# Patient Record
Sex: FEMALE | Race: BLACK OR AFRICAN AMERICAN | Employment: OTHER | URBAN - METROPOLITAN AREA
[De-identification: names, ages, dates, MRNs, and addresses within clinical notes are randomized per-mention and may not be internally consistent; named-entity substitution may affect disease eponyms.]

---

## 2017-02-20 ENCOUNTER — OFFICE VISIT (OUTPATIENT)
Dept: FAMILY MEDICINE CLINIC | Age: 77
End: 2017-02-20

## 2017-02-20 VITALS
DIASTOLIC BLOOD PRESSURE: 96 MMHG | BODY MASS INDEX: 47.09 KG/M2 | OXYGEN SATURATION: 98 % | SYSTOLIC BLOOD PRESSURE: 142 MMHG | WEIGHT: 293 LBS | RESPIRATION RATE: 16 BRPM | TEMPERATURE: 97.9 F | HEART RATE: 80 BPM | HEIGHT: 66 IN

## 2017-02-20 DIAGNOSIS — I10 UNCONTROLLED HYPERTENSION: Primary | ICD-10-CM

## 2017-02-20 DIAGNOSIS — R60.0 LEG EDEMA, RIGHT: ICD-10-CM

## 2017-02-20 DIAGNOSIS — E66.01 MORBID OBESITY WITH BMI OF 50.0-59.9, ADULT (HCC): ICD-10-CM

## 2017-02-20 PROCEDURE — 1090F PRES/ABSN URINE INCON ASSESS: CPT | Performed by: FAMILY MEDICINE

## 2017-02-20 PROCEDURE — G8399 PT W/DXA RESULTS DOCUMENT: HCPCS | Performed by: FAMILY MEDICINE

## 2017-02-20 PROCEDURE — 1123F ACP DISCUSS/DSCN MKR DOCD: CPT | Performed by: FAMILY MEDICINE

## 2017-02-20 PROCEDURE — G8427 DOCREV CUR MEDS BY ELIG CLIN: HCPCS | Performed by: FAMILY MEDICINE

## 2017-02-20 PROCEDURE — G8417 CALC BMI ABV UP PARAM F/U: HCPCS | Performed by: FAMILY MEDICINE

## 2017-02-20 PROCEDURE — G8484 FLU IMMUNIZE NO ADMIN: HCPCS | Performed by: FAMILY MEDICINE

## 2017-02-20 PROCEDURE — 99214 OFFICE O/P EST MOD 30 MIN: CPT | Performed by: FAMILY MEDICINE

## 2017-02-20 PROCEDURE — 4040F PNEUMOC VAC/ADMIN/RCVD: CPT | Performed by: FAMILY MEDICINE

## 2017-02-20 PROCEDURE — 1036F TOBACCO NON-USER: CPT | Performed by: FAMILY MEDICINE

## 2017-02-20 RX ORDER — METOPROLOL TARTRATE 50 MG/1
50 TABLET, FILM COATED ORAL 2 TIMES DAILY
Qty: 60 TABLET | Refills: 3 | Status: SHIPPED | OUTPATIENT
Start: 2017-02-20 | End: 2017-06-13 | Stop reason: SDUPTHER

## 2017-02-20 RX ORDER — ALBUTEROL SULFATE 0.63 MG/3ML
1 SOLUTION RESPIRATORY (INHALATION) EVERY 6 HOURS PRN
Qty: 270 ML | Refills: 0 | Status: SHIPPED | OUTPATIENT
Start: 2017-02-20 | End: 2017-11-20

## 2017-02-20 RX ORDER — FUROSEMIDE 20 MG/1
TABLET ORAL
Qty: 30 TABLET | Refills: 0 | Status: CANCELLED | OUTPATIENT
Start: 2017-02-20

## 2017-02-20 RX ORDER — LOSARTAN POTASSIUM 100 MG/1
100 TABLET ORAL DAILY
Qty: 90 TABLET | Refills: 1 | Status: CANCELLED | OUTPATIENT
Start: 2017-02-20

## 2017-02-20 RX ORDER — AMLODIPINE BESYLATE 10 MG/1
TABLET ORAL
Qty: 30 TABLET | Refills: 5 | Status: SHIPPED | OUTPATIENT
Start: 2017-02-20 | End: 2017-09-11 | Stop reason: SDUPTHER

## 2017-02-20 RX ORDER — ATENOLOL 25 MG/1
25 TABLET ORAL DAILY
Qty: 90 TABLET | Refills: 1 | Status: CANCELLED | OUTPATIENT
Start: 2017-02-20

## 2017-02-20 RX ORDER — LOSARTAN POTASSIUM AND HYDROCHLOROTHIAZIDE 25; 100 MG/1; MG/1
1 TABLET ORAL DAILY
Qty: 30 TABLET | Refills: 5 | Status: SHIPPED | OUTPATIENT
Start: 2017-02-20 | End: 2017-04-28 | Stop reason: SDUPTHER

## 2017-02-20 RX ORDER — HYDROCHLOROTHIAZIDE 25 MG/1
25 TABLET ORAL DAILY
Qty: 90 TABLET | Refills: 1 | Status: CANCELLED | OUTPATIENT
Start: 2017-02-20

## 2017-02-20 ASSESSMENT — ENCOUNTER SYMPTOMS
BACK PAIN: 0
CHEST TIGHTNESS: 0
ORTHOPNEA: 0
BLURRED VISION: 0
SHORTNESS OF BREATH: 0
COLOR CHANGE: 0

## 2017-02-22 ENCOUNTER — HOSPITAL ENCOUNTER (OUTPATIENT)
Dept: VASCULAR LAB | Age: 77
Discharge: HOME | End: 2017-02-22
Attending: FAMILY MEDICINE | Admitting: FAMILY MEDICINE

## 2017-02-22 DIAGNOSIS — R60.0 LOCALIZED EDEMA: ICD-10-CM

## 2017-04-28 DIAGNOSIS — I10 UNCONTROLLED HYPERTENSION: ICD-10-CM

## 2017-04-28 RX ORDER — LOSARTAN POTASSIUM AND HYDROCHLOROTHIAZIDE 25; 100 MG/1; MG/1
1 TABLET ORAL DAILY
Qty: 30 TABLET | Refills: 5 | Status: SHIPPED | OUTPATIENT
Start: 2017-04-28 | End: 2017-09-11 | Stop reason: SDUPTHER

## 2017-06-13 DIAGNOSIS — I10 UNCONTROLLED HYPERTENSION: ICD-10-CM

## 2017-06-13 RX ORDER — METOPROLOL TARTRATE 50 MG/1
50 TABLET, FILM COATED ORAL 2 TIMES DAILY
Qty: 180 TABLET | Refills: 3 | Status: SHIPPED | OUTPATIENT
Start: 2017-06-13 | End: 2017-09-11 | Stop reason: SDUPTHER

## 2017-09-11 ENCOUNTER — OFFICE VISIT (OUTPATIENT)
Dept: FAMILY MEDICINE CLINIC | Age: 77
End: 2017-09-11

## 2017-09-11 VITALS
HEIGHT: 66 IN | HEART RATE: 78 BPM | DIASTOLIC BLOOD PRESSURE: 88 MMHG | RESPIRATION RATE: 16 BRPM | SYSTOLIC BLOOD PRESSURE: 138 MMHG | TEMPERATURE: 98 F | WEIGHT: 293 LBS | OXYGEN SATURATION: 99 % | BODY MASS INDEX: 47.09 KG/M2

## 2017-09-11 DIAGNOSIS — Z12.39 SCREENING FOR BREAST CANCER: ICD-10-CM

## 2017-09-11 DIAGNOSIS — M25.561 CHRONIC PAIN OF RIGHT KNEE: ICD-10-CM

## 2017-09-11 DIAGNOSIS — R06.09 DOE (DYSPNEA ON EXERTION): ICD-10-CM

## 2017-09-11 DIAGNOSIS — E53.8 VITAMIN B12 DEFICIENCY: ICD-10-CM

## 2017-09-11 DIAGNOSIS — E55.9 VITAMIN D DEFICIENCY: ICD-10-CM

## 2017-09-11 DIAGNOSIS — Z23 NEED FOR PROPHYLACTIC VACCINATION AGAINST STREPTOCOCCUS PNEUMONIAE (PNEUMOCOCCUS): ICD-10-CM

## 2017-09-11 DIAGNOSIS — Z12.11 SCREEN FOR COLON CANCER: ICD-10-CM

## 2017-09-11 DIAGNOSIS — G89.29 CHRONIC PAIN OF RIGHT KNEE: ICD-10-CM

## 2017-09-11 DIAGNOSIS — Z23 NEED FOR PROPHYLACTIC VACCINATION AGAINST DIPHTHERIA-TETANUS-PERTUSSIS (DTP): ICD-10-CM

## 2017-09-11 DIAGNOSIS — I10 BENIGN ESSENTIAL HTN: Primary | ICD-10-CM

## 2017-09-11 PROCEDURE — 99214 OFFICE O/P EST MOD 30 MIN: CPT | Performed by: FAMILY MEDICINE

## 2017-09-11 RX ORDER — METOPROLOL TARTRATE 50 MG/1
50 TABLET, FILM COATED ORAL 2 TIMES DAILY
Qty: 180 TABLET | Refills: 3 | Status: SHIPPED | OUTPATIENT
Start: 2017-09-11 | End: 2019-10-01

## 2017-09-11 RX ORDER — FUROSEMIDE 20 MG/1
20 TABLET ORAL DAILY
Qty: 10 TABLET | Refills: 0 | Status: SHIPPED | OUTPATIENT
Start: 2017-09-11 | End: 2017-10-06

## 2017-09-11 RX ORDER — LOSARTAN POTASSIUM AND HYDROCHLOROTHIAZIDE 25; 100 MG/1; MG/1
1 TABLET ORAL DAILY
Qty: 30 TABLET | Refills: 5 | Status: SHIPPED | OUTPATIENT
Start: 2017-09-11 | End: 2019-09-30 | Stop reason: ALTCHOICE

## 2017-09-11 RX ORDER — HYDROCHLOROTHIAZIDE 25 MG/1
25 TABLET ORAL DAILY
Qty: 90 TABLET | Refills: 1 | Status: CANCELLED | OUTPATIENT
Start: 2017-09-11

## 2017-09-11 RX ORDER — AMLODIPINE BESYLATE 10 MG/1
TABLET ORAL
Qty: 30 TABLET | Refills: 5 | Status: SHIPPED | OUTPATIENT
Start: 2017-09-11 | End: 2017-11-20

## 2017-09-11 RX ORDER — LOSARTAN POTASSIUM 100 MG/1
100 TABLET ORAL DAILY
Qty: 90 TABLET | Refills: 1 | Status: CANCELLED | OUTPATIENT
Start: 2017-09-11

## 2017-09-12 PROCEDURE — 90732 PPSV23 VACC 2 YRS+ SUBQ/IM: CPT | Performed by: FAMILY MEDICINE

## 2017-09-12 PROCEDURE — G0009 ADMIN PNEUMOCOCCAL VACCINE: HCPCS | Performed by: FAMILY MEDICINE

## 2017-09-14 LAB
A/G RATIO: 1.2 (ref 1.1–2.2)
ALBUMIN SERPL-MCNC: 4.1 G/DL (ref 3.4–5)
ALP BLD-CCNC: 82 U/L (ref 40–129)
ALT SERPL-CCNC: 10 U/L (ref 10–40)
ANION GAP SERPL CALCULATED.3IONS-SCNC: 13 MMOL/L (ref 3–16)
AST SERPL-CCNC: 15 U/L (ref 15–37)
BASOPHILS ABSOLUTE: 0 K/UL (ref 0–0.2)
BASOPHILS RELATIVE PERCENT: 0.7 %
BILIRUB SERPL-MCNC: 0.5 MG/DL (ref 0–1)
BILIRUBIN URINE: NEGATIVE
BLOOD, URINE: NEGATIVE
BUN BLDV-MCNC: 18 MG/DL (ref 7–20)
CALCIUM SERPL-MCNC: 9.6 MG/DL (ref 8.3–10.6)
CHLORIDE BLD-SCNC: 96 MMOL/L (ref 99–110)
CHOLESTEROL, TOTAL: 214 MG/DL (ref 0–199)
CLARITY: CLEAR
CO2: 29 MMOL/L (ref 21–32)
COLOR: YELLOW
CREAT SERPL-MCNC: 0.6 MG/DL (ref 0.6–1.2)
CREATININE URINE: 9.6 MG/DL (ref 28–259)
EOSINOPHILS ABSOLUTE: 0.1 K/UL (ref 0–0.6)
EOSINOPHILS RELATIVE PERCENT: 1.7 %
GFR AFRICAN AMERICAN: >60
GFR NON-AFRICAN AMERICAN: >60
GLOBULIN: 3.4 G/DL
GLUCOSE BLD-MCNC: 99 MG/DL (ref 70–99)
GLUCOSE URINE: NEGATIVE MG/DL
HCT VFR BLD CALC: 36.4 % (ref 36–48)
HDLC SERPL-MCNC: 85 MG/DL (ref 40–60)
HEMOGLOBIN: 11.8 G/DL (ref 12–16)
KETONES, URINE: NEGATIVE MG/DL
LDL CHOLESTEROL CALCULATED: 118 MG/DL
LEUKOCYTE ESTERASE, URINE: NEGATIVE
LYMPHOCYTES ABSOLUTE: 2.3 K/UL (ref 1–5.1)
LYMPHOCYTES RELATIVE PERCENT: 43.2 %
MCH RBC QN AUTO: 24.7 PG (ref 26–34)
MCHC RBC AUTO-ENTMCNC: 32.3 G/DL (ref 31–36)
MCV RBC AUTO: 76.5 FL (ref 80–100)
MICROALBUMIN UR-MCNC: <1.2 MG/DL
MICROALBUMIN/CREAT UR-RTO: ABNORMAL MG/G (ref 0–30)
MICROSCOPIC EXAMINATION: NORMAL
MONOCYTES ABSOLUTE: 0.4 K/UL (ref 0–1.3)
MONOCYTES RELATIVE PERCENT: 7.7 %
NEUTROPHILS ABSOLUTE: 2.4 K/UL (ref 1.7–7.7)
NEUTROPHILS RELATIVE PERCENT: 46.7 %
NITRITE, URINE: NEGATIVE
PDW BLD-RTO: 16.9 % (ref 12.4–15.4)
PH UA: 7
PLATELET # BLD: 265 K/UL (ref 135–450)
PMV BLD AUTO: 8.8 FL (ref 5–10.5)
POTASSIUM SERPL-SCNC: 4.3 MMOL/L (ref 3.5–5.1)
PROTEIN UA: NEGATIVE MG/DL
RBC # BLD: 4.76 M/UL (ref 4–5.2)
SODIUM BLD-SCNC: 138 MMOL/L (ref 136–145)
SPECIFIC GRAVITY UA: 1.01
TOTAL PROTEIN: 7.5 G/DL (ref 6.4–8.2)
TRIGL SERPL-MCNC: 55 MG/DL (ref 0–150)
TSH SERPL DL<=0.05 MIU/L-ACNC: 2.05 UIU/ML (ref 0.27–4.2)
URINE TYPE: NORMAL
UROBILINOGEN, URINE: 0.2 E.U./DL
VITAMIN B-12: 307 PG/ML (ref 211–911)
VITAMIN D 25-HYDROXY: 26.4 NG/ML
VLDLC SERPL CALC-MCNC: 11 MG/DL
WBC # BLD: 5.2 K/UL (ref 4–11)

## 2017-09-15 DIAGNOSIS — D64.9 ANEMIA, UNSPECIFIED TYPE: Primary | ICD-10-CM

## 2017-09-15 LAB
IRON SATURATION: 16 % (ref 15–50)
IRON: 47 UG/DL (ref 37–145)
TOTAL IRON BINDING CAPACITY: 292 UG/DL (ref 260–445)

## 2017-09-20 DIAGNOSIS — D64.9 ANEMIA, UNSPECIFIED TYPE: Primary | ICD-10-CM

## 2017-09-21 ENCOUNTER — NURSE ONLY (OUTPATIENT)
Dept: FAMILY MEDICINE CLINIC | Age: 77
End: 2017-09-21

## 2017-09-21 DIAGNOSIS — E53.8 B12 DEFICIENCY: Primary | ICD-10-CM

## 2017-09-21 PROCEDURE — 96372 THER/PROPH/DIAG INJ SC/IM: CPT | Performed by: FAMILY MEDICINE

## 2017-09-21 RX ORDER — CYANOCOBALAMIN 1000 UG/ML
1000 INJECTION INTRAMUSCULAR; SUBCUTANEOUS ONCE
Status: COMPLETED | OUTPATIENT
Start: 2017-09-21 | End: 2017-09-21

## 2017-09-21 RX ADMIN — CYANOCOBALAMIN 1000 MCG: 1000 INJECTION INTRAMUSCULAR; SUBCUTANEOUS at 11:09

## 2017-09-25 ENCOUNTER — OFFICE VISIT (OUTPATIENT)
Dept: FAMILY MEDICINE CLINIC | Age: 77
End: 2017-09-25

## 2017-09-25 VITALS
WEIGHT: 293 LBS | RESPIRATION RATE: 16 BRPM | TEMPERATURE: 97.2 F | HEIGHT: 66 IN | HEART RATE: 87 BPM | DIASTOLIC BLOOD PRESSURE: 92 MMHG | SYSTOLIC BLOOD PRESSURE: 130 MMHG | OXYGEN SATURATION: 97 % | BODY MASS INDEX: 47.09 KG/M2

## 2017-09-25 DIAGNOSIS — E53.8 VITAMIN B 12 DEFICIENCY: ICD-10-CM

## 2017-09-25 DIAGNOSIS — I10 BENIGN ESSENTIAL HTN: Primary | ICD-10-CM

## 2017-09-25 DIAGNOSIS — R60.9 EDEMA, UNSPECIFIED TYPE: ICD-10-CM

## 2017-09-25 PROCEDURE — 99214 OFFICE O/P EST MOD 30 MIN: CPT | Performed by: FAMILY MEDICINE

## 2017-09-25 ASSESSMENT — ENCOUNTER SYMPTOMS
ORTHOPNEA: 0
COLOR CHANGE: 0
BLURRED VISION: 0
SHORTNESS OF BREATH: 1
CHEST TIGHTNESS: 0
BACK PAIN: 0

## 2017-09-26 ENCOUNTER — HOSPITAL ENCOUNTER (OUTPATIENT)
Dept: NON INVASIVE DIAGNOSTICS | Age: 77
Discharge: HOME | End: 2017-09-26
Attending: FAMILY MEDICINE | Admitting: FAMILY MEDICINE

## 2017-09-26 DIAGNOSIS — R06.09 DOE (DYSPNEA ON EXERTION): ICD-10-CM

## 2017-09-26 DIAGNOSIS — R06.09 OTHER FORMS OF DYSPNEA: ICD-10-CM

## 2017-09-26 LAB
LV EF: 61 %
LVEF MODALITY: NORMAL

## 2017-09-27 ENCOUNTER — HOSPITAL ENCOUNTER (OUTPATIENT)
Dept: MAMMOGRAPHY | Age: 77
Discharge: HOME | End: 2017-09-27
Attending: FAMILY MEDICINE | Admitting: FAMILY MEDICINE

## 2017-09-27 DIAGNOSIS — Z12.39 ENCOUNTER FOR OTHER SCREENING FOR MALIGNANT NEOPLASM OF BREAST: ICD-10-CM

## 2017-09-27 DIAGNOSIS — Z12.39 SCREENING FOR BREAST CANCER: ICD-10-CM

## 2017-10-06 ENCOUNTER — OFFICE VISIT (OUTPATIENT)
Dept: CARDIOLOGY CLINIC | Age: 77
End: 2017-10-06

## 2017-10-06 VITALS
HEART RATE: 83 BPM | OXYGEN SATURATION: 95 % | HEIGHT: 66 IN | BODY MASS INDEX: 47.09 KG/M2 | DIASTOLIC BLOOD PRESSURE: 74 MMHG | SYSTOLIC BLOOD PRESSURE: 134 MMHG | WEIGHT: 293 LBS

## 2017-10-06 DIAGNOSIS — R94.39 ABNORMAL NUCLEAR STRESS TEST: Primary | ICD-10-CM

## 2017-10-06 DIAGNOSIS — E78.00 PURE HYPERCHOLESTEROLEMIA: ICD-10-CM

## 2017-10-06 DIAGNOSIS — I10 BENIGN ESSENTIAL HTN: ICD-10-CM

## 2017-10-06 PROCEDURE — 99205 OFFICE O/P NEW HI 60 MIN: CPT | Performed by: INTERNAL MEDICINE

## 2017-10-06 PROCEDURE — 93000 ELECTROCARDIOGRAM COMPLETE: CPT | Performed by: INTERNAL MEDICINE

## 2017-10-06 RX ORDER — FERROUS SULFATE 325(65) MG
325 TABLET ORAL
COMMUNITY
End: 2019-12-23 | Stop reason: CLARIF

## 2017-10-06 NOTE — PROGRESS NOTES
2017    PATIENT: Ulisses Goldberg  : 1940    Primary Care Provider:   Rochelle Watts MD  I:[phone]  V:[phone]      Reason for evaluation:   Chief Complaint   Patient presents with   Leanna Barber Doctor    Shortness of Breath    Edema       History of present illness:   Ms. Ulisses Goldberg is a 76 y.o. female patient I am seeing for an abnormal stress test after recent concerns for increasing fatigue and \"not feeling well\". She is here today with her son and says up until a couple months ago she \"was always on the go- up at 5 am and going until 6 pm\". She throughout her progressive fatigue and malaise has been without fevers, chills, nausea, vomiting, chest pain or palpitations. The only localized symptom she endorses is \"heavy legs\" with pain closer to the knees when walking. She denies previous cardiac workup. Medical History:      Diagnosis Date    Edema     Hearing loss     Hyperlipidemia     Hypertension     Morbid obesity due to excess calories ([address] 2015    Pre-diabetes     PUD (peptic ulcer disease)     \"years ago\",        Surgical History:      Procedure Laterality Date    HERNIA REPAIR      umbilical     KNEE SURGERY Bilateral     TKR bilateral       Social History:  Social History     Social History    Marital status:      Spouse name: N/A    Number of children: N/A    Years of education: N/A     Occupational History    Not on file. Social History Main Topics    Smoking status: Never Smoker    Smokeless tobacco: Not on file    Alcohol use No    Drug use: No    Sexual activity: Not Currently     Other Topics Concern    Not on file     Social History Narrative        Family History:  No evidence for sudden cardiac death or premature CAD. Problem Relation Age of Onset    Other Mother      hernia    High Blood Pressure Father        Medications:  [x] Medications and dosages reviewed.   Prior Creatinine, Ur 09/14/2017 9.6*    Microalb Creat Ratio 09/14/2017 see below     Color, UA 09/14/2017 YELLOW     Clarity, UA 09/14/2017 Clear     Glucose, Ur 09/14/2017 Negative     Bilirubin Urine 09/14/2017 Negative     Ketones, Urine 09/14/2017 Negative     Specific Gravity, UA 09/14/2017 1.007     Blood, Urine 09/14/2017 Negative     pH, UA 09/14/2017 7.0     Protein, UA 09/14/2017 Negative     Urobilinogen, Urine 09/14/2017 0.2     Nitrite, Urine 09/14/2017 Negative     Leukocyte Esterase, Urine 09/14/2017 Negative     Microscopic Examination 09/14/2017 Not Indicated     Urine Type 09/14/2017 Not Specified     Vitamin B-12 09/14/2017 307     Vit D, 25-Hydroxy 09/14/2017 26.4*         Imaging:  I have reviewed the below testing personally:    EKG:  NSR, WNL     STRESS TEST:   9/26/17  1. Abnormal myocardial perfusion study. Exercised induced   perfusion abnormality at the apex and anterior lateral wall   compatible with moderate reversible ischemia. 2. Normal wall motion and ejection fraction. Impression/Recommendations    Ms. Lia Vásquez is a 76 y.o. female patient with:    1. Abnormal nuclear stress test    2. Benign essential HTN    3. Pure hypercholesterolemia        Abnormal Stress Test with Progressive fatigue     - Have reviewed the above results from recent testing per . Ebony Phillip has not had previous cardiac workup and now has a moderate sized defect anterolaterally extending to the apex. I discussed with her my concern given her sudden, unexplainable change in energy/function globally. 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 She has agreed to definitive coronary angiography, to be scheduled next week. At this time, will aim for R radial approach.  Precath labs are up to date and all of her questions were answered.    -Continue ASA    HTN, Controlled    -CCB/ARB/HCZT    Dyslipidemia   -Will discuss statin

## 2017-10-06 NOTE — MR AVS SNAPSHOT
your BMI, the greater your risk of heart disease, high blood pressure, type 2 diabetes, stroke, gallstones, arthritis, sleep apnea, and certain cancers. BMI is not perfect. It may overestimate body fat in athletes and people who are more muscular. Even a small weight loss (between 5 and 10 percent of your current weight) by decreasing your calorie intake and becoming more physically active will help lower your risk of developing or worsening diseases associated with obesity. Learn more at: [email or web address]          Instructions    Cardiac catheterization instructions:  No food or drink for at least 8 hours prior to procedure  Okay to take morning medications with small sips of water the morning of the procedure. Left Heart Catheterization: About This Test  What is it? Cardiac catheterization is a test to check the left side of your heart. Your doctor might look at the shape of your heart, the motion of your heart, or the blood pressure inside the chambers. Why is this test done? This test gives information about how your heart is working. It can:  · Check blood flow and blood pressure in the chambers of the heart. · Check the pumping action of the heart. · Find out if a heart defect is present and how severe it is. · Find out how well the heart valves work. What happens during the test?  · You will get medicine to help you relax. · A thin tube called a catheter is put into a blood vessel in the groin or the arm. The doctor moves the catheter through the blood vessel into your heart. · You will get a shot to numb the skin where the catheter goes in. You may feel pressure when the doctor moves the catheter through your blood vessel into your heart. · Dye may be injected into your heart. Your doctor can watch on special monitors as the dye moves in your heart. The dye helps your doctor see blood flow in your heart. questions, ask your nurse or doctor. STOP taking these medications           furosemide 20 MG tablet   Commonly known as:  LASIX   Stopped by:  Paresh Zuniga,        hydrochlorothiazide 25 MG tablet   Commonly known as:  HYDRODIURIL   Stopped by:  Paresh Zuniga, DO       losartan 100 MG tablet   Commonly known as:  COZAAR   Stopped by:  Paresh Zuniga, DO               Your Current Medications Are              ferrous sulfate 325 (65 Fe) MG tablet Take 325 mg by mouth daily (with breakfast)    amLODIPine (NORVASC) 10 MG tablet TAKE ONE TABLET BY MOUTH DAILY    losartan-hydrochlorothiazide (HYZAAR) 100-25 MG per tablet Take 1 tablet by mouth daily    metoprolol tartrate (LOPRESSOR) 50 MG tablet Take 1 tablet by mouth 2 times daily    albuterol (ACCUNEB) 0.63 MG/3ML nebulizer solution Take 3 mLs by nebulization every 6 hours as needed for Wheezing    vitamin D (ERGOCALCIFEROL) 70125 UNITS CAPS capsule Take 1 capsule by mouth once a week    Elastic Bandages & Supports (MEDICAL COMPRESSION STOCKINGS) MISC Use as instructed    aspirin 81 MG tablet Take 81 mg by mouth daily COATED asa      Allergies              Aspirin     Other reaction(s):  Other (See Comments)  Caused ulcers    Dye [Iodides] Itching, Swelling, Rash      We Ordered/Performed the following           EKG 12 Lead          Result Summary for EKG 12 Lead      Result Information     Status          Final result (Resulted: 10/6/2017)           10/6/2017  9:47 AM      Scans on Order [id number]            ECG on 10/6/2017  9:47 AM : ECG Report                     Additional Information        Basic Information     Date Of Birth Sex Race Ethnicity Preferred Language    1940 Female Black Non-/Non  English      Problem List as of 10/6/2017  Date Reviewed: 9/25/2017                Abnormal nuclear stress test    Hyperlipidemia LDL goal <100    Benign essential HTN    Morbid obesity due to excess calories ([address] Pure hypercholesterolemia    Bilateral knee pain    Pre-diabetes      Immunizations as of 10/6/2017     Name Date    Pneumococcal 13-valent Conjugate (Mvnlpea22) 12/29/2015    Pneumococcal Polysaccharide ([id number]) 9/12/2017      Preventive Care        Date Due    Tetanus Combination Vaccine (1 - Tdap) 6/27/1957    Zoster Vaccine 6/27/1998    Yearly Flu Vaccine (1) 9/1/2017    Cholesterol Screening 9/14/2022            MyChart Signup           [facility] allows you to send messages to your doctor, view your test results, renew your prescriptions, schedule appointments, view visit notes, and more. How Do I Sign Up? 1. In your Internet browser, go to [email or web address]  2. Click on the Sign Up Now link in the Sign In box. You will see the New Member Sign Up page. 3. Enter your [employer] Access Code exactly as it appears below. You will not need to use this code after youve completed the sign-up process. If you do not sign up before the expiration date, you must request a new code. [employer] Access Code: [email or web address]  Expires: 11/10/2017  2:36 PM    4. Enter your Social Security Number ([id number]) and Date of Birth (mm/dd/yyyy) as indicated and click Submit. You will be taken to the next sign-up page. 5. Create a [facility] ID. This will be your [facility] login ID and cannot be changed, so think of one that is secure and easy to remember. 6. Create a [employer] password. You can change your password at any time. 7. Enter your Password Reset Question and Answer. This can be used at a later time if you forget your password. 8. Enter your e-mail address. You will receive e-mail notification when new information is available in [address] 9. Click Sign Up. You can now view your medical record. Additional Information  If you have questions, please contact the physician practice where you receive care. Remember, [employer] is NOT to be used for urgent needs. For medical emergencies, dial 911. For questions regarding your [employer] account call [phone]. If you have a clinical question, please call your doctor's office.

## 2017-10-06 NOTE — PATIENT INSTRUCTIONS
Cardiac catheterization instructions:  No food or drink for at least 8 hours prior to procedure  Okay to take morning medications with small sips of water the morning of the procedure. Left Heart Catheterization: About This Test  What is it? Cardiac catheterization is a test to check the left side of your heart. Your doctor might look at the shape of your heart, the motion of your heart, or the blood pressure inside the chambers. Why is this test done? This test gives information about how your heart is working. It can:  · Check blood flow and blood pressure in the chambers of the heart. · Check the pumping action of the heart. · Find out if a heart defect is present and how severe it is. · Find out how well the heart valves work. What happens during the test?  · You will get medicine to help you relax. · A thin tube called a catheter is put into a blood vessel in the groin or the arm. The doctor moves the catheter through the blood vessel into your heart. · You will get a shot to numb the skin where the catheter goes in. You may feel pressure when the doctor moves the catheter through your blood vessel into your heart. · Dye may be injected into your heart. Your doctor can watch on special monitors as the dye moves in your heart. The dye helps your doctor see blood flow in your heart. · You may feel hot or flushed for several seconds when the dye is put in.  · If a heart defect is found, cardiac catheterization sometimes is used to correct it during the test.  How long does it take? · The test will take about 30 minutes. If a problem is found and the doctor treats it, it can take a few hours longer. What happens after the test?  · You will stay in a room for at least a few hours to make sure the catheter site starts to heal. You may have a bandage or a compression device on your groin or arm to prevent bleeding. · If the catheter was placed in your groin, you may lie in bed for a few hours. If the catheter was put in your arm, you will need to keep your arm still for at least 1 hour. · You may or may not need to stay in the hospital overnight. You will get more instructions for what to do at home. · Drink plenty of fluids for several hours after the test.  Follow-up care is a key part of your treatment and safety. Be sure to make and go to all appointments, and call your doctor if you are having problems. It's also a good idea to know your test results and keep a list of the medicines you take. Where can you learn more? Go to [email or web address] and sign in to your Autopilot account. Enter W306 in the [employer] box to learn more about \"Left Heart Catheterization: About This Test.\"     If you do not have an account, please click on the \"Sign Up Now\" link. Current as of: January 12, 2017  Content Version: 11.3  © 9282-2227 [employer]. Care instructions adapted under license by [facility] If you have questions about a medical condition or this instruction, always ask your healthcare professional. [place] [address] any warranty or liability for your use of this information.

## 2017-10-10 ENCOUNTER — HOSPITAL ENCOUNTER (OUTPATIENT)
Dept: NON INVASIVE DIAGNOSTICS | Age: 77
Discharge: HOME | End: 2017-10-10
Attending: INTERNAL MEDICINE | Admitting: INTERNAL MEDICINE

## 2017-10-10 DIAGNOSIS — R94.39 ABNORMAL RESULT OF OTHER CARDIOVASCULAR FUNCTION STUDY (CODE): ICD-10-CM

## 2017-10-10 LAB
LV EF: 55 %
LVEF MODALITY: NORMAL

## 2017-10-11 ENCOUNTER — TELEPHONE (OUTPATIENT)
Dept: CARDIOLOGY CLINIC | Age: 77
End: 2017-10-11

## 2017-10-11 NOTE — TELEPHONE ENCOUNTER
Per Dr Chiang Back patient to take prednisone 60mg 1 po q6hrs x4 doses prior to cath tomorrow d/t IVP dye allergy. I call [facility] pharmacy and left detailed instructions for medication on VM. Also, spoke w/ pt and provided her with detailed instructions for prednisone 60mg q6hrs x4 prior to cath. She verbalized understanding.  She will  rx when [facility]

## 2017-10-12 DIAGNOSIS — I10 ESSENTIAL HYPERTENSION: Primary | ICD-10-CM

## 2017-10-12 DIAGNOSIS — Q24.5 CORONARY ARTERY ANOMALY: ICD-10-CM

## 2017-10-12 RX ORDER — PREDNISONE 20 MG/1
60 TABLET ORAL EVERY 6 HOURS
Qty: 12 TABLET | Refills: 0 | Status: SHIPPED | OUTPATIENT
Start: 2017-10-12 | End: 2017-10-13

## 2017-10-20 ENCOUNTER — TELEPHONE (OUTPATIENT)
Dept: CARDIOLOGY CLINIC | Age: 77
End: 2017-10-20

## 2017-10-20 NOTE — TELEPHONE ENCOUNTER
Spoke to Katina from [facility] scheduling. Patient had poc bun/cr done 10/12/17. Prednisone was already prescribed for patient. Called patient and she took prednisone already (the night she filled it) She has not scheduled test yet. Per [facility] okay to send in prednisone again. Reinstructed patient on taking prednisone and not starting until 24 hrs prior to CT scan. Patient verbalized understanding. Gave her number to call back to schedule CT scan. Prednisone called in again to [facility]

## 2017-10-24 ENCOUNTER — HOSPITAL ENCOUNTER (OUTPATIENT)
Dept: VASCULAR LAB | Age: 77
Discharge: HOME | End: 2017-10-24
Attending: INTERNAL MEDICINE | Admitting: INTERNAL MEDICINE

## 2017-10-24 DIAGNOSIS — I10 ESSENTIAL (PRIMARY) HYPERTENSION: ICD-10-CM

## 2017-10-24 DIAGNOSIS — I10 ESSENTIAL HYPERTENSION: ICD-10-CM

## 2017-10-27 ENCOUNTER — TELEPHONE (OUTPATIENT)
Dept: CARDIOLOGY CLINIC | Age: 77
End: 2017-10-27

## 2017-11-01 ENCOUNTER — TELEPHONE (OUTPATIENT)
Dept: CARDIOLOGY CLINIC | Age: 77
End: 2017-11-01

## 2017-11-01 NOTE — TELEPHONE ENCOUNTER
I spoke with pt and relayed Renal duplex study results per [facility]. Pt verbalized understanding.

## 2017-11-02 ENCOUNTER — TELEPHONE (OUTPATIENT)
Dept: CARDIOLOGY CLINIC | Age: 77
End: 2017-11-02

## 2017-11-04 RX ORDER — PREDNISONE 20 MG/1
60 TABLET ORAL EVERY 6 HOURS
Qty: 12 TABLET | Refills: 0 | Status: SHIPPED | OUTPATIENT
Start: 2017-11-04 | End: 2017-11-05

## 2017-11-07 RX ORDER — DIPHENHYDRAMINE HCL 25 MG
25 CAPSULE ORAL EVERY 6 HOURS PRN
Qty: 10 CAPSULE | Refills: 0 | Status: SHIPPED | OUTPATIENT
Start: 2017-11-07 | End: 2017-11-17

## 2017-11-07 RX ORDER — PREDNISONE 20 MG/1
60 TABLET ORAL EVERY 6 HOURS
Qty: 12 TABLET | Refills: 0 | Status: SHIPPED | OUTPATIENT
Start: 2017-11-07 | End: 2017-11-08

## 2017-11-08 ENCOUNTER — TELEPHONE (OUTPATIENT)
Dept: CARDIOLOGY CLINIC | Age: 77
End: 2017-11-08

## 2017-11-08 DIAGNOSIS — R94.39 ABNORMAL NUCLEAR STRESS TEST: Primary | ICD-10-CM

## 2017-11-08 NOTE — TELEPHONE ENCOUNTER
Spoke to the pt. She will  the medications that were sent in for this pt, and take as directed on the bottle.

## 2017-11-10 ENCOUNTER — HOSPITAL ENCOUNTER (OUTPATIENT)
Dept: CT IMAGING | Age: 77
Discharge: HOME | End: 2017-11-10
Attending: INTERNAL MEDICINE | Admitting: INTERNAL MEDICINE

## 2017-11-10 VITALS
SYSTOLIC BLOOD PRESSURE: 154 MMHG | WEIGHT: 293 LBS | HEART RATE: 70 BPM | BODY MASS INDEX: 47.09 KG/M2 | DIASTOLIC BLOOD PRESSURE: 88 MMHG | HEIGHT: 66 IN | RESPIRATION RATE: 20 BRPM | TEMPERATURE: 98.4 F

## 2017-11-10 DIAGNOSIS — I10 ESSENTIAL (PRIMARY) HYPERTENSION: ICD-10-CM

## 2017-11-10 DIAGNOSIS — Q24.5 CORONARY ARTERY ANOMALY: ICD-10-CM

## 2017-11-10 LAB
ANION GAP SERPL CALCULATED.3IONS-SCNC: 11 MMOL/L (ref 3–16)
BUN BLDV-MCNC: 24 MG/DL (ref 7–20)
CALCIUM SERPL-MCNC: 9.6 MG/DL (ref 8.3–10.6)
CHLORIDE BLD-SCNC: 98 MMOL/L (ref 99–110)
CO2: 30 MMOL/L (ref 21–32)
CREAT SERPL-MCNC: 0.6 MG/DL (ref 0.6–1.2)
GFR AFRICAN AMERICAN: >60
GFR NON-AFRICAN AMERICAN: >60
GLUCOSE BLD-MCNC: 142 MG/DL (ref 70–99)
POTASSIUM SERPL-SCNC: 4.1 MMOL/L (ref 3.5–5.1)
SODIUM BLD-SCNC: 139 MMOL/L (ref 136–145)

## 2017-11-10 RX ORDER — PREDNISONE 20 MG/1
20 TABLET ORAL DAILY
COMMUNITY
End: 2017-11-20 | Stop reason: ALTCHOICE

## 2017-11-10 RX ORDER — METOPROLOL TARTRATE 5 MG/5ML
5 INJECTION INTRAVENOUS ONCE
Status: COMPLETED | OUTPATIENT
Start: 2017-11-10 | End: 2017-11-10

## 2017-11-10 RX ORDER — METOPROLOL TARTRATE 5 MG/5ML
5 INJECTION INTRAVENOUS EVERY 5 MIN PRN
Status: DISCONTINUED | OUTPATIENT
Start: 2017-11-10 | End: 2017-11-11 | Stop reason: HOSPADM

## 2017-11-10 RX ORDER — LORAZEPAM 2 MG/ML
1 INJECTION INTRAMUSCULAR ONCE
Status: COMPLETED | OUTPATIENT
Start: 2017-11-10 | End: 2017-11-10

## 2017-11-10 RX ADMIN — LORAZEPAM 1 MG: 2 INJECTION INTRAMUSCULAR at 09:44

## 2017-11-10 RX ADMIN — METOPROLOL TARTRATE 5 MG: 5 INJECTION INTRAVENOUS at 09:14

## 2017-11-10 RX ADMIN — METOPROLOL TARTRATE 5 MG: 5 INJECTION INTRAVENOUS at 09:52

## 2017-11-20 ENCOUNTER — OFFICE VISIT (OUTPATIENT)
Dept: CARDIOLOGY CLINIC | Age: 77
End: 2017-11-20

## 2017-11-20 VITALS
WEIGHT: 293 LBS | HEIGHT: 66 IN | BODY MASS INDEX: 47.09 KG/M2 | HEART RATE: 81 BPM | SYSTOLIC BLOOD PRESSURE: 124 MMHG | DIASTOLIC BLOOD PRESSURE: 66 MMHG | OXYGEN SATURATION: 98 %

## 2017-11-20 DIAGNOSIS — M25.551 RIGHT HIP PAIN: ICD-10-CM

## 2017-11-20 DIAGNOSIS — R94.39 ABNORMAL RESULT OF OTHER CARDIOVASCULAR FUNCTION STUDY (CODE): Primary | ICD-10-CM

## 2017-11-20 DIAGNOSIS — R94.39 ABNORMAL NUCLEAR STRESS TEST: ICD-10-CM

## 2017-11-20 DIAGNOSIS — E78.5 HYPERLIPIDEMIA LDL GOAL <100: ICD-10-CM

## 2017-11-20 DIAGNOSIS — I10 BENIGN ESSENTIAL HTN: ICD-10-CM

## 2017-11-20 PROCEDURE — 99213 OFFICE O/P EST LOW 20 MIN: CPT | Performed by: INTERNAL MEDICINE

## 2017-11-20 RX ORDER — AMLODIPINE BESYLATE 10 MG/1
10 TABLET ORAL DAILY
COMMUNITY
End: 2018-09-05 | Stop reason: ALTCHOICE

## 2017-11-20 NOTE — PROGRESS NOTES
(65 Fe) MG tablet Take 325 mg by mouth daily (with breakfast)       Historical Provider, MD   amLODIPine (NORVASC) 10 MG tablet TAKE ONE TABLET BY MOUTH DAILY 9/11/17     Teddy Gardiner MD   losartan-hydrochlorothiazide [facility]) 100-25 MG per tablet Take 1 tablet by mouth daily 9/11/17     Teddy Gardiner MD   metoprolol tartrate (LOPRESSOR) 50 MG tablet Take 1 tablet by mouth 2 times daily 9/11/17     Teddy Gardiner MD   albuterol (ACCUNEB) 0.63 MG/3ML nebulizer solution Take 3 mLs by nebulization every 6 hours as needed for Wheezing 2/20/17     Teddy Gardiner MD   vitamin D (ERGOCALCIFEROL) 27683 UNITS CAPS capsule Take 1 capsule by mouth once a week 8/26/16     Teddy Gardiner MD   Elastic Bandages & Supports ([address]) MISC Use as instructed 8/26/16     Teddy Gardiner MD   aspirin 81 MG tablet Take 81 mg by mouth daily COATED asa       Historical Provider, MD            Allergies:  Aspirin and Dye [iodides]      Review of Systems:               [x]Full ROS obtained and negative except as mentioned in HPI     Physical Examination:    There were no vitals taken for this visit.       Wt Readings from Last 3 Encounters:   10/12/17 (!) 326 lb (147.9 kg)   10/06/17 (!) 321 lb 9.6 oz (145.9 kg)   09/25/17 (!) 332 lb 3.2 oz (150.7 kg)         · GENERAL: Well developed, well nourished, no acute distress  · NEUROLOGICAL: Alert and oriented x3  · PSYCH: Normal mood and affect   · SKIN: Warm and dry  · HEENT: Normocephalic, atraumatic, Sclera non-icteric, mucous membranes moist  · NECK: supple, JVP normal  · CAROTID: Normal upstroke, no bruits  · CARDIAC: Normal PMI, regular rate and rhythm, normal S1S2, no murmur, rub, or gallop  · RESPIRATORY: Normal respiratory effort, clear to auscultation bilaterally  · EXTREMITIES: +1edema b/l, +2 pulses bilaterally   · MUSCULOSKELETAL: No joint swelling or tenderness, no chest wall tenderness  · GASTROINTESTINAL: normal bowel sounds, soft, non-tender, no 09/14/2017 5.2     RBC 09/14/2017 4.76     Hemoglobin 09/14/2017 11.8*    Hematocrit 09/14/2017 36.4     MCV 09/14/2017 76.5*    MCH 09/14/2017 24.7*    MCHC 09/14/2017 32.3     RDW 09/14/2017 16.9*    Platelets 71/73/1461 265     MPV 09/14/2017 8.8     Neutrophils % 09/14/2017 46.7     Lymphocytes % 09/14/2017 43.2     Monocytes % 09/14/2017 7.7     Eosinophils % 09/14/2017 1.7     Basophils % 09/14/2017 0.7     Neutrophils # 09/14/2017 2.4     Lymphocytes # 09/14/2017 2.3     Monocytes # 09/14/2017 0.4     Eosinophils # 09/14/2017 0.1     Basophils # 09/14/2017 0.0     TSH 09/14/2017 2.05     MICROALBUMIN, RANDOM URI* 09/14/2017 <1.20     Creatinine, Ur 09/14/2017 9.6*    Microalb Creat Ratio 09/14/2017 see below     Color, UA 09/14/2017 YELLOW     Clarity, UA 09/14/2017 Clear     Glucose, Ur 09/14/2017 Negative     Bilirubin Urine 09/14/2017 Negative     Ketones, Urine 09/14/2017 Negative     Specific Gravity, UA 09/14/2017 1.007     Blood, Urine 09/14/2017 Negative     pH, UA 09/14/2017 7.0     Protein, UA 09/14/2017 Negative     Urobilinogen, Urine 09/14/2017 0.2     Nitrite, Urine 09/14/2017 Negative     Leukocyte Esterase, Urine 09/14/2017 Negative     Microscopic Examination 09/14/2017 Not Indicated     Urine Type 09/14/2017 Not Specified     Vitamin B-12 09/14/2017 307     Vit D, 25-Hydroxy 09/14/2017 26.4*            Imaging:  I have reviewed the below testing personally:     EKG:  NSR, WNL      STRESS TEST:   9/26/17  1. Abnormal myocardial perfusion study. Exercised induced   perfusion abnormality at the apex and anterior lateral wall   compatible with moderate reversible ischemia.    2. Normal wall motion and ejection fraction.         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    -Right anechoic cyst in the lower pole measuring 2.7 x 2.7 x 2.3 cm.    -Proximal and distal aorta were not visualized due to overlying bowel.       11/10/17 Coronary CTA    CTA OF THE CORONARY ARTERIES 11/10/2017 10:45 am       TECHNIQUE:   Retrospective cardiac gating following IV bolus of contrast. Periprocedural   oral metoprolol, Ativan and sublingual nitroglycerin.  Multiplanar   reconstructions generated on the [facility].       Dose modulation, iterative reconstruction, and/or weight based adjustment of   the mA/kV was utilized to reduce the radiation dose to as low as reasonably   achievable.       COMPARISON:   Cardiac SPECT, 09/26/2017.  Report of cardiac catheterization, 10/12/2017.       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       Left Anterior Descending- Widely patent.       1st Diagonal- Patent.       2nd Diagonal- Patent.       Left Circumflex- Widely patent.       Left Marginal- Widely patent.       Right Coronary- Normal origin from the right aortic sinus.  Limitations   secondary to Z axis artifact.  There is a 1 cm missing segment in the lower   3rd of the RCA.       Posterior Descending- Patent.       Posterolateral Branch- Patent       Mediastinum: There is a small sliding hiatal hernia.  No mediastinal or hilar   adenopathy is seen.       Lungs/Pleura: There is mild atelectasis in the left upper lobe.        Upper Abdomen: Limited images of the upper abdomen are unremarkable.       Soft Tissues/Bones: No acute bone or soft tissue

## 2017-11-20 NOTE — PATIENT INSTRUCTIONS
Follow up with Dr Sandie Garcia about right hip pain, testing, and referral to specialist   Script given for thigh high compression hose 20-30 mmHg  Follow up as needed if change in symptoms

## 2017-11-20 NOTE — LETTER
[address] [address] [address] [id number]  Phone: [phone]  Fax: [address], DO        2017     Jairo Ma, [facility] [address]    Patient: Vianca Reid  MR Number: [id number]  YOB: 1940  Date of Visit: 2017    Dear Dr. Jairo Ma:    .      PATIENT: Vianca Reid  : 1940     Primary Care Provider:   Jairo Ma MD  B:[phone]  R:[phone]        Reason for evaluation:   Follow up         History of present illness:              Ms. Vianca Reid is  doing well overall- says that she feels it's down to her right hip in regards to walking limitations. She has had double knee replacements and is describing a shooting pain from outer right hip to knee. She plans to schedule follow up with Dr. Stalin Gorman. Otherwise, no chest pain or shortness of breath.         Medical History:  Past Medical History             Diagnosis Date    Edema      Hearing loss      Hyperlipidemia      Hypertension      Morbid obesity due to excess calories ([address] 2015    Pre-diabetes      PUD (peptic ulcer disease)       \"years ago\",             Surgical History:  Past Surgical History             Procedure Laterality Date    HERNIA REPAIR         umbilical     KNEE SURGERY Bilateral       TKR bilateral            Social History:  Social History   Social History            Social History    Marital status:         Spouse name: N/A    Number of children: N/A    Years of education: N/A          Occupational History    Not on file.           Social History Main Topics    Smoking status: Never Smoker    Smokeless tobacco: Not on file    Alcohol use No    Drug use: No    Sexual activity: Not Currently           Other Topics Concern    Not on file          Social History Narrative    No narrative on file         Alb 09/14/2017 4.1     Albumin/Globulin Ratio 09/14/2017 1.2     Total Bilirubin 09/14/2017 0.5     Alkaline Phosphatase 09/14/2017 82     ALT 09/14/2017 10     AST 09/14/2017 15     Globulin 09/14/2017 3.4     Cholesterol, Total 09/14/2017 214*    Triglycerides 09/14/2017 55     HDL 09/14/2017 85*    LDL Calculated 09/14/2017 118*    VLDL CHOLESTEROL CALCULA* 09/14/2017 11     WBC 09/14/2017 5.2     RBC 09/14/2017 4.76     Hemoglobin 09/14/2017 11.8*    Hematocrit 09/14/2017 36.4     MCV 09/14/2017 76.5*    MCH 09/14/2017 24.7*    MCHC 09/14/2017 32.3     RDW 09/14/2017 16.9*    Platelets 94/13/9263 265     MPV 09/14/2017 8.8     Neutrophils % 09/14/2017 46.7     Lymphocytes % 09/14/2017 43.2     Monocytes % 09/14/2017 7.7     Eosinophils % 09/14/2017 1.7     Basophils % 09/14/2017 0.7     Neutrophils # 09/14/2017 2.4     Lymphocytes # 09/14/2017 2.3     Monocytes # 09/14/2017 0.4     Eosinophils # 09/14/2017 0.1     Basophils # 09/14/2017 0.0     TSH 09/14/2017 2.05     MICROALBUMIN, RANDOM URI* 09/14/2017 <1.20     Creatinine, Ur 09/14/2017 9.6*    Microalb Creat Ratio 09/14/2017 see below     Color, UA 09/14/2017 YELLOW     Clarity, UA 09/14/2017 Clear     Glucose, Ur 09/14/2017 Negative     Bilirubin Urine 09/14/2017 Negative     Ketones, Urine 09/14/2017 Negative     Specific Gravity, UA 09/14/2017 1.007     Blood, Urine 09/14/2017 Negative     pH, UA 09/14/2017 7.0     Protein, UA 09/14/2017 Negative     Urobilinogen, Urine 09/14/2017 0.2     Nitrite, Urine 09/14/2017 Negative     Leukocyte Esterase, Urine 09/14/2017 Negative     Microscopic Examination 09/14/2017 Not Indicated     Urine Type 09/14/2017 Not Specified     Vitamin B-12 09/14/2017 307     Vit D, 25-Hydroxy 09/14/2017 26.4*            Imaging:  I have reviewed the below testing personally:     EKG:  STANFORD BARAKAT      STRESS TEST:   9/26/17 1. Abnormal myocardial perfusion study. Exercised induced   perfusion abnormality at the apex and anterior lateral wall   compatible with moderate reversible ischemia.    2. Normal wall motion and ejection fraction.         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    -Right anechoic cyst in the lower pole measuring 2.7 x 2.7 x 2.3 cm.    -Proximal and distal aorta were not visualized due to overlying bowel.       11/10/17 Coronary CTA    CTA OF THE CORONARY ARTERIES 11/10/2017 10:45 am       TECHNIQUE:   Retrospective cardiac gating following IV bolus of contrast. Periprocedural   oral metoprolol, Ativan and sublingual nitroglycerin.  Multiplanar   reconstructions generated on the [employer].       Dose modulation, iterative reconstruction, and/or weight based adjustment of   the mA/kV was utilized to reduce the radiation dose to as low as reasonably   achievable.       COMPARISON:   Cardiac SPECT, 09/26/2017.  Report of cardiac catheterization, 10/12/2017.       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       Left Anterior Descending- Widely patent.       1st Diagonal- Patent.       2nd Diagonal- Patent.       Left Circumflex- Widely patent.       Left Marginal- Widely patent.       Right Coronary- Normal origin from the right aortic sinus.  Limitations secondary to Z axis artifact. Farida Morris is a 1 cm missing segment in the lower   3rd of the RCA.       Posterior Descending- Patent.       Posterolateral Branch- Patent       Mediastinum: There is a small sliding hiatal hernia.  No mediastinal or hilar   adenopathy is seen.       Lungs/Pleura: There is mild atelectasis in the left upper lobe.     Upper Abdomen: Limited images of the upper abdomen are unremarkable.       Soft Tissues/Bones: No acute bone or soft tissue abnormality.          Impression/Recommendations     Ms. Meli Foss is a 76 y.o. female patient with:        Abnormal Stress Test and subsequent normal diagnostic coronaries                           -Discussed with Dr. Arun Miller my concern with not selectively engaging the dominant RCA on cardiac cath. Given the anomaly, I recommended a Cardiac CTA for coronary course. We discussed all of the testing results as above, viewed together alongside her daily function and no further follow up testing is needed at this time. Zaria Calle knows to call if any further questions or new concerns.                           HTN, Controlled                         -CCB/ARB/HCTZ                          -No BRANDEE as above      Dyslipidemia- not currently on statin   Obesity  PreDM  LE Edema     -Venous duplex was negative in 2014. She had been planning to ask Dr. Arun Miller about the Norvasc as well as fill her Rx for 20-30 mmHg thigh high compression stockings.           Thank you for allowing me to participate in the care of your patient. Please do not hesitate to call.      Gricelda Pinedo D.O., [facility]  Interventional Cardiology      o: [phone]  [address] [place] [address]., [address], [address]      If you have questions, please do not hesitate to call me. I look forward to following Zaria Calle along with you.     Sincerely,        Leonel Erickson, DO

## 2017-11-22 NOTE — COMMUNICATION BODY
PATIENT: Mayur Hitchcock  : 1940     Primary Care Provider:   Grady Conklin MD  L:[phone]  V:[phone]        Reason for evaluation:   Follow up         History of present illness:              Ms. Mayur Hitchcock is  doing well overall- says that she feels it's down to her right hip in regards to walking limitations. She has had double knee replacements and is describing a shooting pain from outer right hip to knee. She plans to schedule follow up with Dr. Shaniqua Sawyer. Otherwise, no chest pain or shortness of breath.         Medical History:  Past Medical History             Diagnosis Date    Edema      Hearing loss      Hyperlipidemia      Hypertension      Morbid obesity due to excess calories ([facility] [address] 2015    Pre-diabetes      PUD (peptic ulcer disease)       \"years ago\",             Surgical History:  Past Surgical History             Procedure Laterality Date    HERNIA REPAIR         umbilical     KNEE SURGERY Bilateral       TKR bilateral            Social History:  Social History   Social History            Social History    Marital status:        Spouse name: N/A    Number of children: N/A    Years of education: N/A          Occupational History    Not on file.           Social History Main Topics    Smoking status: Never Smoker    Smokeless tobacco: Not on file    Alcohol use No    Drug use: No    Sexual activity: Not Currently           Other Topics Concern    Not on file          Social History Narrative    No narrative on file            Family History:  No evidence for sudden cardiac death or premature CAD. Family History              Problem Relation Age of Onset    Other Mother         hernia    High Blood Pressure Father              Medications:  [x] Medications and dosages reviewed. Home Medications           Prior to Admission medications    Medication Sig Start Date End Date Taking?  Authorizing Provider   ferrous sulfate 325 (65 Fe) MG tablet Take 325 mg by mouth daily (with breakfast)       Historical Provider, MD   amLODIPine (NORVASC) 10 MG tablet TAKE ONE TABLET BY MOUTH DAILY 9/11/17     Gwen Wallace MD   losartan-hydrochlorothiazide [facility]) 100-25 MG per tablet Take 1 tablet by mouth daily 9/11/17     Gwen Wallace MD   metoprolol tartrate (LOPRESSOR) 50 MG tablet Take 1 tablet by mouth 2 times daily 9/11/17     Gwen Wallace MD   albuterol (ACCUNEB) 0.63 MG/3ML nebulizer solution Take 3 mLs by nebulization every 6 hours as needed for Wheezing 2/20/17     Gwen Wallace MD   vitamin D (ERGOCALCIFEROL) 88015 UNITS CAPS capsule Take 1 capsule by mouth once a week 8/26/16     Gwen Wallace MD   Elastic Bandages & Supports ([address]) MISC Use as instructed 8/26/16     Gwen Wallace MD   aspirin 81 MG tablet Take 81 mg by mouth daily COATED asa       Historical Provider, MD            Allergies:  Aspirin and Dye [iodides]      Review of Systems:               [x]Full ROS obtained and negative except as mentioned in HPI     Physical Examination:    There were no vitals taken for this visit.       Wt Readings from Last 3 Encounters:   10/12/17 (!) 326 lb (147.9 kg)   10/06/17 (!) 321 lb 9.6 oz (145.9 kg)   09/25/17 (!) 332 lb 3.2 oz (150.7 kg)         · GENERAL: Well developed, well nourished, no acute distress  · NEUROLOGICAL: Alert and oriented x3  · PSYCH: Normal mood and affect   · SKIN: Warm and dry  · HEENT: Normocephalic, atraumatic, Sclera non-icteric, mucous membranes moist  · NECK: supple, JVP normal  · CAROTID: Normal upstroke, no bruits  · CARDIAC: Normal PMI, regular rate and rhythm, normal S1S2, no murmur, rub, or gallop  · RESPIRATORY: Normal respiratory effort, clear to auscultation bilaterally  · EXTREMITIES: +1edema b/l, +2 pulses bilaterally   · MUSCULOSKELETAL: No joint swelling or tenderness, no chest wall tenderness  · GASTROINTESTINAL: normal bowel sounds, soft, non-tender, no aortogram      LVEDP: 24 mmHg     10/24/17 Renal Arterial Duplex  Summary        -No evidence of hemodynamically significant stenosis is identified in the    bilateral renal artery(ies).    -Right anechoic cyst in the lower pole measuring 2.7 x 2.7 x 2.3 cm.    -Proximal and distal aorta were not visualized due to overlying bowel.       11/10/17 Coronary CTA    CTA OF THE CORONARY ARTERIES 11/10/2017 10:45 am       TECHNIQUE:   Retrospective cardiac gating following IV bolus of contrast. Periprocedural   oral metoprolol, Ativan and sublingual nitroglycerin.  Multiplanar   reconstructions generated on the [facility].       Dose modulation, iterative reconstruction, and/or weight based adjustment of   the mA/kV was utilized to reduce the radiation dose to as low as reasonably   achievable.       COMPARISON:   Cardiac SPECT, 09/26/2017.  Report of cardiac catheterization, 10/12/2017.       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       Left Anterior Descending- Widely patent.       1st Diagonal- Patent.       2nd Diagonal- Patent.       Left Circumflex- Widely patent.       Left Marginal- Widely patent.       Right Coronary- Normal origin from the right aortic sinus.  Limitations   secondary to Z axis artifact.  There is a 1 cm missing segment in the lower   3rd of the RCA.       Posterior Descending- Patent.       Posterolateral Branch- Patent       Mediastinum: There is a small sliding hiatal hernia.  No mediastinal or hilar   adenopathy is seen.       Lungs/Pleura: There is mild atelectasis in the left upper lobe.        Upper Abdomen: Limited images of the upper abdomen are unremarkable.       Soft Tissues/Bones: No acute bone or soft tissue

## 2018-09-05 ENCOUNTER — OFFICE VISIT (OUTPATIENT)
Dept: FAMILY MEDICINE CLINIC | Age: 78
End: 2018-09-05

## 2018-09-05 VITALS
BODY MASS INDEX: 47.09 KG/M2 | SYSTOLIC BLOOD PRESSURE: 128 MMHG | HEIGHT: 66 IN | HEART RATE: 81 BPM | RESPIRATION RATE: 16 BRPM | OXYGEN SATURATION: 98 % | TEMPERATURE: 97.3 F | WEIGHT: 293 LBS | DIASTOLIC BLOOD PRESSURE: 88 MMHG

## 2018-09-05 DIAGNOSIS — I10 BENIGN ESSENTIAL HTN: ICD-10-CM

## 2018-09-05 DIAGNOSIS — Z23 NEED FOR SHINGLES VACCINE: ICD-10-CM

## 2018-09-05 DIAGNOSIS — R60.0 LEG EDEMA, RIGHT: ICD-10-CM

## 2018-09-05 DIAGNOSIS — M79.604 LEG PAIN, RIGHT: ICD-10-CM

## 2018-09-05 DIAGNOSIS — Z23 NEED FOR DIPHTHERIA-TETANUS-PERTUSSIS (TDAP) VACCINE: ICD-10-CM

## 2018-09-05 DIAGNOSIS — Z00.00 MEDICARE ANNUAL WELLNESS VISIT, SUBSEQUENT: Primary | ICD-10-CM

## 2018-09-05 DIAGNOSIS — E66.01 MORBID OBESITY WITH BMI OF 50.0-59.9, ADULT (HCC): ICD-10-CM

## 2018-09-05 DIAGNOSIS — E78.00 PURE HYPERCHOLESTEROLEMIA: ICD-10-CM

## 2018-09-05 PROCEDURE — G8399 PT W/DXA RESULTS DOCUMENT: HCPCS | Performed by: FAMILY MEDICINE

## 2018-09-05 PROCEDURE — G8417 CALC BMI ABV UP PARAM F/U: HCPCS | Performed by: FAMILY MEDICINE

## 2018-09-05 PROCEDURE — G8427 DOCREV CUR MEDS BY ELIG CLIN: HCPCS | Performed by: FAMILY MEDICINE

## 2018-09-05 PROCEDURE — 99214 OFFICE O/P EST MOD 30 MIN: CPT | Performed by: FAMILY MEDICINE

## 2018-09-05 PROCEDURE — 4040F PNEUMOC VAC/ADMIN/RCVD: CPT | Performed by: FAMILY MEDICINE

## 2018-09-05 PROCEDURE — 1101F PT FALLS ASSESS-DOCD LE1/YR: CPT | Performed by: FAMILY MEDICINE

## 2018-09-05 PROCEDURE — G0439 PPPS, SUBSEQ VISIT: HCPCS | Performed by: FAMILY MEDICINE

## 2018-09-05 PROCEDURE — 1036F TOBACCO NON-USER: CPT | Performed by: FAMILY MEDICINE

## 2018-09-05 PROCEDURE — 1090F PRES/ABSN URINE INCON ASSESS: CPT | Performed by: FAMILY MEDICINE

## 2018-09-05 PROCEDURE — 1123F ACP DISCUSS/DSCN MKR DOCD: CPT | Performed by: FAMILY MEDICINE

## 2018-09-05 RX ORDER — HYDROCHLOROTHIAZIDE 25 MG/1
25 TABLET ORAL DAILY
Qty: 90 TABLET | Refills: 1 | Status: SHIPPED | OUTPATIENT
Start: 2018-09-05 | End: 2019-09-30 | Stop reason: SDUPTHER

## 2018-09-05 ASSESSMENT — PATIENT HEALTH QUESTIONNAIRE - PHQ9
SUM OF ALL RESPONSES TO PHQ QUESTIONS 1-9: 0
SUM OF ALL RESPONSES TO PHQ QUESTIONS 1-9: 0
2. FEELING DOWN, DEPRESSED OR HOPELESS: 0
1. LITTLE INTEREST OR PLEASURE IN DOING THINGS: 0
SUM OF ALL RESPONSES TO PHQ9 QUESTIONS 1 & 2: 0

## 2018-09-05 ASSESSMENT — ENCOUNTER SYMPTOMS
SHORTNESS OF BREATH: 0
ORTHOPNEA: 0
BLURRED VISION: 0

## 2018-09-05 NOTE — PROGRESS NOTES
Conjunctivae and EOM are normal. No scleral icterus. Neck: Normal range of motion. Neck supple. No JVD present. No thyromegaly present. No carotid bruits   Cardiovascular: Normal rate, regular rhythm, normal heart sounds and intact distal pulses. Exam reveals no gallop and no friction rub. No murmur heard. Pulses:       Carotid pulses are 2+ on the right side, and 2+ on the left side. Asymmetric edema R> L 2++ pitting edema to below knee     Pulmonary/Chest: Effort normal. No respiratory distress. She has no wheezes. She has no rales. She exhibits no tenderness. Abdominal: Soft. Bowel sounds are normal. She exhibits no mass. There is no tenderness. Musculoskeletal: She exhibits no edema. Lymphadenopathy:     She has no cervical adenopathy. Neurological: She is alert and oriented to person, place, and time. She has normal reflexes. No cranial nerve deficit. She exhibits normal muscle tone. Skin: Skin is warm. She is not diaphoretic. No erythema. No pallor. Psychiatric: She has a normal mood and affect. Her behavior is normal. Judgment and thought content normal.   Nursing note and vitals reviewed. Assessment:       Diagnosis Orders   1. Medicare annual wellness visit, subsequent  Comprehensive Metabolic Panel    Lipid Panel    Microalbumin / Creatinine Urine Ratio   2. Benign essential HTN  hydrochlorothiazide (HYDRODIURIL) 25 MG tablet   3. Pure hypercholesterolemia     4. Need for diphtheria-tetanus-pertussis (Tdap) vaccine  Tdap (ADACEL) 5-2-15.5 LF-MCG/0.5 injection   5. Need for shingles vaccine  zoster recombinant adjuvanted vaccine (SHINGRIX) 50 MCG SUSR injection   6. Leg edema, right  US DOPPLER VENOUS LEG RIGHT    hydrochlorothiazide (HYDRODIURIL) 25 MG tablet   7. Leg pain, right  US DOPPLER VENOUS LEG RIGHT           Plan:      1.  Declines flu shot   Counseling: encourage to adjust caloric and fat  intake to maintain or achieve ideal body weight, to emphasize fruits, vegetables, and are found in [facility]. The following problems were reviewed today and where indicated follow up appointments were made and/or referrals ordered. Positive Risk Factor Screenings with Interventions:     Health Habits/Nutrition:     Body mass index is 55.36 kg/m². Health Habits/Nutrition Interventions:  · Inadequate physical activity:  patient is not ready to increase his/her physical activity level at this time    Personalized Preventive Plan   Current Health Maintenance Status  Immunization History   Administered Date(s) Administered    Pneumococcal 13-valent Conjugate ([id number]) 12/29/2015    Pneumococcal Polysaccharide ([id number]) 09/12/2017        Health Maintenance   Topic Date Due    DTaP/Tdap/Td vaccine (1 - Tdap) 06/27/1957    Shingles Vaccine (1 of 2 - 2 Dose Series) 06/27/1988    Flu vaccine (1) 09/02/2019 (Originally 9/1/2018)    Potassium monitoring  11/10/2018    Creatinine monitoring  11/10/2018    DEXA (modify frequency per FRAX score)  Addressed    Pneumococcal low/med risk  Completed     Recommendations for Preventive Services Due: see orders and patient instructions/AVS.  .   Recommended screening schedule for the next 5-10 years is provided to the patient in written form: see Patient Meghann Mccarthy MD

## 2018-09-06 DIAGNOSIS — I10 BENIGN ESSENTIAL HTN: Primary | ICD-10-CM

## 2018-09-06 DIAGNOSIS — E78.00 PURE HYPERCHOLESTEROLEMIA: ICD-10-CM

## 2018-09-06 DIAGNOSIS — I10 BENIGN ESSENTIAL HTN: ICD-10-CM

## 2018-09-06 LAB
A/G RATIO: 1.3 (ref 1.1–2.2)
ALBUMIN SERPL-MCNC: 4 G/DL (ref 3.4–5)
ALP BLD-CCNC: 83 U/L (ref 40–129)
ALT SERPL-CCNC: 9 U/L (ref 10–40)
ANION GAP SERPL CALCULATED.3IONS-SCNC: 15 MMOL/L (ref 3–16)
AST SERPL-CCNC: 15 U/L (ref 15–37)
BILIRUB SERPL-MCNC: 0.4 MG/DL (ref 0–1)
BUN BLDV-MCNC: 14 MG/DL (ref 7–20)
CALCIUM SERPL-MCNC: 9.3 MG/DL (ref 8.3–10.6)
CHLORIDE BLD-SCNC: 102 MMOL/L (ref 99–110)
CHOLESTEROL, TOTAL: 228 MG/DL (ref 0–199)
CO2: 24 MMOL/L (ref 21–32)
CREAT SERPL-MCNC: 0.7 MG/DL (ref 0.6–1.2)
GFR AFRICAN AMERICAN: >60
GFR NON-AFRICAN AMERICAN: >60
GLOBULIN: 3.2 G/DL
GLUCOSE BLD-MCNC: 109 MG/DL (ref 70–99)
HDLC SERPL-MCNC: 72 MG/DL (ref 40–60)
LDL CHOLESTEROL CALCULATED: 143 MG/DL
POTASSIUM SERPL-SCNC: 4.6 MMOL/L (ref 3.5–5.1)
SODIUM BLD-SCNC: 141 MMOL/L (ref 136–145)
TOTAL PROTEIN: 7.2 G/DL (ref 6.4–8.2)
TRIGL SERPL-MCNC: 66 MG/DL (ref 0–150)
VLDLC SERPL CALC-MCNC: 13 MG/DL

## 2018-09-24 ENCOUNTER — HOSPITAL ENCOUNTER (OUTPATIENT)
Dept: VASCULAR LAB | Age: 78
Discharge: HOME | End: 2018-09-24
Payer: MEDICARE

## 2018-09-24 PROCEDURE — 93971 EXTREMITY STUDY: CPT

## 2019-09-30 ENCOUNTER — OFFICE VISIT (OUTPATIENT)
Dept: FAMILY MEDICINE CLINIC | Age: 79
End: 2019-09-30
Payer: MEDICARE

## 2019-09-30 VITALS
TEMPERATURE: 99.1 F | HEART RATE: 78 BPM | SYSTOLIC BLOOD PRESSURE: 130 MMHG | WEIGHT: 293 LBS | OXYGEN SATURATION: 98 % | BODY MASS INDEX: 47.09 KG/M2 | HEIGHT: 66 IN | RESPIRATION RATE: 16 BRPM | DIASTOLIC BLOOD PRESSURE: 88 MMHG

## 2019-09-30 DIAGNOSIS — I10 BENIGN ESSENTIAL HTN: Primary | ICD-10-CM

## 2019-09-30 DIAGNOSIS — E66.01 MORBID OBESITY WITH BMI OF 50.0-59.9, ADULT (HCC): ICD-10-CM

## 2019-09-30 DIAGNOSIS — Z23 FLU VACCINE NEED: ICD-10-CM

## 2019-09-30 DIAGNOSIS — I89.0 LYMPHEDEMA: ICD-10-CM

## 2019-09-30 DIAGNOSIS — E78.00 PURE HYPERCHOLESTEROLEMIA: ICD-10-CM

## 2019-09-30 DIAGNOSIS — D50.8 OTHER IRON DEFICIENCY ANEMIA: ICD-10-CM

## 2019-09-30 DIAGNOSIS — Z13.1 SCREENING FOR DIABETES MELLITUS: ICD-10-CM

## 2019-09-30 LAB
A/G RATIO: 1.3 (ref 1.1–2.2)
ALBUMIN SERPL-MCNC: 4.1 G/DL (ref 3.4–5)
ALP BLD-CCNC: 82 U/L (ref 40–129)
ALT SERPL-CCNC: 9 U/L (ref 10–40)
ANION GAP SERPL CALCULATED.3IONS-SCNC: 13 MMOL/L (ref 3–16)
AST SERPL-CCNC: 16 U/L (ref 15–37)
BASOPHILS ABSOLUTE: 0 K/UL (ref 0–0.2)
BASOPHILS RELATIVE PERCENT: 0.7 %
BILIRUB SERPL-MCNC: 0.4 MG/DL (ref 0–1)
BUN BLDV-MCNC: 12 MG/DL (ref 7–20)
CALCIUM SERPL-MCNC: 9.5 MG/DL (ref 8.3–10.6)
CHLORIDE BLD-SCNC: 100 MMOL/L (ref 99–110)
CHOLESTEROL, TOTAL: 220 MG/DL (ref 0–199)
CO2: 28 MMOL/L (ref 21–32)
CREAT SERPL-MCNC: 0.6 MG/DL (ref 0.6–1.2)
CREATININE URINE: 25.4 MG/DL (ref 28–259)
EOSINOPHILS ABSOLUTE: 0.1 K/UL (ref 0–0.6)
EOSINOPHILS RELATIVE PERCENT: 1.6 %
FERRITIN: 194.8 NG/ML (ref 15–150)
GFR AFRICAN AMERICAN: >60
GFR NON-AFRICAN AMERICAN: >60
GLOBULIN: 3.1 G/DL
GLUCOSE BLD-MCNC: 98 MG/DL (ref 70–99)
HCT VFR BLD CALC: 34.2 % (ref 36–48)
HDLC SERPL-MCNC: 74 MG/DL (ref 40–60)
HEMOGLOBIN: 11.1 G/DL (ref 12–16)
IRON SATURATION: 19 % (ref 15–50)
IRON: 57 UG/DL (ref 37–145)
LDL CHOLESTEROL CALCULATED: 135 MG/DL
LYMPHOCYTES ABSOLUTE: 1.9 K/UL (ref 1–5.1)
LYMPHOCYTES RELATIVE PERCENT: 40.8 %
MCH RBC QN AUTO: 24.8 PG (ref 26–34)
MCHC RBC AUTO-ENTMCNC: 32.5 G/DL (ref 31–36)
MCV RBC AUTO: 76.2 FL (ref 80–100)
MICROALBUMIN UR-MCNC: <1.2 MG/DL
MICROALBUMIN/CREAT UR-RTO: ABNORMAL MG/G (ref 0–30)
MONOCYTES ABSOLUTE: 0.4 K/UL (ref 0–1.3)
MONOCYTES RELATIVE PERCENT: 7.8 %
NEUTROPHILS ABSOLUTE: 2.2 K/UL (ref 1.7–7.7)
NEUTROPHILS RELATIVE PERCENT: 49.1 %
PDW BLD-RTO: 16.9 % (ref 12.4–15.4)
PLATELET # BLD: 252 K/UL (ref 135–450)
PMV BLD AUTO: 8.6 FL (ref 5–10.5)
POTASSIUM SERPL-SCNC: 4.7 MMOL/L (ref 3.5–5.1)
RBC # BLD: 4.49 M/UL (ref 4–5.2)
SODIUM BLD-SCNC: 141 MMOL/L (ref 136–145)
TOTAL IRON BINDING CAPACITY: 304 UG/DL (ref 260–445)
TOTAL PROTEIN: 7.2 G/DL (ref 6.4–8.2)
TRIGL SERPL-MCNC: 56 MG/DL (ref 0–150)
VLDLC SERPL CALC-MCNC: 11 MG/DL
WBC # BLD: 4.6 K/UL (ref 4–11)

## 2019-09-30 PROCEDURE — G8417 CALC BMI ABV UP PARAM F/U: HCPCS | Performed by: FAMILY MEDICINE

## 2019-09-30 PROCEDURE — 4040F PNEUMOC VAC/ADMIN/RCVD: CPT | Performed by: FAMILY MEDICINE

## 2019-09-30 PROCEDURE — 1090F PRES/ABSN URINE INCON ASSESS: CPT | Performed by: FAMILY MEDICINE

## 2019-09-30 PROCEDURE — 36415 COLL VENOUS BLD VENIPUNCTURE: CPT | Performed by: FAMILY MEDICINE

## 2019-09-30 PROCEDURE — 1036F TOBACCO NON-USER: CPT | Performed by: FAMILY MEDICINE

## 2019-09-30 PROCEDURE — G0008 ADMIN INFLUENZA VIRUS VAC: HCPCS | Performed by: FAMILY MEDICINE

## 2019-09-30 PROCEDURE — 1123F ACP DISCUSS/DSCN MKR DOCD: CPT | Performed by: FAMILY MEDICINE

## 2019-09-30 PROCEDURE — 90662 IIV NO PRSV INCREASED AG IM: CPT | Performed by: FAMILY MEDICINE

## 2019-09-30 PROCEDURE — G8399 PT W/DXA RESULTS DOCUMENT: HCPCS | Performed by: FAMILY MEDICINE

## 2019-09-30 PROCEDURE — 99214 OFFICE O/P EST MOD 30 MIN: CPT | Performed by: FAMILY MEDICINE

## 2019-09-30 PROCEDURE — G8427 DOCREV CUR MEDS BY ELIG CLIN: HCPCS | Performed by: FAMILY MEDICINE

## 2019-09-30 RX ORDER — HYDROCHLOROTHIAZIDE 25 MG/1
25 TABLET ORAL DAILY
Qty: 90 TABLET | Refills: 1 | Status: ON HOLD | OUTPATIENT
Start: 2019-09-30 | End: 2019-12-30 | Stop reason: HOSPADM

## 2019-09-30 ASSESSMENT — ENCOUNTER SYMPTOMS
SHORTNESS OF BREATH: 0
CHEST TIGHTNESS: 0
ABDOMINAL DISTENTION: 0
BLURRED VISION: 0
ABDOMINAL PAIN: 0
ORTHOPNEA: 0

## 2019-09-30 ASSESSMENT — PATIENT HEALTH QUESTIONNAIRE - PHQ9
SUM OF ALL RESPONSES TO PHQ9 QUESTIONS 1 & 2: 0
SUM OF ALL RESPONSES TO PHQ QUESTIONS 1-9: 0
1. LITTLE INTEREST OR PLEASURE IN DOING THINGS: 0
SUM OF ALL RESPONSES TO PHQ QUESTIONS 1-9: 0
2. FEELING DOWN, DEPRESSED OR HOPELESS: 0

## 2019-09-30 NOTE — PROGRESS NOTES
Subjective:      Patient ID: Roseann Naranjo is a 78 y.o. female. Hypertension   This is a chronic problem. The current episode started more than 1 year ago. The problem is unchanged. The problem is controlled. Associated symptoms include malaise/fatigue and peripheral edema. Pertinent negatives include no anxiety, blurred vision, chest pain, headaches, neck pain, orthopnea, palpitations, PND, shortness of breath or sweats. There are no associated agents to hypertension. Past treatments include nothing. There is no history of heart failure or left ventricular hypertrophy. There is no history of sleep apnea or a thyroid problem. Hyperlipidemia  Not on mdical tx  Poor adherence to diet/exericse    Bilateral LE edema  Onset years ago   Progression: getting worse   Quality : aches   Radiation: none   Severity mod to severe   Timing: constant   Worse with nothing  Better with nothing, no compliant with stockings  Has apt with lymphedema clinic     Anemia hx   Does not take iron  No hx of abnormal bleedings    Obesity  Pt is not adherent to diet and can not exercise due to poor support and LE victoriano/pain      Review of Systems   Constitutional: Positive for malaise/fatigue. Negative for activity change and appetite change. Eyes: Negative for blurred vision and visual disturbance. Respiratory: Negative for chest tightness and shortness of breath. Cardiovascular: Positive for leg swelling. Negative for chest pain, palpitations, orthopnea and PND. Gastrointestinal: Negative for abdominal distention and abdominal pain. Endocrine: Negative for polydipsia, polyphagia and polyuria. Musculoskeletal: Positive for gait problem. Negative for neck pain. Neurological: Negative for dizziness, light-headedness and headaches. Psychiatric/Behavioral: Negative for behavioral problems and sleep disturbance. The patient is not nervous/anxious. is allergic to aspirin and dye [iodides].       Current Outpatient

## 2019-10-01 LAB
ESTIMATED AVERAGE GLUCOSE: 131.2 MG/DL
HBA1C MFR BLD: 6.2 %

## 2019-11-24 ENCOUNTER — HOSPITAL ENCOUNTER (EMERGENCY)
Age: 79
Discharge: HOME | End: 2019-11-24
Attending: EMERGENCY MEDICINE
Payer: MEDICARE

## 2019-11-24 VITALS
WEIGHT: 293 LBS | HEART RATE: 87 BPM | SYSTOLIC BLOOD PRESSURE: 152 MMHG | TEMPERATURE: 98.5 F | BODY MASS INDEX: 47.09 KG/M2 | HEIGHT: 66 IN | OXYGEN SATURATION: 95 % | RESPIRATION RATE: 18 BRPM | DIASTOLIC BLOOD PRESSURE: 92 MMHG

## 2019-11-24 DIAGNOSIS — N30.00 ACUTE CYSTITIS WITHOUT HEMATURIA: ICD-10-CM

## 2019-11-24 DIAGNOSIS — R82.71 BACTERIURIA: Primary | ICD-10-CM

## 2019-11-24 LAB
BACTERIA: ABNORMAL /HPF
BILIRUBIN URINE: NEGATIVE
BLOOD, URINE: ABNORMAL
CLARITY: ABNORMAL
COLOR: YELLOW
EPITHELIAL CELLS, UA: 1 /HPF (ref 0–5)
GLUCOSE URINE: NEGATIVE MG/DL
HYALINE CASTS: 1 /LPF (ref 0–8)
KETONES, URINE: NEGATIVE MG/DL
LEUKOCYTE ESTERASE, URINE: NEGATIVE
MICROSCOPIC EXAMINATION: YES
NITRITE, URINE: POSITIVE
PH UA: 7 (ref 5–8)
PROTEIN UA: ABNORMAL MG/DL
RBC UA: 5 /HPF (ref 0–4)
SPECIFIC GRAVITY UA: 1.02 (ref 1–1.03)
URINE REFLEX TO CULTURE: YES
URINE TYPE: ABNORMAL
UROBILINOGEN, URINE: 1 E.U./DL
WBC UA: 7 /HPF (ref 0–5)

## 2019-11-24 PROCEDURE — 99283 EMERGENCY DEPT VISIT LOW MDM: CPT

## 2019-11-24 PROCEDURE — 6370000000 HC RX 637 (ALT 250 FOR IP): Performed by: EMERGENCY MEDICINE

## 2019-11-24 PROCEDURE — 87086 URINE CULTURE/COLONY COUNT: CPT

## 2019-11-24 PROCEDURE — 81001 URINALYSIS AUTO W/SCOPE: CPT

## 2019-11-24 PROCEDURE — 87186 SC STD MICRODIL/AGAR DIL: CPT

## 2019-11-24 PROCEDURE — 87077 CULTURE AEROBIC IDENTIFY: CPT

## 2019-11-24 RX ORDER — CEPHALEXIN 500 MG/1
500 CAPSULE ORAL 4 TIMES DAILY
Qty: 40 CAPSULE | Refills: 0 | Status: SHIPPED | OUTPATIENT
Start: 2019-11-24 | End: 2019-12-04

## 2019-11-24 RX ORDER — CEPHALEXIN 250 MG/1
500 CAPSULE ORAL ONCE
Status: COMPLETED | OUTPATIENT
Start: 2019-11-24 | End: 2019-11-24

## 2019-11-24 RX ORDER — AMLODIPINE BESYLATE 5 MG/1
5 TABLET ORAL DAILY
Status: DISCONTINUED | OUTPATIENT
Start: 2019-11-24 | End: 2019-11-24 | Stop reason: HOSPADM

## 2019-11-24 RX ORDER — ATENOLOL 50 MG/1
50 TABLET ORAL DAILY
Status: DISCONTINUED | OUTPATIENT
Start: 2019-11-24 | End: 2019-11-24 | Stop reason: HOSPADM

## 2019-11-24 RX ADMIN — AMLODIPINE BESYLATE 5 MG: 5 TABLET ORAL at 20:49

## 2019-11-24 RX ADMIN — CEPHALEXIN 500 MG: 250 CAPSULE ORAL at 21:20

## 2019-11-24 RX ADMIN — ATENOLOL 50 MG: 50 TABLET ORAL at 20:49

## 2019-11-24 ASSESSMENT — PAIN DESCRIPTION - PAIN TYPE: TYPE: ACUTE PAIN

## 2019-11-24 ASSESSMENT — PAIN SCALES - GENERAL: PAINLEVEL_OUTOF10: 10

## 2019-11-25 ENCOUNTER — TELEPHONE (OUTPATIENT)
Dept: FAMILY MEDICINE CLINIC | Age: 79
End: 2019-11-25

## 2019-11-27 LAB
ORGANISM: ABNORMAL
URINE CULTURE, ROUTINE: ABNORMAL

## 2019-12-23 ENCOUNTER — APPOINTMENT (OUTPATIENT)
Dept: GENERAL RADIOLOGY | Age: 79
DRG: 280 | End: 2019-12-23
Payer: MEDICARE

## 2019-12-23 ENCOUNTER — HOSPITAL ENCOUNTER (INPATIENT)
Age: 79
LOS: 8 days | Discharge: HOME | DRG: 280 | End: 2019-12-31
Attending: EMERGENCY MEDICINE | Admitting: INTERNAL MEDICINE
Payer: MEDICARE

## 2019-12-23 PROBLEM — I48.91 ATRIAL FIBRILLATION WITH RVR (HCC): Status: ACTIVE | Noted: 2019-12-23

## 2019-12-23 LAB
ANION GAP SERPL CALCULATED.3IONS-SCNC: 19 MMOL/L (ref 3–16)
ANTI-XA UNFRAC HEPARIN: 1.57 IU/ML (ref 0.3–0.7)
BASE EXCESS VENOUS: -2 (ref -3–3)
BASOPHILS ABSOLUTE: 0.1 K/UL (ref 0–0.2)
BASOPHILS RELATIVE PERCENT: 1.5 %
BUN BLDV-MCNC: 16 MG/DL (ref 7–20)
CALCIUM SERPL-MCNC: 9.2 MG/DL (ref 8.3–10.6)
CHLORIDE BLD-SCNC: 99 MMOL/L (ref 99–110)
CO2: 22 MMOL/L (ref 21–32)
CREAT SERPL-MCNC: 1 MG/DL (ref 0.6–1.2)
EKG ATRIAL RATE: 375 BPM
EKG DIAGNOSIS: NORMAL
EKG Q-T INTERVAL: 256 MS
EKG QRS DURATION: 70 MS
EKG QTC CALCULATION (BAZETT): 430 MS
EKG R AXIS: 40 DEGREES
EKG T AXIS: -58 DEGREES
EKG VENTRICULAR RATE: 170 BPM
EOSINOPHILS ABSOLUTE: 0.1 K/UL (ref 0–0.6)
EOSINOPHILS RELATIVE PERCENT: 1.4 %
GFR AFRICAN AMERICAN: >60
GFR NON-AFRICAN AMERICAN: 53
GLUCOSE BLD-MCNC: 128 MG/DL (ref 70–99)
GLUCOSE BLD-MCNC: 144 MG/DL (ref 70–99)
GLUCOSE BLD-MCNC: 288 MG/DL (ref 70–99)
HCO3 VENOUS: 24.1 MMOL/L (ref 23–29)
HCT VFR BLD CALC: 35.6 % (ref 36–48)
HEMOGLOBIN: 11.2 G/DL (ref 12–16)
INR BLD: 1.26 (ref 0.86–1.14)
LACTATE: 4.23 MMOL/L (ref 0.4–2)
LACTIC ACID: 2.5 MMOL/L (ref 0.4–2)
LYMPHOCYTES ABSOLUTE: 2.7 K/UL (ref 1–5.1)
LYMPHOCYTES RELATIVE PERCENT: 40.4 %
MAGNESIUM: 1.9 MG/DL (ref 1.8–2.4)
MCH RBC QN AUTO: 25 PG (ref 26–34)
MCHC RBC AUTO-ENTMCNC: 31.5 G/DL (ref 31–36)
MCV RBC AUTO: 79.2 FL (ref 80–100)
MONOCYTES ABSOLUTE: 0.4 K/UL (ref 0–1.3)
MONOCYTES RELATIVE PERCENT: 5.9 %
NEUTROPHILS ABSOLUTE: 3.3 K/UL (ref 1.7–7.7)
NEUTROPHILS RELATIVE PERCENT: 50.8 %
O2 SAT, VEN: 88 %
PCO2, VEN: 45.7 MM HG (ref 40–50)
PDW BLD-RTO: 17.1 % (ref 12.4–15.4)
PERFORMED ON: ABNORMAL
PH VENOUS: 7.33 (ref 7.35–7.45)
PLATELET # BLD: 297 K/UL (ref 135–450)
PMV BLD AUTO: 8.2 FL (ref 5–10.5)
PO2, VEN: 59 MM HG
POC SAMPLE TYPE: ABNORMAL
POTASSIUM REFLEX MAGNESIUM: 4.1 MMOL/L (ref 3.5–5.1)
PRO-BNP: 2266 PG/ML (ref 0–449)
PROTHROMBIN TIME: 14.7 SEC (ref 10–13.2)
RBC # BLD: 4.49 M/UL (ref 4–5.2)
SODIUM BLD-SCNC: 140 MMOL/L (ref 136–145)
TCO2 CALC VENOUS: 25 MMOL/L
TROPONIN: 0.1 NG/ML
TROPONIN: <0.01 NG/ML
TSH REFLEX: 1.85 UIU/ML (ref 0.27–4.2)
WBC # BLD: 6.6 K/UL (ref 4–11)

## 2019-12-23 PROCEDURE — 99223 1ST HOSP IP/OBS HIGH 75: CPT | Performed by: INTERNAL MEDICINE

## 2019-12-23 PROCEDURE — 2500000003 HC RX 250 WO HCPCS

## 2019-12-23 PROCEDURE — 94761 N-INVAS EAR/PLS OXIMETRY MLT: CPT

## 2019-12-23 PROCEDURE — 2700000000 HC OXYGEN THERAPY PER DAY

## 2019-12-23 PROCEDURE — 85520 HEPARIN ASSAY: CPT

## 2019-12-23 PROCEDURE — 96374 THER/PROPH/DIAG INJ IV PUSH: CPT

## 2019-12-23 PROCEDURE — 99222 1ST HOSP IP/OBS MODERATE 55: CPT | Performed by: INTERNAL MEDICINE

## 2019-12-23 PROCEDURE — 71045 X-RAY EXAM CHEST 1 VIEW: CPT

## 2019-12-23 PROCEDURE — 6360000002 HC RX W HCPCS: Performed by: STUDENT IN AN ORGANIZED HEALTH CARE EDUCATION/TRAINING PROGRAM

## 2019-12-23 PROCEDURE — 84484 ASSAY OF TROPONIN QUANT: CPT

## 2019-12-23 PROCEDURE — 84443 ASSAY THYROID STIM HORMONE: CPT

## 2019-12-23 PROCEDURE — 6360000002 HC RX W HCPCS

## 2019-12-23 PROCEDURE — 6370000000 HC RX 637 (ALT 250 FOR IP)

## 2019-12-23 PROCEDURE — 83735 ASSAY OF MAGNESIUM: CPT

## 2019-12-23 PROCEDURE — 96375 TX/PRO/DX INJ NEW DRUG ADDON: CPT

## 2019-12-23 PROCEDURE — 96376 TX/PRO/DX INJ SAME DRUG ADON: CPT

## 2019-12-23 PROCEDURE — 2500000003 HC RX 250 WO HCPCS: Performed by: EMERGENCY MEDICINE

## 2019-12-23 PROCEDURE — 36415 COLL VENOUS BLD VENIPUNCTURE: CPT

## 2019-12-23 PROCEDURE — 99291 CRITICAL CARE FIRST HOUR: CPT

## 2019-12-23 PROCEDURE — 83036 HEMOGLOBIN GLYCOSYLATED A1C: CPT

## 2019-12-23 PROCEDURE — 2000000000 HC ICU R&B

## 2019-12-23 PROCEDURE — 85025 COMPLETE CBC W/AUTO DIFF WBC: CPT

## 2019-12-23 PROCEDURE — 2500000003 HC RX 250 WO HCPCS: Performed by: STUDENT IN AN ORGANIZED HEALTH CARE EDUCATION/TRAINING PROGRAM

## 2019-12-23 PROCEDURE — 93005 ELECTROCARDIOGRAM TRACING: CPT | Performed by: EMERGENCY MEDICINE

## 2019-12-23 PROCEDURE — 94660 CPAP INITIATION&MGMT: CPT

## 2019-12-23 PROCEDURE — 6360000002 HC RX W HCPCS: Performed by: EMERGENCY MEDICINE

## 2019-12-23 PROCEDURE — 80048 BASIC METABOLIC PNL TOTAL CA: CPT

## 2019-12-23 PROCEDURE — 6370000000 HC RX 637 (ALT 250 FOR IP): Performed by: INTERNAL MEDICINE

## 2019-12-23 PROCEDURE — 82803 BLOOD GASES ANY COMBINATION: CPT

## 2019-12-23 PROCEDURE — 83605 ASSAY OF LACTIC ACID: CPT

## 2019-12-23 PROCEDURE — 51702 INSERT TEMP BLADDER CATH: CPT

## 2019-12-23 PROCEDURE — 83880 ASSAY OF NATRIURETIC PEPTIDE: CPT

## 2019-12-23 PROCEDURE — 85610 PROTHROMBIN TIME: CPT

## 2019-12-23 PROCEDURE — 2580000003 HC RX 258: Performed by: STUDENT IN AN ORGANIZED HEALTH CARE EDUCATION/TRAINING PROGRAM

## 2019-12-23 RX ORDER — HEPARIN SODIUM 5000 [USP'U]/ML
10000 INJECTION, SOLUTION INTRAVENOUS; SUBCUTANEOUS ONCE
Status: COMPLETED | OUTPATIENT
Start: 2019-12-23 | End: 2019-12-23

## 2019-12-23 RX ORDER — DILTIAZEM HYDROCHLORIDE 5 MG/ML
10 INJECTION INTRAVENOUS ONCE
Status: COMPLETED | OUTPATIENT
Start: 2019-12-23 | End: 2019-12-23

## 2019-12-23 RX ORDER — SODIUM CHLORIDE 0.9 % (FLUSH) 0.9 %
10 SYRINGE (ML) INJECTION EVERY 12 HOURS SCHEDULED
Status: DISCONTINUED | OUTPATIENT
Start: 2019-12-23 | End: 2019-12-31 | Stop reason: HOSPADM

## 2019-12-23 RX ORDER — ACETAMINOPHEN 325 MG/1
650 TABLET ORAL EVERY 4 HOURS PRN
Status: DISCONTINUED | OUTPATIENT
Start: 2019-12-23 | End: 2019-12-30 | Stop reason: SDUPTHER

## 2019-12-23 RX ORDER — METOPROLOL TARTRATE 5 MG/5ML
5 INJECTION INTRAVENOUS ONCE
Status: COMPLETED | OUTPATIENT
Start: 2019-12-23 | End: 2019-12-23

## 2019-12-23 RX ORDER — DILTIAZEM HYDROCHLORIDE 5 MG/ML
10 INJECTION INTRAVENOUS ONCE
Status: DISCONTINUED | OUTPATIENT
Start: 2019-12-23 | End: 2019-12-23

## 2019-12-23 RX ORDER — SODIUM CHLORIDE 9 MG/ML
INJECTION, SOLUTION INTRAVENOUS
Status: DISPENSED
Start: 2019-12-23 | End: 2019-12-23

## 2019-12-23 RX ORDER — NITROGLYCERIN 20 MG/100ML
20 INJECTION INTRAVENOUS CONTINUOUS
Status: DISCONTINUED | OUTPATIENT
Start: 2019-12-23 | End: 2019-12-23

## 2019-12-23 RX ORDER — DEXTROSE MONOHYDRATE 25 G/50ML
12.5 INJECTION, SOLUTION INTRAVENOUS PRN
Status: DISCONTINUED | OUTPATIENT
Start: 2019-12-23 | End: 2019-12-31 | Stop reason: HOSPADM

## 2019-12-23 RX ORDER — ONDANSETRON 2 MG/ML
4 INJECTION INTRAMUSCULAR; INTRAVENOUS EVERY 6 HOURS PRN
Status: DISCONTINUED | OUTPATIENT
Start: 2019-12-23 | End: 2019-12-30 | Stop reason: SDUPTHER

## 2019-12-23 RX ORDER — NICOTINE POLACRILEX 4 MG
15 LOZENGE BUCCAL PRN
Status: DISCONTINUED | OUTPATIENT
Start: 2019-12-23 | End: 2019-12-31 | Stop reason: HOSPADM

## 2019-12-23 RX ORDER — INSULIN LISPRO 100 [IU]/ML
0-6 INJECTION, SOLUTION INTRAVENOUS; SUBCUTANEOUS
Status: DISCONTINUED | OUTPATIENT
Start: 2019-12-23 | End: 2019-12-31 | Stop reason: HOSPADM

## 2019-12-23 RX ORDER — GUAIFENESIN AND DEXTROMETHORPHAN HYDROBROMIDE 100; 10 MG/5ML; MG/5ML
10 SOLUTION ORAL EVERY 8 HOURS PRN
COMMUNITY
End: 2020-01-06

## 2019-12-23 RX ORDER — FUROSEMIDE 10 MG/ML
40 INJECTION INTRAMUSCULAR; INTRAVENOUS ONCE
Status: COMPLETED | OUTPATIENT
Start: 2019-12-23 | End: 2019-12-23

## 2019-12-23 RX ORDER — HEPARIN SODIUM 5000 [USP'U]/ML
5000 INJECTION, SOLUTION INTRAVENOUS; SUBCUTANEOUS PRN
Status: DISCONTINUED | OUTPATIENT
Start: 2019-12-23 | End: 2019-12-23 | Stop reason: ALTCHOICE

## 2019-12-23 RX ORDER — FUROSEMIDE 10 MG/ML
40 INJECTION INTRAMUSCULAR; INTRAVENOUS 2 TIMES DAILY
Status: DISCONTINUED | OUTPATIENT
Start: 2019-12-23 | End: 2019-12-25

## 2019-12-23 RX ORDER — NITROGLYCERIN 20 MG/100ML
INJECTION INTRAVENOUS
Status: COMPLETED
Start: 2019-12-23 | End: 2019-12-23

## 2019-12-23 RX ORDER — SODIUM CHLORIDE 0.9 % (FLUSH) 0.9 %
10 SYRINGE (ML) INJECTION PRN
Status: DISCONTINUED | OUTPATIENT
Start: 2019-12-23 | End: 2019-12-31 | Stop reason: HOSPADM

## 2019-12-23 RX ORDER — INSULIN LISPRO 100 [IU]/ML
0-3 INJECTION, SOLUTION INTRAVENOUS; SUBCUTANEOUS NIGHTLY
Status: DISCONTINUED | OUTPATIENT
Start: 2019-12-23 | End: 2019-12-31 | Stop reason: HOSPADM

## 2019-12-23 RX ORDER — DEXTROSE MONOHYDRATE 50 MG/ML
100 INJECTION, SOLUTION INTRAVENOUS PRN
Status: DISCONTINUED | OUTPATIENT
Start: 2019-12-23 | End: 2019-12-31 | Stop reason: HOSPADM

## 2019-12-23 RX ORDER — HEPARIN SODIUM 10000 [USP'U]/100ML
12 INJECTION, SOLUTION INTRAVENOUS CONTINUOUS
Status: DISCONTINUED | OUTPATIENT
Start: 2019-12-23 | End: 2019-12-23

## 2019-12-23 RX ORDER — HEPARIN SODIUM 5000 [USP'U]/ML
10000 INJECTION, SOLUTION INTRAVENOUS; SUBCUTANEOUS PRN
Status: DISCONTINUED | OUTPATIENT
Start: 2019-12-23 | End: 2019-12-23 | Stop reason: ALTCHOICE

## 2019-12-23 RX ADMIN — APIXABAN 5 MG: 5 TABLET, FILM COATED ORAL at 20:56

## 2019-12-23 RX ADMIN — INSULIN LISPRO 1 UNITS: 100 INJECTION, SOLUTION INTRAVENOUS; SUBCUTANEOUS at 18:24

## 2019-12-23 RX ADMIN — HEPARIN SODIUM AND DEXTROSE 12 UNITS/KG/HR: 10000; 5 INJECTION INTRAVENOUS at 13:48

## 2019-12-23 RX ADMIN — Medication 10 ML: at 20:55

## 2019-12-23 RX ADMIN — METOPROLOL TARTRATE 5 MG: 5 INJECTION INTRAVENOUS at 11:26

## 2019-12-23 RX ADMIN — HEPARIN SODIUM 10000 UNITS: 5000 INJECTION INTRAVENOUS; SUBCUTANEOUS at 13:47

## 2019-12-23 RX ADMIN — METOPROLOL TARTRATE 5 MG: 5 INJECTION INTRAVENOUS at 09:55

## 2019-12-23 RX ADMIN — FUROSEMIDE 40 MG: 10 INJECTION, SOLUTION INTRAMUSCULAR; INTRAVENOUS at 17:36

## 2019-12-23 RX ADMIN — DILTIAZEM HYDROCHLORIDE 5 MG/HR: 5 INJECTION INTRAVENOUS at 14:39

## 2019-12-23 RX ADMIN — FUROSEMIDE 40 MG: 10 INJECTION, SOLUTION INTRAVENOUS at 10:39

## 2019-12-23 RX ADMIN — NITROGLYCERIN 20 MCG/MIN: 200 INJECTION, SOLUTION INTRAVENOUS at 10:11

## 2019-12-23 RX ADMIN — NITROGLYCERIN 20 MCG/MIN: 20 INJECTION INTRAVENOUS at 10:11

## 2019-12-23 RX ADMIN — DILTIAZEM HYDROCHLORIDE 10 MG: 5 INJECTION INTRAVENOUS at 14:39

## 2019-12-23 ASSESSMENT — PAIN SCALES - GENERAL
PAINLEVEL_OUTOF10: 0

## 2019-12-23 ASSESSMENT — ENCOUNTER SYMPTOMS
CHOKING: 0
CONSTIPATION: 0
COUGH: 1
ABDOMINAL PAIN: 0
DIARRHEA: 0
SHORTNESS OF BREATH: 1
NAUSEA: 0
ABDOMINAL DISTENTION: 0
VOMITING: 0
WHEEZING: 0
COUGH: 0
EYE PAIN: 0
SORE THROAT: 0
SINUS PRESSURE: 0

## 2019-12-23 NOTE — CONSULTS
ICU HISTORY AND PHYSICAL        ICU Day: 1                                                         Code:Full Code  Admit Date: 12/23/2019  PCP: Muratza Chung MD                                  CC: SOB    HISTORY OF PRESENT ILLNESS:   Sydney Shay is a 78year old female with PMHx of HTN, HLD, PUD who presented with gradually worsening shortness of breath over the last few days. Upon arrival to the ED the patient was placed on BiPAP for SOB. CXR with bilateral pulmonary edema. VBG with pH 7.33, pCO2 45.7, pO2 59. EKG was significant for Afib w RVR. Patient subsequently received lasix 40 IV x 1, nitroglycerin gtt, and metoprolol x2. Due to uncontrolled HR patient admitted to the ICU for further management. PAST HISTORY:     Past Medical History:   Diagnosis Date    Edema     Hearing loss     Hyperlipidemia     Hypertension     Morbid obesity due to excess calories ([facility] [address] 12/29/2015    Pre-diabetes     PUD (peptic ulcer disease)     \"years ago\",        Past Surgical History:   Procedure Laterality Date    HERNIA REPAIR      umbilical     KNEE SURGERY Bilateral     TKR bilateral     Family History:  Family History   Problem Relation Age of Onset    Other Mother         hernia    High Blood Pressure Father        MEDICATIONS:     No current facility-administered medications on file prior to encounter.       Current Outpatient Medications on File Prior to Encounter   Medication Sig Dispense Refill    Dextromethorphan-guaiFENesin  MG/5ML SYRP Take 10 mLs by mouth every 8 hours as needed for Cough      hydrochlorothiazide (HYDRODIURIL) 25 MG tablet Take 1 tablet by mouth daily 90 tablet 1         Scheduled Meds:   sodium chloride flush  10 mL Intravenous 2 times per day    furosemide  40 mg Intravenous BID    insulin lispro  0-6 Units Subcutaneous TID WC    insulin lispro  0-3 Units Subcutaneous Nightly    apixaban  5 mg Oral BID      Continuous Infusions:   sodium chloride      diltiazem 5 mg/hr (12/23/19 1439)    dextrose       PRN Meds:sodium chloride flush, magnesium hydroxide, ondansetron, acetaminophen, heparin (porcine), heparin (porcine), perflutren lipid microspheres, glucose, dextrose, glucagon (rDNA), dextrose    Allergies: Allergies   Allergen Reactions    Aspirin      Caused ulcers, burns stomach    Dye [Iodides] Itching, Swelling and Rash       REVIEW OF SYSTEMS:       History obtained from chart review and the patient    Review of Systems   Constitutional: Negative for appetite change, chills, fatigue and fever. HENT: Negative for congestion, sinus pressure, sneezing and sore throat. Eyes: Negative for pain and visual disturbance. Respiratory: Positive for shortness of breath. Negative for cough, choking and wheezing. Gastrointestinal: Negative for abdominal distention, abdominal pain, diarrhea, nausea and vomiting. Neurological: Negative for dizziness, weakness, light-headedness and headaches. Psychiatric/Behavioral: Negative for agitation and confusion. PHYSICAL EXAM:       Vitals: /89   Pulse 127   Temp 97 °F (36.1 °C) (Axillary)   Resp 21   Ht 5' 6\" (1.676 m)   Wt (!) 356 lb 4.2 oz (161.6 kg)   SpO2 96%   BMI 57.50 kg/m²     I/O:      Intake/Output Summary (Last 24 hours) at 12/23/2019 1550  Last data filed at 12/23/2019 1230  Gross per 24 hour   Intake --   Output 180 ml   Net -180 ml     No intake/output data recorded. I/O last 3 completed shifts:  In: -   Out: 180 [Urine:180]    Physical Examination:     Physical Exam  Constitutional:       Appearance: She is obese. She is not diaphoretic. HENT:      Head: Normocephalic and atraumatic. Nose: No congestion or rhinorrhea. Mouth/Throat:      Mouth: Mucous membranes are moist.   Eyes:      Pupils: Pupils are equal, round, and reactive to light. Neck:      Musculoskeletal: Normal range of motion and neck supple. No neck rigidity or muscular tenderness.    Cardiovascular:      Rate and Rhythm: Tachycardia present. Rhythm irregular. Pulses: Normal pulses. Heart sounds: No murmur. No friction rub. Pulmonary:      Effort: Respiratory distress present. Breath sounds: No stridor. Rales present. No rhonchi. Chest:      Chest wall: No tenderness. Abdominal:      General: There is no distension. Palpations: There is no mass. Musculoskeletal: Normal range of motion. General: Swelling present. Right lower leg: Edema present. Left lower leg: Edema present. Skin:     Coloration: Skin is not jaundiced or pale. Neurological:      General: No focal deficit present. Mental Status: She is alert. Cranial Nerves: No cranial nerve deficit. Sensory: No sensory deficit. Psychiatric:         Mood and Affect: Mood normal.       No results for input(s): PHART, KGY5VDZ, PO2ART in the last 72 hours. DATA:       Labs:  CBC:   Recent Labs     12/23/19  1001   WBC 6.6   HGB 11.2*   HCT 35.6*          BMP:   Recent Labs     12/23/19  1001      K 4.1   CL 99   CO2 22   BUN 16   CREATININE 1.0   GLUCOSE 288*     LFT's: No results for input(s): AST, ALT, ALB, BILITOT, ALKPHOS in the last 72 hours. Troponin:   Recent Labs     12/23/19  1001   TROPONINI <0.01     BNP:No results for input(s): BNP in the last 72 hours. ABGs: No results for input(s): PHART, [id number], PO2ART in the last 72 hours. INR: No results for input(s): INR in the last 72 hours. U/A:No results for input(s): NITRITE, COLORU, PHUR, LABCAST, WBCUA, RBCUA, MUCUS, TRICHOMONAS, YEAST, BACTERIA, CLARITYU, SPECGRAV, LEUKOCYTESUR, UROBILINOGEN, BILIRUBINUR, BLOODU, GLUCOSEU, AMORPHOUS in the last 72 hours. Invalid input(s): KETONESU    XR CHEST PORTABLE   Final Result      Cardiomegaly with bilateral interstitial infiltrates suggesting edema versus pneumonia.              ASSESSMENT AND PLAN:   Kimberly Ybarra is a 78 y.o. female, who was admitted for acute hypoxic respiratory failure and new-onset Afib. 1. Acute hypoxic respiratory failure 2/2 Acute CHF exacerbation: Patient presented with shortness of breath. Satting 94% on BiPAP. CXR with bilateral pulmonary edema. VBG with pH 7.33, pCO2 45.7, pO2 59. Received lasix 40 IV x 1 in ED  - Lasix 40 IV BID  - Echo  - BiPAP and nitro gtt Dcd  - Strict I/Os    2. New onset Atrial fibrillation with RVR: HR to the 170s in ED. EKG showing irregular rhythm with no p-waves, no ST changes. Received metoprolol 5 IV x 2 with no improvement. - Dilt gtt  - ECHO  - TSH  - Heparin gtt        Code Status:Full Code  FEN: NPO  PPX:  Heparin gtt  DISPO: ICU     This patient has been staffed and discussed with Isma Phan MD  -----------------------------  Cristobal Melendez MD, PGY-2  12/23/2019  3:50 PM       Patient was seen, examined and discussed with Dr. Marlen Chambers and the ICU team. I agree with the history of present illness, past medical/surgical histories, family history, social history, medication list and allergies as listed. The review of systems is as noted above. My physical exam confirms the findings listed  Chart was reviewed including labs, CXR, EKG and medical records confirm the findings noted     Pulmonary edema   Acute CHF  A.fib with RVR  Lactic acidosis     At the time of evaluation upon arrival to the ICU she was feeling better. Breathing was not labored on BiPAP. I took her off BiPAP, she was not in distress. D/c ABG  D/c nitro and start cardizem   Continue diuresis   F/u on echo   Wean FiO2 as tolerated to keep sats > 90%  Agree on the A/P noted above.

## 2019-12-23 NOTE — ED PROVIDER NOTES
[address] [facility]          ATTENDING PHYSICIAN NOTE       Date of evaluation: 12/23/2019    Chief Complaint     Shortness of Breath      History of Present Illness     Amie Alexander is a 78 y.o. female who presents with shortness of breath for the last few days, gradually worsening. She denies any lung problems. She reports no CP. She arrives via EMS on BiPAP. Initially EMS was unable to get an oxygen saturation and she was working extremely hard to breathe. She is unable to speak in complete sentences and history is limited. She does not eyes any cardiac or pulmonary history. Review of Systems     Review of Systems   Respiratory: Positive for cough and shortness of breath. Cardiovascular: Negative for chest pain and palpitations. Gastrointestinal: Negative for diarrhea, nausea and vomiting. All other systems reviewed and are negative. Past Medical, Surgical, Family, and Social History     She has a past medical history of Edema, Hearing loss, Hyperlipidemia, Hypertension, Morbid obesity due to excess calories ([address] Pre-diabetes, and PUD (peptic ulcer disease). She has a past surgical history that includes hernia repair and knee surgery (Bilateral). Her family history includes High Blood Pressure in her father; Other in her mother. She reports that she has never smoked. She has never used smokeless tobacco. She reports that she does not drink alcohol or use drugs. Medications     Previous Medications    DEXTROMETHORPHAN-GUAIFENESIN  MG/5ML SYRP    Take 10 mLs by mouth every 8 hours as needed for Cough    HYDROCHLOROTHIAZIDE (HYDRODIURIL) 25 MG TABLET    Take 1 tablet by mouth daily       Allergies     She is allergic to aspirin and dye [iodides]. Physical Exam     INITIAL VITALS: BP: (!) 147/126, Temp: 97.2 °F (36.2 °C), Pulse: 168, Resp: 20, SpO2: 94 %   Physical Exam  Vitals signs and nursing note reviewed.    Constitutional:       General: dyspnea found to be in pulmonary edema likely secondary to atrial fibrillation with rapid ventricular response rate. The patient required positive pressure ventilation with BiPAP here in the emergency department. She was treated with beta-blocker, nitroglycerin, and Lasix here in the emergency department with some improvement of her symptoms and improvement of her respiratory status but given likelihood of decompensation and inability to fully control her atrial fibrillation, she will need to be admitted to the ICU. Critical Care:  Due to the immediate potential for life-threatening deterioration due to A. fib with RVR with pulmonary edema, I spent 40 minutes providing critical care. Thistime excludes time spent performing procedures but includes time spent on direct patient care, history retrieval, review of the chart, and discussions with patient, family, and consultant(s). Clinical Impression     1. Atrial fibrillation with RVR ([facility] [address]    2.  Acute pulmonary edema [facility])        Disposition     DISPOSITION Admitted 12/23/2019 11:33:48 AM       Malcom Banks MD  12/23/19 6291

## 2019-12-23 NOTE — CONSULTS
follows:        Problem Relation Age of Onset    Other Mother         hernia    High Blood Pressure Father        REVIEW OF SYSTEMS: Pertinent positives as noted in the HPI. All other systems reviewed and negative. Constitutional: Negative for chills and fever. Respiratory: Positive for cough and shortness of breath. Cardiovascular: Positive for leg swelling. Negative for chest pain and palpitations. Gastrointestinal: Negative for abdominal pain, constipation and nausea. Genitourinary: Negative for dysuria and frequency. Neurological: Negative for dizziness, weakness and light-headedness. PHYSICAL EXAM PERFORMED:    /89   Pulse 127   Temp 97 °F (36.1 °C) (Axillary)   Resp 21   Ht 5' 6\" (1.676 m)   Wt (!) 356 lb 4.2 oz (161.6 kg)   SpO2 100%   BMI 57.50 kg/m²       Vitals signs and nursing note reviewed. Exam conducted with a chaperone present. Constitutional:       Comments: Laying in bed, mild distress   HENT:      Head: Atraumatic. Eyes:      Conjunctiva/sclera: Conjunctivae normal.      Pupils: Pupils are equal, round, and reactive to light. Neck:      Musculoskeletal: Neck supple. Cardiovascular:      Rate and Rhythm: Tachycardia present. Rhythm irregular. Heart sounds: Normal heart sounds. No murmur. Pulmonary:      Breath sounds: Normal breath sounds. No wheezing. Comments: Tachypneic, breathing with accessory muscle use on BiPAP  Abdominal:      General: Bowel sounds are normal.      Palpations: Abdomen is soft. Tenderness: There is no tenderness. Musculoskeletal:      Right lower leg: Edema present. Left lower leg: Edema present. Comments: 3+ pitting edema BLE   Skin:     General: Skin is warm and dry. Capillary Refill: Capillary refill takes less than 2 seconds. Neurological:      Mental Status: She is alert and oriented to person, place, and time. Cranial Nerves: No cranial nerve deficit.      Labs:     Recent Labs 12/23/19  1001   WBC 6.6   HGB 11.2*   HCT 35.6*        Recent Labs     12/23/19  1001      K 4.1   CL 99   CO2 22   BUN 16   CREATININE 1.0   CALCIUM 9.2     No results for input(s): AST, ALT, BILIDIR, BILITOT, ALKPHOS in the last 72 hours. No results for input(s): INR in the last 72 hours. Recent Labs     12/23/19  1001   TROPONINI <0.01       Urinalysis:      Lab Results   Component Value Date    NITRU POSITIVE 11/24/2019    WBCUA 7 11/24/2019    BACTERIA 4+ 11/24/2019    RBCUA 5 11/24/2019    BLOODU TRACE 11/24/2019    SPECGRAV 1.016 11/24/2019    GLUCOSEU Negative 11/24/2019    GLUCOSEU NEGATIVE 09/23/2010       Radiology:   XR CHEST PORTABLE   Final Result      Cardiomegaly with bilateral interstitial infiltrates suggesting edema versus pneumonia. ASSESSMENT & PLAN:  Seb Connolly is a 78 y.o. female who was admitted for acute hypoxic respiratory failure and new-onset Afib. Active Hospital Problems    Diagnosis Date Noted    Atrial fibrillation with RVR ([facility] [address] [I48.91] 12/23/2019       Acute Hypoxic Respiratory Failure 2/2 Acute CHF Exacerbation in setting of Atrial fibrillation:  Pt has Heart failure symptoms, X ray finding consisting of Pul Edema, we are thinking A fib is consequence of Heart failure, Heart rate is better controlled now   -Echocardiogram  -Continue Cardizem for now as blood pressure tolerated   -We recommend to switch Anticoagulant from IV heparin to Eliquis to decrease fluid intake  -Agree juan continue lasix for now  -Tele  -We will continue monitoring     Diet: Diet NPO Effective Now  Code Status: Full Code  I will discuss the patient Rosetta San MD  Internal Medicine Resident,PGY 2    Attestation  I have seen,interviewed and examined the patient with the resident. Pertinent medical data and imaging studies reviewed.   Refer to the residents note for details of clinical findings  I agree with his assessment and plan with following addendum:    Type I respiratory failure secondary to acute systolic congestive heart failure  Atrial fibrillation with rapid ventricular response  Agree with IV diuresis and rate control  Need to optimize her heart failure medications    Harrison Glynn MD   Cardiac Electrophysiology  [address] [place] [address] [phone]  [facility]

## 2019-12-23 NOTE — ED NOTES
16 Fr Cano Catheter placed. Patient tolerated well. Leg securement device placed. Patient still tachycardic and on PAP mask, although pt states that she feels much better now. Pt seems to be in minimal distress and is tolerating the mask fairly well. Family is at bedside. 5 mg Lopressor given. Initial HR was 150, current HR is now 114-120.       Heladio Elizabeth RN  12/23/19 3170

## 2019-12-23 NOTE — ED NOTES
Report called to CHAPARRITA Jones. ICU RN's will come to retrieve the patient for ICU.      Leighton Cheung RN  12/23/19 0510

## 2019-12-24 LAB
ANION GAP SERPL CALCULATED.3IONS-SCNC: 19 MMOL/L (ref 3–16)
BASOPHILS ABSOLUTE: 0.1 K/UL (ref 0–0.2)
BASOPHILS RELATIVE PERCENT: 1.1 %
BUN BLDV-MCNC: 22 MG/DL (ref 7–20)
CALCIUM SERPL-MCNC: 9.1 MG/DL (ref 8.3–10.6)
CHLORIDE BLD-SCNC: 98 MMOL/L (ref 99–110)
CO2: 23 MMOL/L (ref 21–32)
CREAT SERPL-MCNC: 1 MG/DL (ref 0.6–1.2)
EOSINOPHILS ABSOLUTE: 0 K/UL (ref 0–0.6)
EOSINOPHILS RELATIVE PERCENT: 0.4 %
ESTIMATED AVERAGE GLUCOSE: 142.7 MG/DL
GFR AFRICAN AMERICAN: >60
GFR NON-AFRICAN AMERICAN: 53
GLUCOSE BLD-MCNC: 117 MG/DL (ref 70–99)
GLUCOSE BLD-MCNC: 123 MG/DL (ref 70–99)
GLUCOSE BLD-MCNC: 128 MG/DL (ref 70–99)
GLUCOSE BLD-MCNC: 136 MG/DL (ref 70–99)
GLUCOSE BLD-MCNC: 176 MG/DL (ref 70–99)
HBA1C MFR BLD: 6.6 %
HCT VFR BLD CALC: 33.5 % (ref 36–48)
HEMOGLOBIN: 10.4 G/DL (ref 12–16)
INR BLD: 1.43 (ref 0.86–1.14)
LACTIC ACID: 2.7 MMOL/L (ref 0.4–2)
LV EF: 38 %
LVEF MODALITY: NORMAL
LYMPHOCYTES ABSOLUTE: 2 K/UL (ref 1–5.1)
LYMPHOCYTES RELATIVE PERCENT: 30.9 %
MCH RBC QN AUTO: 24.2 PG (ref 26–34)
MCHC RBC AUTO-ENTMCNC: 31 G/DL (ref 31–36)
MCV RBC AUTO: 78 FL (ref 80–100)
MONOCYTES ABSOLUTE: 0.6 K/UL (ref 0–1.3)
MONOCYTES RELATIVE PERCENT: 8.6 %
NEUTROPHILS ABSOLUTE: 3.9 K/UL (ref 1.7–7.7)
NEUTROPHILS RELATIVE PERCENT: 59 %
PDW BLD-RTO: 17.5 % (ref 12.4–15.4)
PERFORMED ON: ABNORMAL
PLATELET # BLD: 225 K/UL (ref 135–450)
PMV BLD AUTO: 8.1 FL (ref 5–10.5)
POTASSIUM REFLEX MAGNESIUM: 4.1 MMOL/L (ref 3.5–5.1)
PROTHROMBIN TIME: 16.6 SEC (ref 10–13.2)
RBC # BLD: 4.3 M/UL (ref 4–5.2)
SODIUM BLD-SCNC: 140 MMOL/L (ref 136–145)
TROPONIN: 0.09 NG/ML
WBC # BLD: 6.6 K/UL (ref 4–11)

## 2019-12-24 PROCEDURE — 2580000003 HC RX 258: Performed by: STUDENT IN AN ORGANIZED HEALTH CARE EDUCATION/TRAINING PROGRAM

## 2019-12-24 PROCEDURE — 80048 BASIC METABOLIC PNL TOTAL CA: CPT

## 2019-12-24 PROCEDURE — 94660 CPAP INITIATION&MGMT: CPT

## 2019-12-24 PROCEDURE — 6360000004 HC RX CONTRAST MEDICATION: Performed by: STUDENT IN AN ORGANIZED HEALTH CARE EDUCATION/TRAINING PROGRAM

## 2019-12-24 PROCEDURE — 6360000002 HC RX W HCPCS

## 2019-12-24 PROCEDURE — 99233 SBSQ HOSP IP/OBS HIGH 50: CPT | Performed by: NURSE PRACTITIONER

## 2019-12-24 PROCEDURE — 99233 SBSQ HOSP IP/OBS HIGH 50: CPT | Performed by: INTERNAL MEDICINE

## 2019-12-24 PROCEDURE — 36415 COLL VENOUS BLD VENIPUNCTURE: CPT

## 2019-12-24 PROCEDURE — 2580000003 HC RX 258

## 2019-12-24 PROCEDURE — C8929 TTE W OR WO FOL WCON,DOPPLER: HCPCS

## 2019-12-24 PROCEDURE — 84484 ASSAY OF TROPONIN QUANT: CPT

## 2019-12-24 PROCEDURE — 6370000000 HC RX 637 (ALT 250 FOR IP)

## 2019-12-24 PROCEDURE — 2700000000 HC OXYGEN THERAPY PER DAY

## 2019-12-24 PROCEDURE — 83605 ASSAY OF LACTIC ACID: CPT

## 2019-12-24 PROCEDURE — 2060000000 HC ICU INTERMEDIATE R&B

## 2019-12-24 PROCEDURE — 6370000000 HC RX 637 (ALT 250 FOR IP): Performed by: STUDENT IN AN ORGANIZED HEALTH CARE EDUCATION/TRAINING PROGRAM

## 2019-12-24 PROCEDURE — 85610 PROTHROMBIN TIME: CPT

## 2019-12-24 PROCEDURE — 94761 N-INVAS EAR/PLS OXIMETRY MLT: CPT

## 2019-12-24 PROCEDURE — 85025 COMPLETE CBC W/AUTO DIFF WBC: CPT

## 2019-12-24 RX ORDER — DILTIAZEM HYDROCHLORIDE 60 MG/1
60 TABLET, FILM COATED ORAL EVERY 6 HOURS SCHEDULED
Status: DISCONTINUED | OUTPATIENT
Start: 2019-12-24 | End: 2019-12-24

## 2019-12-24 RX ORDER — METOPROLOL TARTRATE 50 MG/1
50 TABLET, FILM COATED ORAL EVERY 6 HOURS
Status: DISCONTINUED | OUTPATIENT
Start: 2019-12-24 | End: 2019-12-24

## 2019-12-24 RX ADMIN — FUROSEMIDE 40 MG: 10 INJECTION, SOLUTION INTRAMUSCULAR; INTRAVENOUS at 07:49

## 2019-12-24 RX ADMIN — MAGNESIUM HYDROXIDE 30 ML: 400 SUSPENSION ORAL at 14:02

## 2019-12-24 RX ADMIN — METOPROLOL TARTRATE 25 MG: 25 TABLET ORAL at 17:36

## 2019-12-24 RX ADMIN — APIXABAN 5 MG: 5 TABLET, FILM COATED ORAL at 07:49

## 2019-12-24 RX ADMIN — Medication 10 ML: at 21:11

## 2019-12-24 RX ADMIN — Medication 10 ML: at 07:49

## 2019-12-24 RX ADMIN — APIXABAN 5 MG: 5 TABLET, FILM COATED ORAL at 21:11

## 2019-12-24 RX ADMIN — METOPROLOL TARTRATE 25 MG: 25 TABLET ORAL at 23:26

## 2019-12-24 RX ADMIN — ONDANSETRON 4 MG: 2 INJECTION INTRAMUSCULAR; INTRAVENOUS at 14:53

## 2019-12-24 RX ADMIN — PERFLUTREN 1.65 MG: 6.52 INJECTION, SUSPENSION INTRAVENOUS at 09:06

## 2019-12-24 RX ADMIN — DILTIAZEM HYDROCHLORIDE 60 MG: 60 TABLET, FILM COATED ORAL at 11:09

## 2019-12-24 RX ADMIN — INSULIN LISPRO 1 UNITS: 100 INJECTION, SOLUTION INTRAVENOUS; SUBCUTANEOUS at 21:15

## 2019-12-24 RX ADMIN — DILTIAZEM HYDROCHLORIDE 30 MG: 30 TABLET, FILM COATED ORAL at 07:49

## 2019-12-24 RX ADMIN — FUROSEMIDE 40 MG: 10 INJECTION, SOLUTION INTRAMUSCULAR; INTRAVENOUS at 17:28

## 2019-12-24 RX ADMIN — METOPROLOL TARTRATE 50 MG: 50 TABLET, FILM COATED ORAL at 12:24

## 2019-12-24 ASSESSMENT — PAIN SCALES - GENERAL
PAINLEVEL_OUTOF10: 0

## 2019-12-24 NOTE — PROGRESS NOTES
<0.01 0.10* 0.09*     FASTING LIPID PANEL:  Lab Results   Component Value Date    HDL 74 09/30/2019    LDLCALC 135 09/30/2019    TRIG 56 09/30/2019    TSH 2.05 09/14/2017     LIVER PROFILE:  Lab Results   Component Value Date    AST 16 09/30/2019    AST 15 09/06/2018    ALT 9 09/30/2019    ALT 9 09/06/2018       -----------------------------------------------------------------  Telemetry: Personally reviewed  AF - HR controlled    Objective:   Vitals: BP (!) 147/95   Pulse 91   Temp 98.4 °F (36.9 °C) (Oral)   Resp 29   Ht 5' 6\" (1.676 m)   Wt (!) 348 lb 5.2 oz (158 kg)   SpO2 98%   BMI 56.22 kg/m²   General appearance: alert, appears stated age and cooperative, No acute distress   Eyes: Conjunctiva and pupils normal and reactive  Skin: Skin color, texture, turgor normal. No rashes or ecchymosis. Neck: no JVD, supple, symmetrical, trachea midline   Lungs: , no accessory muscle use, no respiratory distress  Heart: irreg, not tachy  Abdomen: soft, non-tender; bowel sounds normal  Extremities: 2-3 + bilat LE edema, DP +  Psychiatric: normal insight and affect    Patient Active Problem List:     Benign essential HTN     Morbid obesity due to excess calories (HCC)     Pure hypercholesterolemia     Bilateral knee pain     Pre-diabetes     Hyperlipidemia LDL goal <100     Abnormal nuclear stress test     Abnormal result of other cardiovascular function study (CODE)     Right hip pain     Atrial fibrillation with RVR (HCC)        Assessment & Plan:      1. AF/RVR  2. CMP  3. Acute sCHF    79 y/o woman with a h/o HLD, HTN, DM, obesity, who p/w a 3 day h/o SOB, + PND x 10 days, noted to be in AF/RVR with rates in the 150's, on dilt with HR now controlled. [id number] 5. TSH 1.85 (12/23/19).      AF/RVR  - In AF - HR controlled  - On dilt 60 mg Q6, dilt gtt  - Echo - LA 5.2 , vol 120  - On apixaban 5 mg BID - H&H stable - continue    CMP/ acute sCHF  - EF 35-40%, EF 55% (2017)  - NYHA class III  - QRS 70  - BNP 2,266

## 2019-12-24 NOTE — PROGRESS NOTES
Pt with c/o nausea when getting up to bathroom this afternoon post administration of PO metoprolol. Pt remains in controlled Afib at this time 60s-70s. Pt BP systolically as low as high 80s and low 90s. Pt AOx4 but appears more lethargic at this time. MD made aware. Verbal order to not administer scheduled 1700 PO metoprolol. Will monitor.

## 2019-12-24 NOTE — PLAN OF CARE
Problem: Falls - Risk of:  Goal: Will remain free from falls  Description  Will remain free from falls  12/24/2019 0925 by Selma Meza RN  Outcome: Ongoing  Note:   Pt is a fall risk. Fall risk protocol in place. See Aleksandra Eduardo Fall Score. Pt bed is in low position, bed alarm is on, side rails up, fall risk bracelet applied. , non-skid footwear in use. Patient/family educated on fall risk protocol, instructed to call for assistance when needed and belongings are in reach. assistance. Will continue with hourly rounds for po intake, pain needs, toileting and repositioning as needed. Will continue to monitor for needs. Problem: Activity Intolerance:  Goal: Ability to tolerate increased activity will improve  Description  Ability to tolerate increased activity will improve  Outcome: Ongoing  Note:   Pt is nervous to get out of bed this morning r/t increased difficulty at home prior to admission with ambulation. Concerns addressed. Will help pt ambulate this shift. Problem: Cardiac Output - Decreased:  Goal: Hemodynamic stability will improve  Description  Hemodynamic stability will improve  Outcome: Ongoing  Note:   VSS. Pt remains on 2 L O2; denies SOB. Pt remains on diltiazem gtt at this time. PO started. Will titrate off as appropriate. Will monitor. Problem: Fluid Volume - Excess:  Goal: Control of fluid volume excess will improve  Description  Control of fluid volume excess will improve  Outcome: Ongoing  Note:   Pt receiving lasix BID able to void large amounts of urine. Will monitor. Problem: Gas Exchange - Impaired:  Goal: Levels of oxygenation will improve  Description  Levels of oxygenation will improve  Outcome: Ongoing  Note:   Pt on 2 L O2 down from bipap over night. Will monitor. Problem: Pain:  Goal: Control of acute pain  Description  Control of acute pain  Outcome: Ongoing  Note:   Pt has not had any c/o pain thus far this shift. Pt is resting comfortably in bed. Will monitor. Problem: Skin Integrity:  Intervention: Skin care  Note:   Pt at risk for skin breakdown. See Damien score. Pt remains on bedrest. Unable to reposition self in bed. Heels elevated off bed. Sacral heart mepilex intact to protect, site inspected and intact underneath. Will continue to turn and reposition patient every two hours and as needed. Will continue to keep patient clean and dry, applying skin care cream as needed. Pillows used for repositioning q2hs. Will continue to monitor and assess for skin breakdown.

## 2019-12-24 NOTE — FLOWSHEET NOTE
12/24/19 1629   Encounter Summary   Services provided to: Patient   Referral/Consult From: [facility] Family members; Worship/severino community   Continue Visiting   (Seen 12/24/19, MARIAH. )   Complexity of Encounter Moderate   Length of Encounter 15 minutes   Routine   Type Initial   Assessment Approachable   Intervention Active listening;Nurtured hope   Outcome Expressed gratitude;Engaged in conversation   Advanced Care Planning Visit    Encounter Summary       Type: Initial  Services provided to[de-identified] Patient  Current Code Status Full Code     Advanced Care Planning Documents     The patient has advance directives The hospital is requesting copies. Advanced Care Planning Discussion    Patient and/or surrogate wishes to discuss advance care planning. Yes     Understanding  Patient has questions about medical condition(s) and treatment options. No     Patient knows current code status  Yes   The patients wants to remain a Full Code. However, she would want to change her status to DNR if she becomes terminally ill. Patient's advance directives (if completed) accurately reflect his/her current wishes. Yes    Values/Beliefs  What is important to you? (What gives your life meaning/purpose?)     My family and my [employer] family give me purpose and meaning. 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 Do you have specific beliefs that influence your decisions in life? Yes     I always want my family to be part of my decisions. They know my wishes. Are there Spiritual and/or Sabianism beliefs that influence your medical decisions? Yes   I pray and ask my children for their opinion. Is there anything in your healthcare that has cause you to lose peace? No      Support  Support System: Family members, Worship/severino community     Do you have people in your life who are important to you?   Yes     My family is very

## 2019-12-24 NOTE — PROGRESS NOTES
chaperone present. Constitutional:       Comments: Laying in bed, no apparent distress    HENT:      Head: Atraumatic. Eyes:      Conjunctiva/sclera: Conjunctivae normal.      Pupils: Pupils are equal, round, and reactive to light. Neck:      Musculoskeletal: Neck supple. Cardiovascular:      Rate and Rhythm: Normal rate and regular rhythm. Heart sounds: Normal heart sounds. No murmur. Pulmonary:      Effort: Pulmonary effort is normal.      Breath sounds: Normal breath sounds. No wheezing. Comments: Breathing comfortably on 2L NC  Abdominal:      General: Bowel sounds are normal.      Palpations: Abdomen is soft. Tenderness: There is no tenderness. Musculoskeletal:      Right lower leg: Edema present. Left lower leg: Edema present. Comments: Improved from prior   Skin:     General: Skin is warm and dry. Capillary Refill: Capillary refill takes less than 2 seconds. Neurological:      Mental Status: She is alert and oriented to person, place, and time. Cranial Nerves: No cranial nerve deficit. LABS:    CBC:   Recent Labs     12/23/19  1001 12/24/19  0228   WBC 6.6 6.6   HGB 11.2* 10.4*   HCT 35.6* 33.5*    225   MCV 79.2* 78.0*     Renal:    Recent Labs     12/23/19  1001 12/23/19  1652 12/24/19  0227     --  140   K 4.1  --  4.1   CL 99  --  98*   CO2 22  --  23   BUN 16  --  22*   CREATININE 1.0  --  1.0   GLUCOSE 288*  --  123*   CALCIUM 9.2  --  9.1   MG  --  1.90  --    ANIONGAP 19*  --  19*     Hepatic: No results for input(s): AST, ALT, BILITOT, BILIDIR, PROT, LABALBU, ALKPHOS in the last 72 hours. Troponin:   Recent Labs     12/23/19  1001 12/23/19  1651 12/24/19  0228   TROPONINI <0.01 0.10* 0.09*     BNP: No results for input(s): BNP in the last 72 hours. Lipids: No results for input(s): CHOL, HDL in the last 72 hours.     Invalid input(s): LDLCALCU, TRIGLYCERIDE  ABGs:  No results for input(s): PHART, FNO8XOM, PO2ART, [id number], BEART,

## 2019-12-25 LAB
ANION GAP SERPL CALCULATED.3IONS-SCNC: 13 MMOL/L (ref 3–16)
BASOPHILS ABSOLUTE: 0 K/UL (ref 0–0.2)
BASOPHILS RELATIVE PERCENT: 0.8 %
BUN BLDV-MCNC: 28 MG/DL (ref 7–20)
CALCIUM SERPL-MCNC: 8.9 MG/DL (ref 8.3–10.6)
CHLORIDE BLD-SCNC: 97 MMOL/L (ref 99–110)
CO2: 28 MMOL/L (ref 21–32)
CREAT SERPL-MCNC: 1.3 MG/DL (ref 0.6–1.2)
EOSINOPHILS ABSOLUTE: 0 K/UL (ref 0–0.6)
EOSINOPHILS RELATIVE PERCENT: 0.7 %
GFR AFRICAN AMERICAN: 48
GFR NON-AFRICAN AMERICAN: 39
GLUCOSE BLD-MCNC: 102 MG/DL (ref 70–99)
GLUCOSE BLD-MCNC: 113 MG/DL (ref 70–99)
GLUCOSE BLD-MCNC: 114 MG/DL (ref 70–99)
GLUCOSE BLD-MCNC: 117 MG/DL (ref 70–99)
GLUCOSE BLD-MCNC: 129 MG/DL (ref 70–99)
HCT VFR BLD CALC: 32.2 % (ref 36–48)
HEMOGLOBIN: 10.2 G/DL (ref 12–16)
LYMPHOCYTES ABSOLUTE: 1.9 K/UL (ref 1–5.1)
LYMPHOCYTES RELATIVE PERCENT: 32.1 %
MCH RBC QN AUTO: 24.5 PG (ref 26–34)
MCHC RBC AUTO-ENTMCNC: 31.8 G/DL (ref 31–36)
MCV RBC AUTO: 77.3 FL (ref 80–100)
MONOCYTES ABSOLUTE: 0.6 K/UL (ref 0–1.3)
MONOCYTES RELATIVE PERCENT: 9.9 %
NEUTROPHILS ABSOLUTE: 3.4 K/UL (ref 1.7–7.7)
NEUTROPHILS RELATIVE PERCENT: 56.5 %
PDW BLD-RTO: 17.1 % (ref 12.4–15.4)
PERFORMED ON: ABNORMAL
PLATELET # BLD: 215 K/UL (ref 135–450)
PMV BLD AUTO: 8.1 FL (ref 5–10.5)
POTASSIUM REFLEX MAGNESIUM: 3.8 MMOL/L (ref 3.5–5.1)
RBC # BLD: 4.16 M/UL (ref 4–5.2)
SODIUM BLD-SCNC: 138 MMOL/L (ref 136–145)
WBC # BLD: 6 K/UL (ref 4–11)

## 2019-12-25 PROCEDURE — 2580000003 HC RX 258

## 2019-12-25 PROCEDURE — 85025 COMPLETE CBC W/AUTO DIFF WBC: CPT

## 2019-12-25 PROCEDURE — 36415 COLL VENOUS BLD VENIPUNCTURE: CPT

## 2019-12-25 PROCEDURE — 6370000000 HC RX 637 (ALT 250 FOR IP): Performed by: STUDENT IN AN ORGANIZED HEALTH CARE EDUCATION/TRAINING PROGRAM

## 2019-12-25 PROCEDURE — 99232 SBSQ HOSP IP/OBS MODERATE 35: CPT | Performed by: NURSE PRACTITIONER

## 2019-12-25 PROCEDURE — 80048 BASIC METABOLIC PNL TOTAL CA: CPT

## 2019-12-25 PROCEDURE — 6370000000 HC RX 637 (ALT 250 FOR IP): Performed by: NURSE PRACTITIONER

## 2019-12-25 PROCEDURE — 6370000000 HC RX 637 (ALT 250 FOR IP)

## 2019-12-25 PROCEDURE — 6360000002 HC RX W HCPCS

## 2019-12-25 PROCEDURE — 2060000000 HC ICU INTERMEDIATE R&B

## 2019-12-25 RX ORDER — METOPROLOL TARTRATE 50 MG/1
50 TABLET, FILM COATED ORAL 2 TIMES DAILY
Status: DISCONTINUED | OUTPATIENT
Start: 2019-12-25 | End: 2019-12-26

## 2019-12-25 RX ORDER — METOPROLOL SUCCINATE 25 MG/1
25 TABLET, EXTENDED RELEASE ORAL DAILY
Status: DISCONTINUED | OUTPATIENT
Start: 2019-12-25 | End: 2019-12-25

## 2019-12-25 RX ORDER — METOPROLOL SUCCINATE 50 MG/1
50 TABLET, EXTENDED RELEASE ORAL 2 TIMES DAILY
Status: DISCONTINUED | OUTPATIENT
Start: 2019-12-25 | End: 2019-12-25

## 2019-12-25 RX ORDER — HYDRALAZINE HYDROCHLORIDE 10 MG/1
10 TABLET, FILM COATED ORAL EVERY 8 HOURS SCHEDULED
Status: DISCONTINUED | OUTPATIENT
Start: 2019-12-25 | End: 2019-12-26

## 2019-12-25 RX ORDER — FUROSEMIDE 40 MG/1
40 TABLET ORAL DAILY
Status: DISCONTINUED | OUTPATIENT
Start: 2019-12-26 | End: 2019-12-27

## 2019-12-25 RX ORDER — METOPROLOL TARTRATE 50 MG/1
50 TABLET, FILM COATED ORAL 2 TIMES DAILY
Status: DISCONTINUED | OUTPATIENT
Start: 2019-12-25 | End: 2019-12-25

## 2019-12-25 RX ORDER — ISOSORBIDE MONONITRATE 30 MG/1
30 TABLET, EXTENDED RELEASE ORAL DAILY
Status: DISCONTINUED | OUTPATIENT
Start: 2019-12-25 | End: 2019-12-30

## 2019-12-25 RX ADMIN — APIXABAN 5 MG: 5 TABLET, FILM COATED ORAL at 08:58

## 2019-12-25 RX ADMIN — HYDRALAZINE HYDROCHLORIDE 10 MG: 10 TABLET, FILM COATED ORAL at 13:19

## 2019-12-25 RX ADMIN — FUROSEMIDE 40 MG: 10 INJECTION, SOLUTION INTRAMUSCULAR; INTRAVENOUS at 08:58

## 2019-12-25 RX ADMIN — ISOSORBIDE MONONITRATE 30 MG: 30 TABLET, EXTENDED RELEASE ORAL at 13:19

## 2019-12-25 RX ADMIN — Medication 10 ML: at 08:59

## 2019-12-25 RX ADMIN — Medication 10 ML: at 21:18

## 2019-12-25 RX ADMIN — METOPROLOL TARTRATE 50 MG: 50 TABLET, FILM COATED ORAL at 21:17

## 2019-12-25 RX ADMIN — HYDRALAZINE HYDROCHLORIDE 10 MG: 10 TABLET, FILM COATED ORAL at 21:17

## 2019-12-25 RX ADMIN — APIXABAN 5 MG: 5 TABLET, FILM COATED ORAL at 21:17

## 2019-12-25 RX ADMIN — METOPROLOL TARTRATE 25 MG: 25 TABLET ORAL at 08:58

## 2019-12-25 ASSESSMENT — PAIN DESCRIPTION - DESCRIPTORS: DESCRIPTORS: ACHING

## 2019-12-25 ASSESSMENT — PAIN DESCRIPTION - ORIENTATION: ORIENTATION: RIGHT;LEFT;LOWER

## 2019-12-25 ASSESSMENT — PAIN DESCRIPTION - PROGRESSION
CLINICAL_PROGRESSION: NOT CHANGED
CLINICAL_PROGRESSION: NOT CHANGED

## 2019-12-25 ASSESSMENT — PAIN DESCRIPTION - ONSET: ONSET: ON-GOING

## 2019-12-25 ASSESSMENT — PAIN DESCRIPTION - PAIN TYPE: TYPE: ACUTE PAIN

## 2019-12-25 ASSESSMENT — PAIN SCALES - GENERAL
PAINLEVEL_OUTOF10: 0
PAINLEVEL_OUTOF10: 6
PAINLEVEL_OUTOF10: 0

## 2019-12-25 ASSESSMENT — PAIN - FUNCTIONAL ASSESSMENT: PAIN_FUNCTIONAL_ASSESSMENT: PREVENTS OR INTERFERES SOME ACTIVE ACTIVITIES AND ADLS

## 2019-12-25 ASSESSMENT — PAIN DESCRIPTION - LOCATION: LOCATION: LEG

## 2019-12-25 ASSESSMENT — PAIN DESCRIPTION - FREQUENCY: FREQUENCY: INTERMITTENT

## 2019-12-25 NOTE — PROGRESS NOTES
4 Eyes Admission Assessment     I agree as the admission nurse that 2 RN's have performed a thorough Head to Toe Skin Assessment on the patient. ALL assessment sites listed below have been assessed on admission. Areas assessed by both nurses:   [x]   Head, Face, and Ears   [x]   Shoulders, Back, and Chest  [x]   Arms, Elbows, and Hands   [x]   Coccyx, Sacrum, and Ischum  [x]   Legs, Feet, and Heels        Does the Patient have Skin Breakdown? No         Damien Prevention initiated:  No   Wound Care Orders initiated:  No      [facility] nurse consulted for Pressure Injury (Stage 3,4, Unstageable, DTI, NWPT, and Complex wounds):  No      Nurse 1 eSignature: Electronically signed by Enedelia Tadeo RN on 12/25/19 at 3:23 PM    **SHARE this note so that the co-signing nurse is able to place an eSignature**    Nurse 2 eSignature: Electronically signed by Hailey Figueroa.  CHAPARRITA Alba on 12/25/2019 at 5:10 PM

## 2019-12-25 NOTE — PROGRESS NOTES
continue    CMP/ acute sCHF  - EF 35-40%, EF 55% (2017)  - NYHA class III  - QRS 70  - BNP 2,266 on admission  - On lasix 40 mg BID - neg 2.4 L - continue diuresis  - Keep K+ > 4.0 and Mg > 2.0  - On metoprolol 25 mg Q 6 - will change to 50 mg po BID and change to long acting on d/c   - Will add hydralazine - Cr up to 1.3 today  - Trops 0.1 and 0.09 - could consider ischemic eval when euvolemic howerver cath in 2017 no significant disease     Debi Anthony [address]

## 2019-12-26 LAB
ANION GAP SERPL CALCULATED.3IONS-SCNC: 12 MMOL/L (ref 3–16)
BASOPHILS ABSOLUTE: 0 K/UL (ref 0–0.2)
BASOPHILS RELATIVE PERCENT: 0.3 %
BUN BLDV-MCNC: 32 MG/DL (ref 7–20)
CALCIUM SERPL-MCNC: 9 MG/DL (ref 8.3–10.6)
CHLORIDE BLD-SCNC: 98 MMOL/L (ref 99–110)
CO2: 32 MMOL/L (ref 21–32)
CREAT SERPL-MCNC: 1.2 MG/DL (ref 0.6–1.2)
EKG ATRIAL RATE: 50 BPM
EKG DIAGNOSIS: NORMAL
EKG Q-T INTERVAL: 512 MS
EKG QRS DURATION: 90 MS
EKG QTC CALCULATION (BAZETT): 590 MS
EKG R AXIS: 8 DEGREES
EKG T AXIS: 220 DEGREES
EKG VENTRICULAR RATE: 80 BPM
EOSINOPHILS ABSOLUTE: 0.1 K/UL (ref 0–0.6)
EOSINOPHILS RELATIVE PERCENT: 1.1 %
GFR AFRICAN AMERICAN: 52
GFR NON-AFRICAN AMERICAN: 43
GLUCOSE BLD-MCNC: 114 MG/DL (ref 70–99)
GLUCOSE BLD-MCNC: 127 MG/DL (ref 70–99)
GLUCOSE BLD-MCNC: 129 MG/DL (ref 70–99)
GLUCOSE BLD-MCNC: 135 MG/DL (ref 70–99)
GLUCOSE BLD-MCNC: 150 MG/DL (ref 70–99)
HCT VFR BLD CALC: 32.4 % (ref 36–48)
HEMOGLOBIN: 10.2 G/DL (ref 12–16)
LYMPHOCYTES ABSOLUTE: 2.2 K/UL (ref 1–5.1)
LYMPHOCYTES RELATIVE PERCENT: 37.7 %
MCH RBC QN AUTO: 24.6 PG (ref 26–34)
MCHC RBC AUTO-ENTMCNC: 31.6 G/DL (ref 31–36)
MCV RBC AUTO: 77.7 FL (ref 80–100)
MONOCYTES ABSOLUTE: 0.5 K/UL (ref 0–1.3)
MONOCYTES RELATIVE PERCENT: 8.8 %
NEUTROPHILS ABSOLUTE: 3.1 K/UL (ref 1.7–7.7)
NEUTROPHILS RELATIVE PERCENT: 52.1 %
PDW BLD-RTO: 17 % (ref 12.4–15.4)
PERFORMED ON: ABNORMAL
PLATELET # BLD: 236 K/UL (ref 135–450)
PMV BLD AUTO: 8.2 FL (ref 5–10.5)
POTASSIUM REFLEX MAGNESIUM: 3.8 MMOL/L (ref 3.5–5.1)
RBC # BLD: 4.17 M/UL (ref 4–5.2)
SODIUM BLD-SCNC: 142 MMOL/L (ref 136–145)
WBC # BLD: 5.9 K/UL (ref 4–11)

## 2019-12-26 PROCEDURE — 97530 THERAPEUTIC ACTIVITIES: CPT

## 2019-12-26 PROCEDURE — 97165 OT EVAL LOW COMPLEX 30 MIN: CPT

## 2019-12-26 PROCEDURE — 99233 SBSQ HOSP IP/OBS HIGH 50: CPT | Performed by: INTERNAL MEDICINE

## 2019-12-26 PROCEDURE — 97116 GAIT TRAINING THERAPY: CPT

## 2019-12-26 PROCEDURE — 6370000000 HC RX 637 (ALT 250 FOR IP): Performed by: NURSE PRACTITIONER

## 2019-12-26 PROCEDURE — 36415 COLL VENOUS BLD VENIPUNCTURE: CPT

## 2019-12-26 PROCEDURE — 2060000000 HC ICU INTERMEDIATE R&B

## 2019-12-26 PROCEDURE — 2580000003 HC RX 258

## 2019-12-26 PROCEDURE — 97162 PT EVAL MOD COMPLEX 30 MIN: CPT

## 2019-12-26 PROCEDURE — 99233 SBSQ HOSP IP/OBS HIGH 50: CPT | Performed by: NURSE PRACTITIONER

## 2019-12-26 PROCEDURE — 85025 COMPLETE CBC W/AUTO DIFF WBC: CPT

## 2019-12-26 PROCEDURE — 97535 SELF CARE MNGMENT TRAINING: CPT

## 2019-12-26 PROCEDURE — 6370000000 HC RX 637 (ALT 250 FOR IP): Performed by: STUDENT IN AN ORGANIZED HEALTH CARE EDUCATION/TRAINING PROGRAM

## 2019-12-26 PROCEDURE — 80048 BASIC METABOLIC PNL TOTAL CA: CPT

## 2019-12-26 RX ORDER — METOPROLOL TARTRATE 50 MG/1
50 TABLET, FILM COATED ORAL 2 TIMES DAILY
Status: COMPLETED | OUTPATIENT
Start: 2019-12-26 | End: 2019-12-26

## 2019-12-26 RX ORDER — METOPROLOL SUCCINATE 50 MG/1
50 TABLET, EXTENDED RELEASE ORAL 2 TIMES DAILY
Status: DISCONTINUED | OUTPATIENT
Start: 2019-12-26 | End: 2019-12-26

## 2019-12-26 RX ORDER — METOPROLOL SUCCINATE 50 MG/1
50 TABLET, EXTENDED RELEASE ORAL DAILY
Status: DISCONTINUED | OUTPATIENT
Start: 2019-12-27 | End: 2019-12-28

## 2019-12-26 RX ORDER — POTASSIUM CHLORIDE 20 MEQ/1
20 TABLET, EXTENDED RELEASE ORAL ONCE
Status: COMPLETED | OUTPATIENT
Start: 2019-12-26 | End: 2019-12-26

## 2019-12-26 RX ORDER — HYDRALAZINE HYDROCHLORIDE 25 MG/1
25 TABLET, FILM COATED ORAL EVERY 8 HOURS SCHEDULED
Status: DISCONTINUED | OUTPATIENT
Start: 2019-12-26 | End: 2019-12-27

## 2019-12-26 RX ORDER — METOPROLOL SUCCINATE 50 MG/1
50 TABLET, EXTENDED RELEASE ORAL DAILY
Status: DISCONTINUED | OUTPATIENT
Start: 2019-12-26 | End: 2019-12-26

## 2019-12-26 RX ADMIN — ISOSORBIDE MONONITRATE 30 MG: 30 TABLET, EXTENDED RELEASE ORAL at 09:29

## 2019-12-26 RX ADMIN — HYDRALAZINE HYDROCHLORIDE 25 MG: 25 TABLET, FILM COATED ORAL at 21:25

## 2019-12-26 RX ADMIN — FUROSEMIDE 40 MG: 40 TABLET ORAL at 09:29

## 2019-12-26 RX ADMIN — HYDRALAZINE HYDROCHLORIDE 10 MG: 10 TABLET, FILM COATED ORAL at 05:59

## 2019-12-26 RX ADMIN — METOPROLOL TARTRATE 50 MG: 50 TABLET, FILM COATED ORAL at 09:29

## 2019-12-26 RX ADMIN — METOPROLOL TARTRATE 50 MG: 50 TABLET, FILM COATED ORAL at 21:25

## 2019-12-26 RX ADMIN — Medication 10 ML: at 21:26

## 2019-12-26 RX ADMIN — HYDRALAZINE HYDROCHLORIDE 25 MG: 25 TABLET, FILM COATED ORAL at 14:32

## 2019-12-26 RX ADMIN — POTASSIUM CHLORIDE 20 MEQ: 20 TABLET, EXTENDED RELEASE ORAL at 09:34

## 2019-12-26 RX ADMIN — INSULIN LISPRO 1 UNITS: 100 INJECTION, SOLUTION INTRAVENOUS; SUBCUTANEOUS at 21:25

## 2019-12-26 ASSESSMENT — PAIN SCALES - GENERAL
PAINLEVEL_OUTOF10: 6
PAINLEVEL_OUTOF10: 0

## 2019-12-26 ASSESSMENT — PAIN DESCRIPTION - PAIN TYPE: TYPE: ACUTE PAIN

## 2019-12-26 ASSESSMENT — PAIN DESCRIPTION - LOCATION: LOCATION: LEG

## 2019-12-26 ASSESSMENT — PAIN DESCRIPTION - FREQUENCY: FREQUENCY: INTERMITTENT

## 2019-12-26 ASSESSMENT — PAIN DESCRIPTION - DESCRIPTORS: DESCRIPTORS: ACHING

## 2019-12-26 ASSESSMENT — PAIN DESCRIPTION - ONSET: ONSET: ON-GOING

## 2019-12-26 ASSESSMENT — PAIN DESCRIPTION - ORIENTATION: ORIENTATION: RIGHT;LEFT;LOWER

## 2019-12-26 NOTE — PROGRESS NOTES
CREATININE 1.0 1.3* 1.2     LIVER PROFILE: No results for input(s): AST, ALT, LIPASE, BILIDIR, BILITOT, ALKPHOS in the last 72 hours. Invalid input(s): AMYLASE,  ALB  PT/INR:   Recent Labs     12/23/19  1651 12/24/19  0228   PROTIME 14.7* 16.6*   INR 1.26* 1.43*     APTT: No results for input(s): APTT in the last 72 hours. BNP:  No results for input(s): BNP in the last 72 hours. IMAGING: LVEF 35-40%    Assessment:  Patient Active Problem List    Diagnosis Date Noted    Abnormal result of other cardiovascular function study (CODE)      Priority: High    Acute pulmonary edema (HCC)     Acute systolic (congestive) heart failure (HCC)     Atrial fibrillation with RVR ([facility] [address] 12/23/2019    Right hip pain 11/20/2017    Abnormal nuclear stress test 10/06/2017    Hyperlipidemia LDL goal <100 02/10/2016    Benign essential HTN 12/29/2015    Morbid obesity due to excess calories ([facility] [address] 12/29/2015    Pure hypercholesterolemia 12/29/2015    Bilateral knee pain 12/29/2015    Pre-diabetes 12/29/2015       Plan:  1. Abnormal ECG and reduced LVEF on echo:  D/w Dr. Di Hebert and cath is indicated to assess for significant CAD. Cath in 2017 was negative for significant CAD. RBA discussed with patient and she wishes to proceed.   Given the eliquis was discontinued last night, will perform cath tomorrow 12/27/19 at 2pm.      Core Measures:  · Discharge instructions:   · LVEF documented:   · ACEI for LV dysfunction:   · Smoking Cessation:    Hasmukh Ferreira MD 12/26/2019 3:21 PM

## 2019-12-26 NOTE — DISCHARGE SUMMARY
INTERNAL MEDICINE    RESIDENT DISCHARGE SUMMARY      Patient ID: Tameka Record   Gender: female      :  1940  AGE: 78 y.o. MRN:  [id number]  Code Status: Full Code   PCP: Barby Zavala MD    Admit date:  2019      Discharge date:  No discharge date for patient encounter. Admitting Physician:  Mian Mann MD    Discharge Physician: Nelly Everett MD     Admission Condition:  Poor  Discharged Condition:      Discharge Diagnoses:  1. Acute hypoxic respiratory failure  2. Acute systolic heart failure  3. New onset atrial fibrillation with rapid ventricular response  4. Acute kidney injury  5. DM type II  6. HTN     Active Hospital Problems    Diagnosis Date Noted    Acute pulmonary edema ([address] [J81.0]     Acute systolic (congestive) heart failure (HCC) [I50.21]     Atrial fibrillation with RVR ([facility] [address] [I48.91] 2019       The patient was seen and examined on day of discharge and this discharge summary is in conjunction with any daily progress note from day of discharge. Hospital Course:  Ms. English Record is an 79 yo F with PMH PUD, HTN, HLD, no prior heart/lung disease who presented with SOB for several days. Associated symptoms included cough, orthopnea but no chest pain. She was admitted for acute hypoxic respiratory failure (requiring BiPAP) secondary to acute systolic heart failure (LVEF 35-40% from 55% in 2017) and new onset Afib with RVR. Her volume status and hypoxia improved with sequential lasix administration. She was also started on diltiazem gtt with good rate control of her Afib, following which she was transitioned to lopressor and then toprol prior to discharge. Her [id number] score was 5, for which she was started on Eliquis for anticoagulation. In light of her abnormal ECG findings and new onset heart failure, she was agreeable to undergoing angiogram which revealed _________.      Pt also developed an MARIA during her hospital course, likely secondary to Neurological:      General: No focal deficit present. Mental Status: She is alert and oriented to person, place, and time. Mental status is at baseline. Psychiatric:         Mood and Affect: Mood normal.         Behavior: Behavior normal.    Labs: For convenience and continuity at follow-up the following most recent labs are provided:      CBC:    Lab Results   Component Value Date    WBC 5.9 12/26/2019    HGB 10.2 12/26/2019    HCT 32.4 12/26/2019     12/26/2019       Renal:    Lab Results   Component Value Date     12/26/2019    K 3.8 12/26/2019    CL 98 12/26/2019    CO2 32 12/26/2019    BUN 32 12/26/2019    CREATININE 1.2 12/26/2019    CALCIUM 9.0 12/26/2019         Significant Diagnostic Studies    Radiology:   XR CHEST PORTABLE   Final Result      Cardiomegaly with bilateral interstitial infiltrates suggesting edema versus pneumonia. Consults:     IP CONSULT TO CRITICAL CARE  IP CONSULT TO HOSPITALIST  IP CONSULT TO CARDIOLOGY      Discharge Instructions/Follow-up:  PCP, Cardiology    Activity: activity as tolerated    Diet: cardiac diet    Discharge Medications:     Current Discharge Medication List           Details   Dextromethorphan-guaiFENesin  MG/5ML SYRP Take 10 mLs by mouth every 8 hours as needed for Cough      hydrochlorothiazide (HYDRODIURIL) 25 MG tablet Take 1 tablet by mouth daily  Qty: 90 tablet, Refills: 1    Associated Diagnoses: Benign essential HTN             Time Spent on discharge is more than 45 minutes in the examination, evaluation, counseling and review of medications and discharge plan. Signed:    Maria Teresa Vicente MD   Internal Medicine Resident, PGY-1  12/27/2019      Thank you Belén Fuller MD for the opportunity to be involved in this patient's care. If you have any questions or concerns please feel free to contact me.

## 2019-12-26 NOTE — PLAN OF CARE
No falls this shift, bed in lowest position, bed alarm on, non skid socks on, call light within reach, hourly checks, safety maintained, will continue to monitor. Problem: Falls - Risk of:  Goal: Will remain free from falls  Description  Will remain free from falls  Outcome: Ongoing    Oxygen saturation good on room air without issue. Pt transferred from bed to chair with only slight SOB on exertion.      Problem: Gas Exchange - Impaired:  Goal: Levels of oxygenation will improve  Description  Levels of oxygenation will improve  Outcome: Ongoing

## 2019-12-26 NOTE — CARE COORDINATION
Case Management Assessment           Initial Evaluation                Date / Time of Evaluation: 12/26/2019 10:58 AM                 Assessment Completed by: Liza Radford    Patient Name: Evelyn Campos     YOB: 1940  Diagnosis: Atrial fibrillation with RVR ([facility] [address] [I48.91]  Atrial fibrillation with RVR ([facility] [address] [I48.91]     Date / Time: 12/23/2019  9:30 AM    Patient Admission Status: Inpatient    If patient is discharged prior to next notation, then this note serves as note for discharge by case management. Current PCP: Leigh Love MD  Clinic Patient: No    Chart Reviewed: Yes  Patient/ Family Interviewed: Yes    Initial assessment completed at bedside with: patient    Hospitalization in the last 30 days: No    Emergency Contacts:  Extended Emergency Contact Information  [facility] [address] Phone: [phone]  Mobile Phone: [phone]  Relation: Child  Secondary Emergency Contact: Jane Salcedo  [place] Phone: [phone]  Relation: Brother/Sister    Advance Directives:   Code Status: Full Code    Financial  Payor: MEDICARE / Plan: MEDICARE PART A AND B / Product Type: *No Product type* /     Pre-cert required for SNF: No    Pharmacy    [facility] [address] [facility] [phone] - f [phone]  [address] [facility] [address]  Phone: [phone] Fax: [address] [phone] Edin Augustin [address] [place] [address]  Phone: [phone] Fax: [phone]      Potential assistance Purchasing Medications: Potential Assistance Purchasing Medications: No  Does Patient want to participate in local refill/ meds to beds program?: No    Meds To Beds General Rules:  1. Can ONLY be done Monday- Friday between 8:30am-5pm  2. Prescription(s) must be in pharmacy by 3pm to be filled same day  3. Copy of patient's insurance/ prescription drug card and patient face sheet must be sent along with the prescription(s)  4. Cost of Rx cannot be added to hospital bill. If financial assistance is needed, please contact unit  or ;  or  CANNOT provide pharmacy voucher for patients co-pays  5. Patients can then  the prescription on their way out of the hospital at discharge, or pharmacy can deliver to the bedside if staff is available. (payment due at time of pick-up or delivery - cash, check, or card accepted)     Able to afford home medications/ co-pay costs: Yes    ADLS  Support Systems: Children, Family Members    PT AM-PAC:   /24  OT AM-PAC: 23 /24    [place]: lives daughter/family very much involved with her needs and care  Steps: none    Plans to RETURN to current housing: Yes  Barriers to RETURNING to current housing: none    [address]  Currently ACTIVE with [address]: No  Home Care Agency: Not Applicable    Currently ACTIVE with [place] on Aging: No    Durable Medical Equipment  DME Provider: none  Equipment: none    Home Oxygen and [address] [place] prior to admission: No  [address]: Not Applicable  Dialysis  Active with HD/PD prior to admission: No  DISCHARGE PLAN:  Disposition: Home- No Services Needed    Transportation PLAN for discharge: family daughter or sister    Factors facilitating achievement of predicted outcomes: Family support, Caregiver support, Friend support, Motivated, Cooperative, Pleasant, Sense of humor and Good insight into deficits    Barriers to discharge: Medical complications    Additional Case Management Notes: patient states no needs at discharge. Family members are often helping her with any transport needs and she lives with daughter on one level home.       The Plan for Transition of Care is related to the following treatment goals of Atrial fibrillation with RVR ([facility] [address]

## 2019-12-26 NOTE — PROGRESS NOTES
LDSSI  - Accuchecks, hypoglycemia protocol  - Will start metformin on discharge    HTN - well controlled  - Increase Hydralazine to 25mg q8h  - Start Toprol XR 50mg daily tomorrow  - Continue Imdur 30mg daily  - Holding PO lasix 40mg    Discharge Planning  - PT/OT on board: score 23/24  - CW following: discharge home, no services required    Code Status: Full Code   FEN: Cardiac diet  PPX: Eliquis  DISPO: [employer]    Petra Muniz MD, PGY-1  12/26/19  8:40 AM    This patient has been staffed and discussed with Leonardo Fields MD.

## 2019-12-26 NOTE — CARE COORDINATION
Patient spoke to cardiologist and patient will have Eliquis washout today and tomorrow afternoon be scheduled for angio. CM will continue to follow patient until discharge.   Electronically signed by Fifi Valenzuela RN on 12/26/2019 at 2:55 PM

## 2019-12-26 NOTE — PROGRESS NOTES
Occupational Therapy   Occupational Therapy Initial Assessment and Treatment  Discharge  Date: 2019   Patient Name: Conchita Zavala  MRN: [id number]     : 1940    Date of Service: 2019    Discharge Recommendations:  Conchita Zavala scored a 23/24 on the AM-PAC ADL Inpatient form. At this time, no further OT is recommended upon discharge due to baseline abilities. Recommend patient returns to prior setting with prior services. OT Equipment Recommendations  Equipment Needed: No    Assessment   Assessment: Pt demonstrating ability to perform ADLs/transfers/mobiility safely and with independence/modified independence. Limited by HR elevation on min exertion - functionally safe and independent. Plans to discharge home. No DME needs. No furhter OT needs indicated. Decision Making: Low Complexity  OT Education: OT Role  REQUIRES OT FOLLOW UP: No  Activity Tolerance  Activity Tolerance: Treatment limited secondary to medical complications (free text)  Activity Tolerance: functionally limited by fluctuating/elevated HR (120s-160s)  Safety Devices  Safety Devices in place: Yes  Type of devices: Nurse notified; Left in chair;Chair alarm in place;Call light within reach           Patient Diagnosis(es): The primary encounter diagnosis was Atrial fibrillation with RVR ([address] A diagnosis of Acute pulmonary edema (HCC) was also pertinent to this visit. has a past medical history of Acute systolic (congestive) heart failure ([address] Edema, Hearing loss, Hyperlipidemia, Hypertension, Morbid obesity due to excess calories ([address] Pre-diabetes, and PUD (peptic ulcer disease). has a past surgical history that includes hernia repair and knee surgery (Bilateral). Restrictions  Position Activity Restriction  Other position/activity restrictions: up with assist    Subjective   General  Chart Reviewed: Yes  Additional Pertinent Hx: 78 y.o. F to ED w/ c/o SOB.        Hospital Course:  CXR: edema vs. pneumonia; ICU; BiPap. PMH: PUD, Morbid Obesity, HTN, Hyperlipidemia, Edema, Acute CHF, Hernia Repair. Family / Caregiver Present: No  Referring Practitioner: Dr. Isha Beverly  Diagnosis: Atrial Fibrillation RVR    Subjective  Subjective: Sitting up in chair on entry. Plans for home at discharge. Patient Currently in Pain: No    Social/Functional History  Social/Functional History  Lives With: Alone(family is in and out a lot, suzan ( 15 yoa_ lives with her.))  Type of Home: House  Home Layout: Two level, Able to Live on Main level with bedroom/bathroom, Performs ADL's on one level  Home Access: Ramped entrance  Bathroom Shower/Tub: (walk in tub)  Bathroom Toilet: Handicap height  Bathroom Equipment: Grab bars in shower, Hand-held shower, Built-in shower seat  Bathroom Accessibility: Accessible  Home Equipment: Rolling walker, Cane(uses DMe sometimes when her legs are swollen)  ADL Assistance: Independent  Homemaking Assistance: Needs assistance(does meal prep and light cleaning.)  Ambulation Assistance: Independent  Transfer Assistance: Independent  Active : Yes  Occupation: Part time employment  Type of occupation: 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 They go out every day and the patient has assist with their care. 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 Additional Comments: sometimes uses scooter cart in big stores        Objective   Vision: Impaired  Vision Exceptions: Wears glasses for reading  Hearing: Exceptions to [facility]  Hearing Exceptions: Hard of hearing/hearing concerns;Bilateral hearing aid    Orientation  Overall Orientation Status: Within Functional Limits     Balance  Sitting Balance: Independent  Standing Balance: Independent  Functional Mobility  Functional - Mobility Device: No device  Activity: To/from bathroom; Other  Assist Level: Supervision  Functional Mobility Comments: NOTE: HR fluctuating 120s-160s (returned to room)  Toilet Transfers  Toilet - Technique: Ambulating  Equipment Used: Standard toilet(w/ bar)  Toilet Transfer: Modified independent  ADL  Feeding: Independent  LE Dressing: Modified independent   Toileting: Modified independent   Tone RUE  RUE Tone: Normotonic  Tone LUE  LUE Tone: Normotonic  Coordination  Movements Are Fluid And Coordinated: Yes     Bed mobility  Comment: NT - up in chair on entry; up in chair at end of treatment  Transfers  Sit to stand: Independent  Stand to sit: Independent     Cognition  Overall Cognitive Status: WNL                 LUE AROM (degrees)  LUE AROM : WFL  Left Hand AROM (degrees)  Left Hand AROM: WFL  RUE AROM (degrees)  RUE AROM : WFL  Right Hand AROM (degrees)  Right Hand AROM: WFL  LUE Strength  Gross LUE Strength: WFL  RUE Strength  Gross RUE Strength: WFL               Pt seen by OT for eval and treat. Treatment included:  Functional transfer/mobility, ADL, pt education         Plan   Discharge acute OT - no needs.                                                       AM-PAC Score        AM-[facility] Inpatient Daily Activity Raw Score: 23 (12/26/19 1047)  AM-PAC Inpatient ADL T-Scale Score : 51.12 (12/26/19 1047)  ADL Inpatient CMS 0-100% Score: 15.86 (12/26/19 1047)  ADL Inpatient CMS G-Code Modifier : CI (12/26/19 1047)              Therapy Time   Individual Concurrent Group Co-treatment   Time In 0830         Time Out 0908         Minutes 38           Timed Code Treatment Minutes:   23    Total Treatment Minutes:  [facility] OTR/L #[id number]

## 2019-12-26 NOTE — PROGRESS NOTES
Physical Therapy    Facility/Department: [place] [address] PCU  Initial Assessment, Treatment and discharge    NAME: Odalis Harris  : 1940  MRN: [id number]    Date of Service: 2019    Discharge Recommendations:    Odalis Harris scored a 23/24 on the AM-PAC short mobility form. Current research shows that an AM-PAC score of 18 or greater is typically associated with a discharge to the patient's home setting. Based on the patients AM-PAC score and their current functional mobility deficits, it is recommended that the patient have 2-3 sessions per week of Physical Therapy at d/c to increase the patients independence. PT Equipment Recommendations  Equipment Needed: No    Assessment   Assessment: Pt currently appears to be at baseline in terms of functional mobility and gait, good stability and is normally indepedent. No PT needs identified at this time. Recommended that pt get up with nursing staff as medical status allows  Treatment Diagnosis: Decreased functional mobility 2/2 endurance  Prognosis: Good  Decision Making: Medium Complexity  PT Education: Energy Conservation;General Safety;PT Role  Barriers to Learning: none noted  REQUIRES PT FOLLOW UP: No  Activity Tolerance  Activity Tolerance: Limited by increased HR - increased to 150's with activity, returned to room       Patient Diagnosis(es): The primary encounter diagnosis was Atrial fibrillation with RVR ([address] A diagnosis of Acute pulmonary edema (HCC) was also pertinent to this visit. has a past medical history of Acute systolic (congestive) heart failure ([address] Edema, Hearing loss, Hyperlipidemia, Hypertension, Morbid obesity due to excess calories ([address] Pre-diabetes, and PUD (peptic ulcer disease). has a past surgical history that includes hernia repair and knee surgery (Bilateral).     Restrictions  Position Activity Restriction  Other position/activity restrictions: up with assist  Vision/Hearing  Vision: Impaired  Vision Exceptions: RLE: WFL  Strength LLE  Strength LLE: WFL           Transfers  Sit to Stand: Supervision  Stand to sit: Supervision  Ambulation  Ambulation?: Yes  Ambulation 1  Surface: level tile  Device: No Device  Assistance: Supervision  Quality of Gait: slow and steady gait, wide PATRICIA 2/2 body habitus, no LOB noted  Distance: 40 ft  Comments: limited by endurance,  increased HR with activity     Balance  Sitting - Static: Good  Standing - Static: Good  Standing - Dynamic: Good  Comments: good stability with functional mobility and gait      Education  Patient educated regarding safety with functional mobility, transfers and gait. Pt demonstrates good safety awareness. Pt also educated regarding use of call light for safety with mobility. Pt expresses understanding. Treatment rendered and includes  transfers and gait training, and education regarding safety with functional mobility.     Plan   Plan  Times per week: DC from PT  Safety Devices  Type of devices: Call light within reach, Chair alarm in place, Nurse notified, Left in chair    AM-PAC Score  AM-PAC Inpatient Mobility Raw Score : 23 (12/26/19 1112)  AM-PAC Inpatient T-Scale Score : 56.93 (12/26/19 1112)  Mobility Inpatient CMS 0-100% Score: 11.2 (12/26/19 1112)  Mobility Inpatient CMS G-Code Modifier : CI (12/26/19 1112)        Therapy Time   Individual Concurrent Group Co-treatment   Time In 0830         Time Out 0908         Minutes 38               Timed Code Treatment Minutes:   20    Total Treatment Minutes:  [place] [address], [id number]

## 2019-12-27 LAB
ANION GAP SERPL CALCULATED.3IONS-SCNC: 12 MMOL/L (ref 3–16)
ANTI-XA UNFRAC HEPARIN: 0.67 IU/ML (ref 0.3–0.7)
APTT: 38.1 SEC (ref 24.2–36.2)
BASOPHILS ABSOLUTE: 0.1 K/UL (ref 0–0.2)
BASOPHILS RELATIVE PERCENT: 1.2 %
BUN BLDV-MCNC: 28 MG/DL (ref 7–20)
CALCIUM SERPL-MCNC: 9.2 MG/DL (ref 8.3–10.6)
CHLORIDE BLD-SCNC: 97 MMOL/L (ref 99–110)
CO2: 30 MMOL/L (ref 21–32)
CREAT SERPL-MCNC: 0.9 MG/DL (ref 0.6–1.2)
EOSINOPHILS ABSOLUTE: 0.1 K/UL (ref 0–0.6)
EOSINOPHILS RELATIVE PERCENT: 1.3 %
GFR AFRICAN AMERICAN: >60
GFR NON-AFRICAN AMERICAN: >60
GLUCOSE BLD-MCNC: 106 MG/DL (ref 70–99)
GLUCOSE BLD-MCNC: 116 MG/DL (ref 70–99)
GLUCOSE BLD-MCNC: 123 MG/DL (ref 70–99)
GLUCOSE BLD-MCNC: 130 MG/DL (ref 70–99)
GLUCOSE BLD-MCNC: 133 MG/DL (ref 70–99)
GLUCOSE BLD-MCNC: 186 MG/DL (ref 70–99)
HCT VFR BLD CALC: 35.4 % (ref 36–48)
HCT VFR BLD CALC: 39.5 % (ref 36–48)
HEMOGLOBIN: 11.2 G/DL (ref 12–16)
HEMOGLOBIN: 12.2 G/DL (ref 12–16)
INR BLD: 1.24 (ref 0.86–1.14)
LYMPHOCYTES ABSOLUTE: 2 K/UL (ref 1–5.1)
LYMPHOCYTES RELATIVE PERCENT: 36.8 %
MAGNESIUM: 2.1 MG/DL (ref 1.8–2.4)
MCH RBC QN AUTO: 24.4 PG (ref 26–34)
MCH RBC QN AUTO: 24.7 PG (ref 26–34)
MCHC RBC AUTO-ENTMCNC: 30.9 G/DL (ref 31–36)
MCHC RBC AUTO-ENTMCNC: 31.5 G/DL (ref 31–36)
MCV RBC AUTO: 77.6 FL (ref 80–100)
MCV RBC AUTO: 80 FL (ref 80–100)
MONOCYTES ABSOLUTE: 0.4 K/UL (ref 0–1.3)
MONOCYTES RELATIVE PERCENT: 8.1 %
NEUTROPHILS ABSOLUTE: 2.8 K/UL (ref 1.7–7.7)
NEUTROPHILS RELATIVE PERCENT: 52.6 %
PDW BLD-RTO: 16.9 % (ref 12.4–15.4)
PDW BLD-RTO: 18.1 % (ref 12.4–15.4)
PERFORMED ON: ABNORMAL
PLATELET # BLD: 252 K/UL (ref 135–450)
PLATELET # BLD: 253 K/UL (ref 135–450)
PMV BLD AUTO: 8.1 FL (ref 5–10.5)
PMV BLD AUTO: 8.3 FL (ref 5–10.5)
POTASSIUM REFLEX MAGNESIUM: 3.9 MMOL/L (ref 3.5–5.1)
PROTHROMBIN TIME: 14.4 SEC (ref 10–13.2)
RBC # BLD: 4.57 M/UL (ref 4–5.2)
RBC # BLD: 4.94 M/UL (ref 4–5.2)
SODIUM BLD-SCNC: 139 MMOL/L (ref 136–145)
WBC # BLD: 5.3 K/UL (ref 4–11)
WBC # BLD: 6.3 K/UL (ref 4–11)

## 2019-12-27 PROCEDURE — 80048 BASIC METABOLIC PNL TOTAL CA: CPT

## 2019-12-27 PROCEDURE — 6360000002 HC RX W HCPCS

## 2019-12-27 PROCEDURE — 6360000002 HC RX W HCPCS: Performed by: STUDENT IN AN ORGANIZED HEALTH CARE EDUCATION/TRAINING PROGRAM

## 2019-12-27 PROCEDURE — 6360000002 HC RX W HCPCS: Performed by: INTERNAL MEDICINE

## 2019-12-27 PROCEDURE — 85027 COMPLETE CBC AUTOMATED: CPT

## 2019-12-27 PROCEDURE — 85520 HEPARIN ASSAY: CPT

## 2019-12-27 PROCEDURE — 99233 SBSQ HOSP IP/OBS HIGH 50: CPT | Performed by: INTERNAL MEDICINE

## 2019-12-27 PROCEDURE — 36415 COLL VENOUS BLD VENIPUNCTURE: CPT

## 2019-12-27 PROCEDURE — 83735 ASSAY OF MAGNESIUM: CPT

## 2019-12-27 PROCEDURE — 2580000003 HC RX 258: Performed by: NURSE PRACTITIONER

## 2019-12-27 PROCEDURE — 85610 PROTHROMBIN TIME: CPT

## 2019-12-27 PROCEDURE — 6370000000 HC RX 637 (ALT 250 FOR IP)

## 2019-12-27 PROCEDURE — 85730 THROMBOPLASTIN TIME PARTIAL: CPT

## 2019-12-27 PROCEDURE — 2580000003 HC RX 258

## 2019-12-27 PROCEDURE — 2060000000 HC ICU INTERMEDIATE R&B

## 2019-12-27 PROCEDURE — 85025 COMPLETE CBC W/AUTO DIFF WBC: CPT

## 2019-12-27 PROCEDURE — 6370000000 HC RX 637 (ALT 250 FOR IP): Performed by: STUDENT IN AN ORGANIZED HEALTH CARE EDUCATION/TRAINING PROGRAM

## 2019-12-27 PROCEDURE — 2500000003 HC RX 250 WO HCPCS

## 2019-12-27 RX ORDER — HEPARIN SODIUM 5000 [USP'U]/ML
10000 INJECTION, SOLUTION INTRAVENOUS; SUBCUTANEOUS ONCE
Status: DISCONTINUED | OUTPATIENT
Start: 2019-12-27 | End: 2019-12-27

## 2019-12-27 RX ORDER — HEPARIN SODIUM 5000 [USP'U]/ML
5000 INJECTION, SOLUTION INTRAVENOUS; SUBCUTANEOUS PRN
Status: DISCONTINUED | OUTPATIENT
Start: 2019-12-27 | End: 2019-12-31 | Stop reason: HOSPADM

## 2019-12-27 RX ORDER — SODIUM CHLORIDE 0.9 % (FLUSH) 0.9 %
10 SYRINGE (ML) INJECTION PRN
Status: DISCONTINUED | OUTPATIENT
Start: 2019-12-27 | End: 2019-12-27 | Stop reason: SDUPTHER

## 2019-12-27 RX ORDER — METOPROLOL SUCCINATE 50 MG/1
50 TABLET, EXTENDED RELEASE ORAL DAILY
Qty: 30 TABLET | Refills: 1 | Status: CANCELLED | OUTPATIENT
Start: 2019-12-28

## 2019-12-27 RX ORDER — HEPARIN SODIUM 5000 [USP'U]/ML
10000 INJECTION, SOLUTION INTRAVENOUS; SUBCUTANEOUS PRN
Status: DISCONTINUED | OUTPATIENT
Start: 2019-12-27 | End: 2019-12-27

## 2019-12-27 RX ORDER — FUROSEMIDE 10 MG/ML
40 INJECTION INTRAMUSCULAR; INTRAVENOUS DAILY
Status: DISCONTINUED | OUTPATIENT
Start: 2019-12-27 | End: 2019-12-27

## 2019-12-27 RX ORDER — FUROSEMIDE 40 MG/1
40 TABLET ORAL DAILY
Qty: 60 TABLET | Refills: 1 | Status: CANCELLED | OUTPATIENT
Start: 2019-12-28

## 2019-12-27 RX ORDER — HYDRALAZINE HYDROCHLORIDE 50 MG/1
50 TABLET, FILM COATED ORAL EVERY 8 HOURS SCHEDULED
Qty: 90 TABLET | Refills: 1 | Status: CANCELLED | OUTPATIENT
Start: 2019-12-27

## 2019-12-27 RX ORDER — HYDRALAZINE HYDROCHLORIDE 25 MG/1
25 TABLET, FILM COATED ORAL EVERY 8 HOURS SCHEDULED
Qty: 90 TABLET | Refills: 1 | Status: CANCELLED | OUTPATIENT
Start: 2019-12-27

## 2019-12-27 RX ORDER — HYDRALAZINE HYDROCHLORIDE 50 MG/1
50 TABLET, FILM COATED ORAL EVERY 8 HOURS SCHEDULED
Status: DISCONTINUED | OUTPATIENT
Start: 2019-12-27 | End: 2019-12-30

## 2019-12-27 RX ORDER — ISOSORBIDE MONONITRATE 30 MG/1
30 TABLET, EXTENDED RELEASE ORAL DAILY
Qty: 30 TABLET | Refills: 1 | Status: CANCELLED | OUTPATIENT
Start: 2019-12-28

## 2019-12-27 RX ORDER — SODIUM CHLORIDE 0.9 % (FLUSH) 0.9 %
10 SYRINGE (ML) INJECTION EVERY 12 HOURS SCHEDULED
Status: DISCONTINUED | OUTPATIENT
Start: 2019-12-27 | End: 2019-12-27 | Stop reason: SDUPTHER

## 2019-12-27 RX ORDER — SODIUM CHLORIDE 9 MG/ML
INJECTION, SOLUTION INTRAVENOUS CONTINUOUS
Status: DISCONTINUED | OUTPATIENT
Start: 2019-12-27 | End: 2019-12-29

## 2019-12-27 RX ORDER — FUROSEMIDE 10 MG/ML
40 INJECTION INTRAMUSCULAR; INTRAVENOUS 2 TIMES DAILY
Status: DISCONTINUED | OUTPATIENT
Start: 2019-12-27 | End: 2019-12-28

## 2019-12-27 RX ORDER — HEPARIN SODIUM 10000 [USP'U]/100ML
8 INJECTION, SOLUTION INTRAVENOUS CONTINUOUS
Status: DISPENSED | OUTPATIENT
Start: 2019-12-27 | End: 2019-12-30

## 2019-12-27 RX ADMIN — SODIUM CHLORIDE: 9 INJECTION, SOLUTION INTRAVENOUS at 12:00

## 2019-12-27 RX ADMIN — FUROSEMIDE 40 MG: 10 INJECTION, SOLUTION INTRAMUSCULAR; INTRAVENOUS at 16:31

## 2019-12-27 RX ADMIN — Medication 10 ML: at 08:29

## 2019-12-27 RX ADMIN — METOPROLOL SUCCINATE 50 MG: 50 TABLET, EXTENDED RELEASE ORAL at 08:29

## 2019-12-27 RX ADMIN — HEPARIN SODIUM 13 UNITS/KG/HR: 10000 INJECTION, SOLUTION INTRAVENOUS at 16:32

## 2019-12-27 RX ADMIN — Medication 10 ML: at 22:37

## 2019-12-27 RX ADMIN — INSULIN LISPRO 1 UNITS: 100 INJECTION, SOLUTION INTRAVENOUS; SUBCUTANEOUS at 17:26

## 2019-12-27 RX ADMIN — ISOSORBIDE MONONITRATE 30 MG: 30 TABLET, EXTENDED RELEASE ORAL at 08:29

## 2019-12-27 RX ADMIN — HYDRALAZINE HYDROCHLORIDE 50 MG: 50 TABLET, FILM COATED ORAL at 22:37

## 2019-12-27 RX ADMIN — HYDRALAZINE HYDROCHLORIDE 25 MG: 25 TABLET, FILM COATED ORAL at 06:11

## 2019-12-27 RX ADMIN — MAGNESIUM HYDROXIDE 30 ML: 400 SUSPENSION ORAL at 06:14

## 2019-12-27 RX ADMIN — HYDRALAZINE HYDROCHLORIDE 50 MG: 50 TABLET, FILM COATED ORAL at 16:30

## 2019-12-27 ASSESSMENT — PAIN SCALES - GENERAL
PAINLEVEL_OUTOF10: 0

## 2019-12-27 NOTE — CARE COORDINATION
Case Management Assessment           Daily Note                 Date/ Time of Note: 12/27/2019 10:03 AM         Note completed by: Marco Bustamante    Patient Name: Sherrie Ziegler  YOB: 1940    Diagnosis:Atrial fibrillation with RVR ([facility] [address] [I48.91]  Atrial fibrillation with RVR ([facility] [address] [I48.91]  Patient Admission Status: Inpatient    Date of Admission:12/23/2019  9:30 AM Length of Stay: 4 GLOS:        Current Plan of Care: plans for Cath today 12.27 at 1400  ________________________________________________________________________________________  PT AM-PAC: 23 / 24 per last evaluation on: 12.26    OT AM-PAC: 23 / 24 per last evaluation on: 12.26    DME Needs for discharge: n/a    ________________________________________________________________________________________  Discharge Plan: Home    Tentative discharge date: 12.28    Current barriers to discharge: pending procedures    Referrals completed: Not Applicable    Resources/ information provided: Not indicated at this time  ________________________________________________________________________________________  Case Management Notes: Patient is from home with grand daughter and plans to return at discharge. No CM needs at this time. Will continue to follow up to discharge. Ignaciorula Vikram and her family were provided with choice of provider; she and her family are in agreement with the discharge plan.     Care Transition Patient: Yes    Marco Bustamante RN  [facility]  Case Management Department  Ph: [phone]  Fax: [phone]

## 2019-12-27 NOTE — PLAN OF CARE
Problem: Falls - Risk of:  Goal: Will remain free from falls  Description  Will remain free from falls  Outcome: Ongoing  Note:   Pt free from injury or falls at this time, fall precautions in place, bed in low position, side rail up x2, Mejia Fall Risk: Medium (25-44), bed alarm on, reoriented to room and call light, reminded not to get up without assistance. Pt verbalizes understanding of fall risk procedures. Will continue with hourly rounds for PO intake, pain needs, toileting, and repositioning as needed. No needs expressed at this time. Call light in reach, will continue to monitor. Problem: Cardiac Output - Decreased:  Goal: Hemodynamic stability will improve  Description  Hemodynamic stability will improve  Outcome: Ongoing  Note:   Pt remains hemodynamically stable at this time. Vss. Denies chest pain, sob, lightheadedness, dizziness, or palpitations. Strict I/Os maintained with daily weights. NSR on tele. SpO2 remains >92% on room air . Will continue to monitor.

## 2019-12-27 NOTE — PROGRESS NOTES
Progress Note    Admit Date: 12/23/2019  Day: 5  Diet: Diet NPO Effective Now    CC: Palpitations    Interval history: No acute events overnight. A fib rate controlled on tele. Denies chest pain, SOB, palpitations. Denies orthopnea or PND. States main reason she came into the hospital was palpitations and fatigue with exertion. Ambulated with PT yesterday, had fatigue and palpitations with rise in HR, and session was terminated. Denies fevers, chills, reports constipation. Medications:     Scheduled Meds:   hydrALAZINE  25 mg Oral 3 times per day    metoprolol succinate  50 mg Oral Daily    [Held by provider] furosemide  40 mg Oral Daily    isosorbide mononitrate  30 mg Oral Daily    sodium chloride flush  10 mL Intravenous 2 times per day    insulin lispro  0-6 Units Subcutaneous TID WC    insulin lispro  0-3 Units Subcutaneous Nightly     Continuous Infusions:   sodium chloride      dextrose       PRN Meds:sodium chloride flush, magnesium hydroxide, ondansetron, acetaminophen, glucose, dextrose, glucagon (rDNA), dextrose    Objective:   Vitals:   T-max:  Patient Vitals for the past 8 hrs:   BP Temp Temp src Pulse Resp SpO2 Weight   12/27/19 0729 (!) 151/89 -- -- -- -- -- --   12/27/19 0726 (!) 159/90 97.6 °F (36.4 °C) Oral 87 18 93 % --   12/27/19 0630 -- -- -- -- -- -- (!) 342 lb 6 oz (155.3 kg)   12/27/19 0507 (!) 140/91 97.9 °F (36.6 °C) Oral 81 18 93 % --       Intake/Output Summary (Last 24 hours) at 12/27/2019 0929  Last data filed at 12/27/2019 6121  Gross per 24 hour   Intake 490 ml   Output 550 ml   Net -60 ml       Review of Systems   As per above    Physical Exam  Constitutional:       General: She is not in acute distress. Appearance: She is obese. HENT:      Head: Normocephalic and atraumatic. Nose: Nose normal. No rhinorrhea. Mouth/Throat:      Mouth: Mucous membranes are moist.      Pharynx: Oropharynx is clear.    Eyes:      Conjunctiva/sclera: Conjunctivae normal. Pupils: Pupils are equal, round, and reactive to light. Cardiovascular:      Rate and Rhythm: Normal rate. Rhythm irregular. Heart sounds: No murmur. No friction rub. No gallop. Pulmonary:      Effort: Pulmonary effort is normal. No respiratory distress. Breath sounds: Normal breath sounds. No stridor. No wheezing, rhonchi or rales. Chest:      Chest wall: No tenderness. Abdominal:      General: Bowel sounds are normal.      Tenderness: There is no tenderness. There is no guarding or rebound. Musculoskeletal: Normal range of motion. General: No tenderness. Right lower leg: No edema. Left lower leg: No edema. Skin:     General: Skin is warm and dry. Neurological:      General: No focal deficit present. Mental Status: She is alert and oriented to person, place, and time. Mental status is at baseline. Psychiatric:         Mood and Affect: Mood normal.         Behavior: Behavior normal.         LABS:    CBC:   Recent Labs     12/25/19  0432 12/26/19  0430 12/27/19  0529   WBC 6.0 5.9 5.3   HGB 10.2* 10.2* 11.2*   HCT 32.2* 32.4* 35.4*    236 252   MCV 77.3* 77.7* 77.6*     Renal:    Recent Labs     12/25/19  0432 12/26/19  0430 12/27/19  0529    142 139   K 3.8 3.8 3.9   CL 97* 98* 97*   CO2 28 32 30   BUN 28* 32* 28*   CREATININE 1.3* 1.2 0.9   GLUCOSE 113* 135* 133*   CALCIUM 8.9 9.0 9.2   ANIONGAP 13 12 12     Hepatic: No results for input(s): AST, ALT, BILITOT, BILIDIR, PROT, LABALBU, ALKPHOS in the last 72 hours. Troponin:   No results for input(s): TROPONINI in the last 72 hours. BNP: No results for input(s): BNP in the last 72 hours. Lipids: No results for input(s): CHOL, HDL in the last 72 hours. Invalid input(s): LDLCALCU, TRIGLYCERIDE  ABGs:  No results for input(s): PHART, MSQ1FQR, PO2ART, TZP1MYZ, BEART, THGBART, M1MFSWGL, EBB2KPC in the last 72 hours. INR:   No results for input(s): INR in the last 72 hours.   Lactate:   No results for input(s): LACTATE in the last 72 hours. Cultures:  -----------------------------------------------------------------  RAD:   XR CHEST PORTABLE   Final Result      Cardiomegaly with bilateral interstitial infiltrates suggesting edema versus pneumonia. Assessment/Plan:     Acute hypoxic respiratory failure - 2/2 new onset acute systolic heart failure  Presented with SOB, CXR with bilateral pulmonary edema. Pro-BNP 2,266. TTE with LVEF 35-40%, indeterminate diastolic function. New onset systolic HF possibly secondary to underlying ischemic HD  - Strict I's & O's, daily weights  - NPO for cath today at 2 pm  - Continue Imdur 30mg daily, hydralazine 25 TID, toprol XL 50 mg daily  - Can start ACEIs after cath as BP and Cr tolerate  - Hold lasix 40 mg PO daily and start tomorrow  - F/u cardiology recs    New onset Afib with RVR - now rate controlled  TSH normal. Electrolytes wnl.   - Toprol XR 50mg daily  - Eliquis on hold for cath, resume after procedure  - F/u cardiology recs    MARIA   Cr 0.9, baseline 0.6-0.7  - Hold lasix, restart tomorrow given cath today and will receive contrast  - Daily BMP    DMII  Last HbA1c 6.6 (12/23/19).   - LDSSI  - Accuchecks, hypoglycemia protocol  - Will start metformin on discharge    HTN - SBP 150s  - Started toprol XL 50 daily  - Continue hydralazine 25 q8h, imdur 30 mg daily  - Increase hydralazine to 50 q8h pm dose for better BP control  - Hold lasix  - Start ACEI after cath as Cr and BP tolerate    Discharge Planning  - PT/OT on board: score 23/24  - CW following: discharge home, no services required    Code Status: Full Code   FEN: Cardiac diet  PPX: Eliquis  DISPO: TRICE Hebert MD, PGY-1  12/27/19  9:34 AM    This patient has been staffed and discussed with Raliegh Essex, MD.

## 2019-12-28 LAB
ANION GAP SERPL CALCULATED.3IONS-SCNC: 16 MMOL/L (ref 3–16)
ANTI-XA UNFRAC HEPARIN: 0.52 IU/ML (ref 0.3–0.7)
ANTI-XA UNFRAC HEPARIN: 0.71 IU/ML (ref 0.3–0.7)
ANTI-XA UNFRAC HEPARIN: 1.09 IU/ML (ref 0.3–0.7)
BASOPHILS ABSOLUTE: 0.1 K/UL (ref 0–0.2)
BASOPHILS RELATIVE PERCENT: 1.4 %
BUN BLDV-MCNC: 21 MG/DL (ref 7–20)
CALCIUM SERPL-MCNC: 9.5 MG/DL (ref 8.3–10.6)
CHLORIDE BLD-SCNC: 97 MMOL/L (ref 99–110)
CO2: 29 MMOL/L (ref 21–32)
CREAT SERPL-MCNC: 0.8 MG/DL (ref 0.6–1.2)
EOSINOPHILS ABSOLUTE: 0.1 K/UL (ref 0–0.6)
EOSINOPHILS RELATIVE PERCENT: 1 %
GFR AFRICAN AMERICAN: >60
GFR NON-AFRICAN AMERICAN: >60
GLUCOSE BLD-MCNC: 124 MG/DL (ref 70–99)
GLUCOSE BLD-MCNC: 129 MG/DL (ref 70–99)
GLUCOSE BLD-MCNC: 134 MG/DL (ref 70–99)
GLUCOSE BLD-MCNC: 136 MG/DL (ref 70–99)
GLUCOSE BLD-MCNC: 171 MG/DL (ref 70–99)
HCT VFR BLD CALC: 37.1 % (ref 36–48)
HEMOGLOBIN: 11.6 G/DL (ref 12–16)
LYMPHOCYTES ABSOLUTE: 2.2 K/UL (ref 1–5.1)
LYMPHOCYTES RELATIVE PERCENT: 37.3 %
MCH RBC QN AUTO: 24.2 PG (ref 26–34)
MCHC RBC AUTO-ENTMCNC: 31.3 G/DL (ref 31–36)
MCV RBC AUTO: 77.4 FL (ref 80–100)
MONOCYTES ABSOLUTE: 0.4 K/UL (ref 0–1.3)
MONOCYTES RELATIVE PERCENT: 7.1 %
NEUTROPHILS ABSOLUTE: 3.1 K/UL (ref 1.7–7.7)
NEUTROPHILS RELATIVE PERCENT: 53.2 %
PDW BLD-RTO: 17.2 % (ref 12.4–15.4)
PERFORMED ON: ABNORMAL
PLATELET # BLD: 271 K/UL (ref 135–450)
PMV BLD AUTO: 8.5 FL (ref 5–10.5)
POTASSIUM REFLEX MAGNESIUM: 4.2 MMOL/L (ref 3.5–5.1)
PRO-BNP: 4435 PG/ML (ref 0–449)
RBC # BLD: 4.79 M/UL (ref 4–5.2)
SODIUM BLD-SCNC: 142 MMOL/L (ref 136–145)
WBC # BLD: 5.8 K/UL (ref 4–11)

## 2019-12-28 PROCEDURE — 6360000002 HC RX W HCPCS: Performed by: INTERNAL MEDICINE

## 2019-12-28 PROCEDURE — 85025 COMPLETE CBC W/AUTO DIFF WBC: CPT

## 2019-12-28 PROCEDURE — 36415 COLL VENOUS BLD VENIPUNCTURE: CPT

## 2019-12-28 PROCEDURE — 6370000000 HC RX 637 (ALT 250 FOR IP): Performed by: STUDENT IN AN ORGANIZED HEALTH CARE EDUCATION/TRAINING PROGRAM

## 2019-12-28 PROCEDURE — 2060000000 HC ICU INTERMEDIATE R&B

## 2019-12-28 PROCEDURE — 99233 SBSQ HOSP IP/OBS HIGH 50: CPT | Performed by: INTERNAL MEDICINE

## 2019-12-28 PROCEDURE — 2580000003 HC RX 258

## 2019-12-28 PROCEDURE — 99233 SBSQ HOSP IP/OBS HIGH 50: CPT | Performed by: NURSE PRACTITIONER

## 2019-12-28 PROCEDURE — 80048 BASIC METABOLIC PNL TOTAL CA: CPT

## 2019-12-28 PROCEDURE — 85520 HEPARIN ASSAY: CPT

## 2019-12-28 PROCEDURE — 6360000002 HC RX W HCPCS: Performed by: STUDENT IN AN ORGANIZED HEALTH CARE EDUCATION/TRAINING PROGRAM

## 2019-12-28 PROCEDURE — 83880 ASSAY OF NATRIURETIC PEPTIDE: CPT

## 2019-12-28 RX ORDER — METOPROLOL SUCCINATE 100 MG/1
100 TABLET, EXTENDED RELEASE ORAL DAILY
Status: DISCONTINUED | OUTPATIENT
Start: 2019-12-29 | End: 2019-12-28

## 2019-12-28 RX ORDER — FUROSEMIDE 10 MG/ML
60 INJECTION INTRAMUSCULAR; INTRAVENOUS 2 TIMES DAILY
Status: DISCONTINUED | OUTPATIENT
Start: 2019-12-28 | End: 2019-12-29

## 2019-12-28 RX ORDER — METOPROLOL SUCCINATE 100 MG/1
100 TABLET, EXTENDED RELEASE ORAL DAILY
Status: DISCONTINUED | OUTPATIENT
Start: 2019-12-28 | End: 2019-12-31 | Stop reason: HOSPADM

## 2019-12-28 RX ADMIN — INSULIN LISPRO 1 UNITS: 100 INJECTION, SOLUTION INTRAVENOUS; SUBCUTANEOUS at 21:25

## 2019-12-28 RX ADMIN — FUROSEMIDE 40 MG: 10 INJECTION, SOLUTION INTRAMUSCULAR; INTRAVENOUS at 09:26

## 2019-12-28 RX ADMIN — HEPARIN SODIUM 8 UNITS/KG/HR: 10000 INJECTION, SOLUTION INTRAVENOUS at 23:38

## 2019-12-28 RX ADMIN — Medication 10 ML: at 21:26

## 2019-12-28 RX ADMIN — Medication 10 ML: at 09:30

## 2019-12-28 RX ADMIN — HYDRALAZINE HYDROCHLORIDE 50 MG: 50 TABLET, FILM COATED ORAL at 14:02

## 2019-12-28 RX ADMIN — HEPARIN SODIUM 13 UNITS/KG/HR: 10000 INJECTION, SOLUTION INTRAVENOUS at 05:06

## 2019-12-28 RX ADMIN — HYDRALAZINE HYDROCHLORIDE 50 MG: 50 TABLET, FILM COATED ORAL at 06:16

## 2019-12-28 RX ADMIN — FUROSEMIDE 60 MG: 10 INJECTION, SOLUTION INTRAMUSCULAR; INTRAVENOUS at 18:02

## 2019-12-28 RX ADMIN — METOPROLOL SUCCINATE 100 MG: 100 TABLET, EXTENDED RELEASE ORAL at 10:30

## 2019-12-28 RX ADMIN — HEPARIN SODIUM 8 UNITS/KG/HR: 10000 INJECTION, SOLUTION INTRAVENOUS at 15:01

## 2019-12-28 RX ADMIN — ISOSORBIDE MONONITRATE 30 MG: 30 TABLET, EXTENDED RELEASE ORAL at 09:26

## 2019-12-28 ASSESSMENT — PAIN SCALES - GENERAL
PAINLEVEL_OUTOF10: 0

## 2019-12-28 NOTE — PROGRESS NOTES
BP (!) 150/94   Pulse 105   Temp 97.6 °F (36.4 °C) (Oral)   Resp 20   Ht 5' 6\" (1.676 m)   Wt (!) 335 lb 8.6 oz (152.2 kg)   SpO2 97%   BMI 54.16 kg/m²   General appearance: alert, appears stated age and cooperative, No acute distress   Skin: Skin color, texture, turgor normal. No rashes or ecchymosis. Neck: supple, symmetrical, trachea midline   Lungs: Bilateral wheezes, no accessory muscle use, no respiratory distress  Heart: s1s2, irregular, rate 100s  Abdomen: soft, non-tender; bowel sounds normal  Extremities: + edema, DP +  Psychiatric: normal insight and affect    Patient Active Problem List:     Benign essential HTN     Morbid obesity due to excess calories (HCC)     Pure hypercholesterolemia     Bilateral knee pain     Pre-diabetes     Hyperlipidemia LDL goal <100     Abnormal nuclear stress test     Abnormal result of other cardiovascular function study (CODE)     Right hip pain     Atrial fibrillation with RVR (HCC)     Acute pulmonary edema (HCC)     Acute systolic (congestive) heart failure (HCC)        Assessment/Plan:     1. AHF- 5L out on IV lasix 40 bid, continue diuresis  2. New HFrEF- on BB, nitrates, hydralazine, cath on Monday, add ACE/ARB after cath, Heparin gtt now  3. HTN- elevated this morning,  toprol increased today and BP improved now   4.  AF - rate elevated, toprol increased this morning, may need to increase to BID, eliquis on hold for cath        Laney Signs, ACNP, AGPCNP    Aly Rader CNP  Heart Failure  [facility]       Core Measures:   · Discharge instructions:   · LVEF documented:   · ACEI for LV dysfunction:   · Smoking Cessation:

## 2019-12-28 NOTE — PROGRESS NOTES
142 139 142   K 3.8 3.9 4.2   CL 98* 97* 97*   CO2 32 30 29   BUN 32* 28* 21*   CREATININE 1.2 0.9 0.8     IMAGING: LVEF 35-40%    Assessment:  Patient Active Problem List    Diagnosis Date Noted    Abnormal result of other cardiovascular function study (CODE)      Priority: High    Acute pulmonary edema (HCC)     Acute systolic (congestive) heart failure (HCC)     Atrial fibrillation with RVR (HCC) 12/23/2019    Right hip pain 11/20/2017    Abnormal nuclear stress test 10/06/2017    Hyperlipidemia LDL goal <100 02/10/2016    Benign essential HTN 12/29/2015    Morbid obesity due to excess calories ([address] 12/29/2015    Pure hypercholesterolemia 12/29/2015    Bilateral knee pain 12/29/2015    Pre-diabetes 12/29/2015       Plan:  1. Abnormal ECG and reduced LVEF on echo:  D/w Dr. Troy Davila and cath is indicated to assess for significant CAD. Cath in 2017 was negative for significant CAD. RBA discussed with patient and she wishes to proceed. Would start pt on heparin gtt as she could not get cath due to acute systolic HF. 2. Acute on chronic systolic HF:  Would increase lasix to 60 mg IVP bid and assess response. 3. NSTEMI from demand ischemia from HF. D/W Dr. Flora Christensen.     Core Measures:  · Discharge instructions:   · LVEF documented:   · ACEI for LV dysfunction:   · Smoking Cessation:    Trina Crowley MD 12/28/2019 1:29 PM

## 2019-12-28 NOTE — PROGRESS NOTES
Progress Note    Admit Date: 12/23/2019  Day: 6  Diet: DIET CARDIAC;    CC: Palpitations    Interval history: No acute events overnight. A fib, HR 100s on tele. Started on lasix IV BID after she could not lay flat for cath yesterday. Denies chest pain, SOB, palpitations. Reports 3 pillow orthopnea, denies PND. Reports less fatigue compared to yesterday, denies fevers or chills. States LE edema has improved significantly. Medications:     Scheduled Meds:   metoprolol succinate  75 mg Oral Daily    hydrALAZINE  50 mg Oral 3 times per day    furosemide  40 mg Intravenous BID    isosorbide mononitrate  30 mg Oral Daily    sodium chloride flush  10 mL Intravenous 2 times per day    insulin lispro  0-6 Units Subcutaneous TID WC    insulin lispro  0-3 Units Subcutaneous Nightly     Continuous Infusions:   sodium chloride Stopped (12/27/19 1345)    heparin (porcine) 11 Units/kg/hr (12/28/19 0614)    dextrose       PRN Meds:heparin (porcine), sodium chloride flush, magnesium hydroxide, ondansetron, acetaminophen, glucose, dextrose, glucagon (rDNA), dextrose    Objective:   Vitals:   T-max:  Patient Vitals for the past 8 hrs:   BP Temp Temp src Pulse Resp SpO2 Weight   12/28/19 0600 -- -- -- -- -- -- (!) 335 lb 8.6 oz (152.2 kg)   12/28/19 0501 (!) 150/94 97.6 °F (36.4 °C) Oral 105 20 97 % --   12/28/19 0059 137/84 97.4 °F (36.3 °C) Oral 107 20 97 % --       Intake/Output Summary (Last 24 hours) at 12/28/2019 3332  Last data filed at 12/28/2019 2920  Gross per 24 hour   Intake 600 ml   Output 2900 ml   Net -2300 ml       Review of Systems   As per above    Physical Exam  Constitutional:       General: She is not in acute distress. Appearance: She is obese. HENT:      Head: Normocephalic and atraumatic. Nose: Nose normal. No rhinorrhea. Mouth/Throat:      Mouth: Mucous membranes are moist.      Pharynx: Oropharynx is clear.    Eyes:      Conjunctiva/sclera: Conjunctivae normal.      Pupils: Pupils are equal, round, and reactive to light. Cardiovascular:      Rate and Rhythm: Rhythm irregular. Heart sounds: No murmur. No friction rub. No gallop. Comments: tachycardic  Pulmonary:      Effort: Pulmonary effort is normal. No respiratory distress. Breath sounds: Normal breath sounds. No stridor. No wheezing, rhonchi or rales. Chest:      Chest wall: No tenderness. Abdominal:      General: Bowel sounds are normal.      Tenderness: There is no tenderness. There is no guarding or rebound. Musculoskeletal: Normal range of motion. General: No tenderness. Right lower leg: Edema (improved since yesterday) present. Left lower leg: Edema (improved since yesterday, less edema around toes) present. Skin:     General: Skin is warm and dry. Neurological:      General: No focal deficit present. Mental Status: She is alert and oriented to person, place, and time. Mental status is at baseline. Psychiatric:         Mood and Affect: Mood normal.         Behavior: Behavior normal.         LABS:    CBC:   Recent Labs     12/27/19  0529 12/27/19  1554 12/28/19  0506   WBC 5.3 6.3 5.8   HGB 11.2* 12.2 11.6*   HCT 35.4* 39.5 37.1    253 271   MCV 77.6* 80.0 77.4*     Renal:    Recent Labs     12/26/19  0430 12/27/19  0529 12/28/19  0506    139 142   K 3.8 3.9 4.2   CL 98* 97* 97*   CO2 32 30 29   BUN 32* 28* 21*   CREATININE 1.2 0.9 0.8   GLUCOSE 135* 133* 136*   CALCIUM 9.0 9.2 9.5   MG  --  2.10  --    ANIONGAP 12 12 16     Hepatic: No results for input(s): AST, ALT, BILITOT, BILIDIR, PROT, LABALBU, ALKPHOS in the last 72 hours. Troponin:   No results for input(s): TROPONINI in the last 72 hours. BNP: No results for input(s): BNP in the last 72 hours. Lipids: No results for input(s): CHOL, HDL in the last 72 hours.     Invalid input(s): LDLCALCU, TRIGLYCERIDE  ABGs:  No results for input(s): PHART, OLZ8YAP, PO2ART, BVN5NFD, BEART, THGBART, Z6JOXOGH, PAW0BPB in the

## 2019-12-29 LAB
ANION GAP SERPL CALCULATED.3IONS-SCNC: 15 MMOL/L (ref 3–16)
ANTI-XA UNFRAC HEPARIN: 0.37 IU/ML (ref 0.3–0.7)
BASOPHILS ABSOLUTE: 0.1 K/UL (ref 0–0.2)
BASOPHILS RELATIVE PERCENT: 1.4 %
BUN BLDV-MCNC: 25 MG/DL (ref 7–20)
CALCIUM SERPL-MCNC: 8.9 MG/DL (ref 8.3–10.6)
CHLORIDE BLD-SCNC: 100 MMOL/L (ref 99–110)
CO2: 26 MMOL/L (ref 21–32)
CREAT SERPL-MCNC: 0.8 MG/DL (ref 0.6–1.2)
EOSINOPHILS ABSOLUTE: 0.1 K/UL (ref 0–0.6)
EOSINOPHILS RELATIVE PERCENT: 2 %
GFR AFRICAN AMERICAN: >60
GFR NON-AFRICAN AMERICAN: >60
GLUCOSE BLD-MCNC: 116 MG/DL (ref 70–99)
GLUCOSE BLD-MCNC: 119 MG/DL (ref 70–99)
GLUCOSE BLD-MCNC: 131 MG/DL (ref 70–99)
GLUCOSE BLD-MCNC: 139 MG/DL (ref 70–99)
GLUCOSE BLD-MCNC: 153 MG/DL (ref 70–99)
HCT VFR BLD CALC: 32.1 % (ref 36–48)
HEMOGLOBIN: 10.2 G/DL (ref 12–16)
LYMPHOCYTES ABSOLUTE: 2.4 K/UL (ref 1–5.1)
LYMPHOCYTES RELATIVE PERCENT: 42.7 %
MCH RBC QN AUTO: 24.7 PG (ref 26–34)
MCHC RBC AUTO-ENTMCNC: 31.9 G/DL (ref 31–36)
MCV RBC AUTO: 77.5 FL (ref 80–100)
MONOCYTES ABSOLUTE: 0.6 K/UL (ref 0–1.3)
MONOCYTES RELATIVE PERCENT: 11 %
NEUTROPHILS ABSOLUTE: 2.4 K/UL (ref 1.7–7.7)
NEUTROPHILS RELATIVE PERCENT: 42.9 %
PDW BLD-RTO: 17.1 % (ref 12.4–15.4)
PERFORMED ON: ABNORMAL
PLATELET # BLD: 226 K/UL (ref 135–450)
PMV BLD AUTO: 8.2 FL (ref 5–10.5)
POTASSIUM REFLEX MAGNESIUM: 3.8 MMOL/L (ref 3.5–5.1)
RBC # BLD: 4.15 M/UL (ref 4–5.2)
SODIUM BLD-SCNC: 141 MMOL/L (ref 136–145)
WBC # BLD: 5.7 K/UL (ref 4–11)

## 2019-12-29 PROCEDURE — 85025 COMPLETE CBC W/AUTO DIFF WBC: CPT

## 2019-12-29 PROCEDURE — 6370000000 HC RX 637 (ALT 250 FOR IP): Performed by: STUDENT IN AN ORGANIZED HEALTH CARE EDUCATION/TRAINING PROGRAM

## 2019-12-29 PROCEDURE — 6360000002 HC RX W HCPCS: Performed by: INTERNAL MEDICINE

## 2019-12-29 PROCEDURE — 6360000002 HC RX W HCPCS

## 2019-12-29 PROCEDURE — 99232 SBSQ HOSP IP/OBS MODERATE 35: CPT | Performed by: NURSE PRACTITIONER

## 2019-12-29 PROCEDURE — 36415 COLL VENOUS BLD VENIPUNCTURE: CPT

## 2019-12-29 PROCEDURE — 85520 HEPARIN ASSAY: CPT

## 2019-12-29 PROCEDURE — 2060000000 HC ICU INTERMEDIATE R&B

## 2019-12-29 PROCEDURE — 2580000003 HC RX 258

## 2019-12-29 PROCEDURE — 80048 BASIC METABOLIC PNL TOTAL CA: CPT

## 2019-12-29 RX ORDER — FUROSEMIDE 10 MG/ML
60 INJECTION INTRAMUSCULAR; INTRAVENOUS 2 TIMES DAILY
Status: COMPLETED | OUTPATIENT
Start: 2019-12-29 | End: 2019-12-29

## 2019-12-29 RX ADMIN — Medication 10 ML: at 21:13

## 2019-12-29 RX ADMIN — INSULIN LISPRO 1 UNITS: 100 INJECTION, SOLUTION INTRAVENOUS; SUBCUTANEOUS at 17:38

## 2019-12-29 RX ADMIN — METOPROLOL SUCCINATE 100 MG: 100 TABLET, EXTENDED RELEASE ORAL at 08:59

## 2019-12-29 RX ADMIN — FUROSEMIDE 60 MG: 10 INJECTION, SOLUTION INTRAMUSCULAR; INTRAVENOUS at 17:37

## 2019-12-29 RX ADMIN — HYDRALAZINE HYDROCHLORIDE 50 MG: 50 TABLET, FILM COATED ORAL at 21:12

## 2019-12-29 RX ADMIN — FUROSEMIDE 60 MG: 10 INJECTION, SOLUTION INTRAMUSCULAR; INTRAVENOUS at 08:59

## 2019-12-29 RX ADMIN — HEPARIN SODIUM 8 UNITS/KG/HR: 10000 INJECTION, SOLUTION INTRAVENOUS at 21:21

## 2019-12-29 RX ADMIN — ISOSORBIDE MONONITRATE 30 MG: 30 TABLET, EXTENDED RELEASE ORAL at 08:59

## 2019-12-29 RX ADMIN — HYDRALAZINE HYDROCHLORIDE 50 MG: 50 TABLET, FILM COATED ORAL at 06:28

## 2019-12-29 ASSESSMENT — PAIN SCALES - GENERAL
PAINLEVEL_OUTOF10: 0
PAINLEVEL_OUTOF10: 0

## 2019-12-29 NOTE — PROGRESS NOTES
Progress Note    Admit Date: 12/23/2019  Day: 6  Diet: DIET CARDIAC;    CC: Palpitations    Interval history: No acute events overnight. Seen at bedside this morning. Pt sitting in chair feeling better than yesterday. States improvement in ability to sleep as well as decreased swelling. No complaints of fever, chills, headache, lightheadedness, dizziness, dyspnea, chest pain, abdominal pain, N/V/D/C. Medications:     Scheduled Meds:   metoprolol succinate  100 mg Oral Daily    furosemide  60 mg Intravenous BID    hydrALAZINE  50 mg Oral 3 times per day    isosorbide mononitrate  30 mg Oral Daily    sodium chloride flush  10 mL Intravenous 2 times per day    insulin lispro  0-6 Units Subcutaneous TID WC    insulin lispro  0-3 Units Subcutaneous Nightly     Continuous Infusions:   sodium chloride Stopped (12/27/19 1345)    heparin (porcine) 8 Units/kg/hr (12/28/19 2338)    dextrose       PRN Meds:heparin (porcine), sodium chloride flush, magnesium hydroxide, ondansetron, acetaminophen, glucose, dextrose, glucagon (rDNA), dextrose    Objective:   Vitals:   T-max:  Patient Vitals for the past 8 hrs:   BP Temp Temp src Pulse Resp SpO2 Weight   12/29/19 0623 -- -- -- -- -- -- (!) 335 lb 15.7 oz (152.4 kg)   12/29/19 0602 122/84 98 °F (36.7 °C) Oral 99 18 96 % --       Intake/Output Summary (Last 24 hours) at 12/29/2019 0729  Last data filed at 12/29/2019 0602  Gross per 24 hour   Intake 820 ml   Output 2100 ml   Net -1280 ml       Review of Systems   As per above    Physical Exam  Constitutional:       General: She is not in acute distress. Appearance: She is obese. HENT:      Head: Normocephalic and atraumatic. Nose: Nose normal. No rhinorrhea. Mouth/Throat:      Mouth: Mucous membranes are moist.      Pharynx: Oropharynx is clear. Eyes:      Conjunctiva/sclera: Conjunctivae normal.      Pupils: Pupils are equal, round, and reactive to light. Cardiovascular:      Heart sounds: No murmur. No friction rub. No gallop. Comments: Irregularly irregular rhythm  Pulmonary:      Effort: Pulmonary effort is normal. No respiratory distress. Breath sounds: Normal breath sounds. No stridor. No wheezing, rhonchi or rales. Chest:      Chest wall: No tenderness. Abdominal:      General: Bowel sounds are normal.      Tenderness: There is no tenderness. There is no guarding or rebound. Musculoskeletal: Normal range of motion. General: No tenderness. Right lower leg: Edema (improved since yesterday) present. Left lower leg: Edema (improved since yesterday, less edema around toes) present. Skin:     General: Skin is warm and dry. Neurological:      General: No focal deficit present. Mental Status: She is alert and oriented to person, place, and time. Mental status is at baseline. Psychiatric:         Mood and Affect: Mood normal.         Behavior: Behavior normal.         LABS:    CBC:   Recent Labs     12/27/19  1554 12/28/19  0506 12/29/19  0338   WBC 6.3 5.8 5.7   HGB 12.2 11.6* 10.2*   HCT 39.5 37.1 32.1*    271 226   MCV 80.0 77.4* 77.5*     Renal:    Recent Labs     12/27/19  0529 12/28/19  0506 12/29/19  0338    142 141   K 3.9 4.2 3.8   CL 97* 97* 100   CO2 30 29 26   BUN 28* 21* 25*   CREATININE 0.9 0.8 0.8   GLUCOSE 133* 136* 116*   CALCIUM 9.2 9.5 8.9   MG 2.10  --   --    ANIONGAP 12 16 15     Hepatic: No results for input(s): AST, ALT, BILITOT, BILIDIR, PROT, LABALBU, ALKPHOS in the last 72 hours. Troponin:   No results for input(s): TROPONINI in the last 72 hours. BNP: No results for input(s): BNP in the last 72 hours. Lipids: No results for input(s): CHOL, HDL in the last 72 hours. Invalid input(s): LDLCALCU, TRIGLYCERIDE  ABGs:  No results for input(s): PHART, PTN1VQE, PO2ART, HCN6BWJ, BEART, THGBART, F6IPYPRW, HBQ7JMP in the last 72 hours.     INR:   Recent Labs     12/27/19  1554   INR 1.24*     Lactate:   No results for input(s): LACTATE in the last 72 hours. Cultures:  -----------------------------------------------------------------  RAD:   XR CHEST PORTABLE   Final Result      Cardiomegaly with bilateral interstitial infiltrates suggesting edema versus pneumonia. Assessment/Plan:     Acute hypoxemic respiratory failure 2/2 acute systolic heart failure exacerbation  - Plan for cath on 12/30 (could not lay flat earlier), diuresis with lasix 60 IV BID, decreasing UOP (-1.3L net/24h)  - Continue Imdur 30mg daily, hydralazine 50 TID  - Hold off on ACEIs pending cath  - F/u cardiology recs  - Strict I's & O's, daily weights    New onset Afib - not in RVR, rate 100s. TSH normal. Electrolytes wnl.   - toprol XL 100mg qd  - Heparin gtt now (stop 9h before cath), restart eliquis after cath    DMII  - LDSSI  - Accuchecks, hypoglycemia protocol  - Will start metformin on discharge    HTN - SBP 140s-150s after hydralazine increased  - toprol  mg, hydralazine 50 mg q8h, imdur 30 mg daily    MARIA, resolved.  Cr 0.8, baseline 0.6-0.7    Discharge Planning  - PT/OT on board: score 23/24  - CW following: discharge home, no services required    Code Status: Full Code   FEN: Cardiac diet, NPO from midnight  PPX: Eliquis  DISPO: FLORIDALMA Durham MD, PGY-1  12/29/19  7:29 AM    This patient has been staffed and discussed with Barbara Osorio MD.

## 2019-12-29 NOTE — PROGRESS NOTES
[facility] [place]  DAILY PROGRESS NOTE    Admit Date: 12/23/2019       Chief Complaint: SOB     Interval History:   Patient seen and examined and notes reviewed. Patient is being followed for AHF, drop in EF. Pt sitting in chair, reports improvement in orthopnea last night, edema better today. She denies any CP or palpitations today. Diuresed 1200 yesterday with increased lasix.      In: 580 [P.O.:580]  Out: 1750    Wt Readings from Last 2 Encounters:   12/29/19 (!) 335 lb 15.7 oz (152.4 kg)   11/24/19 (!) 320 lb (145.2 kg)         Data:   Scheduled Meds:   Scheduled Meds:   metoprolol succinate  100 mg Oral Daily    furosemide  60 mg Intravenous BID    hydrALAZINE  50 mg Oral 3 times per day    isosorbide mononitrate  30 mg Oral Daily    sodium chloride flush  10 mL Intravenous 2 times per day    insulin lispro  0-6 Units Subcutaneous TID WC    insulin lispro  0-3 Units Subcutaneous Nightly     Continuous Infusions:   sodium chloride Stopped (12/27/19 1345)    heparin (porcine) 8 Units/kg/hr (12/28/19 2338)    dextrose       PRN Meds:.heparin (porcine), sodium chloride flush, magnesium hydroxide, ondansetron, acetaminophen, glucose, dextrose, glucagon (rDNA), dextrose  Continuous Infusions:   sodium chloride Stopped (12/27/19 1345)    heparin (porcine) 8 Units/kg/hr (12/28/19 2338)    dextrose         Intake/Output Summary (Last 24 hours) at 12/29/2019 0857  Last data filed at 12/29/2019 0856  Gross per 24 hour   Intake 920 ml   Output 2100 ml   Net -1180 ml     Lab Data:  CBC:   Lab Results   Component Value Date    WBC 5.7 12/29/2019    HGB 10.2 12/29/2019     12/29/2019     BMP:  Lab Results   Component Value Date     12/29/2019    K 3.8 12/29/2019     12/29/2019    CO2 26 12/29/2019    BUN 25 12/29/2019    CREATININE 0.8 12/29/2019    GLUCOSE 116 12/29/2019     INR:   Lab Results   Component Value Date    INR 1.24 12/27/2019    INR 1.43 12/24/2019    INR 1.26 12/23/2019 CARDIAC LABS  ENZYMES:No results for input(s): CKMB, CKMBINDEX, TROPONINI in the last 72 hours. Invalid input(s): CKTOTAL;3  FASTING LIPID PANEL:  Lab Results   Component Value Date    HDL 74 09/30/2019    LDLCALC 135 09/30/2019    TRIG 56 09/30/2019    TSH 2.05 09/14/2017     LIVER PROFILE:  Lab Results   Component Value Date    AST 16 09/30/2019    AST 15 09/06/2018    ALT 9 09/30/2019    ALT 9 09/06/2018     BNP:   Lab Results   Component Value Date    PROBNP 4,435 12/28/2019    PROBNP 2,266 12/23/2019     Iron Studies:    Lab Results   Component Value Date    FERRITIN 194.8 09/30/2019     Iron Deficiency Anemia:  No   IV Iron Therapy:  No  2017 ACC/AHA HF Guidelines:   intravenous iron replacement in patients with New York Heart Association (NYHA) class II and III HF and iron deficiency(ferritin <100 ng/ml or 100-300 ng/ml if transferrin saturation <20%), to improve functional status and QoL. 1. WEIGHT: Admit Weight: (!) 371 lb 9.6 oz (168.6 kg)      Today  Weight: (!) 335 lb 15.7 oz (152.4 kg)   2. I/O     Intake/Output Summary (Last 24 hours) at 12/29/2019 0857  Last data filed at 12/29/2019 0856  Gross per 24 hour   Intake 920 ml   Output 2100 ml   Net -1180 ml       Cardiac Testing:     ECHO:12/23/19   Summary   Left ventricular cavity size is normal with asymmetric hypertrophy of the   basal septum. Overall left ventricular systolic function appears reduced with an estimated   ejection fraction of 35-40%. There is hypokinesis of the entire apex and the mid wall segments   Diastolic function is indeterminate due to abnormal heart rhythm (atrial   fibrillation). No evidence of left ventricular mass or thrombus   Mitral annular calcification with mild mitral regurgitation   Moderate tricuspid regurgitation. Estimated pulmonary artery systolic pressure is at 49 mmHg assuming a right   atrial pressure of 8 mmHg. There is a pleural effusion   Bi-atrial enlargement.     Telemetry: AF    Objective:   Vitals: /84   Pulse 99   Temp 98.3 °F (36.8 °C) (Oral)   Resp 20   Ht 5' 6\" (1.676 m)   Wt (!) 335 lb 15.7 oz (152.4 kg)   SpO2 93%   BMI 54.23 kg/m²   General appearance: alert, appears stated age and cooperative, No acute distress   Skin: Skin color, texture, turgor normal. No rashes or ecchymosis. Neck: supple, symmetrical, trachea midline   Lungs: Bilateral wheezes, no accessory muscle use, no respiratory distress  Heart: s1s2, irregular  Abdomen: soft, non-tender; bowel sounds normal  Extremities: + edema, DP +  Psychiatric: normal insight and affect    Patient Active Problem List:     Benign essential HTN     Morbid obesity due to excess calories (HCC)     Pure hypercholesterolemia     Bilateral knee pain     Pre-diabetes     Hyperlipidemia LDL goal <100     Abnormal nuclear stress test     Abnormal result of other cardiovascular function study (CODE)     Right hip pain     Atrial fibrillation with RVR (HCC)     Acute pulmonary edema (HCC)     Acute systolic (congestive) heart failure (HCC)        Assessment/Plan:     1. AHF- cont IV lasix 60 bid, check BNP tomorrow  2. New HFrEF- on BB, nitrates, hydralazine, cath on Monday, add ACE/ARB after cath, Heparin gtt now  3. HTN- labile, on toprol hydralazine and imdur  4.  AF -  toprol increased to 100mg today, eliquis on hold for cath        Sarah Walls, ACNP, AGPCNP    Carola Boards, CNP  Heart Failure  [facility]       Core Measures:   · Discharge instructions:   · LVEF documented:   · ACEI for LV dysfunction:   · Smoking Cessation:

## 2019-12-29 NOTE — PLAN OF CARE
No falls this shift, bed in lowest position, bed alarm on, non skid socks on, call light within reach, hourly checks, safety maintained, will continue to monitor. Problem: Falls - Risk of:  Goal: Will remain free from falls  Description  Will remain free from falls  Outcome: Ongoing     Pt tolerating getting up to go to the bathroom better since earlier this week. She says she feels she is improving as well. Problem: Activity Intolerance:  Goal: Ability to tolerate increased activity will improve  Description  Ability to tolerate increased activity will improve  Outcome: Ongoing     Pt urinating without trouble, weight has been steadily decreasing as more fluids come off. Pt states her edema is better and she is feeling better.     Problem: Fluid Volume - Excess:  Goal: Control of fluid volume excess will improve  Description  Control of fluid volume excess will improve  Outcome: Ongoing

## 2019-12-30 LAB
ANION GAP SERPL CALCULATED.3IONS-SCNC: 15 MMOL/L (ref 3–16)
ANTI-XA UNFRAC HEPARIN: 0.51 IU/ML (ref 0.3–0.7)
BASOPHILS ABSOLUTE: 0.1 K/UL (ref 0–0.2)
BASOPHILS RELATIVE PERCENT: 1.5 %
BUN BLDV-MCNC: 27 MG/DL (ref 7–20)
CALCIUM SERPL-MCNC: 9.4 MG/DL (ref 8.3–10.6)
CHLORIDE BLD-SCNC: 98 MMOL/L (ref 99–110)
CHOLESTEROL, TOTAL: 146 MG/DL (ref 0–199)
CO2: 27 MMOL/L (ref 21–32)
CREAT SERPL-MCNC: 1 MG/DL (ref 0.6–1.2)
EKG ATRIAL RATE: 100 BPM
EKG DIAGNOSIS: NORMAL
EKG Q-T INTERVAL: 386 MS
EKG QRS DURATION: 84 MS
EKG QTC CALCULATION (BAZETT): 474 MS
EKG R AXIS: 6 DEGREES
EKG T AXIS: 235 DEGREES
EKG VENTRICULAR RATE: 91 BPM
EOSINOPHILS ABSOLUTE: 0.1 K/UL (ref 0–0.6)
EOSINOPHILS RELATIVE PERCENT: 1.4 %
GFR AFRICAN AMERICAN: >60
GFR NON-AFRICAN AMERICAN: 53
GLUCOSE BLD-MCNC: 127 MG/DL (ref 70–99)
GLUCOSE BLD-MCNC: 136 MG/DL (ref 70–99)
GLUCOSE BLD-MCNC: 139 MG/DL (ref 70–99)
GLUCOSE BLD-MCNC: 156 MG/DL (ref 70–99)
GLUCOSE BLD-MCNC: 156 MG/DL (ref 70–99)
HCT VFR BLD CALC: 34.2 % (ref 36–48)
HCT VFR BLD CALC: 35.9 % (ref 36–48)
HDLC SERPL-MCNC: 56 MG/DL (ref 40–60)
HEMOGLOBIN: 10.9 G/DL (ref 12–16)
HEMOGLOBIN: 11.3 G/DL (ref 12–16)
LDL CHOLESTEROL CALCULATED: 78 MG/DL
LEFT VENTRICULAR EJECTION FRACTION MODE: NORMAL
LV EF: 50 %
LYMPHOCYTES ABSOLUTE: 2.5 K/UL (ref 1–5.1)
LYMPHOCYTES RELATIVE PERCENT: 45.4 %
MAGNESIUM: 2.1 MG/DL (ref 1.8–2.4)
MCH RBC QN AUTO: 24.1 PG (ref 26–34)
MCH RBC QN AUTO: 24.3 PG (ref 26–34)
MCHC RBC AUTO-ENTMCNC: 31.5 G/DL (ref 31–36)
MCHC RBC AUTO-ENTMCNC: 31.7 G/DL (ref 31–36)
MCV RBC AUTO: 76.5 FL (ref 80–100)
MCV RBC AUTO: 76.7 FL (ref 80–100)
MONOCYTES ABSOLUTE: 0.5 K/UL (ref 0–1.3)
MONOCYTES RELATIVE PERCENT: 8.1 %
NEUTROPHILS ABSOLUTE: 2.4 K/UL (ref 1.7–7.7)
NEUTROPHILS RELATIVE PERCENT: 43.6 %
PDW BLD-RTO: 17.6 % (ref 12.4–15.4)
PDW BLD-RTO: 17.7 % (ref 12.4–15.4)
PERFORMED ON: ABNORMAL
PLATELET # BLD: 233 K/UL (ref 135–450)
PLATELET # BLD: 256 K/UL (ref 135–450)
PMV BLD AUTO: 8.4 FL (ref 5–10.5)
PMV BLD AUTO: 8.5 FL (ref 5–10.5)
POTASSIUM REFLEX MAGNESIUM: 3.9 MMOL/L (ref 3.5–5.1)
PRO-BNP: 2408 PG/ML (ref 0–449)
RBC # BLD: 4.46 M/UL (ref 4–5.2)
RBC # BLD: 4.7 M/UL (ref 4–5.2)
SODIUM BLD-SCNC: 140 MMOL/L (ref 136–145)
TRIGL SERPL-MCNC: 61 MG/DL (ref 0–150)
VLDLC SERPL CALC-MCNC: 12 MG/DL
WBC # BLD: 4.8 K/UL (ref 4–11)
WBC # BLD: 5.6 K/UL (ref 4–11)

## 2019-12-30 PROCEDURE — 85520 HEPARIN ASSAY: CPT

## 2019-12-30 PROCEDURE — 6370000000 HC RX 637 (ALT 250 FOR IP): Performed by: STUDENT IN AN ORGANIZED HEALTH CARE EDUCATION/TRAINING PROGRAM

## 2019-12-30 PROCEDURE — 93010 ELECTROCARDIOGRAM REPORT: CPT | Performed by: INTERNAL MEDICINE

## 2019-12-30 PROCEDURE — 6360000004 HC RX CONTRAST MEDICATION: Performed by: INTERNAL MEDICINE

## 2019-12-30 PROCEDURE — 99152 MOD SED SAME PHYS/QHP 5/>YRS: CPT

## 2019-12-30 PROCEDURE — 80048 BASIC METABOLIC PNL TOTAL CA: CPT

## 2019-12-30 PROCEDURE — 2709999900 HC NON-CHARGEABLE SUPPLY

## 2019-12-30 PROCEDURE — B2111ZZ FLUOROSCOPY OF MULTIPLE CORONARY ARTERIES USING LOW OSMOLAR CONTRAST: ICD-10-PCS | Performed by: INTERNAL MEDICINE

## 2019-12-30 PROCEDURE — 85025 COMPLETE CBC W/AUTO DIFF WBC: CPT

## 2019-12-30 PROCEDURE — 6370000000 HC RX 637 (ALT 250 FOR IP): Performed by: INTERNAL MEDICINE

## 2019-12-30 PROCEDURE — 2580000003 HC RX 258

## 2019-12-30 PROCEDURE — 99153 MOD SED SAME PHYS/QHP EA: CPT

## 2019-12-30 PROCEDURE — 85027 COMPLETE CBC AUTOMATED: CPT

## 2019-12-30 PROCEDURE — 2060000000 HC ICU INTERMEDIATE R&B

## 2019-12-30 PROCEDURE — C1887 CATHETER, GUIDING: HCPCS

## 2019-12-30 PROCEDURE — 83735 ASSAY OF MAGNESIUM: CPT

## 2019-12-30 PROCEDURE — 93458 L HRT ARTERY/VENTRICLE ANGIO: CPT

## 2019-12-30 PROCEDURE — C1769 GUIDE WIRE: HCPCS

## 2019-12-30 PROCEDURE — 94760 N-INVAS EAR/PLS OXIMETRY 1: CPT

## 2019-12-30 PROCEDURE — 93005 ELECTROCARDIOGRAM TRACING: CPT | Performed by: INTERNAL MEDICINE

## 2019-12-30 PROCEDURE — C1894 INTRO/SHEATH, NON-LASER: HCPCS

## 2019-12-30 PROCEDURE — B2151ZZ FLUOROSCOPY OF LEFT HEART USING LOW OSMOLAR CONTRAST: ICD-10-PCS | Performed by: INTERNAL MEDICINE

## 2019-12-30 PROCEDURE — 83880 ASSAY OF NATRIURETIC PEPTIDE: CPT

## 2019-12-30 PROCEDURE — 99233 SBSQ HOSP IP/OBS HIGH 50: CPT | Performed by: INTERNAL MEDICINE

## 2019-12-30 PROCEDURE — 2500000003 HC RX 250 WO HCPCS

## 2019-12-30 PROCEDURE — 36415 COLL VENOUS BLD VENIPUNCTURE: CPT

## 2019-12-30 PROCEDURE — 4A023N7 MEASUREMENT OF CARDIAC SAMPLING AND PRESSURE, LEFT HEART, PERCUTANEOUS APPROACH: ICD-10-PCS | Performed by: INTERNAL MEDICINE

## 2019-12-30 PROCEDURE — 2580000003 HC RX 258: Performed by: INTERNAL MEDICINE

## 2019-12-30 PROCEDURE — 6360000002 HC RX W HCPCS

## 2019-12-30 PROCEDURE — 80061 LIPID PANEL: CPT

## 2019-12-30 RX ORDER — SODIUM CHLORIDE 0.9 % (FLUSH) 0.9 %
10 SYRINGE (ML) INJECTION PRN
Status: DISCONTINUED | OUTPATIENT
Start: 2019-12-30 | End: 2019-12-31 | Stop reason: HOSPADM

## 2019-12-30 RX ORDER — ATROPINE SULFATE 0.4 MG/ML
0.5 AMPUL (ML) INJECTION
Status: ACTIVE | OUTPATIENT
Start: 2019-12-30 | End: 2019-12-30

## 2019-12-30 RX ORDER — HYDRALAZINE HYDROCHLORIDE 50 MG/1
50 TABLET, FILM COATED ORAL EVERY 8 HOURS SCHEDULED
Qty: 90 TABLET | Refills: 1 | Status: SHIPPED | OUTPATIENT
Start: 2019-12-30 | End: 2020-01-15 | Stop reason: ALTCHOICE

## 2019-12-30 RX ORDER — SODIUM CHLORIDE 9 MG/ML
INJECTION, SOLUTION INTRAVENOUS CONTINUOUS
Status: ACTIVE | OUTPATIENT
Start: 2019-12-30 | End: 2019-12-30

## 2019-12-30 RX ORDER — METOPROLOL SUCCINATE 100 MG/1
100 TABLET, EXTENDED RELEASE ORAL DAILY
Qty: 30 TABLET | Refills: 1 | Status: SHIPPED | OUTPATIENT
Start: 2019-12-31 | End: 2019-12-31

## 2019-12-30 RX ORDER — ACETAMINOPHEN 325 MG/1
650 TABLET ORAL EVERY 4 HOURS PRN
Status: DISCONTINUED | OUTPATIENT
Start: 2019-12-30 | End: 2019-12-31 | Stop reason: HOSPADM

## 2019-12-30 RX ORDER — HEPARIN SODIUM 10000 [USP'U]/100ML
8 INJECTION, SOLUTION INTRAVENOUS CONTINUOUS
Status: DISCONTINUED | OUTPATIENT
Start: 2019-12-30 | End: 2019-12-30

## 2019-12-30 RX ORDER — LOSARTAN POTASSIUM 50 MG/1
50 TABLET ORAL DAILY
Status: DISCONTINUED | OUTPATIENT
Start: 2019-12-30 | End: 2019-12-31 | Stop reason: HOSPADM

## 2019-12-30 RX ORDER — FUROSEMIDE 40 MG/1
40 TABLET ORAL 2 TIMES DAILY
Qty: 60 TABLET | Refills: 1 | Status: SHIPPED | OUTPATIENT
Start: 2019-12-30 | End: 2019-12-31 | Stop reason: SDUPTHER

## 2019-12-30 RX ORDER — ISOSORBIDE MONONITRATE 30 MG/1
30 TABLET, EXTENDED RELEASE ORAL DAILY
Qty: 30 TABLET | Refills: 1 | Status: SHIPPED | OUTPATIENT
Start: 2019-12-31 | End: 2019-12-31

## 2019-12-30 RX ORDER — SODIUM CHLORIDE 0.9 % (FLUSH) 0.9 %
10 SYRINGE (ML) INJECTION EVERY 12 HOURS SCHEDULED
Status: DISCONTINUED | OUTPATIENT
Start: 2019-12-30 | End: 2019-12-31 | Stop reason: HOSPADM

## 2019-12-30 RX ORDER — ONDANSETRON 2 MG/ML
4 INJECTION INTRAMUSCULAR; INTRAVENOUS EVERY 6 HOURS PRN
Status: DISCONTINUED | OUTPATIENT
Start: 2019-12-30 | End: 2019-12-31 | Stop reason: HOSPADM

## 2019-12-30 RX ORDER — FUROSEMIDE 40 MG/1
40 TABLET ORAL 2 TIMES DAILY
Status: DISCONTINUED | OUTPATIENT
Start: 2019-12-31 | End: 2019-12-31

## 2019-12-30 RX ADMIN — METOPROLOL SUCCINATE 100 MG: 100 TABLET, EXTENDED RELEASE ORAL at 09:28

## 2019-12-30 RX ADMIN — SODIUM CHLORIDE: 9 INJECTION, SOLUTION INTRAVENOUS at 13:45

## 2019-12-30 RX ADMIN — Medication 10 ML: at 09:28

## 2019-12-30 RX ADMIN — LOSARTAN POTASSIUM 50 MG: 50 TABLET ORAL at 15:38

## 2019-12-30 RX ADMIN — Medication 10 ML: at 21:21

## 2019-12-30 RX ADMIN — APIXABAN 5 MG: 5 TABLET, FILM COATED ORAL at 21:20

## 2019-12-30 RX ADMIN — INSULIN LISPRO 1 UNITS: 100 INJECTION, SOLUTION INTRAVENOUS; SUBCUTANEOUS at 18:20

## 2019-12-30 RX ADMIN — ISOSORBIDE MONONITRATE 30 MG: 30 TABLET, EXTENDED RELEASE ORAL at 09:28

## 2019-12-30 RX ADMIN — IOPAMIDOL 150 ML: 755 INJECTION, SOLUTION INTRAVENOUS at 12:44

## 2019-12-30 RX ADMIN — INSULIN LISPRO 1 UNITS: 100 INJECTION, SOLUTION INTRAVENOUS; SUBCUTANEOUS at 21:20

## 2019-12-30 RX ADMIN — HYDRALAZINE HYDROCHLORIDE 50 MG: 50 TABLET, FILM COATED ORAL at 04:55

## 2019-12-30 ASSESSMENT — PAIN SCALES - GENERAL
PAINLEVEL_OUTOF10: 0

## 2019-12-30 NOTE — CARE COORDINATION
Case Management Assessment           Daily Note                 Date/ Time of Note: 12/30/2019 2:05 PM         Note completed by: Feli Rivera    Patient Name: Jaime Birch  YOB: 1940    Diagnosis:Atrial fibrillation with RVR ([facility] [address] [I48.91]  Atrial fibrillation with RVR ([facility] [address] [I48.91]  Patient Admission Status: Inpatient    Date of Admission:12/23/2019  9:30 AM Length of Stay: 7 GLOS:        Current Plan of Care: angio today  ________________________________________________________________________________________  PT AM-PAC: 23 / 24 per last evaluation on: 12.23    OT AM-PAC: 23 / 24 per last evaluation on: 12.23    DME Needs for discharge: n/a    ________________________________________________________________________________________  Discharge Plan: Home    Tentative discharge date: 12.31    Current barriers to discharge: anticoagulation    Referrals completed: Not Applicable    Resources/ information provided: Not indicated at this time  ________________________________________________________________________________________  Case Management Notes:  Patient is from home with grand daughter, plans to return at discharge. Patient denies any CM needs at this time. Feng Bejarano and her family were provided with choice of provider; she and her family are in agreement with the discharge plan.     Care Transition Patient: Yes    Feli Rivrea, CHAPARRITA  [facility]  Case Management Department  Ph: [phone]  Fax: [phone]

## 2019-12-30 NOTE — DISCHARGE SUMMARY
INTERNAL MEDICINE DEPARTMENT AT [address] [place] [address]  DISCHARGE SUMMARY    Patient ID: Alessandro Raymundo                                             Discharge Date: 12/31/2019   Patient's PCP: Carito Holland MD                                          Discharge Physician: Breanna Loaiza MD  Admit Date: 12/23/2019   Admitting Physician: Yimi Alvarez MD    PROBLEMS DURING HOSPITALIZATION:  Present on Admission:   Atrial fibrillation with RVR ([address]   Acute pulmonary edema (HCC)   Acute systolic (congestive) heart failure (HCC)   SOB (shortness of breath)   Abnormal ECG      DISCHARGE DIAGNOSES:  Acute systolic heart failure  New onset atrial fibrillation  New diagnosis of Type II diabetes mellitus  Hypertension      The following issues were addressed during hospitalization:  Patient presented with acute hypoxemic respiratory failure, found to be secondary to acute systolic heart failure. EF was 35-40%, and patient subsequently underwent cardiac cath that showed EF of 50% and no clinically significant coronary artery disease. Patient was started on imdur, hydralazine and toprol XL prior to cath and the imdur and hydralazine were discontinued subsequently. She will be discharged on cozaar in place of them. Patient was diagnosed with new onset atrial fibrillation and started on eliquis, which will be continued outpatient. Patient was newly diagnosed with type 2 diabetes mellitus and will be started on metformin. Her HgbA1c was 6.6. She will need close follow up with PCP. Patient was advised to get CBC and CMP in a week, and a renal function panel in 2 days after discharge. Patient will need follow up with cardiology given her new diagnosis of systolic heart failure and atrial fibrillation. On the day of discharge, patient denies chest pain, SOB or palpitations. She reports her dyspnea and fatigue present on admission have significantly improved, denies orthopnea, PND. Has some lower extremity swelling. medications    Dextromethorphan-guaiFENesin  MG/5ML Syrp        STOP taking these medications    hydrochlorothiazide 25 MG tablet  Commonly known as:  HYDRODIURIL           Where to Get Your Medications      You can get these medications from any pharmacy    Bring a paper prescription for each of these medications  · apixaban 5 MG Tabs tablet  · blood glucose test strips  · furosemide 40 MG tablet  · hydrALAZINE 50 MG tablet  · isosorbide mononitrate 30 MG extended release tablet  · losartan 50 MG tablet  · metoprolol succinate 100 MG extended release tablet       Activity: activity as tolerated  Diet: cardiac diet  Wound Care: none needed    Time Spent on discharge is more than 30 minutes    Signed:  Herbie Gabrile MD, PGY-1  12/31/2019

## 2019-12-30 NOTE — PROGRESS NOTES
Progress Note    Admit Date: 12/23/2019  Diet: Diet NPO, After Midnight    CC: Palpitations    Interval history: No acute events overnight. Seen at bedside this morning. SOB improved, able to lay flat. She denies dyspnea on exertion, orthopnea, PND. Denies F/C, N/V, abdominal pain. Medications:     Scheduled Meds:   metoprolol succinate  100 mg Oral Daily    hydrALAZINE  50 mg Oral 3 times per day    isosorbide mononitrate  30 mg Oral Daily    sodium chloride flush  10 mL Intravenous 2 times per day    insulin lispro  0-6 Units Subcutaneous TID WC    insulin lispro  0-3 Units Subcutaneous Nightly     Continuous Infusions:   dextrose       PRN Meds:heparin (porcine), sodium chloride flush, magnesium hydroxide, ondansetron, acetaminophen, glucose, dextrose, glucagon (rDNA), dextrose    Objective:   Vitals:   T-max:  Patient Vitals for the past 8 hrs:   BP Temp Temp src Pulse Resp SpO2 Weight   12/30/19 0402 118/77 98.4 °F (36.9 °C) Oral 98 17 97 % (!) 336 lb 1.6 oz (152.5 kg)       Intake/Output Summary (Last 24 hours) at 12/30/2019 0858  Last data filed at 12/30/2019 0827  Gross per 24 hour   Intake 1302 ml   Output 2400 ml   Net -1098 ml       Review of Systems   As per above    Physical Exam  Constitutional:       General: She is not in acute distress. Appearance: She is obese. HENT:      Head: Normocephalic and atraumatic. Nose: Nose normal. No rhinorrhea. Mouth/Throat:      Mouth: Mucous membranes are moist.      Pharynx: Oropharynx is clear. Eyes:      Conjunctiva/sclera: Conjunctivae normal.      Pupils: Pupils are equal, round, and reactive to light. Cardiovascular:      Heart sounds: No murmur. No friction rub. No gallop. Comments: Irregularly irregular rhythm  Pulmonary:      Effort: Pulmonary effort is normal. No respiratory distress. Breath sounds: Normal breath sounds. No stridor. No wheezing, rhonchi or rales. Chest:      Chest wall: No tenderness.    Abdominal: General: Bowel sounds are normal.      Tenderness: There is no tenderness. There is no guarding or rebound. Musculoskeletal: Normal range of motion. General: No tenderness. Right lower leg: Edema (improved since yesterday) present. Left lower leg: Edema (improved since yesterday, less edema around toes) present. Skin:     General: Skin is warm and dry. Neurological:      General: No focal deficit present. Mental Status: She is alert and oriented to person, place, and time. Mental status is at baseline. Psychiatric:         Mood and Affect: Mood normal.         Behavior: Behavior normal.     LABS:    CBC:   Recent Labs     12/28/19  0506 12/29/19  0338 12/30/19  0537   WBC 5.8 5.7 5.6   HGB 11.6* 10.2* 10.9*   HCT 37.1 32.1* 34.2*    226 256   MCV 77.4* 77.5* 76.7*     Renal:    Recent Labs     12/28/19  0506 12/29/19  0338 12/30/19  0537    141 140   K 4.2 3.8 3.9   CL 97* 100 98*   CO2 29 26 27   BUN 21* 25* 27*   CREATININE 0.8 0.8 1.0   GLUCOSE 136* 116* 136*   CALCIUM 9.5 8.9 9.4   MG  --   --  2.10   ANIONGAP 16 15 15     Hepatic: No results for input(s): AST, ALT, BILITOT, BILIDIR, PROT, LABALBU, ALKPHOS in the last 72 hours. Troponin:   No results for input(s): TROPONINI in the last 72 hours. BNP: No results for input(s): BNP in the last 72 hours. Lipids: No results for input(s): CHOL, HDL in the last 72 hours. Invalid input(s): LDLCALCU, TRIGLYCERIDE  ABGs:  No results for input(s): PHART, CIE7MFW, PO2ART, JFX8RKT, BEART, THGBART, Q1AUEIVA, OCD2OAJ in the last 72 hours. INR:   Recent Labs     12/27/19  1554   INR 1.24*     Lactate:   No results for input(s): LACTATE in the last 72 hours. Cultures:  -----------------------------------------------------------------  RAD:   XR CHEST PORTABLE   Final Result      Cardiomegaly with bilateral interstitial infiltrates suggesting edema versus pneumonia.              Assessment/Plan:     Acute hypoxemic respiratory failure 2/2 acute systolic heart failure exacerbation  - Plan for cath today, patient reportedly not on list, awaiting response from cardiology  - Holding lasix today, good diuresis 2.4 L yesterday  - Continue Imdur 30mg daily, hydralazine 50 TID, toprol  qd  - Hold off on ACEIs pending cath  - F/u cardiology recs  - Strict I's & O's, daily weights    New onset Afib - not in RVR, rate 90s.  TSH normal. Electrolytes wnl.   - Toprol XL 100mg qd  - Holding hep gtt for cath, restart     DMII  - LDSSI  - Accuchecks, hypoglycemia protocol  - Will start metformin on discharge    HTN - BP well controlled  - Toprol  mg, hydralazine 50 mg q8h, imdur 30 mg daily    MARIA  Cr 1.0, baseline 0.6-0.7    Discharge Planning  - PT/OT on board: score 23/24  - CW following: discharge home, no services required    Code Status: Full Code   FEN: Cardiac diet, NPO from midnight  PPX: Eliquis  DISPO: TRICE Wilde MD, PGY-1  12/30/19  8:58 AM    This patient has been staffed and discussed with Smiley Newberry MD.

## 2019-12-30 NOTE — PLAN OF CARE
Brief Pre-Op Note/Sedation Assessment      Marne Kanner  1940  [id number]      [id number]  11:49 AM    Planned Procedure: Cardiac Catheterization Procedure. Radial access. Post Procedure Plan: Return to same level of care    Consent: I have discussed with the patient and/or the patient representative the indication, alternatives, and the possible risks and/or complications of the planned procedure and the anesthesia methods. The patient and/or patient representative appear to understand and agree to proceed. Chief Complaint: Dyspnea on Exertion      Indications for Cath Procedure:  ACS > 24 hrs  Small NSTEMI  Anginal Classification within 2 weeks:  CCS III - Symptoms with everyday living activities, i.e., moderate limitation  NYHA Heart Failure Class within 2 weeks: Class III - Symptoms of HF on less-than-ordinary exertion, Newly Diagnosed? Yes, Heart Failure Type: Systolic  Is Cath Lab Visit Valve-related?: No  Surgical Risk: Intermediate  Functional Type: < 4 METS    Anti- Anginal Meds within 2 weeks:   Yes: Statin (Any)    Stress or Imaging Studies Performed:  None     Vital Signs:  /79   Pulse 88   Temp 97.8 °F (36.6 °C) (Oral)   Resp 18   Ht 5' 6\" (1.676 m)   Wt (!) 336 lb 1.6 oz (152.5 kg)   SpO2 96%   BMI 54.25 kg/m²     Allergies:   Allergies   Allergen Reactions    Aspirin      Caused ulcers, burns stomach    Dye [Iodides] Itching, Swelling and Rash       Past Medical History:  Past Medical History:   Diagnosis Date    Acute systolic (congestive) heart failure (HCC)     Edema     Hearing loss     Hyperlipidemia     Hypertension     Morbid obesity due to excess calories ([facility] [address] 12/29/2015    Pre-diabetes     PUD (peptic ulcer disease)     \"years ago\",          Surgical History:  Past Surgical History:   Procedure Laterality Date    HERNIA REPAIR      umbilical     KNEE SURGERY Bilateral     TKR bilateral         Medications:  Current Facility-Administered Medications   Medication Dose Route Frequency Provider Last Rate Last Dose    metoprolol succinate (TOPROL XL) extended release tablet 100 mg  100 mg Oral Daily Boris New MD   100 mg at 12/30/19 4843    hydrALAZINE (APRESOLINE) tablet 50 mg  50 mg Oral 3 times per day Boris New MD   50 mg at 12/30/19 0455    heparin (porcine) injection 5,000 Units  5,000 Units Intravenous PRN Boris New MD        isosorbide mononitrate (IMDUR) extended release tablet 30 mg  30 mg Oral Daily Hoang Frazier MD   30 mg at 12/30/19 6538    sodium chloride flush 0.9 % injection 10 mL  10 mL Intravenous 2 times per day Mallika Gutierrez MD   10 mL at 12/30/19 0928    sodium chloride flush 0.9 % injection 10 mL  10 mL Intravenous PRN Mallika Gutierrez MD        magnesium hydroxide (MILK OF MAGNESIA) 400 MG/5ML suspension 30 mL  30 mL Oral Daily PRN Mallika Gutierrez MD   30 mL at 12/27/19 0614    ondansetron (ZOFRAN) injection 4 mg  4 mg Intravenous Q6H PRN Mallika Gutierrez MD   4 mg at 12/24/19 1453    acetaminophen (TYLENOL) tablet 650 mg  650 mg Oral Q4H PRN Mallika Gutierrez MD        glucose (GLUTOSE) 40 % oral gel 15 g  15 g Oral PRN Mallika Gutierrez MD        dextrose 50 % IV solution  12.5 g Intravenous PRN Mallika Gutierrez MD        glucagon (rDNA) injection 1 mg  1 mg Intramuscular PRN Mallika Gutierrez MD        dextrose 5 % solution  100 mL/hr Intravenous PRN Mallika Gutierrez MD        insulin lispro (1 Unit Dial) 0-6 Units  0-6 Units Subcutaneous TID WC Mallika Gutierrez MD   1 Units at 12/29/19 9438    insulin lispro (1 Unit Dial) 0-3 Units  0-3 Units Subcutaneous Nightly Mallika Gutierrez MD   1 Units at 12/28/19 7372           Pre-Sedation:    Pre-Sedation Documentation and Exam:  I have personally completed a history, physical exam & review of systems for this patient (see notes).     Prior History of Anesthesia Complications:   none    Modified Mallampati:  II (soft palate, uvula, fauces visible)    ASA

## 2019-12-31 VITALS
DIASTOLIC BLOOD PRESSURE: 79 MMHG | RESPIRATION RATE: 18 BRPM | WEIGHT: 293 LBS | OXYGEN SATURATION: 97 % | SYSTOLIC BLOOD PRESSURE: 119 MMHG | HEIGHT: 66 IN | BODY MASS INDEX: 47.09 KG/M2 | TEMPERATURE: 97.2 F | HEART RATE: 85 BPM

## 2019-12-31 LAB
ANION GAP SERPL CALCULATED.3IONS-SCNC: 15 MMOL/L (ref 3–16)
ANTI-XA UNFRAC HEPARIN: 0.55 IU/ML (ref 0.3–0.7)
BASOPHILS ABSOLUTE: 0 K/UL (ref 0–0.2)
BASOPHILS RELATIVE PERCENT: 0.6 %
BUN BLDV-MCNC: 27 MG/DL (ref 7–20)
CALCIUM SERPL-MCNC: 9.3 MG/DL (ref 8.3–10.6)
CHLORIDE BLD-SCNC: 100 MMOL/L (ref 99–110)
CO2: 26 MMOL/L (ref 21–32)
CREAT SERPL-MCNC: 1.1 MG/DL (ref 0.6–1.2)
EOSINOPHILS ABSOLUTE: 0 K/UL (ref 0–0.6)
EOSINOPHILS RELATIVE PERCENT: 0.4 %
GFR AFRICAN AMERICAN: 58
GFR NON-AFRICAN AMERICAN: 48
GLUCOSE BLD-MCNC: 112 MG/DL (ref 70–99)
GLUCOSE BLD-MCNC: 124 MG/DL (ref 70–99)
GLUCOSE BLD-MCNC: 131 MG/DL (ref 70–99)
HCT VFR BLD CALC: 33.3 % (ref 36–48)
HEMOGLOBIN: 10.5 G/DL (ref 12–16)
LYMPHOCYTES ABSOLUTE: 2.2 K/UL (ref 1–5.1)
LYMPHOCYTES RELATIVE PERCENT: 34.7 %
MCH RBC QN AUTO: 24.6 PG (ref 26–34)
MCHC RBC AUTO-ENTMCNC: 31.5 G/DL (ref 31–36)
MCV RBC AUTO: 78 FL (ref 80–100)
MONOCYTES ABSOLUTE: 0.4 K/UL (ref 0–1.3)
MONOCYTES RELATIVE PERCENT: 6.5 %
NEUTROPHILS ABSOLUTE: 3.7 K/UL (ref 1.7–7.7)
NEUTROPHILS RELATIVE PERCENT: 57.8 %
PDW BLD-RTO: 17.6 % (ref 12.4–15.4)
PERFORMED ON: ABNORMAL
PERFORMED ON: ABNORMAL
PLATELET # BLD: 236 K/UL (ref 135–450)
PMV BLD AUTO: 8.2 FL (ref 5–10.5)
POTASSIUM REFLEX MAGNESIUM: 4.1 MMOL/L (ref 3.5–5.1)
RBC # BLD: 4.27 M/UL (ref 4–5.2)
SODIUM BLD-SCNC: 141 MMOL/L (ref 136–145)
WBC # BLD: 6.3 K/UL (ref 4–11)

## 2019-12-31 PROCEDURE — 99233 SBSQ HOSP IP/OBS HIGH 50: CPT | Performed by: INTERNAL MEDICINE

## 2019-12-31 PROCEDURE — 85025 COMPLETE CBC W/AUTO DIFF WBC: CPT

## 2019-12-31 PROCEDURE — 36415 COLL VENOUS BLD VENIPUNCTURE: CPT

## 2019-12-31 PROCEDURE — 2580000003 HC RX 258: Performed by: INTERNAL MEDICINE

## 2019-12-31 PROCEDURE — 80048 BASIC METABOLIC PNL TOTAL CA: CPT

## 2019-12-31 PROCEDURE — 6370000000 HC RX 637 (ALT 250 FOR IP): Performed by: STUDENT IN AN ORGANIZED HEALTH CARE EDUCATION/TRAINING PROGRAM

## 2019-12-31 PROCEDURE — 85520 HEPARIN ASSAY: CPT

## 2019-12-31 PROCEDURE — 6370000000 HC RX 637 (ALT 250 FOR IP): Performed by: INTERNAL MEDICINE

## 2019-12-31 PROCEDURE — 6370000000 HC RX 637 (ALT 250 FOR IP)

## 2019-12-31 PROCEDURE — 99232 SBSQ HOSP IP/OBS MODERATE 35: CPT | Performed by: INTERNAL MEDICINE

## 2019-12-31 RX ORDER — FUROSEMIDE 40 MG/1
40 TABLET ORAL 2 TIMES DAILY
Qty: 60 TABLET | Refills: 1 | Status: CANCELLED | OUTPATIENT
Start: 2019-12-31

## 2019-12-31 RX ORDER — FUROSEMIDE 40 MG/1
40 TABLET ORAL DAILY
Status: DISCONTINUED | OUTPATIENT
Start: 2020-01-01 | End: 2019-12-31 | Stop reason: HOSPADM

## 2019-12-31 RX ORDER — FUROSEMIDE 40 MG/1
40 TABLET ORAL 2 TIMES DAILY
Qty: 60 TABLET | Refills: 1 | Status: SHIPPED | OUTPATIENT
Start: 2019-12-31 | End: 2020-01-15 | Stop reason: SDUPTHER

## 2019-12-31 RX ORDER — LOSARTAN POTASSIUM 50 MG/1
50 TABLET ORAL DAILY
Qty: 30 TABLET | Refills: 1 | Status: SHIPPED | OUTPATIENT
Start: 2019-12-31 | End: 2020-01-02 | Stop reason: ALTCHOICE

## 2019-12-31 RX ORDER — GLUCOSAMINE HCL/CHONDROITIN SU 500-400 MG
CAPSULE ORAL
Qty: 100 STRIP | Refills: 1 | Status: SHIPPED | OUTPATIENT
Start: 2019-12-31

## 2019-12-31 RX ORDER — ISOSORBIDE MONONITRATE 30 MG/1
30 TABLET, EXTENDED RELEASE ORAL DAILY
Qty: 30 TABLET | Refills: 1 | Status: SHIPPED | OUTPATIENT
Start: 2019-12-31 | End: 2020-01-15 | Stop reason: ALTCHOICE

## 2019-12-31 RX ORDER — METOPROLOL SUCCINATE 100 MG/1
100 TABLET, EXTENDED RELEASE ORAL DAILY
Qty: 30 TABLET | Refills: 1 | Status: SHIPPED | OUTPATIENT
Start: 2019-12-31 | End: 2020-01-06 | Stop reason: SDUPTHER

## 2019-12-31 RX ADMIN — LOSARTAN POTASSIUM 50 MG: 50 TABLET ORAL at 10:29

## 2019-12-31 RX ADMIN — METOPROLOL SUCCINATE 100 MG: 100 TABLET, EXTENDED RELEASE ORAL at 10:30

## 2019-12-31 RX ADMIN — Medication 10 ML: at 10:30

## 2019-12-31 RX ADMIN — APIXABAN 5 MG: 5 TABLET, FILM COATED ORAL at 10:29

## 2019-12-31 RX ADMIN — FUROSEMIDE 40 MG: 40 TABLET ORAL at 10:30

## 2019-12-31 ASSESSMENT — PAIN SCALES - GENERAL
PAINLEVEL_OUTOF10: 0
PAINLEVEL_OUTOF10: 0

## 2019-12-31 NOTE — PROCEDURES
guidance over a guidewire, which is a  [place] wire. FINDINGS:  The Baylee Ditto would not engage the left main coronary artery. The  JR4 would not engage the right coronary artery. The Chip catheter was  chosen after using an exchange length wire and this was able to engage  the left main coronary artery. Injections of the left main coronary  artery shows that it is widely patent. No obstructive disease in the  left main coronary artery, it bifurcates in the typical manner with the  left anterior descending coronary artery and the left circumflex arising  from the left main coronary artery. The left anterior descending  coronary artery is tortuous and angiographically normal.  There is no  obstructive disease identified. Diagonal branches are normal.  Septal  perforators are normal.  The left anterior descending coronary artery  course around the apex of the left ventricle without focal obstruction. The circumflex coronary artery is normal.  There is no obstructive  disease in the circumflex. There is PHUONG grade 3 flow in the  circumflex, LAD, and left main coronary arteries. The obtuse marginal  branches are normal.    As the JR4 would not engage the right coronary artery, an AR mod was  used to get close to the right coronary artery. As the [place] would not  engage the right coronary artery, a modified AR catheter was used to  engage and get near to the ostium of the right coronary. The right  coronary artery ostium was widely patent. The right coronary artery is  dominant. The mid and distal right coronary artery is normal with PHUONG  grade 3 flow. The right posterior descending coronary artery has  proximal, mid, and nearly distal aspect overall normal.  Right  posterolateral branch is normal.  No obstructive disease is identified. The modified AR catheter was used to cross the aortic valve into the  left ventricle. The LVEDP was about 20 mmHg.   Hand injection and AR  projection shows LV ejection fraction of 50% without wall motion  abnormality. There was no mitral regurgitation seen. There was no  gradient upon pullback from the left ventricle and descending aorta. The angiography hardware was removed. The sheath was removed and the  radial band was applied per protocol. Estimated blood loss was less than 15 mL. CONCLUSION:  1. Normal epicardial coronary arteries. 2.  LV ejection fraction 50% without regional wall motion abnormality. 3.  LVEDP 20 mmHg. 4.  No aortic valve gradient and no wall motion abnormality on left  ventriculography. PLAN:  Hydration, bedrest, restart Eliquis later tonight, and potential  discharge tomorrow. José Jama MD    D: 12/30/2019 17:07:33       T: 12/30/2019 20:16:16     [email or web address]  Job#: [id number]     Doc#: [id number]    CC:  Lyssa Obregon.  MD Ceferino Sales MD Karie Solan

## 2019-12-31 NOTE — PROGRESS NOTES
Nutrition Assessment (Low Risk)    Type and Reason for Visit: Initial, Patient Education(LOS)    Nutrition Recommendations:   · Continue cardiac diet   · Monitor nutrition adequacy, pertinent labs, bowel habits, wt changes, and clinical progress     Nutrition Assessment:  Patient assessed for nutritional risk. Deemed to be at low risk at this time. Will continue to monitor for changes in status. Pt voices having a good appetite and enjoying the food offered this admission. Pt w/questions on diets to follow at home. Provided pt w/verbal and written edu for Heart Healthy-Low Sodium. Pt w/plans to d/c today. No further nutritional questions or concerns at this time. Diet Education:   Provided pt w/written and verbal instruction on Heart Healthy-Low Sodium diet education. Discussed low sodium, healthy food swaps, fruits/veggies, and making 1/2 of grains whole grains. Pt verbalized understanding.      Malnutrition Assessment:  · Malnutrition Status: No malnutrition    Nutrition Risk Level   Risk Level: Low    Nutrition Diagnosis:   · Problem: No nutrition diagnosis at this time    Nutrition Intervention:  Food and/or Delivery: Continue current diet  Nutrition Education/Counseling/Coordination of Care:  Continued Inpatient Monitoring      Electronically signed by Slade Beard RD, LD on 12/31/19 at 12:04 PM    Contact Number: [phone]

## 2019-12-31 NOTE — CARE COORDINATION
or delivery - cash, check, or card accepted)     Able to afford home medications/ co-pay costs: Yes    ADLS:  Current PT AM-PAC Score: 23 /24  Current OT AM-PAC Score: 23 /24      DISCHARGE Disposition: Home with [facility]: [facility]     LOC at discharge: Not Applicable  ROD Completed: Not Indicated    Notification completed in HENS/PAS?:  Not Applicable    IMM Completed:   Yes, Case management has presented and reviewed IMM letter #2 to the patient and/or family/ POA. Patient and/or family/POA verbalized understanding of their medicare rights and appeal process if needed. Patient and/or family/POA has signed, initialed and placed today's date (12.31.19) and time ([phone]) on IMM letter #2 on the the appropriate lines. Patient and/or family/POA, copy of letter offered and they are aware that this original copy of IMM letter #2 is available prior to discharge from the paper chart on the unit. Electronic documentation has been entered into epic for IMM letter #2 and original paper copy has been added to the paper chart at the nurses station.      Transportation:  Transportation PLAN for discharge: family   Mode of Transport: Private Car  Reason for medical transport: Not Applicable  Name of [address] [place] [address],[address]: Not Applicable  Time of Transport: TBD    Transport form completed: Not Indicated    Home Care:  [address] ordered at discharge: Yes  [address]  Phone: [phone]  Fax: [phone]  Orders faxed: Yes    Durable Medical Equipment:  DME Provider: n/a  Equipment obtained during hospitalization: n/a    Home Oxygen and Respiratory Equipment:  Oxygen needed at discharge?: Not [address]: Not Applicable  Portable tank available for discharge?: Not Indicated    Dialysis:  Dialysis patient: No    Dialysis Center:  Not Applicable    Hospice Services:  Location: Not Applicable  Agency: Not Applicable    Consents signed: Not Indicated    Referrals made at [facility] for

## 2019-12-31 NOTE — PLAN OF CARE
Problem: Cardiac Output - Decreased:  Goal: Hemodynamic stability will improve  Description  Hemodynamic stability will improve  12/31/2019 0241 by Shannon Nuñez RN  Outcome: Ongoing. Pt remains hemodynamically stable  with no signs of decrease cardiac output. Will continue to monitor. Problem: Falls - Risk of:  Goal: Will remain free from falls  Description  Will remain free from falls  12/31/2019 0241 by Shannon Nuñez RN  Pt remains without falls during this shift. Pt does not attempt to get OOB alone. Pt able to call out appropriately, call light within reach. Safety precautions in place include fall armband, fall towel, chair & bed alarms, non slip foot wear. Signs posted on door, and door remains open for observation. The bed is in lowest position, wheels are locked, and rails are up 2/4. Continue with current care.

## 2019-12-31 NOTE — CARE COORDINATION
[address] Transitions Interview     2019    Patient: Lorin Lu Patient : 1940   MRN: [id number]   Reason for Admission: A-Fib with RVR  RARS: Readmission Risk Score: 15         Spoke with: Lorin Lu        Readmission Risk  Patient Active Problem List   Diagnosis    Benign essential HTN    Morbid obesity due to excess calories ([address]    Pure hypercholesterolemia    Bilateral knee pain    Pre-diabetes    Hyperlipidemia LDL goal <100    Abnormal nuclear stress test    Abnormal result of other cardiovascular function study (CODE)    Right hip pain    Atrial fibrillation with RVR (HCC)    Acute pulmonary edema (HCC)    Acute systolic (congestive) heart failure (HCC)    SOB (shortness of breath)    Abnormal ECG       Inpatient Assessment  Care Transitions Summary    Care Transitions Inpatient Review  Medication Review  Are you able to afford your medications?:  Yes  How often do you have difficulty taking your medications?:  I always take them as prescribed. Housing Review  Who do you live with?:  Grandchild, Other Caregiver  Are you an active caregiver in your home?:  No  Social Support  Do you have a ?:  No  Do you have a [address] [place] [address]?:  No  Durable Medical Equipment  Patient DME:  Shower chair, Other, Straight cane, Walker  Other Patient DME:  Tamie Elder,   Functional Review  Ability to seek help/take action for Emergent/Urgent situations i.e. fire, crime, inclement weather or health crisis. :  Independent  Ability handle personal hygiene needs (bathing/dressing/grooming): Independent  Ability to manage medications: Independent  Ability to prepare food:  Independent  Ability to maintain home (clean home, laundry):  Needs Assistance  Ability to drive and/or has transportation:  Independent  Ability to do shopping:  Independent  Ability to manage finances:   Independent  Is patient able to live independently?:  Yes  Hearing and Vision  Visual

## 2019-12-31 NOTE — PROGRESS NOTES
atraumatic. · Mouth/Throat: Lips appear moist. Oropharynx is clear and moist.  · Eyes: Conjunctivae normal. EOM are normal.   · Neck: Neck supple. No lymphadenopathy. No rigidity. No JVD present. · Cardiovascular: Normal rate, regular rhythm. Normal S1&S2. Carotid pulse 2+ bilaterally. · Pulmonary/Chest: Bilateral respiratory sounds present. No respiratory accessory muscle use. No wheezes, No rhonchi. · Abdominal: Soft. Normal bowel sounds present. No distension, No tenderness. No splenomegaly. No hernia. · Musculoskeletal: No tenderness. No edema    · Lymphadenopathy: Has no cervical adenopathy. · Neurological: Alert and oriented. Cranial nerve II-XII grossly intact, No gross deficit to touch. · Skin: Skin is warm and dry. No rash, lesions, ulcerations noted. · Psychiatric: No anxiety nor agitation. Labs, diagnostic and imaging results reviewed. Reviewed. Recent Labs     12/29/19  0338 12/30/19  0537 12/31/19  0539    140 141   K 3.8 3.9 4.1    98* 100   CO2 26 27 26   BUN 25* 27* 27*   CREATININE 0.8 1.0 1.1     Recent Labs     12/30/19  0537 12/30/19  1446 12/31/19  0539   WBC 5.6 4.8 6.3   HGB 10.9* 11.3* 10.5*   HCT 34.2* 35.9* 33.3*   MCV 76.7* 76.5* 78.0*    233 236     Lab Results   Component Value Date    TROPONINI 0.09 12/24/2019     Estimated Creatinine Clearance: 61 mL/min (based on SCr of 1.1 mg/dL).    No results found for: BNP  Lab Results   Component Value Date    PROTIME 14.4 12/27/2019    PROTIME 16.6 12/24/2019    PROTIME 14.7 12/23/2019    INR 1.24 12/27/2019    INR 1.43 12/24/2019    INR 1.26 12/23/2019     Lab Results   Component Value Date    CHOL 146 12/30/2019    HDL 56 12/30/2019    TRIG 61 12/30/2019       Scheduled Meds:   sodium chloride flush  10 mL Intravenous 2 times per day    apixaban  5 mg Oral BID    losartan  50 mg Oral Daily    furosemide  40 mg Oral BID    metoprolol succinate  100 mg Oral Daily    sodium chloride flush  10 mL Intravenous 2 times per day    insulin lispro  0-6 Units Subcutaneous TID WC    insulin lispro  0-3 Units Subcutaneous Nightly     Continuous Infusions:   dextrose       PRN Meds:sodium chloride flush, acetaminophen, magnesium hydroxide, ondansetron, heparin (porcine), sodium chloride flush, glucose, dextrose, glucagon (rDNA), dextrose     Patient Active Problem List    Diagnosis Date Noted    Abnormal result of other cardiovascular function study (CODE)      Priority: High    SOB (shortness of breath)     Abnormal ECG     Acute pulmonary edema (HCC)     Acute systolic (congestive) heart failure (HCC)     Atrial fibrillation with RVR ([address] 12/23/2019    Right hip pain 11/20/2017    Abnormal nuclear stress test 10/06/2017    Hyperlipidemia LDL goal <100 02/10/2016    Benign essential HTN 12/29/2015    Morbid obesity due to excess calories ([address] 12/29/2015    Pure hypercholesterolemia 12/29/2015    Bilateral knee pain 12/29/2015    Pre-diabetes 12/29/2015      Active Hospital Problems    Diagnosis Date Noted    SOB (shortness of breath) [R06.02]     Abnormal ECG [R94.31]     Acute pulmonary edema (HCC) [J81.0]     Acute systolic (congestive) heart failure (HCC) [I50.21]     Atrial fibrillation with RVR ([address] [I48.91] 12/23/2019       Assessment and Plan:     Assessment and Plan:      1. Takotsubo cardiomyopathy  2. Atrial fibrillation  3. Acute systolic congestive heart failure  4. No obstructive coronary disease     Patient presented with acute congestive heart failure symptoms. She was noted to be in atrial fibrillation. A transthoracic echocardiogram showed new onset drop in LV ejection fraction. She had regional wall motion abnormalities in the transthoracic echocardiogram which was not fitting with her vascular territory.   In addition to this, she also had a diffuse T wave inversion with prolonged QT interval.  Hence, she had a cardiac catheterization which did not show any evidence of obstructive coronary artery disease. There is a marginal improvement in her LV ejection fraction based on LV gram.  Hence, her overall picture is consistent with stress cardiomyopathy.     Patient feels much better after her diuresis and rate control. She had a radial approach cardiac catheterization yesterday. She was initiated back on Eliquis last night and her access site is good.      Recommendations:  1. Continue Toprol  mg daily  2. Continue losartan 50 mg daily  3.  Eliquis 5 mg bid  4. Lasix 40 mg p.o. daily    She have a f/u appointment with cardiology on 1/6/20    Thank you for allowing me to participate in the care of this patient. If you have any questions, please do not hesitate to contact me.     Jorge Bridges MD   Cardiac Electrophysiology  [facility]

## 2020-01-02 ENCOUNTER — TELEPHONE (OUTPATIENT)
Dept: CARDIOLOGY CLINIC | Age: 80
End: 2020-01-02

## 2020-01-02 ENCOUNTER — CARE COORDINATION (OUTPATIENT)
Dept: CASE MANAGEMENT | Age: 80
End: 2020-01-02

## 2020-01-02 RX ORDER — VALSARTAN 80 MG/1
80 TABLET ORAL DAILY
Qty: 30 TABLET | Refills: 0 | Status: SHIPPED | OUTPATIENT
Start: 2020-01-02 | End: 2020-01-15 | Stop reason: ALTCHOICE

## 2020-01-02 NOTE — CARE COORDINATION
[facility] Transitions Initial Follow Up Call    Call within 2 business days of discharge: Yes    Patient: Magnus Durham Patient : 1940   MRN: [id number]   Reason for Admission: A-Fib with RVR  Discharge Date: 19 RARS: Readmission Risk Score: 14      Last Discharge [facility]       Complaint Diagnosis Description Type Department Provider    19 Shortness of Breath Atrial fibrillation with RVR ([address] . .. ED to Hosp-Admission (Discharged) (ADMITTED) TJ 4 PCU Neil Barron MD; Pramod Cerda. .. Spoke with: [address]: [facility]     Non-face-to-face services provided:  Obtained and reviewed discharge summary and/or continuity of care documents  Education of patient/family/caregiver/guardian to support self-management-. Assessment and support for treatment adherence and medication management-.    Care Transitions 24 Hour Call    Schedule Follow Up Appointment with PCP:  Declined  Do you have any ongoing symptoms?:  No  Do you have a copy of your discharge instructions?:  Yes  Do you have all of your prescriptions and are they filled?:  No  Have you been contacted by a [facility] Pharmacist?:  No  Have you scheduled your follow up appointment?:  Yes  How are you going to get to your appointment?:  Car - family or friend to transport  Were you discharged with any Home Care or Post Acute Services:  Yes  Post Acute Services:  Home Health (Comment: [facility] )  Patient DME:  Shower chair, Other, Straight cane, Walker  Other Patient DME:  Naz Vallejo,   Do you have support at home?:  Grandchild, Other Caregiver  Do you feel like you have everything you need to keep you well at home?:  Yes  Are you an active caregiver in your home?:  No  Care Transitions Interventions  No Identified Needs                               Summary  CTN spoke with patient this afternoon for initial 24 hour discharge follow up CTN call.   Patient states she is doing well, no complaints of any nausea, vomiting, fevers, chills, SOB or Cough. No chest pain, dizziness or lightheadedness reported. Weight this am was 332 lbs, no LE Edema, states she is following her low sodium diet. CTN encouraged patient to take all medications as prescribed, continue to weigh herself daily and follow low sodium diet as ordered. SN with [facility] in home at time of CTN call, patient declined to review medications, states she will go over them with SN from [address], as some medications were unable to be filled, Pharmacy awaiting return phone call from MD for clarification. CTN provided education on s/s that require medical attention and when to seek medical attention. CTN advised Pt of use of urgent care or physicians 24 hr access line if assistance is needed after hours or weekend. Pt denies any needs or concerns and is agreeable with additional calls.     Follow Up  Future Appointments   Date Time Provider Carlos Enrique Diaz   1/6/2020 11:15 AM Toni Iqbal, [place] - [place] Jeong Card NEISHA Calle RN

## 2020-01-06 ENCOUNTER — OFFICE VISIT (OUTPATIENT)
Dept: CARDIOLOGY CLINIC | Age: 80
End: 2020-01-06
Payer: COMMERCIAL

## 2020-01-06 ENCOUNTER — CARE COORDINATION (OUTPATIENT)
Dept: CASE MANAGEMENT | Age: 80
End: 2020-01-06

## 2020-01-06 VITALS
HEIGHT: 66 IN | SYSTOLIC BLOOD PRESSURE: 122 MMHG | WEIGHT: 293 LBS | OXYGEN SATURATION: 97 % | BODY MASS INDEX: 47.09 KG/M2 | DIASTOLIC BLOOD PRESSURE: 88 MMHG | HEART RATE: 83 BPM

## 2020-01-06 PROCEDURE — 1123F ACP DISCUSS/DSCN MKR DOCD: CPT | Performed by: NURSE PRACTITIONER

## 2020-01-06 PROCEDURE — G8417 CALC BMI ABV UP PARAM F/U: HCPCS | Performed by: NURSE PRACTITIONER

## 2020-01-06 PROCEDURE — G8427 DOCREV CUR MEDS BY ELIG CLIN: HCPCS | Performed by: NURSE PRACTITIONER

## 2020-01-06 PROCEDURE — 1111F DSCHRG MED/CURRENT MED MERGE: CPT | Performed by: NURSE PRACTITIONER

## 2020-01-06 PROCEDURE — G8482 FLU IMMUNIZE ORDER/ADMIN: HCPCS | Performed by: NURSE PRACTITIONER

## 2020-01-06 PROCEDURE — 1090F PRES/ABSN URINE INCON ASSESS: CPT | Performed by: NURSE PRACTITIONER

## 2020-01-06 PROCEDURE — 4040F PNEUMOC VAC/ADMIN/RCVD: CPT | Performed by: NURSE PRACTITIONER

## 2020-01-06 PROCEDURE — G8399 PT W/DXA RESULTS DOCUMENT: HCPCS | Performed by: NURSE PRACTITIONER

## 2020-01-06 PROCEDURE — 99214 OFFICE O/P EST MOD 30 MIN: CPT | Performed by: NURSE PRACTITIONER

## 2020-01-06 PROCEDURE — 1036F TOBACCO NON-USER: CPT | Performed by: NURSE PRACTITIONER

## 2020-01-06 PROCEDURE — 0296T PR EXT ECG > 48HR TO 21 DAY RCRD W/CONECT INTL RCRD: CPT | Performed by: INTERNAL MEDICINE

## 2020-01-06 RX ORDER — GUAIFENESIN DEXTROMETHORPHAN HYDROBROMIDE ORAL SOLUTION 10; 100 MG/5ML; MG/5ML
10 SOLUTION ORAL EVERY 4 HOURS PRN
Status: ON HOLD | COMMUNITY
End: 2022-09-19 | Stop reason: HOSPADM

## 2020-01-06 RX ORDER — METOPROLOL SUCCINATE 200 MG/1
200 TABLET, EXTENDED RELEASE ORAL DAILY
Qty: 90 TABLET | Refills: 3 | Status: SHIPPED | OUTPATIENT
Start: 2020-01-06 | End: 2020-08-21 | Stop reason: SDUPTHER

## 2020-01-06 RX ORDER — LOSARTAN POTASSIUM 50 MG/1
50 TABLET ORAL DAILY
COMMUNITY
End: 2020-01-15 | Stop reason: SDUPTHER

## 2020-01-07 ENCOUNTER — NURSE ONLY (OUTPATIENT)
Dept: CARDIOLOGY CLINIC | Age: 80
End: 2020-01-07

## 2020-01-09 ENCOUNTER — CARE COORDINATION (OUTPATIENT)
Dept: CASE MANAGEMENT | Age: 80
End: 2020-01-09

## 2020-01-09 NOTE — PROGRESS NOTES
CC/HPI:   s/p coronary angiogram, A fib, CHF    78 y.o. patient of Guero Retana and Aaron with sCHF, HTN, HLD, SOB, abnormal ECG and low LVEF on echo. She is here for follow up after coronary angiogram with Dr. Natalie Retana. She continues to have SOB with exertion. Denies cp, orthopnea, LE edema, weight gain or PND. No LH/dizziness, palpitations or syncope. No fevers, n/v/d or GI/Gu bleeding. Denies right radial pain. Past Medical History:   Diagnosis Date    Acute systolic (congestive) heart failure (HCC)     Edema     Hearing loss     Hyperlipidemia     Hypertension     Morbid obesity due to excess calories ([facility] 12/29/2015    Pre-diabetes     PUD (peptic ulcer disease)     \"years ago\",      Past Surgical History:   Procedure Laterality Date    HERNIA REPAIR      umbilical     KNEE SURGERY Bilateral     TKR bilateral     Family History   Problem Relation Age of Onset    Other Mother         hernia    High Blood Pressure Father      Social History     Tobacco Use    Smoking status: Never Smoker    Smokeless tobacco: Never Used   Substance Use Topics    Alcohol use: No    Drug use: No     Allergies:Aspirin and Dye [iodides]    Review of Systems  General: No changes in weight, fatigue, or night sweats. HEENT: No blurry or decreased vision. No changes in hearing, nasal discharge or sore throat. Cardiovascular:  See HPI. Respiratory: No cough, hemoptysis, or wheezing. No history of asthma. Gastrointestinal:  No abdominal pain, hematochezia, melana, constipation, diarrhea, or history of GI ulcers. Genito-Urinary: No dysuria or hematuria. No urgency or polyuria. Musculoskeletal:  No complaints of joint pain, joint swelling or muscular weakness/soreness. Neurological:  No dizziness, headaches, numbness/tingling, speech problems or weakness. No history of a stroke or TIA. Psychological:  No anxiety or depression.   Hematological and Lymphatic: No abnormal bleeding or bruising, blood clots, Component Value Date    TRIG 61 2019    TRIG 56 2019    TRIG 66 2018     Lab Results   Component Value Date    HDL 56 2019    HDL 74 (H) 2019    HDL 72 (H) 2018     Lab Results   Component Value Date    LDLCALC 78 2019    LDLCALC 135 (H) 2019    LDLCALC 143 (H) 2018     Lab Results   Component Value Date    LABVLDL 12 2019    LABVLDL 11 2019    LABVLDL 13 2018     TSH:  Lab Results   Component Value Date    TSH 2.05 2017       IMAGIN2019 Coronary angiogram  1. Normal epicardial coronary arteries. 2.  LV ejection fraction 50% without regional wall motion abnormality. 3.  LVEDP 20 mmHg. 4.  No aortic valve gradient and no wall motion abnormality on left  Ventriculography. 2019 Echo:   Left ventricular cavity size is normal with asymmetric hypertrophy of the basal septum. Overall left ventricular systolic function appears reduced with an estimated ejection fraction of 35-40%. There is hypokinesis of the entire apex and the mid wall segments   Diastolic function is indeterminate due to abnormal heart rhythm (atrial fibrillation). No evidence of left ventricular mass or thrombus   Mitral annular calcification with mild mitral regurgitation   Moderate tricuspid regurgitation. Estimated pulmonary artery systolic pressure is at 49 mmHg assuming a right   atrial pressure of 8 mmHg. There is a pleural effusion   Bi-atrial enlargement. Medications:   Current Outpatient Medications   Medication Sig Dispense Refill    losartan (COZAAR) 50 MG tablet Take 50 mg by mouth daily      dextromethorphan-guaiFENesin (ROBITUSSIN-DM)  MG/5ML syrup Take 10 mLs by mouth every 4 hours as needed for Cough      metoprolol succinate (TOPROL XL) 200 MG extended release tablet Take 1 tablet by mouth daily 90 tablet 3    blood glucose monitor strips Test 3 times a day & as needed for symptoms of irregular blood glucose.  100 strip 1    furosemide (LASIX) 40 MG tablet Take 1 tablet by mouth 2 times daily 60 tablet 1    apixaban (ELIQUIS) 5 MG TABS tablet Take 1 tablet by mouth 2 times daily 60 tablet 1    valsartan (DIOVAN) 80 MG tablet Take 1 tablet by mouth daily (Patient not taking: Reported on 1/6/2020) 30 tablet 0    isosorbide mononitrate (IMDUR) 30 MG extended release tablet Take 1 tablet by mouth daily (Patient not taking: Reported on 1/6/2020) 30 tablet 1    hydrALAZINE (APRESOLINE) 50 MG tablet Take 1 tablet by mouth every 8 hours (Patient not taking: Reported on 1/6/2020) 90 tablet 1     No current facility-administered medications for this visit. Assessment:   1. Chronic atrial fibrillation    2. Chronic systolic congestive heart failure ([facility]    3. Essential hypertension    4. Dyslipidemia    5. S/P coronary angiogram        Plan:  A fib   2 day CAM monitor placed   Toprol increased to 200 mg po daily   Cont eliquis  sCHF   Increased toprol today   Consider increasing losartan in follow up if BP tolerates.     Lasix 40 mg po BID  HTN   /88   Losartan    Follow up with Whitney Pascal after monitor

## 2020-01-14 ENCOUNTER — CARE COORDINATION (OUTPATIENT)
Dept: CASE MANAGEMENT | Age: 80
End: 2020-01-14

## 2020-01-14 ENCOUNTER — OFFICE VISIT (OUTPATIENT)
Dept: FAMILY MEDICINE CLINIC | Age: 80
End: 2020-01-14
Payer: MEDICARE

## 2020-01-14 VITALS
HEART RATE: 88 BPM | SYSTOLIC BLOOD PRESSURE: 126 MMHG | WEIGHT: 293 LBS | HEIGHT: 66 IN | BODY MASS INDEX: 47.09 KG/M2 | OXYGEN SATURATION: 98 % | TEMPERATURE: 98.5 F | DIASTOLIC BLOOD PRESSURE: 82 MMHG | RESPIRATION RATE: 16 BRPM

## 2020-01-14 LAB
ANION GAP SERPL CALCULATED.3IONS-SCNC: 14 MMOL/L (ref 3–16)
BUN BLDV-MCNC: 31 MG/DL (ref 7–20)
CALCIUM SERPL-MCNC: 9.9 MG/DL (ref 8.3–10.6)
CHLORIDE BLD-SCNC: 100 MMOL/L (ref 99–110)
CO2: 28 MMOL/L (ref 21–32)
CREAT SERPL-MCNC: 1.1 MG/DL (ref 0.6–1.2)
GFR AFRICAN AMERICAN: 58
GFR NON-AFRICAN AMERICAN: 48
GLUCOSE BLD-MCNC: 129 MG/DL (ref 70–99)
POTASSIUM SERPL-SCNC: 4.3 MMOL/L (ref 3.5–5.1)
SODIUM BLD-SCNC: 142 MMOL/L (ref 136–145)

## 2020-01-14 PROCEDURE — 36415 COLL VENOUS BLD VENIPUNCTURE: CPT | Performed by: FAMILY MEDICINE

## 2020-01-14 PROCEDURE — 99214 OFFICE O/P EST MOD 30 MIN: CPT | Performed by: FAMILY MEDICINE

## 2020-01-14 ASSESSMENT — PATIENT HEALTH QUESTIONNAIRE - PHQ9
2. FEELING DOWN, DEPRESSED OR HOPELESS: 0
1. LITTLE INTEREST OR PLEASURE IN DOING THINGS: 0
SUM OF ALL RESPONSES TO PHQ9 QUESTIONS 1 & 2: 0
SUM OF ALL RESPONSES TO PHQ QUESTIONS 1-9: 0
SUM OF ALL RESPONSES TO PHQ QUESTIONS 1-9: 0

## 2020-01-14 NOTE — PROGRESS NOTES
Subjective:      Patient ID: Kimberly Ybarra is a 78 y.o. female. Here for follow up from ED visit  Reason for visit: sob, edema  Diagnosis given: CHF   Treatment: lasix ,   Work up: labs, echo, cath   Recommended follow up: 1 mo  Now with following symptoms     Congestive Heart Failure   Presents for follow-up visit. Associated symptoms include fatigue and shortness of breath (chronic, improved). Pertinent negatives include no abdominal pain, chest pain, chest pressure, claudication, edema, muscle weakness, near-syncope, nocturia, orthopnea, palpitations, paroxysmal nocturnal dyspnea or unexpected weight change. The symptoms have been stable. Compliance with total regimen is 26-50%. Compliance problems include adherence to diet and adherence to exercise. Compliance with diet is 26-50%. Compliance with exercise is 0-25%. Compliance with medications is %. Treatment side effects: none. Palpitations    This is a chronic problem. The current episode started more than 1 month ago. The problem occurs intermittently. The problem has been gradually improving. Nothing aggravates the symptoms. Associated symptoms include an irregular heartbeat and shortness of breath (chronic, improved). Pertinent negatives include no anxiety, chest fullness, chest pain, coughing, diaphoresis, dizziness, fever, malaise/fatigue, nausea, near-syncope, numbness, syncope or vomiting. Treatments tried: eliquis. The treatment provided mild relief. Morbid obesity  Patient is not able to exercise secondary to chronic osteoarthritis and chronic shortness of breath  Poor adherence to diet    Sacroiliac pain  Onset more than a month ago   Progression getting worse   Quality ache, sharp  Radiation none   Severity moderate   Timing: daytime  Worse with standing, twisting, walking  Better with rest  Denies leg paresthesia, weakness, saddle anesthesia        Review of Systems   Constitutional: Positive for fatigue.  Negative for diaphoresis, systolic (congestive) heart failure (HCC), Edema, Hearing loss, Hyperlipidemia, Hypertension, Morbid obesity due to excess calories ([address] Pre-diabetes, and PUD (peptic ulcer disease). Past Surgical History:   Procedure Laterality Date    HERNIA REPAIR      umbilical     KNEE SURGERY Bilateral     TKR bilateral        reports that she has never smoked. She has never used smokeless tobacco. She reports that she does not drink alcohol or use drugs. family history includes High Blood Pressure in her father; Other in her mother. Objective:   Physical Exam  Vitals signs and nursing note reviewed. Constitutional:       General: She is not in acute distress. Appearance: She is well-developed. She is obese. She is not ill-appearing, toxic-appearing or diaphoretic. HENT:      Head: Normocephalic. Mouth/Throat:      Mouth: Mucous membranes are moist.   Eyes:      General: No scleral icterus. Extraocular Movements: Extraocular movements intact. Conjunctiva/sclera: Conjunctivae normal.      Pupils: Pupils are equal, round, and reactive to light. Neck:      Musculoskeletal: Normal range of motion and neck supple. Thyroid: No thyromegaly. Vascular: No carotid bruit or JVD. Comments: Occasional extra heart beats     Cardiovascular:      Rate and Rhythm: Normal rate and regular rhythm. Pulses:           Carotid pulses are 2+ on the right side and 2+ on the left side. Heart sounds: Normal heart sounds. No murmur. No friction rub. No gallop. Comments: No edema    Pulmonary:      Effort: Pulmonary effort is normal. No respiratory distress. Breath sounds: No wheezing or rales. Chest:      Chest wall: No tenderness. Abdominal:      General: Bowel sounds are normal.      Palpations: Abdomen is soft. There is no mass. Tenderness: There is no tenderness. Musculoskeletal:      Lumbar back: She exhibits decreased range of motion, tenderness, pain and spasm.  She exhibits no bony tenderness, no swelling and no edema. Back:       Right lower leg: No edema. Left lower leg: No edema. Lymphadenopathy:      Cervical: No cervical adenopathy. Skin:     General: Skin is warm. Capillary Refill: Capillary refill takes less than 2 seconds. Coloration: Skin is not pale. Neurological:      General: No focal deficit present. Mental Status: She is alert and oriented to person, place, and time. Mental status is at baseline. Cranial Nerves: No cranial nerve deficit. Sensory: No sensory deficit. Motor: No abnormal muscle tone. Deep Tendon Reflexes: Reflexes are normal and symmetric. Psychiatric:         Mood and Affect: Mood normal.         Behavior: Behavior normal.         Thought Content: Thought content normal.         Judgment: Judgment normal.         Assessment:       Diagnosis Orders   1. Chronic congestive heart failure, unspecified heart failure type ([facility]  BASIC METABOLIC PANEL   2. Atrial fibrillation with RVR ([facility]     3. Morbid obesity with BMI of 50.0-59.9, adult ([facility]     4. Sacroiliac pain  [facility] Physical [facility]           Plan:      1. Check fu bmp  Encourage compliance with medication, life style changes  Low sodium diet discuss    2. Fu with cardiology  Continue anticoagulation  Falls precautions discussed    3. Counseling: encourage to adjust caloric and fat  intake to maintain or achieve ideal body weight, to emphasize fruits, vegetables, whole grains, if possible drink  vegetable milks, reduce meats, poultry, fish, and rich in legume like beans,lentus, chickpeas ,  nuts. Exercise is a life-long commitment.      4. Referral to PT   Tylenol prn      Time face to face >25 minutes, 50% time spent in education and coordination of care  Topics discussed:   Medical condition, diff diagnoses, work up options, medication use/secondary effects/medication interactions, and life style changes           Torito Rueda

## 2020-01-15 ENCOUNTER — OFFICE VISIT (OUTPATIENT)
Dept: CARDIOLOGY CLINIC | Age: 80
End: 2020-01-15
Payer: MEDICARE

## 2020-01-15 VITALS
HEIGHT: 66 IN | SYSTOLIC BLOOD PRESSURE: 124 MMHG | HEART RATE: 80 BPM | WEIGHT: 293 LBS | BODY MASS INDEX: 47.09 KG/M2 | DIASTOLIC BLOOD PRESSURE: 84 MMHG

## 2020-01-15 PROCEDURE — 93000 ELECTROCARDIOGRAM COMPLETE: CPT | Performed by: NURSE PRACTITIONER

## 2020-01-15 PROCEDURE — 99214 OFFICE O/P EST MOD 30 MIN: CPT | Performed by: NURSE PRACTITIONER

## 2020-01-15 RX ORDER — LOSARTAN POTASSIUM 100 MG/1
100 TABLET ORAL DAILY
Qty: 30 TABLET | Refills: 5 | Status: SHIPPED | OUTPATIENT
Start: 2020-01-15 | End: 2020-07-13 | Stop reason: SDUPTHER

## 2020-01-15 RX ORDER — FUROSEMIDE 40 MG/1
40 TABLET ORAL 2 TIMES DAILY
Qty: 60 TABLET | Refills: 5 | Status: SHIPPED | OUTPATIENT
Start: 2020-01-15 | End: 2020-02-12 | Stop reason: SDUPTHER

## 2020-01-15 ASSESSMENT — ENCOUNTER SYMPTOMS
NAUSEA: 0
SHORTNESS OF BREATH: 1
VOMITING: 0
ABDOMINAL PAIN: 0
COUGH: 0

## 2020-01-22 ENCOUNTER — TELEPHONE (OUTPATIENT)
Dept: FAMILY MEDICINE CLINIC | Age: 80
End: 2020-01-22

## 2020-01-28 ENCOUNTER — HOSPITAL ENCOUNTER (OUTPATIENT)
Dept: PHYSICAL THERAPY | Age: 80
Setting detail: THERAPIES SERIES
Discharge: HOME | End: 2020-01-28
Payer: MEDICARE

## 2020-01-28 NOTE — FLOWSHEET NOTE
[facility]  Phone: [phone]  Fax: [phone]        Physical Therapy  Cancellation/No-show Note  Patient Name:  Seb Connolly  :  1940   Date:  2020  Cancelled visits to date: 0  No-shows to date: 1    For today's appointment patient:  []  Cancelled  []  Rescheduled appointment  [x]  No-show     Reason given by patient:  []  Patient ill  []  Conflicting appointment  []  No transportation    []  Conflict with work  []  No reason given  []  Other:     Comments: Pt did not show for her eval and she has no other appts scheduled at this time.      Electronically signed by:  Deborah Andrews PT, Norma Gallardo, OMT-C    Physical Therapist [place] license #[id number]  Physical Therapist [place] license #[id number]

## 2020-02-07 ENCOUNTER — OFFICE VISIT (OUTPATIENT)
Dept: FAMILY MEDICINE CLINIC | Age: 80
End: 2020-02-07
Payer: MEDICARE

## 2020-02-07 VITALS
SYSTOLIC BLOOD PRESSURE: 128 MMHG | BODY MASS INDEX: 47.09 KG/M2 | DIASTOLIC BLOOD PRESSURE: 78 MMHG | HEART RATE: 86 BPM | HEIGHT: 66 IN | TEMPERATURE: 98 F | RESPIRATION RATE: 16 BRPM | OXYGEN SATURATION: 97 % | WEIGHT: 293 LBS

## 2020-02-07 PROCEDURE — 99215 OFFICE O/P EST HI 40 MIN: CPT | Performed by: FAMILY MEDICINE

## 2020-02-07 PROCEDURE — 94640 AIRWAY INHALATION TREATMENT: CPT | Performed by: FAMILY MEDICINE

## 2020-02-07 RX ORDER — ALBUTEROL SULFATE 90 UG/1
2 AEROSOL, METERED RESPIRATORY (INHALATION) EVERY 6 HOURS PRN
Qty: 1 INHALER | Refills: 3 | Status: SHIPPED | OUTPATIENT
Start: 2020-02-07 | End: 2020-09-17 | Stop reason: ALTCHOICE

## 2020-02-07 RX ORDER — AZITHROMYCIN 250 MG/1
250 TABLET, FILM COATED ORAL SEE ADMIN INSTRUCTIONS
Qty: 6 TABLET | Refills: 0 | Status: SHIPPED | OUTPATIENT
Start: 2020-02-07 | End: 2020-02-12

## 2020-02-07 RX ORDER — PREDNISONE 20 MG/1
40 TABLET ORAL DAILY
Qty: 14 TABLET | Refills: 0 | Status: SHIPPED | OUTPATIENT
Start: 2020-02-07 | End: 2020-02-14

## 2020-02-07 RX ADMIN — Medication 0.5 MG: at 15:57

## 2020-02-07 ASSESSMENT — ENCOUNTER SYMPTOMS
SWOLLEN GLANDS: 0
SORE THROAT: 1
WHEEZING: 1
COUGH: 1
HEARTBURN: 0
HEMOPTYSIS: 0
SHORTNESS OF BREATH: 1
RHINORRHEA: 0
ORTHOPNEA: 0
SPUTUM PRODUCTION: 1
VOMITING: 0

## 2020-02-07 NOTE — PROGRESS NOTES
Positive for sore throat. Negative for ear pain, postnasal drip and rhinorrhea. Respiratory: Positive for cough, sputum production, shortness of breath and wheezing. Negative for hemoptysis. Cardiovascular: Positive for chest pain. Negative for orthopnea, leg swelling, syncope and PND. Gastrointestinal: Negative for heartburn and vomiting. Musculoskeletal: Negative for myalgias and neck pain. Skin: Negative for rash. Neurological: Positive for headaches. is allergic to aspirin and dye [iodides].       Current Outpatient Medications:     predniSONE (DELTASONE) 20 MG tablet, Take 2 tablets by mouth daily for 7 days, Disp: 14 tablet, Rfl: 0    azithromycin (ZITHROMAX) 250 MG tablet, Take 1 tablet by mouth See Admin Instructions for 5 days 500mg on day 1 followed by 250mg on days 2 - 5, Disp: 6 tablet, Rfl: 0    albuterol sulfate HFA (PROVENTIL HFA) 108 (90 Base) MCG/ACT inhaler, Inhale 2 puffs into the lungs every 6 hours as needed for Wheezing, Disp: 1 Inhaler, Rfl: 3    losartan (COZAAR) 100 MG tablet, Take 1 tablet by mouth daily, Disp: 30 tablet, Rfl: 5    apixaban (ELIQUIS) 5 MG TABS tablet, Take 1 tablet by mouth 2 times daily, Disp: 60 tablet, Rfl: 5    furosemide (LASIX) 40 MG tablet, Take 1 tablet by mouth 2 times daily, Disp: 60 tablet, Rfl: 5    dextromethorphan-guaiFENesin (ROBITUSSIN-DM)  MG/5ML syrup, Take 10 mLs by mouth every 4 hours as needed for Cough, Disp: , Rfl:     metoprolol succinate (TOPROL XL) 200 MG extended release tablet, Take 1 tablet by mouth daily (Patient taking differently: Take 200 mg by mouth daily Indications: Pt reports she is taking 2 tablets by mouth everyday ), Disp: 90 tablet, Rfl: 3    blood glucose monitor strips, Test 3 times a day & as needed for symptoms of irregular blood glucose., Disp: 100 strip, Rfl: 1     has a past medical history of Acute systolic (congestive) heart failure (HCC), Edema, Hearing loss, Hyperlipidemia, Hypertension, Morbid obesity due to excess calories ([address] Pre-diabetes, and PUD (peptic ulcer disease). Past Surgical History:   Procedure Laterality Date    HERNIA REPAIR      umbilical     KNEE SURGERY Bilateral     TKR bilateral        reports that she has never smoked. She has never used smokeless tobacco. She reports that she does not drink alcohol or use drugs. family history includes High Blood Pressure in her father; Other in her mother. Objective:  Blood pressure 128/78, pulse 86, temperature 98 °F (36.7 °C), temperature source Tympanic, resp. rate 16, height 5' 6\" (1.676 m), weight (!) 337 lb (152.9 kg), SpO2 97 %, not currently breastfeeding. Physical Exam  Vitals signs and nursing note reviewed. Constitutional:       General: She is not in acute distress. Appearance: She is well-developed. She is obese. She is not ill-appearing, toxic-appearing or diaphoretic. HENT:      Head: Normocephalic. No right periorbital erythema or left periorbital erythema. Right Ear: Hearing, tympanic membrane, ear canal and external ear normal. No decreased hearing noted. No drainage, swelling or tenderness. No middle ear effusion. There is no impacted cerumen. Left Ear: Hearing, tympanic membrane, ear canal and external ear normal. No decreased hearing noted. No drainage, swelling or tenderness. No middle ear effusion. There is no impacted cerumen. Nose: Mucosal edema present. No nasal deformity, septal deviation or rhinorrhea. Right Sinus: No maxillary sinus tenderness or frontal sinus tenderness. Left Sinus: No maxillary sinus tenderness or frontal sinus tenderness. Mouth/Throat:      Mouth: Mucous membranes are moist.      Pharynx: Posterior oropharyngeal erythema present. Eyes:      Conjunctiva/sclera: Conjunctivae normal.      Pupils: Pupils are equal, round, and reactive to light. Neck:      Musculoskeletal: Normal range of motion and neck supple. Vascular: No JVD.

## 2020-02-10 ENCOUNTER — TELEPHONE (OUTPATIENT)
Dept: FAMILY MEDICINE CLINIC | Age: 80
End: 2020-02-10

## 2020-02-11 DIAGNOSIS — I10 ESSENTIAL HYPERTENSION: ICD-10-CM

## 2020-02-11 DIAGNOSIS — I42.0 DILATED CARDIOMYOPATHY (HCC): ICD-10-CM

## 2020-02-11 LAB
ANION GAP SERPL CALCULATED.3IONS-SCNC: 17 MMOL/L (ref 3–16)
BUN BLDV-MCNC: 27 MG/DL (ref 7–20)
CALCIUM SERPL-MCNC: 9.7 MG/DL (ref 8.3–10.6)
CHLORIDE BLD-SCNC: 99 MMOL/L (ref 99–110)
CO2: 29 MMOL/L (ref 21–32)
CREAT SERPL-MCNC: 0.9 MG/DL (ref 0.6–1.2)
GFR AFRICAN AMERICAN: >60
GFR NON-AFRICAN AMERICAN: 60
GLUCOSE BLD-MCNC: 157 MG/DL (ref 70–99)
POTASSIUM SERPL-SCNC: 5 MMOL/L (ref 3.5–5.1)
SODIUM BLD-SCNC: 145 MMOL/L (ref 136–145)

## 2020-02-11 NOTE — PROGRESS NOTES
[address]   Electrophysiology  Office Visit  Date: 2/12/2020    Chief Complaint   Patient presents with    Atrial Fibrillation    Cardiomyopathy    Congestive Heart Failure       Cardiac HX: Odalis Harris is a 78 y.o. woman with a h/o HLD, HTN, DM, obesity, who p/w a 3 day h/o SOB, + PND x 10 days, noted to be in AF/RVR with rates in the 150's, placed on dilt, HR controlled, EF 20-25% per echo, changed to Toprol XL, lasix, losartan, LHC showed no CAD. Interval History/HPI: Patient been seen in the office 4 weeks ago. BP had been elevated and losartan was increased. BMP showed a potassium of 5.0 in follow-up. Patient wore a 2-day CAM that showed 100% AF, minimum HR 52, average 84, max 171 with less than 1% PVCs. Heart rate histogram shows heart rate fairly well controlled with some elevated heart rates. Patient states her energy is ok. 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 Patient is wheezing today - is on steroids for bronchitis as well as an ATB. Remains with bilat LE edema, R>L. Weight is up 2# from last and states edema is worse. No PND or orthopnea.       Home medications:   Current Outpatient Medications on File Prior to Visit   Medication Sig Dispense Refill    predniSONE (DELTASONE) 20 MG tablet Take 2 tablets by mouth daily for 7 days 14 tablet 0    azithromycin (ZITHROMAX) 250 MG tablet Take 1 tablet by mouth See Admin Instructions for 5 days 500mg on day 1 followed by 250mg on days 2 - 5 6 tablet 0    albuterol sulfate HFA (PROVENTIL HFA) 108 (90 Base) MCG/ACT inhaler Inhale 2 puffs into the lungs every 6 hours as needed for Wheezing 1 Inhaler 3    losartan (COZAAR) 100 MG tablet Take 1 tablet by mouth daily 30 tablet 5    apixaban (ELIQUIS) 5 MG TABS tablet Take 1 tablet by mouth 2 times daily 60 tablet 5    dextromethorphan-guaiFENesin (ROBITUSSIN-DM)  MG/5ML syrup Take 10 mLs by mouth numbness or tingling. · Psychiatric: No anxiety, or depression. · Endocrine: No temperature intolerance. No excessive thirst, fluid intake, or urination. · Hem/Lymph: No abnormal bruising or bleeding, blood clots or swollen lymph nodes. · Allergic/Immunologic: No nasal congestion or hives. Physical Examination:  Vitals:    02/12/20 1039   BP: (!) 142/92   Pulse: 62         Wt Readings from Last 3 Encounters:   02/12/20 (!) 339 lb 12.8 oz (154.1 kg)   02/07/20 (!) 337 lb (152.9 kg)   01/15/20 (!) 333 lb 9.6 oz (151.3 kg)       · Constitutional: Oriented. No distress. · Head: Normocephalic and atraumatic. · Mouth/Throat: Oropharynx is clear and moist.   · Eyes: Conjunctivae clear without jaunduice. PERRL. · Neck: Neck supple. No rigidity. No JVD present. · Cardiovascular: Normal rate, regular rhythm, S1&S2. · Pulmonary/Chest: Bilateral respiratory sounds. No wheezes, No rhonchi. · Abdominal: Soft. Bowel sounds present. No distension, No tenderness. · Musculoskeletal: No tenderness. No edema    · Lymphadenopathy: Has no cervical adenopathy. · Neurological: Alert and oriented. Cranial nerve appears intact, No Gross deficit   · Skin: Skin is warm and dry. No rash noted. · Psychiatric: Has a normal mood, affect and behavior     Labs:  Reviewed. Recent Labs     02/11/20  0905      K 5.0   CL 99   CO2 29   BUN 27*   CREATININE 0.9     No results for input(s): WBC, HGB, HCT, MCV, PLT in the last 72 hours.   Lab Results   Component Value Date    TROPONINI 0.09 12/24/2019     No results found for: BNP  Lab Results   Component Value Date    PROTIME 14.4 12/27/2019    PROTIME 16.6 12/24/2019    PROTIME 14.7 12/23/2019    INR 1.24 12/27/2019    INR 1.43 12/24/2019    INR 1.26 12/23/2019     Lab Results   Component Value Date    CHOL 146 12/30/2019    HDL 56 12/30/2019    TRIG 61 12/30/2019       ECG: Personally reviewed: AF, HR 66, QRS 92, QTc 418    ECHO: 24 2019  Summary   Left ventricular cavity size is normal with asymmetric hypertrophy of the   basal septum. Overall left ventricular systolic function appears reduced with an estimated   ejection fraction of 35-40%. There is hypokinesis of the entire apex and the mid wall segments   Diastolic function is indeterminate due to abnormal heart rhythm (atrial   fibrillation). No evidence of left ventricular mass or thrombus   Mitral annular calcification with mild mitral regurgitation   Moderate tricuspid regurgitation. Estimated pulmonary artery systolic pressure is at 49 mmHg assuming a right   atrial pressure of 8 mmHg. There is a pleural effusion   Bi-atrial enlargement. Cardiac Angiography: 12/23/2019   cONCLUSION:  1. Normal epicardial coronary arteries. 2.  LV ejection fraction 50% without regional wall motion abnormality. 3.  LVEDP 20 mmHg. 4.  No aortic valve gradient and no wall motion abnormality on left  ventriculography.     PLAN:  Hydration, bedrest, restart Eliquis later tonight, and potential  discharge tomorrow. Problem List:   Patient Active Problem List    Diagnosis Date Noted    Abnormal result of other cardiovascular function study (CODE)      Priority: High    SOB (shortness of breath)     Abnormal ECG     Acute pulmonary edema (HCC)     Acute systolic (congestive) heart failure (HCC)     Atrial fibrillation with RVR ([address] 12/23/2019    Right hip pain 11/20/2017    Abnormal nuclear stress test 10/06/2017    Hyperlipidemia LDL goal <100 02/10/2016    Benign essential HTN 12/29/2015    Morbid obesity due to excess calories ([address] 12/29/2015    Pure hypercholesterolemia 12/29/2015    Bilateral knee pain 12/29/2015    Pre-diabetes 12/29/2015        Assessment:   1. Atrial fibrillation, unspecified type ([address]    2. Chronic systolic (congestive) heart failure ([address]    3.  Nonischemic cardiomyopathy ([address]       77 y/o woman with a h/o HLD, HTN, DM, obesity, who p/w a 3 day h/o SOB, + PND x 10 days, noted to be in AF/RVR with rates in the 150's, placed on dilt, HR controlled, EF 20-25% per echo, changed to Toprol XL, lasix, losartan, LHC showed no CAD. [id number] 5. TSH 1.85 (12/23/19). 2-day CAM (1/6/2020 to 1/8/2020) that showed 100% AF, minimum HR 52, average 84, max 171 with less than 1% PVCs. Heart rate histogram shows heart rate fairly well controlled with some elevated heart rates.      AF/RVR  - In AF - HR fairly well controlled  - On Toprol  mg QD  - Echo - LA 5.2 , vol 120  - On apixaban 5 mg BID - no s/s bleeding - continue  - 2 day BardyCam monitor pending - will call patient with results  - Will discuss rhythm management with Dr. Elsa Freedman. - ECG ordered and results personally reviewed      CMP/ acute sCHF  - EF 35-40%, EF 55% (2017)  - NYHA class III  - QRS 70  - On lasix 40 mg BID  - will increase to 80 in the am and 40 mg in the pm  - Reviewed labs that show an elevated potassium with increase in losartan   - Will recheck BMP and BNP in 2 weeks  - On Toprol 200 mg QD, losartan 100 mg QD - will leave for now   - S/p LHC that showed no CAD  - F/u in 4 weeks with Dr. Elsa Freedman - discuss rhythm mgmnt  HTN  - Remains hypertensive (also on steroid)  - K+ was - 5.0 will recheck on 2 weeks - will leave on current dose of losartan as going up on lasix  - Patient to monitor BP at home - will get cuff today and monitor  - Will call me in 1 week with BP's and weights     EF of 35-40%  ARB, BB for systolic HF  No CAD  Anticoagulation for AF and heart failure  No Tobacco use. All questions and concerns were addressed to the patient/family. Alternatives to my treatment were discussed. The note was completed using EMR. Every effort was made to ensure accuracy; however, inadvertent computerized transcription errors may be present. Patient received education regarding their diagnosis, treatment and medications while in the office today.       Gaby Ham [address] [facility]

## 2020-02-12 ENCOUNTER — OFFICE VISIT (OUTPATIENT)
Dept: CARDIOLOGY CLINIC | Age: 80
End: 2020-02-12
Payer: MEDICARE

## 2020-02-12 VITALS
HEIGHT: 66 IN | BODY MASS INDEX: 47.09 KG/M2 | HEART RATE: 62 BPM | WEIGHT: 293 LBS | DIASTOLIC BLOOD PRESSURE: 92 MMHG | SYSTOLIC BLOOD PRESSURE: 142 MMHG

## 2020-02-12 PROCEDURE — 99214 OFFICE O/P EST MOD 30 MIN: CPT | Performed by: NURSE PRACTITIONER

## 2020-02-12 PROCEDURE — 93000 ELECTROCARDIOGRAM COMPLETE: CPT | Performed by: NURSE PRACTITIONER

## 2020-02-12 RX ORDER — FUROSEMIDE 40 MG/1
40 TABLET ORAL 3 TIMES DAILY
Qty: 90 TABLET | Refills: 5 | Status: SHIPPED | OUTPATIENT
Start: 2020-02-12 | End: 2020-08-21 | Stop reason: SDUPTHER

## 2020-02-13 PROCEDURE — 0298T PR EXT ECG > 48HR TO 21 DAY REVIEW AND INTERPRETATN: CPT | Performed by: INTERNAL MEDICINE

## 2020-03-24 ENCOUNTER — TELEPHONE (OUTPATIENT)
Dept: CARDIOLOGY CLINIC | Age: 80
End: 2020-03-24

## 2020-03-24 NOTE — TELEPHONE ENCOUNTER
Spoke with patient, she has missed Eliquis this past Sunday, let her know she cannot miss any more doses. Scheduled DCCV for 4/13/20. Reviewed instructions and patient verbalized understanding.

## 2020-03-24 NOTE — TELEPHONE ENCOUNTER
Called and spoke with patient. Revieweed CAM that showed AF with poorly controlled rate. EF 20-25% - LHC showed no CAD. Discussed the use of amio however patient is allergic to Iodine (facial, tongue swelling and rash). Will schedule for DCCV. Will verify no missed doses of Eliquis.

## 2020-03-26 ENCOUNTER — PREP FOR PROCEDURE (OUTPATIENT)
Dept: CARDIOLOGY CLINIC | Age: 80
End: 2020-03-26

## 2020-03-26 RX ORDER — SODIUM CHLORIDE 9 MG/ML
INJECTION, SOLUTION INTRAVENOUS CONTINUOUS
Status: CANCELLED | OUTPATIENT
Start: 2020-03-26

## 2020-03-26 RX ORDER — SODIUM CHLORIDE 0.9 % (FLUSH) 0.9 %
10 SYRINGE (ML) INJECTION PRN
Status: CANCELLED | OUTPATIENT
Start: 2020-03-26

## 2020-03-26 RX ORDER — MIDAZOLAM HYDROCHLORIDE 1 MG/ML
1 INJECTION INTRAMUSCULAR; INTRAVENOUS ONCE
Status: CANCELLED | OUTPATIENT
Start: 2020-04-13

## 2020-03-26 RX ORDER — SODIUM CHLORIDE 0.9 % (FLUSH) 0.9 %
10 SYRINGE (ML) INJECTION EVERY 12 HOURS SCHEDULED
Status: CANCELLED | OUTPATIENT
Start: 2020-03-26

## 2020-04-08 ENCOUNTER — TELEPHONE (OUTPATIENT)
Dept: CARDIOLOGY CLINIC | Age: 80
End: 2020-04-08

## 2020-04-08 NOTE — TELEPHONE ENCOUNTER
Pt called office to cancel DCCV on 4/13/20 due to the risk of exposure to COVID-19. Scheduled telephone OV with UL on 4/14/20.

## 2020-05-07 ENCOUNTER — TELEPHONE (OUTPATIENT)
Dept: CARDIOLOGY CLINIC | Age: 80
End: 2020-05-07

## 2020-05-07 NOTE — TELEPHONE ENCOUNTER
Pt cancelled DCCV on 4/13 due to COVID-19. Pt also cancelled f/u appt with  on 4/14. Tried to call pt, but VM is full. Called and spoke with pt's daughter, Elysia Rosas, on [facility]. She will talk to her mom and see what she wants to do. She will at least have her call for a f/u appt. Can schedule with FW when she calls back.

## 2020-05-11 ENCOUNTER — PREP FOR PROCEDURE (OUTPATIENT)
Dept: CARDIOLOGY CLINIC | Age: 80
End: 2020-05-11

## 2020-05-11 ENCOUNTER — OFFICE VISIT (OUTPATIENT)
Dept: CARDIOLOGY CLINIC | Age: 80
End: 2020-05-11
Payer: MEDICARE

## 2020-05-11 VITALS
DIASTOLIC BLOOD PRESSURE: 86 MMHG | WEIGHT: 293 LBS | HEART RATE: 86 BPM | BODY MASS INDEX: 56.1 KG/M2 | SYSTOLIC BLOOD PRESSURE: 130 MMHG

## 2020-05-11 DIAGNOSIS — I48.91 ATRIAL FIBRILLATION, UNSPECIFIED TYPE (HCC): ICD-10-CM

## 2020-05-11 LAB
ANION GAP SERPL CALCULATED.3IONS-SCNC: 13 MMOL/L (ref 3–16)
BUN BLDV-MCNC: 22 MG/DL (ref 7–20)
CALCIUM SERPL-MCNC: 9.9 MG/DL (ref 8.3–10.6)
CHLORIDE BLD-SCNC: 101 MMOL/L (ref 99–110)
CO2: 27 MMOL/L (ref 21–32)
CREAT SERPL-MCNC: 0.8 MG/DL (ref 0.6–1.2)
GFR AFRICAN AMERICAN: >60
GFR NON-AFRICAN AMERICAN: >60
GLUCOSE BLD-MCNC: 97 MG/DL (ref 70–99)
INR BLD: 1.42 (ref 0.86–1.14)
POTASSIUM SERPL-SCNC: 4.2 MMOL/L (ref 3.5–5.1)
PROTHROMBIN TIME: 16.5 SEC (ref 10–13.2)
SODIUM BLD-SCNC: 141 MMOL/L (ref 136–145)

## 2020-05-11 PROCEDURE — 99214 OFFICE O/P EST MOD 30 MIN: CPT | Performed by: NURSE PRACTITIONER

## 2020-05-11 PROCEDURE — 93000 ELECTROCARDIOGRAM COMPLETE: CPT | Performed by: NURSE PRACTITIONER

## 2020-05-11 RX ORDER — MIDAZOLAM HYDROCHLORIDE 1 MG/ML
1 INJECTION INTRAMUSCULAR; INTRAVENOUS ONCE
Status: CANCELLED | OUTPATIENT
Start: 2020-05-12

## 2020-05-11 RX ORDER — SODIUM CHLORIDE 9 MG/ML
INJECTION, SOLUTION INTRAVENOUS CONTINUOUS
Status: CANCELLED | OUTPATIENT
Start: 2020-05-11

## 2020-05-11 RX ORDER — HYDRALAZINE HYDROCHLORIDE 25 MG/1
25 TABLET, FILM COATED ORAL 3 TIMES DAILY
Qty: 90 TABLET | Refills: 3 | Status: ON HOLD | OUTPATIENT
Start: 2020-05-11 | End: 2020-10-02 | Stop reason: HOSPADM

## 2020-05-11 RX ORDER — SODIUM CHLORIDE 0.9 % (FLUSH) 0.9 %
10 SYRINGE (ML) INJECTION EVERY 12 HOURS SCHEDULED
Status: CANCELLED | OUTPATIENT
Start: 2020-05-11

## 2020-05-11 RX ORDER — SODIUM CHLORIDE 0.9 % (FLUSH) 0.9 %
10 SYRINGE (ML) INJECTION PRN
Status: CANCELLED | OUTPATIENT
Start: 2020-05-11

## 2020-05-11 NOTE — PROGRESS NOTES
[facility], [place]   Electrophysiology  Office Visit  Date: 5/11/2020    Chief Complaint   Patient presents with    Atrial Fibrillation    Cardiomyopathy    Congestive Heart Failure    Hypertension       Cardiac HX: Ngozi Driver is a 78 y.o. woman with a h/o HLD, HTN, DM, obesity, who originally p/w (12/2019) a 3 day h/o SOB, + PND x 10 days, noted to be in AF/RVR with rates in the 150's, placed on dilt, HR controlled, EF 20-25% per echo, changed to Toprol XL, lasix, losartan, LHC showed no CAD. Interval History/HPI: She is here to follow-up for her atrial fibrillation and cardiomyopathy. Patient been seen in the office in February with increased lower extremity edema and audible wheezing, Lasix was increased to 80 in the morning and 40 in the afternoon. Her labs been reviewed at that time which showed an elevated potassium with an increase in losartan, she did not have follow-up labs after the increase in Lasix. She did wear a 2-day CAM that showed a min HR 52, average 84, max 171, persistent atrial fibrillation with suboptimal rate control at 100% AF burden. She was scheduled for a cardioversion however canceled this due to [place]. Today she has minimal s/s with her atrial fib. BP is on the higher side and EF is low presumably d/t inadequate rate control in AF  Her weight is up today and admits to eating more. She states her edema is better with the increase in lasix and can actually get her shoes on today. She has 1-2+ pitting edema knees on down, denies PND or orthopnea. Her BP is elevated today - she was to get a BP cuff and measure at home however has not done.        Home medications:   Current Outpatient Medications on File Prior to Visit   Medication Sig Dispense Refill    albuterol sulfate HFA (PROVENTIL HFA) 108 (90 Base) MCG/ACT inhaler Inhale 2 puffs into the lungs every 6 hours as needed for Wheezing 1 Inhaler 3    losartan (COZAAR) 100 MG tablet Take 1 tablet by mouth daily 30 tablet 5    apixaban (ELIQUIS) 5 MG TABS tablet Take 1 tablet by mouth 2 times daily 60 tablet 5    dextromethorphan-guaiFENesin (ROBITUSSIN-DM)  MG/5ML syrup Take 10 mLs by mouth every 4 hours as needed for Cough      metoprolol succinate (TOPROL XL) 200 MG extended release tablet Take 1 tablet by mouth daily 90 tablet 3    blood glucose monitor strips Test 3 times a day & as needed for symptoms of irregular blood glucose. 100 strip 1    furosemide (LASIX) 40 MG tablet Take 1 tablet by mouth 3 times daily (Patient taking differently: Take 40 mg by mouth See Admin Instructions 2 tabs q am 1 q hs) 90 tablet 5     No current facility-administered medications on file prior to visit. Past Medical History:   Diagnosis Date    Acute systolic (congestive) heart failure (HCC)     Edema     Hearing loss     Hyperlipidemia     Hypertension     Morbid obesity due to excess calories ([facility] 12/29/2015    Pre-diabetes     PUD (peptic ulcer disease)     \"years ago\",         Past Surgical History:   Procedure Laterality Date    HERNIA REPAIR      umbilical     KNEE SURGERY Bilateral     TKR bilateral       Allergies   Allergen Reactions    Aspirin      Caused ulcers, burns stomach    Dye [Iodides] Itching, Swelling and Rash       Social History:  Reviewed. reports that she has never smoked. She has never used smokeless tobacco. She reports that she does not drink alcohol or use drugs. Family History:  Reviewed. family history includes High Blood Pressure in her father; Other in her mother. Review of System:    · Constitutional: No fevers, chills. · Eyes: No visual changes or diplopia. No scleral icterus. · ENT: No Headaches. No mouth sores or sore throat. · Cardiovascular: No for chest pain, Yes for dyspnea on exertion, No for palpitations or No for loss of consciousness. No cough, hemoptysis, No for pleuritic pain, or phlebitis. · Respiratory: No for cough or wheezing. No hematemesis. · Gastrointestinal: No abdominal pain, blood in stools. · Genitourinary: No dysuria, or hematuria. · Musculoskeletal: No gait disturbance,    · Integumentary: No rash or pruritis. · Neurological: No headache, change in muscle strength, numbness or tingling. · Psychiatric: No anxiety, or depression. · Endocrine: No temperature intolerance. No excessive thirst, fluid intake, or urination. · Hem/Lymph: No abnormal bruising or bleeding, blood clots or swollen lymph nodes. · Allergic/Immunologic: No nasal congestion or hives. Physical Examination:  Vitals:    05/11/20 1116   BP: 130/86   Pulse: 86         Wt Readings from Last 3 Encounters:   05/11/20 (!) 347 lb 9.6 oz (157.7 kg)   02/12/20 (!) 339 lb 12.8 oz (154.1 kg)   02/07/20 (!) 337 lb (152.9 kg)       · Constitutional: Oriented. No distress. · Head: Normocephalic and atraumatic. · Mouth/Throat: Oropharynx is clear and moist.   · Eyes: Conjunctivae clear without jaunduice. PERRL. · Neck: Neck supple. No rigidity. No JVD present. · Cardiovascular: Normal rate, regular rhythm, S1&S2. · Pulmonary/Chest: Bilateral respiratory sounds. No wheezes, No rhonchi. · Abdominal: Soft. Bowel sounds present. No distension, No tenderness. · Musculoskeletal: No tenderness. 1-2 + bilat LE edema    · Lymphadenopathy: Has no cervical adenopathy. · Neurological: Alert and oriented. Cranial nerve appears intact, No Gross deficit   · Skin: Skin is warm and dry. No rash noted. · Psychiatric: Has a normal mood, affect and behavior     Labs:  Reviewed. No results for input(s): NA, K, CL, CO2, PHOS, BUN, CREATININE in the last 72 hours. Invalid input(s): CA,  TSH  No results for input(s): WBC, HGB, HCT, MCV, PLT in the last 72 hours.   Lab Results   Component Value Date    TROPONINI 0.09 12/24/2019     No results found for: BNP  Lab Results   Component Value Date    PROTIME 14.4 12/27/2019    PROTIME 16.6 12/24/2019    PROTIME 14.7 12/23/2019    INR 1.24 12/27/2019    INR 1.43 12/24/2019    INR 1.26 12/23/2019     Lab Results   Component Value Date    CHOL 146 12/30/2019    HDL 56 12/30/2019    TRIG 61 12/30/2019       ECG: Personally reviewed: AF, HR 86, QRS 90, QTc 413    ECHO: 24 2019  Summary   Left ventricular cavity size is normal with asymmetric hypertrophy of the   basal septum. Overall left ventricular systolic function appears reduced with an estimated   ejection fraction of 35-40%. There is hypokinesis of the entire apex and the mid wall segments   Diastolic function is indeterminate due to abnormal heart rhythm (atrial   fibrillation). No evidence of left ventricular mass or thrombus   Mitral annular calcification with mild mitral regurgitation   Moderate tricuspid regurgitation. Estimated pulmonary artery systolic pressure is at 49 mmHg assuming a right   atrial pressure of 8 mmHg. There is a pleural effusion   Bi-atrial enlargement. Cardiac Angiography: 12/23/2019   cONCLUSION:  1. Normal epicardial coronary arteries. 2.  LV ejection fraction 50% without regional wall motion abnormality. 3.  LVEDP 20 mmHg. 4.  No aortic valve gradient and no wall motion abnormality on left  ventriculography.     PLAN:  Hydration, bedrest, restart Eliquis later tonight, and potential  discharge tomorrow.     Problem List:   Patient Active Problem List    Diagnosis Date Noted    Abnormal result of other cardiovascular function study (CODE)      Priority: High    SOB (shortness of breath)     Abnormal ECG     Acute pulmonary edema (HCC)     Acute systolic (congestive) heart failure (HCC)     Atrial fibrillation with RVR ([address] 12/23/2019    Right hip pain 11/20/2017    Abnormal nuclear stress test 10/06/2017    Hyperlipidemia LDL goal <100 02/10/2016    Benign essential HTN 12/29/2015    Morbid obesity due to excess calories ([address] 12/29/2015    Pure hypercholesterolemia 12/29/2015    Bilateral knee pain 12/29/2015    Pre-diabetes

## 2020-05-12 ENCOUNTER — HOSPITAL ENCOUNTER (OUTPATIENT)
Dept: CARDIAC CATH/INVASIVE PROCEDURES | Age: 80
Discharge: HOME | End: 2020-05-14
Payer: MEDICARE

## 2020-05-12 VITALS — TEMPERATURE: 98.3 F | WEIGHT: 293 LBS | BODY MASS INDEX: 47.09 KG/M2 | HEIGHT: 66 IN

## 2020-05-12 LAB
ANION GAP SERPL CALCULATED.3IONS-SCNC: 11 MMOL/L (ref 3–16)
BUN BLDV-MCNC: 23 MG/DL (ref 7–20)
CALCIUM SERPL-MCNC: 9.5 MG/DL (ref 8.3–10.6)
CHLORIDE BLD-SCNC: 98 MMOL/L (ref 99–110)
CO2: 27 MMOL/L (ref 21–32)
CREAT SERPL-MCNC: 0.9 MG/DL (ref 0.6–1.2)
EKG ATRIAL RATE: 59 BPM
EKG ATRIAL RATE: 91 BPM
EKG DIAGNOSIS: NORMAL
EKG DIAGNOSIS: NORMAL
EKG P AXIS: 49 DEGREES
EKG P-R INTERVAL: 154 MS
EKG Q-T INTERVAL: 352 MS
EKG Q-T INTERVAL: 448 MS
EKG QRS DURATION: 74 MS
EKG QRS DURATION: 78 MS
EKG QTC CALCULATION (BAZETT): 440 MS
EKG QTC CALCULATION (BAZETT): 443 MS
EKG R AXIS: 71 DEGREES
EKG R AXIS: 84 DEGREES
EKG T AXIS: -1 DEGREES
EKG T AXIS: -33 DEGREES
EKG VENTRICULAR RATE: 59 BPM
EKG VENTRICULAR RATE: 94 BPM
GFR AFRICAN AMERICAN: >60
GFR NON-AFRICAN AMERICAN: 60
GLUCOSE BLD-MCNC: 130 MG/DL (ref 70–99)
INR BLD: 1.7 (ref 0.86–1.14)
POTASSIUM SERPL-SCNC: 4.7 MMOL/L (ref 3.5–5.1)
PROTHROMBIN TIME: 19.8 SEC (ref 10–13.2)
SODIUM BLD-SCNC: 136 MMOL/L (ref 136–145)

## 2020-05-12 PROCEDURE — 93005 ELECTROCARDIOGRAM TRACING: CPT | Performed by: INTERNAL MEDICINE

## 2020-05-12 PROCEDURE — 92960 CARDIOVERSION ELECTRIC EXT: CPT | Performed by: INTERNAL MEDICINE

## 2020-05-12 PROCEDURE — 93010 ELECTROCARDIOGRAM REPORT: CPT | Performed by: INTERNAL MEDICINE

## 2020-05-12 PROCEDURE — 80048 BASIC METABOLIC PNL TOTAL CA: CPT

## 2020-05-12 PROCEDURE — 99152 MOD SED SAME PHYS/QHP 5/>YRS: CPT | Performed by: INTERNAL MEDICINE

## 2020-05-12 PROCEDURE — 94664 DEMO&/EVAL PT USE INHALER: CPT

## 2020-05-12 PROCEDURE — 92960 CARDIOVERSION ELECTRIC EXT: CPT

## 2020-05-12 PROCEDURE — 94770 HC ETCO2 MONITOR DAILY: CPT

## 2020-05-12 PROCEDURE — 85610 PROTHROMBIN TIME: CPT

## 2020-05-12 RX ORDER — SODIUM CHLORIDE 9 MG/ML
INJECTION, SOLUTION INTRAVENOUS CONTINUOUS
Status: DISCONTINUED | OUTPATIENT
Start: 2020-05-12 | End: 2020-05-15 | Stop reason: HOSPADM

## 2020-05-12 RX ORDER — SODIUM CHLORIDE 0.9 % (FLUSH) 0.9 %
10 SYRINGE (ML) INJECTION PRN
Status: DISCONTINUED | OUTPATIENT
Start: 2020-05-12 | End: 2020-05-15 | Stop reason: HOSPADM

## 2020-05-12 RX ORDER — MIDAZOLAM HYDROCHLORIDE 1 MG/ML
1 INJECTION INTRAMUSCULAR; INTRAVENOUS ONCE
Status: DISCONTINUED | OUTPATIENT
Start: 2020-05-12 | End: 2020-05-15 | Stop reason: HOSPADM

## 2020-05-12 RX ORDER — SODIUM CHLORIDE 0.9 % (FLUSH) 0.9 %
10 SYRINGE (ML) INJECTION EVERY 12 HOURS SCHEDULED
Status: DISCONTINUED | OUTPATIENT
Start: 2020-05-12 | End: 2020-05-15 | Stop reason: HOSPADM

## 2020-05-12 NOTE — H&P
Patient's History and Physical from May 11, 2019 by Ms. Gage Sheffield  was reviewed. Patient examined. There has been no change.       Roe Londono MD   Cardiac Electrophysiology  [address] [facility] [phone]  [facility]

## 2020-05-12 NOTE — PRE SEDATION
Sedation Pre-Procedure Note    Patient Name: Frankie Chun   YOB: 1940  Room/Bed: Room/bed info not found  Medical Record Number: [id number]  Date: 5/12/2020   Time: 11:57 AM       Indication: Cardioversion    Consent: I have discussed with the patient and/or the patient representative the indication, alternatives, and the possible risks and/or complications of the planned procedure and the anesthesia methods. The patient and/or patient representative appear to understand and agree to proceed. Vital Signs:   Vitals:    05/12/20 0646   Temp: 98.3 °F (36.8 °C)       Past Medical History:   has a past medical history of Acute systolic (congestive) heart failure (HCC), Edema, Hearing loss, Hyperlipidemia, Hypertension, Morbid obesity due to excess calories ([address] Pre-diabetes, and PUD (peptic ulcer disease). Past Surgical History:   has a past surgical history that includes hernia repair and knee surgery (Bilateral). Medications:   Scheduled Meds:    sodium chloride flush  10 mL Intravenous 2 times per day    midazolam  1 mg Intravenous Once     Continuous Infusions:    sodium chloride       PRN Meds: sodium chloride flush  Home Meds:   Prior to Admission medications    Medication Sig Start Date End Date Taking?  Authorizing Provider   hydrALAZINE (APRESOLINE) 25 MG tablet Take 1 tablet by mouth 3 times daily 5/11/20  Yes LAWRENCE Bowser CNP   furosemide (LASIX) 40 MG tablet Take 1 tablet by mouth 3 times daily  Patient taking differently: Take 40 mg by mouth See Admin Instructions 2 tabs q am 1 q hs 2/12/20 5/12/20 Yes LAWRENCE Bowser CNP   losartan (COZAAR) 100 MG tablet Take 1 tablet by mouth daily 1/15/20  Yes LAWRENEC Bowser CNP   apixaban (ELIQUIS) 5 MG TABS tablet Take 1 tablet by mouth 2 times daily 1/15/20  Yes LAWRENCE Bowser CNP   metoprolol succinate (TOPROL XL) 200 MG extended release tablet Take 1 tablet by mouth daily 1/6/20  Yes LAWRENCE Hagan CNP albuterol sulfate HFA (PROVENTIL HFA) 108 (90 Base) MCG/ACT inhaler Inhale 2 puffs into the lungs every 6 hours as needed for Wheezing 2/7/20   Chadwick Santos MD   dextromethorphan-guaiFENesin (ROBITUSSIN-DM)  MG/5ML syrup Take 10 mLs by mouth every 4 hours as needed for Cough    Historical Provider, MD   blood glucose monitor strips Test 3 times a day & as needed for symptoms of irregular blood glucose. 12/31/19   Jacey Geller MD     Coumadin Use Last 7 Days:  no  Antiplatelet drug therapy use last 7 days: no  Other anticoagulant use last 7 days: yes -Eliquis  Additional Medication Information: None      Pre-Sedation Documentation and Exam:   I have personally completed a history, physical exam & review of systems for this patient (see notes). Vital signs have been reviewed (see flow sheet for vitals).     Mallampati Airway Assessment:  normal    Prior History of Anesthesia Complications:   none    ASA Classification:  Class 2 - A normal healthy patient with mild systemic disease    Sedation/ Anesthesia Plan:   intravenous sedation    Medications Planned:   propofol intravenously    Patient is an appropriate candidate for plan of sedation: yes    Electronically signed by Naif Diaz MD on 5/12/2020 at 11:57 AM

## 2020-07-13 RX ORDER — LOSARTAN POTASSIUM 100 MG/1
100 TABLET ORAL DAILY
Qty: 30 TABLET | Refills: 5 | Status: SHIPPED | OUTPATIENT
Start: 2020-07-13 | End: 2021-01-08

## 2020-07-13 NOTE — TELEPHONE ENCOUNTER
[facility] Medication Refills:       losartan (COZAAR) 100 MG tablet  Take 1 tablet by mouth daily, Disp-30 tablet, R-5  Normal Last Dose: Not Recorded    Pharmacy:[facility]/pharmacy #[id number]- [address] [address] [phone] [place] [phone]      Last Office Visit: 05/11/20    Next Office Visit: 08/20/20    Last Refill: 01/15/20    Last Labs: 05/12/20

## 2020-08-19 NOTE — PROGRESS NOTES
[address]   Cardiac Electrophysiology Consultation   Date: 8/20/2020  Reason for Consultation:  Atrial Fibrillation  Consult Requesting Physician: No att. providers found     Chief Complaint:   Chief Complaint   Patient presents with    1 Month Follow-Up    Atrial Fibrillation      HPI: Pramod Tuttle is a 78 y.o. female with PMH significant for HLD, HTN, DM, lymphedema, obesity, who originally p/w (12/2019) a 3 day h/o SOB, + PND x 10 days, noted to be in AF/RVR with rates in the 150's, placed on dilt, HR controlled, EF 20-25% per echo, changed to Toprol XL, lasix, losartan, LHC showed no CAD. 2-day CAM (1/2020) showed persistent AF with suboptimal rate control with max rate of 171 bpm.  She was scheduled for a DCCV; however, canceled this due to [place]. She eventually had a DCCV on 5/12/20. Interval History: Today, she is here for 3 month f/u after DCCV, presenting in [facility]. After restoration in SR, she is feeling very well. She denies to have any recurrence of chest pain, shortness of breath, palpitations or syncopal episodes. Overall, her quality of life has been improved after being in sinus rhythm. Her predominant complaint is hip and leg pain. In addition to this, secondary to lymphedema, she also have significant discomfort in her right leg. She is using compression stockings for the same. She uses a walker for ambulation. Past Medical History:   Diagnosis Date    Acute systolic (congestive) heart failure (HCC)     Edema     Hearing loss     Hyperlipidemia     Hypertension     Morbid obesity due to excess calories ([facility] [address] 12/29/2015    Pre-diabetes     PUD (peptic ulcer disease)     \"years ago\",         Past Surgical History:   Procedure Laterality Date    HERNIA REPAIR      umbilical     KNEE SURGERY Bilateral     TKR bilateral       Allergies:   Allergies   Allergen Reactions    Aspirin      Caused ulcers, burns stomach    Dye [Iodides] Itching, Swelling and Rash Lymphatic ROS: negative for - bleeding problems, blood clots, bruising or jaundice  · Endocrine ROS: negative for - skin changes, temperature intolerance or unexpected weight changes  · Respiratory ROS: negative for - cough, hemoptysis, pleuritic pain, SOB, sputum changes or wheezing  · Cardiovascular ROS: Per HPI. · Gastrointestinal ROS: negative for - abdominal pain, blood in stools, diarrhea, hematemesis, melena, nausea/vomiting or swallowing difficulty/pain  · Genito-Urinary ROS: negative for - dysuria or incontinence  · Musculoskeletal ROS: negative for - joint swelling or muscle pain  · Neurological ROS: negative for - confusion, dizziness, gait disturbance, headaches, numbness/tingling, seizures, speech problems, tremors, visual changes or weakness  · Dermatological ROS: negative for - rash    Physical Examination:  Vitals:    08/20/20 1523   BP: 110/75   Pulse: 87   Temp: 97.1 °F (36.2 °C)       · Constitutional: Oriented. No distress. · Head: Normocephalic and atraumatic. · Mouth/Throat: Oropharynx is clear and moist.   · Eyes: Conjunctivae normal. EOM are normal.   · Neck: Normal range of motion. Neck supple. No rigidity. No JVD present. · Cardiovascular: Normal rate, regular rhythm, S1&S2 and intact distal pulses. · Pulmonary/Chest: Bilateral respiratory sounds. No wheezes. No rhonchi. · Abdominal: Soft. Bowel sounds present. No distension, No tenderness. · Musculoskeletal: No tenderness. No edema    · Lymphadenopathy: Has no cervical adenopathy. · Neurological: Alert and oriented. Cranial nerve appears intact, No Gross deficit   · Skin: Skin is warm and dry. No rash noted. · Psychiatric: Has a normal mood, affect and behavior     Labs:  Reviewed. ECG: reviewed, Sinus  Rhythm, with QRS duration 92 ms. No pathologic Q waves, ventricular pre-excitation, or QT prolongation.      Studies:   1. 2-day CAM (1/6-1/8/20): 100% AF with average HR 84 () with less than 1% PVCs    2. Echo diet to assure blood pressure remains controlled for cardiovascular risk factor modification.   - The patient is counseled to avoid excess caffeine, and energy drinks as this may exacerbated ectopy and arrhythmia. - The patient is counseled to get regular exercise 3-5 times per week to control cardiovascular risk factors. - The patient is counseled to lose weigt to control cardiovascular risk factors. -The patient is counseled about the health hazards of smoking including cardiovascular side effects and its impact on morbidity and mortality. Follow up with Dr. Nicko Bhat for the pain in her legs  Follow up in 6 months with Silvestre Lira NP    Thank you for allowing me to participate in the care of Lashonda Escamilla. All questions and concerns were addressed to the patient/family. Alternatives to my treatment were discussed. Armaan Joseph RN, am scribing for and in the presence of Dr. Meenu Raines. 08/20/20 3:43 PM  Presley Alvarez RN    I, Chadwick Trejo MD, personally performed the services prescribed in this documentation as scribed by Ms. Presley Alvarez RN in my presence and it is both accurate and complete.        Chadwick Trejo MD  Cardiac Electrophysiology  [facility]

## 2020-08-20 ENCOUNTER — OFFICE VISIT (OUTPATIENT)
Dept: CARDIOLOGY CLINIC | Age: 80
End: 2020-08-20
Payer: MEDICARE

## 2020-08-20 VITALS
TEMPERATURE: 97.1 F | BODY MASS INDEX: 57.04 KG/M2 | HEART RATE: 87 BPM | WEIGHT: 293 LBS | SYSTOLIC BLOOD PRESSURE: 110 MMHG | DIASTOLIC BLOOD PRESSURE: 75 MMHG

## 2020-08-20 PROCEDURE — 93000 ELECTROCARDIOGRAM COMPLETE: CPT | Performed by: INTERNAL MEDICINE

## 2020-08-20 PROCEDURE — 99214 OFFICE O/P EST MOD 30 MIN: CPT | Performed by: INTERNAL MEDICINE

## 2020-08-20 PROCEDURE — 93225 XTRNL ECG REC<48 HRS REC: CPT | Performed by: INTERNAL MEDICINE

## 2020-08-21 ENCOUNTER — TELEPHONE (OUTPATIENT)
Dept: CARDIOLOGY CLINIC | Age: 80
End: 2020-08-21

## 2020-08-21 RX ORDER — METOPROLOL SUCCINATE 200 MG/1
200 TABLET, EXTENDED RELEASE ORAL DAILY
Qty: 90 TABLET | Refills: 3 | Status: SHIPPED | OUTPATIENT
Start: 2020-08-21 | End: 2021-11-01

## 2020-08-21 RX ORDER — FUROSEMIDE 40 MG/1
40 TABLET ORAL 3 TIMES DAILY
Qty: 270 TABLET | Refills: 3 | Status: SHIPPED | OUTPATIENT
Start: 2020-08-21 | End: 2020-12-14 | Stop reason: SDUPTHER

## 2020-08-21 NOTE — TELEPHONE ENCOUNTER
[facility] called patient stating they need new prescriptions to refill her Lasix, Eliquis, and metoprolol.      furosemide (LASIX) 40 MG tablet  Take 1 tablet by mouth 3 times daily, Disp-90 tablet,R-5  Normal  Patient taking differently: 40 mg Oral SEE ADMIN INSTRUCTIONS, 2 tabs q am 1 q hs, Reported on 5/11/2020 Last Dose: Not Recorded    metoprolol succinate (TOPROL XL) 200 MG extended release tablet  Take 1 tablet by mouth daily, Disp-90 tablet, R-3  Normal Last Dose: Not Recorded  Refills:3 ordered     Pharmacy:[facility]/pharmacy #[id number]- [place], [place] - [address] [place] [address] - P [phone] [facility] [phone]     apixaban (ELIQUIS) 5 MG TABS tablet  Take 1 tablet by mouth 2 times daily, Disp-60 tablet, R-5  Normal Last Dose: Not Recorded  Refills:5 ordered     Pharmacy:[facility]/pharmacy #[id number]- [place], [address] - P [phone] Solit[facility] Cage [phone]    Last visit: 8/20/20    Next visit: 2/11/21    Last labs: 5/12/20    Last filled: Lasix 2/12/20, metoprolol 1/6/20, Eliquis 1/15/20

## 2020-09-03 PROCEDURE — 93227 XTRNL ECG REC<48 HR R&I: CPT | Performed by: INTERNAL MEDICINE

## 2020-09-16 ENCOUNTER — TELEPHONE (OUTPATIENT)
Dept: ADMINISTRATIVE | Age: 80
End: 2020-09-16

## 2020-09-16 NOTE — TELEPHONE ENCOUNTER
Inusrance agent called b/c he said the DX and medications on the list are preventing the patient from getting the coverage she needs. DX Congestive Heart Failure, medication Isosorbide. Patient started on Isosorbide 12/31/19 and discontinued 1/15/20. Mendoza García is requesting that if she is not taking this medication that a letter be faxed to him. # [phone] Fax# [phone].

## 2020-09-16 NOTE — LETTER
[facility] ([facility]) Pre-Services  Phone: [phone]    Baljit Lane MD        September 17, 2020    [address]      To Whom It May Concern:    Codey Lane dose not take the medication Isosorbide. If you have any questions or concerns, please don't hesitate to call.     Sincerely,        Baljit Lane MD

## 2020-09-17 NOTE — TELEPHONE ENCOUNTER
Thank you I needed to verify because of pharmacy msg below    Let Veronica Avitia know, see initial msg

## 2020-09-17 NOTE — TELEPHONE ENCOUNTER
Pt states that she does not take isosorbide. She only takes:  Furosemide  Losartan  Hydralazine  Eliquis  Metoprolol.

## 2020-09-18 ENCOUNTER — TELEPHONE (OUTPATIENT)
Dept: FAMILY MEDICINE CLINIC | Age: 80
End: 2020-09-18

## 2020-09-18 NOTE — TELEPHONE ENCOUNTER
She does have CHF   Not all pt with CHF are treated with isosorbide,   And any heart diagnosis should be forwarded to her cardiologist    Let pt know   Any questions about dx refer to her cardiologist

## 2020-09-21 ENCOUNTER — TELEPHONE (OUTPATIENT)
Dept: FAMILY MEDICINE CLINIC | Age: 80
End: 2020-09-21

## 2020-09-21 NOTE — TELEPHONE ENCOUNTER
----- Message from Leon Golden sent at 9/21/2020 10:27 AM EDT -----  Subject: Message to Provider    QUESTIONS  Information for Provider? pt advisor called in to get the status of the   letter regarding her medical record. please contact Ginger Krissy @   [phone]  ---------------------------------------------------------------------------  --------------  Deejay ADAMS  What is the best way for the office to contact you? OK to leave message on   voicemail  Preferred Call Back Phone Number? [phone]  ---------------------------------------------------------------------------  --------------  SCRIPT ANSWERS  Relationship to Patient?  Self

## 2020-09-21 NOTE — LETTER
[address], [address] [address]  Phone: [phone]  Fax: [phone]    Victor Manuel Ball MD        September 21, 2020    [address]      To whom it may concern:     Corry Monique does not take the medication Isosorbide and hasn't not taken the medication. If you have any questions or concerns, please don't hesitate to call.     Sincerely,        Victor Manuel Ball MD

## 2020-09-30 ENCOUNTER — HOSPITAL ENCOUNTER (INPATIENT)
Age: 80
LOS: 2 days | Discharge: HOME | DRG: 291 | End: 2020-10-02
Attending: EMERGENCY MEDICINE | Admitting: INTERNAL MEDICINE
Payer: MEDICARE

## 2020-09-30 ENCOUNTER — APPOINTMENT (OUTPATIENT)
Dept: GENERAL RADIOLOGY | Age: 80
DRG: 291 | End: 2020-09-30
Payer: MEDICARE

## 2020-09-30 PROBLEM — I50.31: Status: ACTIVE | Noted: 2020-09-30

## 2020-09-30 LAB
A/G RATIO: 0.9 (ref 1.1–2.2)
ALBUMIN SERPL-MCNC: 4.1 G/DL (ref 3.4–5)
ALP BLD-CCNC: 125 U/L (ref 40–129)
ALT SERPL-CCNC: 14 U/L (ref 10–40)
ANION GAP SERPL CALCULATED.3IONS-SCNC: 15 MMOL/L (ref 3–16)
AST SERPL-CCNC: 25 U/L (ref 15–37)
BASE EXCESS ARTERIAL: 1.1 MMOL/L (ref -3–3)
BASE EXCESS VENOUS: -5.4 MMOL/L (ref -2–3)
BASOPHILS ABSOLUTE: 0.2 K/UL (ref 0–0.2)
BASOPHILS RELATIVE PERCENT: 1.7 %
BILIRUB SERPL-MCNC: 0.3 MG/DL (ref 0–1)
BILIRUBIN URINE: NEGATIVE
BLOOD, URINE: ABNORMAL
BUN BLDV-MCNC: 17 MG/DL (ref 7–20)
CALCIUM SERPL-MCNC: 9.6 MG/DL (ref 8.3–10.6)
CARBOXYHEMOGLOBIN ARTERIAL: 0.4 % (ref 0–1.5)
CARBOXYHEMOGLOBIN: 1.1 % (ref 0–1.5)
CHLORIDE BLD-SCNC: 99 MMOL/L (ref 99–110)
CLARITY: CLEAR
CO2: 22 MMOL/L (ref 21–32)
COLOR: YELLOW
CREAT SERPL-MCNC: 0.9 MG/DL (ref 0.6–1.2)
EKG ATRIAL RATE: 126 BPM
EKG DIAGNOSIS: NORMAL
EKG P AXIS: 51 DEGREES
EKG P-R INTERVAL: 142 MS
EKG Q-T INTERVAL: 310 MS
EKG QRS DURATION: 92 MS
EKG QTC CALCULATION (BAZETT): 448 MS
EKG R AXIS: -9 DEGREES
EKG T AXIS: 56 DEGREES
EKG VENTRICULAR RATE: 126 BPM
EOSINOPHILS ABSOLUTE: 0.1 K/UL (ref 0–0.6)
EOSINOPHILS RELATIVE PERCENT: 1.3 %
EPITHELIAL CELLS, UA: ABNORMAL /HPF (ref 0–5)
GFR AFRICAN AMERICAN: >60
GFR NON-AFRICAN AMERICAN: 60
GLOBULIN: 4.7 G/DL
GLUCOSE BLD-MCNC: 116 MG/DL (ref 70–99)
GLUCOSE BLD-MCNC: 247 MG/DL (ref 70–99)
GLUCOSE BLD-MCNC: 88 MG/DL (ref 70–99)
GLUCOSE URINE: NEGATIVE MG/DL
HCO3 ARTERIAL: 27 MMOL/L (ref 21–29)
HCO3 VENOUS: 24.7 MMOL/L (ref 24–28)
HCT VFR BLD CALC: 43.6 % (ref 36–48)
HEMOGLOBIN, ART, EXTENDED: 12.3 G/DL
HEMOGLOBIN, VEN, REDUCED: 6.9 %
HEMOGLOBIN: 13.9 G/DL (ref 12–16)
HYALINE CASTS: ABNORMAL /LPF (ref 0–2)
KETONES, URINE: NEGATIVE MG/DL
LEUKOCYTE ESTERASE, URINE: ABNORMAL
LYMPHOCYTES ABSOLUTE: 5.2 K/UL (ref 1–5.1)
LYMPHOCYTES RELATIVE PERCENT: 47.3 %
MCH RBC QN AUTO: 25.9 PG (ref 26–34)
MCHC RBC AUTO-ENTMCNC: 31.8 G/DL (ref 31–36)
MCV RBC AUTO: 81.5 FL (ref 80–100)
METHEMOGLOBIN ARTERIAL: 0.6 % (ref 0–1.4)
METHEMOGLOBIN VENOUS: 1 % (ref 0–1.5)
MICROSCOPIC EXAMINATION: YES
MONOCYTES ABSOLUTE: 0.7 K/UL (ref 0–1.3)
MONOCYTES RELATIVE PERCENT: 6.4 %
NEUTROPHILS ABSOLUTE: 4.8 K/UL (ref 1.7–7.7)
NEUTROPHILS RELATIVE PERCENT: 43.3 %
NITRITE, URINE: NEGATIVE
O2 SAT, ARTERIAL: 96 % (ref 93–100)
O2 SAT, VEN: 93 %
PCO2 ARTERIAL: 50.9 MMHG (ref 35–45)
PCO2, VEN: 72.1 MMHG (ref 41–51)
PDW BLD-RTO: 16.9 % (ref 12.4–15.4)
PERFORMED ON: ABNORMAL
PERFORMED ON: NORMAL
PH ARTERIAL: 7.34 (ref 7.35–7.45)
PH UA: 6 (ref 5–8)
PH VENOUS: 7.16 (ref 7.35–7.45)
PLATELET # BLD: 285 K/UL (ref 135–450)
PMV BLD AUTO: 8.7 FL (ref 5–10.5)
PO2 ARTERIAL: 110 MMHG (ref 75–108)
PO2, VEN: 85.6 MMHG (ref 25–40)
POTASSIUM REFLEX MAGNESIUM: 4.7 MMOL/L (ref 3.5–5.1)
PRO-BNP: 1602 PG/ML (ref 0–449)
PROTEIN UA: 30 MG/DL
RBC # BLD: 5.35 M/UL (ref 4–5.2)
RBC UA: ABNORMAL /HPF (ref 0–4)
SODIUM BLD-SCNC: 136 MMOL/L (ref 136–145)
SPECIFIC GRAVITY UA: 1.02 (ref 1–1.03)
TCO2 ARTERIAL: 29 MMOL/L
TCO2 CALC VENOUS: 27 MMOL/L
TOTAL PROTEIN: 8.8 G/DL (ref 6.4–8.2)
TROPONIN: 0.02 NG/ML
TROPONIN: <0.01 NG/ML
URINE REFLEX TO CULTURE: ABNORMAL
URINE TYPE: ABNORMAL
UROBILINOGEN, URINE: 0.2 E.U./DL
WBC # BLD: 11 K/UL (ref 4–11)
WBC UA: ABNORMAL /HPF (ref 0–5)

## 2020-09-30 PROCEDURE — 99222 1ST HOSP IP/OBS MODERATE 55: CPT | Performed by: INTERNAL MEDICINE

## 2020-09-30 PROCEDURE — 99285 EMERGENCY DEPT VISIT HI MDM: CPT

## 2020-09-30 PROCEDURE — 36415 COLL VENOUS BLD VENIPUNCTURE: CPT

## 2020-09-30 PROCEDURE — 94640 AIRWAY INHALATION TREATMENT: CPT

## 2020-09-30 PROCEDURE — 85025 COMPLETE CBC W/AUTO DIFF WBC: CPT

## 2020-09-30 PROCEDURE — 6360000002 HC RX W HCPCS: Performed by: INTERNAL MEDICINE

## 2020-09-30 PROCEDURE — 83880 ASSAY OF NATRIURETIC PEPTIDE: CPT

## 2020-09-30 PROCEDURE — 6360000002 HC RX W HCPCS: Performed by: PHYSICIAN ASSISTANT

## 2020-09-30 PROCEDURE — 6370000000 HC RX 637 (ALT 250 FOR IP): Performed by: INTERNAL MEDICINE

## 2020-09-30 PROCEDURE — 71045 X-RAY EXAM CHEST 1 VIEW: CPT

## 2020-09-30 PROCEDURE — 2580000003 HC RX 258: Performed by: INTERNAL MEDICINE

## 2020-09-30 PROCEDURE — 1200000000 HC SEMI PRIVATE

## 2020-09-30 PROCEDURE — 2700000000 HC OXYGEN THERAPY PER DAY

## 2020-09-30 PROCEDURE — 80053 COMPREHEN METABOLIC PANEL: CPT

## 2020-09-30 PROCEDURE — 36600 WITHDRAWAL OF ARTERIAL BLOOD: CPT

## 2020-09-30 PROCEDURE — 83036 HEMOGLOBIN GLYCOSYLATED A1C: CPT

## 2020-09-30 PROCEDURE — 6370000000 HC RX 637 (ALT 250 FOR IP): Performed by: PHYSICIAN ASSISTANT

## 2020-09-30 PROCEDURE — 81001 URINALYSIS AUTO W/SCOPE: CPT

## 2020-09-30 PROCEDURE — 84484 ASSAY OF TROPONIN QUANT: CPT

## 2020-09-30 PROCEDURE — 51702 INSERT TEMP BLADDER CATH: CPT

## 2020-09-30 PROCEDURE — 82803 BLOOD GASES ANY COMBINATION: CPT

## 2020-09-30 PROCEDURE — 93005 ELECTROCARDIOGRAM TRACING: CPT | Performed by: PHYSICIAN ASSISTANT

## 2020-09-30 PROCEDURE — 96374 THER/PROPH/DIAG INJ IV PUSH: CPT

## 2020-09-30 PROCEDURE — 94761 N-INVAS EAR/PLS OXIMETRY MLT: CPT

## 2020-09-30 PROCEDURE — 6360000002 HC RX W HCPCS

## 2020-09-30 PROCEDURE — 94660 CPAP INITIATION&MGMT: CPT

## 2020-09-30 RX ORDER — INSULIN LISPRO 100 [IU]/ML
0-6 INJECTION, SOLUTION INTRAVENOUS; SUBCUTANEOUS
Status: DISCONTINUED | OUTPATIENT
Start: 2020-09-30 | End: 2020-10-02 | Stop reason: HOSPADM

## 2020-09-30 RX ORDER — INSULIN LISPRO 100 [IU]/ML
0-3 INJECTION, SOLUTION INTRAVENOUS; SUBCUTANEOUS NIGHTLY
Status: DISCONTINUED | OUTPATIENT
Start: 2020-09-30 | End: 2020-10-02 | Stop reason: HOSPADM

## 2020-09-30 RX ORDER — SODIUM CHLORIDE 0.9 % (FLUSH) 0.9 %
10 SYRINGE (ML) INJECTION EVERY 12 HOURS SCHEDULED
Status: DISCONTINUED | OUTPATIENT
Start: 2020-09-30 | End: 2020-10-02 | Stop reason: HOSPADM

## 2020-09-30 RX ORDER — ONDANSETRON 2 MG/ML
4 INJECTION INTRAMUSCULAR; INTRAVENOUS EVERY 6 HOURS PRN
Status: DISCONTINUED | OUTPATIENT
Start: 2020-09-30 | End: 2020-10-02 | Stop reason: HOSPADM

## 2020-09-30 RX ORDER — LOSARTAN POTASSIUM 100 MG/1
100 TABLET ORAL DAILY
Status: DISCONTINUED | OUTPATIENT
Start: 2020-09-30 | End: 2020-10-02 | Stop reason: HOSPADM

## 2020-09-30 RX ORDER — PROMETHAZINE HYDROCHLORIDE 25 MG/1
12.5 TABLET ORAL EVERY 6 HOURS PRN
Status: DISCONTINUED | OUTPATIENT
Start: 2020-09-30 | End: 2020-10-02 | Stop reason: HOSPADM

## 2020-09-30 RX ORDER — DEXTROSE MONOHYDRATE 25 G/50ML
12.5 INJECTION, SOLUTION INTRAVENOUS PRN
Status: DISCONTINUED | OUTPATIENT
Start: 2020-09-30 | End: 2020-10-02 | Stop reason: HOSPADM

## 2020-09-30 RX ORDER — NITROGLYCERIN 0.4 MG/1
0.4 TABLET SUBLINGUAL EVERY 5 MIN PRN
Status: DISCONTINUED | OUTPATIENT
Start: 2020-09-30 | End: 2020-09-30

## 2020-09-30 RX ORDER — NITROGLYCERIN 20 MG/100ML
20 INJECTION INTRAVENOUS CONTINUOUS
Status: DISCONTINUED | OUTPATIENT
Start: 2020-09-30 | End: 2020-10-02

## 2020-09-30 RX ORDER — ACETAMINOPHEN 325 MG/1
650 TABLET ORAL EVERY 6 HOURS PRN
Status: DISCONTINUED | OUTPATIENT
Start: 2020-09-30 | End: 2020-10-02 | Stop reason: HOSPADM

## 2020-09-30 RX ORDER — POLYETHYLENE GLYCOL 3350 17 G/17G
17 POWDER, FOR SOLUTION ORAL DAILY PRN
Status: DISCONTINUED | OUTPATIENT
Start: 2020-09-30 | End: 2020-10-02 | Stop reason: HOSPADM

## 2020-09-30 RX ORDER — DEXTROSE MONOHYDRATE 50 MG/ML
100 INJECTION, SOLUTION INTRAVENOUS PRN
Status: DISCONTINUED | OUTPATIENT
Start: 2020-09-30 | End: 2020-10-02 | Stop reason: HOSPADM

## 2020-09-30 RX ORDER — METOPROLOL SUCCINATE 100 MG/1
200 TABLET, EXTENDED RELEASE ORAL DAILY
Status: DISCONTINUED | OUTPATIENT
Start: 2020-09-30 | End: 2020-10-02 | Stop reason: HOSPADM

## 2020-09-30 RX ORDER — FUROSEMIDE 10 MG/ML
40 INJECTION INTRAMUSCULAR; INTRAVENOUS 3 TIMES DAILY
Status: DISCONTINUED | OUTPATIENT
Start: 2020-09-30 | End: 2020-10-01

## 2020-09-30 RX ORDER — ALBUTEROL SULFATE 2.5 MG/3ML
2.5 SOLUTION RESPIRATORY (INHALATION) ONCE
Status: COMPLETED | OUTPATIENT
Start: 2020-09-30 | End: 2020-09-30

## 2020-09-30 RX ORDER — SODIUM CHLORIDE 0.9 % (FLUSH) 0.9 %
10 SYRINGE (ML) INJECTION PRN
Status: DISCONTINUED | OUTPATIENT
Start: 2020-09-30 | End: 2020-10-02 | Stop reason: HOSPADM

## 2020-09-30 RX ORDER — ALBUTEROL SULFATE 2.5 MG/3ML
2.5 SOLUTION RESPIRATORY (INHALATION) EVERY 6 HOURS PRN
Status: DISCONTINUED | OUTPATIENT
Start: 2020-09-30 | End: 2020-09-30

## 2020-09-30 RX ORDER — NICOTINE POLACRILEX 4 MG
15 LOZENGE BUCCAL PRN
Status: DISCONTINUED | OUTPATIENT
Start: 2020-09-30 | End: 2020-10-02 | Stop reason: HOSPADM

## 2020-09-30 RX ORDER — FUROSEMIDE 10 MG/ML
INJECTION INTRAMUSCULAR; INTRAVENOUS
Status: COMPLETED
Start: 2020-09-30 | End: 2020-09-30

## 2020-09-30 RX ORDER — ACETAMINOPHEN 650 MG/1
650 SUPPOSITORY RECTAL EVERY 6 HOURS PRN
Status: DISCONTINUED | OUTPATIENT
Start: 2020-09-30 | End: 2020-10-02 | Stop reason: HOSPADM

## 2020-09-30 RX ADMIN — APIXABAN 5 MG: 5 TABLET, FILM COATED ORAL at 22:21

## 2020-09-30 RX ADMIN — Medication 10 ML: at 10:00

## 2020-09-30 RX ADMIN — Medication 10 ML: at 22:11

## 2020-09-30 RX ADMIN — FUROSEMIDE 80 MG: 10 INJECTION, SOLUTION INTRAMUSCULAR; INTRAVENOUS at 01:21

## 2020-09-30 RX ADMIN — LOSARTAN POTASSIUM 100 MG: 100 TABLET, FILM COATED ORAL at 14:21

## 2020-09-30 RX ADMIN — APIXABAN 5 MG: 5 TABLET, FILM COATED ORAL at 11:30

## 2020-09-30 RX ADMIN — METOPROLOL SUCCINATE 200 MG: 100 TABLET, EXTENDED RELEASE ORAL at 14:20

## 2020-09-30 RX ADMIN — FUROSEMIDE 40 MG: 10 INJECTION, SOLUTION INTRAMUSCULAR; INTRAVENOUS at 22:21

## 2020-09-30 RX ADMIN — FUROSEMIDE 40 MG: 10 INJECTION, SOLUTION INTRAMUSCULAR; INTRAVENOUS at 10:23

## 2020-09-30 RX ADMIN — ALBUTEROL SULFATE 2.5 MG: 2.5 SOLUTION RESPIRATORY (INHALATION) at 02:45

## 2020-09-30 RX ADMIN — NITROGLYCERIN 0.8 MG: 0.4 TABLET, ORALLY DISINTEGRATING SUBLINGUAL at 01:20

## 2020-09-30 RX ADMIN — FUROSEMIDE 40 MG: 10 INJECTION, SOLUTION INTRAMUSCULAR; INTRAVENOUS at 14:16

## 2020-09-30 ASSESSMENT — PAIN SCALES - GENERAL
PAINLEVEL_OUTOF10: 0
PAINLEVEL_OUTOF10: 0

## 2020-09-30 NOTE — CONSULTS
Cardiology Consultation                                                                    Pt Name: Francesco Munguia  Age: 78 y.o. Sex: female  : 1940  [facility]: [facility]    Referring Physician: Kriss Kapoor MD  Primary cardiologist: Dr Yin Bajwa / Dr. Clarence Hendrickson      Reason for Consult:     Reason for Consultation/Chief Complaint: AHF    HPI:      Francesco Munguia is a 78 y.o. female with a past medical history of HTN, HLP, morbid obesity, DM, PAF s/p DC cardioversion 2020, HFrEF. Echo 2017: normal    Echo 2019: nl LV, EF 35-40%, mid to apical HK, indeterminate diastolic, mod TR, mild RV dysfunction, RVSP 49 (8), mod BLAKE. 5 [facility] 19: normal coronaries, EF 50%, LVEDP 20    Patient presented to the emergency room on  early a.m. with worsening shortness of breath over the last couple of days. She denies any maría chest pains, fevers, dizziness, palpitations. She admits to some excess salt in her diet recently. At the emergency room she was afebrile, /110, sinus tachycardia 126 bpm, initially on BiPAP to maintain normal oxygen saturation, improved, now at 3 L supplemental oxygen. Normal BMP, CBC, LFTs.  proBNP 1600, troponin borderline abnormal at 0.02, repeat was normal.  ECG consistent with sinus tachycardia 126 bpm, no ischemic changes. Chest x-ray showed pulmonary edema bilaterally. Patient was admitted for acute systolic heart failure and severe hypertension    Histories     Past Medical History:   has a past medical history of Acute systolic (congestive) heart failure ([address] Edema, Hearing loss, Hyperlipidemia, Hypertension, Morbid obesity due to excess calories ([address] Pre-diabetes, and PUD (peptic ulcer disease). Surgical History:   has a past surgical history that includes hernia repair and knee surgery (Bilateral). Social History:   reports that she has never smoked. She has never used smokeless tobacco. She reports that she does not drink alcohol or use drugs. Family History:  No evidence for sudden cardiac death or premature CAD      Medications:       Home Medications  Were reviewed and are listed in nursing record. and/or listed below  Prior to Admission medications    Medication Sig Start Date End Date Taking? Authorizing Provider   metoprolol succinate (TOPROL XL) 200 MG extended release tablet Take 1 tablet by mouth daily 8/21/20   LAWRENCE Ramos CNP   apixaban (ELIQUIS) 5 MG TABS tablet Take 1 tablet by mouth 2 times daily 8/21/20   LAWRENCE Ramos - CNP   furosemide (LASIX) 40 MG tablet Take 1 tablet by mouth 3 times daily 8/21/20 9/20/20  LAWRENCE Ramos CNP   losartan (COZAAR) 100 MG tablet Take 1 tablet by mouth daily 7/13/20   Kay Bonilla MD   hydrALAZINE (APRESOLINE) 25 MG tablet Take 1 tablet by mouth 3 times daily 5/11/20   LAWRENCE Patel CNP   dextromethorphan-guaiFENesin (ROBITUSSIN-DM)  MG/5ML syrup Take 10 mLs by mouth every 4 hours as needed for Cough    Historical Provider, MD   blood glucose monitor strips Test 3 times a day & as needed for symptoms of irregular blood glucose. 12/31/19   Bryan Hussein MD          Inpatient Medications:   apixaban  5 mg Oral BID    sodium chloride flush  10 mL Intravenous 2 times per day    insulin glargine  0.25 Units/kg Subcutaneous Nightly    insulin lispro  0-6 Units Subcutaneous TID WC    insulin lispro  0-3 Units Subcutaneous Nightly    furosemide  40 mg Intravenous TID    losartan  100 mg Oral Daily    metoprolol succinate  200 mg Oral Daily       IV drips:   nitroGLYCERIN Stopped (09/30/20 0249)    dextrose         PRN:  sodium chloride flush, acetaminophen **OR** acetaminophen, polyethylene glycol, promethazine **OR** ondansetron, glucose, dextrose, glucagon (rDNA), dextrose    Allergy:     Aspirin and Dye [iodides]       Review of Systems:     All 12 point review of symptoms completed.  Pertinent positives identified in the HPI, all other review of controlled, S1, S2 normal, there is no murmur, there is no rub or gallop, + jvd, 1+ bilateral lower extremity edema   Abdomen:   Soft, non-tender, bowel sounds active all four quadrants,  no masses, no organomegaly       Extremities: Extremities normal, atraumatic, no cyanosis. Pulses: 2+ and symmetric   Skin: Skin color, texture, turgor normal, no rashes or lesions   Pysch: Normal mood and affect   Neurologic: Normal gross motor and sensory exam.  Cranial nerves intact        Labs:     Recent Labs     09/30/20  0120      K 4.7   BUN 17   CREATININE 0.9   CL 99   CO2 22   GLUCOSE 247*   CALCIUM 9.6     Recent Labs     09/30/20  0120   WBC 11.0   HGB 13.9   HCT 43.6      MCV 81.5     No results for input(s): CHOLTOT, TRIG, HDL in the last 72 hours. Invalid input(s): LIPIDCOMM, CHOLHDL, VLDCHOL, LDL  No results for input(s): PTT, INR in the last 72 hours. Invalid input(s): PT  Recent Labs     09/30/20  0120 09/30/20  1006   TROPONINI 0.02* <0.01     No results for input(s): BNP in the last 72 hours. No results for input(s): TSH in the last 72 hours. No results for input(s): CHOL, HDL, LDLCALC, TRIG in the last 72 hours.]    Lab Results   Component Value Date    TROPONINI <0.01 09/30/2020         Imaging:     Telemetry:  NSR      Assessment / Plan:     1. Acute on chronic HFrEF:  Patient presented volume overloaded and hypertensive most likely due to excess salt in her diet recently. She reports compliance with her medications. - Continue with Lasix 40 mg IV every 8 hours  -Continue with home medications Toprol 200 mg daily and losartan 100 mg daily.  -Consider starting low-dose spironolactone  - No indication for ICD in view of LVEF greater than 35%. 2.  PAF:  Patient remains in sinus rhythm.    -Continue with Toprol and Eliquis    3.   Severe hypertension:  Patient was transiently started on nitroglycerin drip which now has been stopped due to improvement with patient's blood pressure.    -Continue with medications as above. Dr Delicia Lucio, patient's regular cardiologist, will see patient tomorrow. I have personally reviewed the reports and images of labs, radiological studies, cardiac studies including ECG's and telemetry, current and old medical records. The note was completed using EMR and Dragon dictation system. Every effort was made to ensure accuracy; however, inadvertent computerized transcription errors may be present. All questions and concerns were addressed to the patient/family. Alternatives to my treatment were discussed. I would like to thank you for providing me the opportunity to participate in the care of your patient. If you have any questions, please do not hesitate to contact me.      Michael Arias MD, [facility] [place], [address]  The [address]  [address] [place] [address]  Ph: [phone]  Fax: [phone]

## 2020-09-30 NOTE — ED PROVIDER NOTES
ED Attending Attestation Note     Date of evaluation: 9/30/2020    This patient was seen by the advance practice provider. I have seen and examined the patient, agree with the workup, evaluation, management and diagnosis. The care plan has been discussed. I have reviewed the ECG and concur with the CHARISMA's interpretation. My assessment reveals complaints of shortness of breath. On arrival, the patient was noted to be in respiratory distress with audible crackles. Patient had rales bilaterally. EKG showed nonspecific ST changes. Chest x-ray does show evidence of pulmonary edema. Venous blood gas showed CO2 retention that resolved after being placed on BiPAP. Initial troponin was negative. Patient will be admitted for further evaluation and management. Due to the immediate potential for life-threatening deterioration due to the patient's combined hypoxic and hypercarbic respiratory failure with exacerbation of congestive heart failure I spent a total of 35 minutes providing critical care. This is excluding time spent supervising or performing procedures.      Jamil Merrill MD  09/30/20 [id number]

## 2020-09-30 NOTE — ED TRIAGE NOTES
Son says patient went to a class earlier today around 1300 hours and had dyspnea upon exertion. Coming out of class this happened again which only worsened over time when she was at home. Son brought mother here with extreme SOB.

## 2020-09-30 NOTE — PROGRESS NOTES
Patient requesting to come off BIPAP . NO2 applied at 3L SPO2 100%, RR 16. Appears in no distress, eating lunch.

## 2020-09-30 NOTE — H&P
Take 1 tablet by mouth daily 7/13/20   Christiano Salvador MD   hydrALAZINE (APRESOLINE) 25 MG tablet Take 1 tablet by mouth 3 times daily 5/11/20   LAWRENCE Clark - CNP   dextromethorphan-guaiFENesin (ROBITUSSIN-DM)  MG/5ML syrup Take 10 mLs by mouth every 4 hours as needed for Cough    Historical Provider, MD   blood glucose monitor strips Test 3 times a day & as needed for symptoms of irregular blood glucose. 12/31/19   Jody Mclain MD       Allergies:  Aspirin and Dye [iodides]    Social History:      The patient currently lives home, uses walker    TOBACCO:   reports that she has never smoked. She has never used smokeless tobacco.  ETOH:   reports no history of alcohol use. Family History:       Reviewed in detail. Positive as follows:        Problem Relation Age of Onset    Other Mother         hernia    High Blood Pressure Father        REVIEW OF SYSTEMS:   Pertinent positives as noted in the HPI. All other systems reviewed and negative. PHYSICAL EXAM PERFORMED:    BP (!) 145/95   Pulse 79   Temp 97.6 °F (36.4 °C) (Axillary)   Resp 15   SpO2 98%     General appearance:  resp distress, appears stated age and cooperative. HEENT:  Normal cephalic, atraumatic without obvious deformity. Pupils equal, round, and reactive to light. Extra ocular muscles intact. Conjunctivae/corneas clear. Neck: Supple, with full range of motion. No jugular venous distention. Trachea midline. Respiratory: on Bipap, bilateral crackles. .  Cardiovascular:  Regular rate and rhythm with normal S1/S2 without murmurs, rubs or gallops. Abdomen: Soft, non-tender, non-distended with normal bowel sounds. Musculoskeletal:  No clubbing, cyanosis or edema bilaterally. Full range of motion without deformity. Skin: Skin color, texture, turgor normal.  No rashes or lesions. Neurologic:  Neurovascularly intact without any focal sensory/motor deficits.  Cranial nerves: II-XII intact, grossly Status: Full Code    PT/OT Eval Status: once stable. Dispo - inpatient. Diuresis, follow up on Card recs. This chart was likely completed using voice recognition technology and may contain unintended grammatical , phraseology,and/or punctuation errors       Ro Michele MD    Thank you Marcus Irizarry MD for the opportunity to be involved in this patient's care. If you have any questions or concerns please feel free to contact me at [phone].

## 2020-09-30 NOTE — ED NOTES
Mint Green, Lavender, Blue, Yellow/Orange, gray on ice and VBG syringe blood tubes collected and sent to lab.         Jessica Christian RN  09/30/20 [id number]

## 2020-09-30 NOTE — ED NOTES
Respiratory called for excess secretions in the bipap mask, ABG draw, and an Albuterol treatment.      Claude Turk RN  09/30/20 0531

## 2020-09-30 NOTE — CARE COORDINATION
Referred to patient for d/c planning. Spoke to patient and son. Patient is an 78year old female admitted for respiratory failure. Patient usually lives at home. 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 If patient is not able to provide care for them, the agency will be able to arrange other caregivers. Patient is usually independent in ADLs. Patient uses a walker for mobility. Unsure of d/c needs. Currently on bipap. Case Management Assessment           Initial Evaluation                Date / Time of Evaluation: 9/30/2020 2:31 PM                 Assessment Completed by: Latisha Rai    Patient Name: Maurisio Gomez     YOB: 1940  Diagnosis: CHF NYHA class III, acute, diastolic ([facility] [H56.11]  CHF NYHA class III, acute, diastolic ([facility] [S33.64]     Date / Time: 9/30/2020 12:58 AM    Patient Admission Status: Inpatient    If patient is discharged prior to next notation, then this note serves as note for discharge by case management.      Current PCP: Clyde Haines MD  Clinic Patient: No    Chart Reviewed: No  Patient/ Family Interviewed: Yes    Initial assessment completed at bedside with: Patient and son    Hospitalization in the last 30 days: No    Emergency Contacts:  Extended Emergency Contact Information  Primary Emergency Contact: [address] Phone: [phone]  Mobile Phone: [phone]  Relation: Child  Secondary Emergency Contact: [address] [place] [address],[address] Phone: [phone]  Relation: Brother/Sister    Advance Directives:   Code Status: Full [address]: No      Financial  Payor: A[address] Lane / Plan: Vale Navarro / Product Type: *No Product type* /     Pre-cert required for SNF: Yes    Pharmacy    [address] [place] [address] 54, [address] [facility] [phone] Mary Underwood [phone] [address] [address]  Phone: [phone] Fax: [address] [phone] Derw Pop [phone]  [facility]  [address] [address]  Phone: [phone] Fax: [phone]      Potential assistance Purchasing Medications:    Does Patient want to participate in local refill/ meds to beds program?:      Meds To Beds General Rules:  1. Can ONLY be done Monday- Friday between 8:30am-5pm  2. Prescription(s) must be in pharmacy by 3pm to be filled same day  3. Copy of patient's insurance/ prescription drug card and patient face sheet must be sent along with the prescription(s)  4. Cost of Rx cannot be added to hospital bill. If financial assistance is needed, please contact unit  or ;  or  CANNOT provide pharmacy voucher for patients co-pays  5.  Patients can then  the prescription on their way out of the hospital at discharge, or pharmacy can deliver to the bedside if staff is available. (payment due at time of pick-up or delivery - cash, check, or card accepted)     Able to afford home medications/ co-pay costs: Yes    ADLS  Support Systems:      PT AM-PAC:   /24  OT AM-PAC:   /24    [place]: from home  Steps:     Plans to RETURN to current housing: Yes  Barriers to RETURNING to current housing: medical complications    Home Care Information  Currently ACTIVE with [address]: No  Home Care Agency: Not Applicable    Currently ACTIVE with [place] [facility]: No        Durable Medical Equipment  DME Provider:   Equipment: cane and walker    Home Oxygen and [address] prior to admission: No  [address]: Not Applicable      DISCHARGE PLAN:  Disposition: Home- No Services Needed    Transportation PLAN for discharge: family     Factors facilitating achievement of predicted outcomes: Family support, Cooperative and Pleasant    Barriers to discharge: Medical complications    Additional Case Management Notes: see above    The Plan for Transition of Care is related to the following treatment goals of CHF NYHA class III, acute, diastolic (HCC) [B51.89]  CHF NYHA class III, acute, diastolic ([address] [I00.49]    The Patient and/or patient representative Gallo Frey and her family were provided with a choice of provider and agrees with the discharge plan Not Indicated    Freedom of choice list was provided with basic dialogue that supports the patient's individualized plan of care/goals and shares the quality data associated with the providers.  Not Indicated    Care Transition patient: SHIRA Moreno, DOUGIE-S  [facility] [employer]  Case Management Department  Ph: [phone]

## 2020-10-01 LAB
ANION GAP SERPL CALCULATED.3IONS-SCNC: 10 MMOL/L (ref 3–16)
BUN BLDV-MCNC: 21 MG/DL (ref 7–20)
CALCIUM SERPL-MCNC: 8.8 MG/DL (ref 8.3–10.6)
CHLORIDE BLD-SCNC: 101 MMOL/L (ref 99–110)
CHOLESTEROL, TOTAL: 178 MG/DL (ref 0–199)
CO2: 29 MMOL/L (ref 21–32)
CREAT SERPL-MCNC: 0.9 MG/DL (ref 0.6–1.2)
ESTIMATED AVERAGE GLUCOSE: 139.9 MG/DL
GFR AFRICAN AMERICAN: >60
GFR NON-AFRICAN AMERICAN: 60
GLUCOSE BLD-MCNC: 102 MG/DL (ref 70–99)
GLUCOSE BLD-MCNC: 111 MG/DL (ref 70–99)
GLUCOSE BLD-MCNC: 119 MG/DL (ref 70–99)
GLUCOSE BLD-MCNC: 124 MG/DL (ref 70–99)
GLUCOSE BLD-MCNC: 127 MG/DL (ref 70–99)
HBA1C MFR BLD: 6.5 %
HDLC SERPL-MCNC: 60 MG/DL (ref 40–60)
LDL CHOLESTEROL CALCULATED: 107 MG/DL
MAGNESIUM: 1.9 MG/DL (ref 1.8–2.4)
PERFORMED ON: ABNORMAL
POTASSIUM SERPL-SCNC: 3.8 MMOL/L (ref 3.5–5.1)
SODIUM BLD-SCNC: 140 MMOL/L (ref 136–145)
TRIGL SERPL-MCNC: 57 MG/DL (ref 0–150)
VLDLC SERPL CALC-MCNC: 11 MG/DL

## 2020-10-01 PROCEDURE — 80048 BASIC METABOLIC PNL TOTAL CA: CPT

## 2020-10-01 PROCEDURE — 97116 GAIT TRAINING THERAPY: CPT

## 2020-10-01 PROCEDURE — 97535 SELF CARE MNGMENT TRAINING: CPT

## 2020-10-01 PROCEDURE — 83735 ASSAY OF MAGNESIUM: CPT

## 2020-10-01 PROCEDURE — 6360000002 HC RX W HCPCS: Performed by: INTERNAL MEDICINE

## 2020-10-01 PROCEDURE — 6370000000 HC RX 637 (ALT 250 FOR IP): Performed by: INTERNAL MEDICINE

## 2020-10-01 PROCEDURE — 2580000003 HC RX 258: Performed by: INTERNAL MEDICINE

## 2020-10-01 PROCEDURE — 6370000000 HC RX 637 (ALT 250 FOR IP): Performed by: STUDENT IN AN ORGANIZED HEALTH CARE EDUCATION/TRAINING PROGRAM

## 2020-10-01 PROCEDURE — 1200000000 HC SEMI PRIVATE

## 2020-10-01 PROCEDURE — 99232 SBSQ HOSP IP/OBS MODERATE 35: CPT | Performed by: INTERNAL MEDICINE

## 2020-10-01 PROCEDURE — 97161 PT EVAL LOW COMPLEX 20 MIN: CPT

## 2020-10-01 PROCEDURE — 36415 COLL VENOUS BLD VENIPUNCTURE: CPT

## 2020-10-01 PROCEDURE — 97165 OT EVAL LOW COMPLEX 30 MIN: CPT

## 2020-10-01 PROCEDURE — 97530 THERAPEUTIC ACTIVITIES: CPT

## 2020-10-01 PROCEDURE — 80061 LIPID PANEL: CPT

## 2020-10-01 RX ADMIN — METOPROLOL SUCCINATE 200 MG: 100 TABLET, EXTENDED RELEASE ORAL at 08:51

## 2020-10-01 RX ADMIN — APIXABAN 5 MG: 5 TABLET, FILM COATED ORAL at 08:51

## 2020-10-01 RX ADMIN — FUROSEMIDE 60 MG: 40 TABLET ORAL at 21:03

## 2020-10-01 RX ADMIN — FUROSEMIDE 40 MG: 10 INJECTION, SOLUTION INTRAMUSCULAR; INTRAVENOUS at 08:51

## 2020-10-01 RX ADMIN — LOSARTAN POTASSIUM 100 MG: 100 TABLET, FILM COATED ORAL at 08:51

## 2020-10-01 RX ADMIN — Medication 10 ML: at 21:04

## 2020-10-01 RX ADMIN — APIXABAN 5 MG: 5 TABLET, FILM COATED ORAL at 21:03

## 2020-10-01 ASSESSMENT — PAIN SCALES - GENERAL
PAINLEVEL_OUTOF10: 0

## 2020-10-01 NOTE — PROGRESS NOTES
Progress Note    Admit Date: 9/30/2020  Diet: DIET LOW SODIUM 2 GM;    CC: SOB    Interval history: No acute events overnight. Pt seen and examined at bedside this morning, she feels well. Endorses her leg swelling, appetite and SOB to have improved (is currently on 3L oxygen via NC, not on oxygen at home). Denies dizziness/LH, fevers, chills, palpitations, chest pain, abdominal pain. HPI: Ms. Chuyita Ireland is an 77 yo F with PMH HTN, HFrEF, Afib s/p DCCV (5/12/20) who presented with sudden worsening SOB x2 days. Pt was tachypneic and in respiratory distress for which he was started on BiPAP with improvement. Labs significant for pro-BNP 1600, trops 0.02. EKG sinus tachycardia, non-specific ST changes. CXR with cardiomegaly and probably diffuse pulmonary edema. Pt was started on nitroglycerin gtt.     Medications:     Scheduled Meds:   apixaban  5 mg Oral BID    sodium chloride flush  10 mL Intravenous 2 times per day    insulin glargine  0.25 Units/kg Subcutaneous Nightly    insulin lispro  0-6 Units Subcutaneous TID WC    insulin lispro  0-3 Units Subcutaneous Nightly    furosemide  40 mg Intravenous TID    losartan  100 mg Oral Daily    metoprolol succinate  200 mg Oral Daily     Continuous Infusions:   nitroGLYCERIN Stopped (09/30/20 0249)    dextrose       PRN Meds:sodium chloride flush, acetaminophen **OR** acetaminophen, polyethylene glycol, promethazine **OR** ondansetron, glucose, dextrose, glucagon (rDNA), dextrose    Objective:   Vitals:   T-max:  Patient Vitals for the past 8 hrs:   BP Temp Temp src Pulse Resp SpO2 Weight   10/01/20 0842 (!) 149/76 97.4 °F (36.3 °C) Oral 63 20 95 % --   10/01/20 0612 -- -- -- -- -- -- (!) 359 lb 2.1 oz (162.9 kg)   10/01/20 0432 107/65 97.9 °F (36.6 °C) Oral 75 22 92 % --       Intake/Output Summary (Last 24 hours) at 10/1/2020 1034  Last data filed at 10/1/2020 0850  Gross per 24 hour   Intake 460 ml   Output 5800 ml   Net -5340 ml       Review of Systems As per above    Physical Exam   General appearance:  no acute distress, appears stated age and cooperative. HEENT:  Normal cephalic, atraumatic without obvious deformity. Pupils equal, round, and reactive to light. Extra ocular muscles intact. Conjunctivae/corneas clear. Neck: Supple, with full range of motion. No jugular venous distention. Trachea midline. Respiratory: on oxygen via NC, lungs clear without wheezes rales or rhonchi. Cardiovascular:  Regular rate and rhythm with normal S1/S2 without murmurs, rubs or gallops. Abdomen: Soft, non-tender, non-distended with normal bowel sounds. Musculoskeletal:  No clubbing, cyanosis or edema bilaterally. Full range of motion without deformity. LE 3+ pitting edema bilaterally. Skin: Skin color, texture, turgor normal.  No rashes or lesions. Neurologic:  Neurovascularly intact without any focal sensory/motor deficits. Cranial nerves: II-XII intact, grossly non-focal.  Psychiatric:  Alert and oriented, thought content appropriate, normal insight  Capillary Refill: Brisk,< 3 seconds   Peripheral Pulses: +2 palpable, equal bilaterally       LABS:    CBC:   Recent Labs     09/30/20  0120   WBC 11.0   HGB 13.9   HCT 43.6      MCV 81.5     Renal:    Recent Labs     09/30/20  0120 10/01/20  0542    140   K 4.7 3.8   CL 99 101   CO2 22 29   BUN 17 21*   CREATININE 0.9 0.9   GLUCOSE 247* 119*   CALCIUM 9.6 8.8   MG  --  1.90   ANIONGAP 15 10     Hepatic:   Recent Labs     09/30/20  0120   AST 25   ALT 14   BILITOT 0.3   PROT 8.8*   LABALBU 4.1   ALKPHOS 125     Troponin:   Recent Labs     09/30/20  0120 09/30/20  1006   TROPONINI 0.02* <0.01     BNP: No results for input(s): BNP in the last 72 hours. Lipids: No results for input(s): CHOL, HDL in the last 72 hours.     Invalid input(s): LDLCALCU, TRIGLYCERIDE  ABGs:    Recent Labs     09/30/20  0229   PHART 7.343*   WFR6KSE 50.9*   PO2ART 110.0*   QYW3ZDB 27   BEART 1.1   Q6FGFQOH 96   TXC7FSV 29       INR: No results for input(s): INR in the last 72 hours. Lactate: No results for input(s): LACTATE in the last 72 hours. Cultures:  -----------------------------------------------------------------  RAD:   XR CHEST PORTABLE   Final Result   Cardiomegaly and probable diffuse pulmonary edema. Assessment/Plan:     Acute respiratory failure with hypoxia - improved  Initially required BiPAP. - Wean oxygen as able to keep SpO2 >88%. Acute on chronic systolic heart failure - likely 2/2 excess salt in diet  TTE (12/2019) with LVEF 35-40%, LHC (12/2019) with LVEF 50% and no significant vessel stenosis. Follows with Dr. Karly Pond outpatient. - Changed IV lasix 40 mg TID to PO lasix 60 mg BID (home dose PO 40 mg TID) per cardiology recs  - Continue losartan 100 mg qd, metoprolol 200 mg qd  - Strict I/Os, daily weights  - Cardiology following: no indication for ICD in view of LVEF >35%, consider starting low-dose spironolactone  - Low sodium diet: counseled on importance    Afib - s/p DCCV (5/20)  NSR at present. - Eliquis for [facility]  - Continue metoprolol    HTN - controlled  - Continue with meds as above    DMII  HbA1c 6.6 (12/19), 6.5 this visit.   - Lantus 41U nightly, LDSSI  - Glucose checks, hypoglycemia protocol      Code Status: Full Code   FEN: Low sodium diet  PPX: Eliquis  DISPO: TRICE Weir MD, PGY-2  10/01/20  10:34 AM    This patient has been staffed and discussed with María Berkowitz MD.

## 2020-10-01 NOTE — PROGRESS NOTES
4 Eyes Admission Assessment     I agree as the admission nurse that 2 RN's have performed a thorough Head to Toe Skin Assessment on the patient. ALL assessment sites listed below have been assessed on admission. Areas assessed by both nurses: Verlena Kipper  [x]   Head, Face, and Ears   [x]   Shoulders, Back, and Chest  [x]   Arms, Elbows, and Hands   [x]   Coccyx, Sacrum, and Ischium  [x]   Legs, Feet, and Heels        Does the Patient have Skin Breakdown?   No         Damien Prevention initiated:  No   Wound Care Orders initiated:  No      [facility] nurse consulted for Pressure Injury (Stage 3,4, Unstageable, DTI, NWPT, and Complex wounds) or Damien score 18 or lower:  No      Nurse 1 eSignature: Electronically signed by Allison Callahan RN on 10/1/20 at 7:52 AM EDT    **SHARE this note so that the co-signing nurse is able to place an eSignature**    Nurse 2 eSignature: Electronically signed by Eugene Lugo RN on 10/1/20 at 6:02 PM EDT

## 2020-10-01 NOTE — PLAN OF CARE
Problem: OXYGENATION/RESPIRATORY FUNCTION  Goal: Patient will maintain patent airway  Outcome: Met This Shift  Note: Patient able to maintain patent airway. Patient is able to swallow without complications, patient is able to cough and deep breath. Problem: Falls - Risk of:  Goal: Will remain free from falls  Description: Will remain free from falls  Outcome: Met This Shift  Note: Patient remained free from falls this shift. Patient calls out to staff for assistance and does not try to get up without help. Patient's bed is locked in lowest position with bed alarm on, call light, bedside table and personal belongings within reach.

## 2020-10-01 NOTE — PROGRESS NOTES
Occupational Therapy   Occupational Therapy Initial Assessment/ Treatment/ Discharge  Date: 10/1/2020   Patient Name: Mitchell Pop  MRN: [id number]     : 1940    Date of Service: 10/1/2020    Discharge Recommendations:     OT Equipment Recommendations  Equipment Needed: No  Mitchell Pop scored a 21/24 on the AM-PAC ADL Inpatient form. At this time, no further OT is recommended upon discharge due to pt near her baseline. Recommend patient returns to prior setting with prior services. 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 Pt now presented to ED with c/o SOB, now however near her baseline. Pt now supervision for transfers and mobility, O2 in 90s on 3lpm. Pt reporting planning to dc back home. No acute OT needs are identified, will sign off on acute OT services. Treatment Diagnosis: decreased endurance  Prognosis: Fair  Decision Making: Low Complexity  OT Education: OT Role  Patient Education: verb understanding  REQUIRES OT FOLLOW UP: No  Activity Tolerance  Activity Tolerance: Patient Tolerated treatment well  Activity Tolerance: Pt O2 sats in 90s during mobiltiy in hallway greater than household distance  Safety Devices  Safety Devices in place: Yes  Type of devices: Left in chair;Nurse notified;Call light within reach; Chair alarm in place           Patient Diagnosis(es): The primary encounter diagnosis was Acute on chronic congestive heart failure, unspecified heart failure type ([address] A diagnosis of Acute respiratory failure with hypoxemia (HCC) was also pertinent to this visit.      has a past medical history of Acute systolic (congestive) heart failure ([address] Edema, Hearing loss, Hyperlipidemia, Hypertension, Morbid obesity due to excess calories ([address] Pre-diabetes, and PUD (peptic ulcer disease). has a past surgical history that includes hernia repair and knee surgery (Bilateral). Treatment Diagnosis: decreased endurance      Restrictions  Position Activity Restriction  Other position/activity restrictions: up with assist    Subjective   General  Chart Reviewed: Yes  Patient assessed for rehabilitation services?: Yes  Additional Pertinent Hx: Pt admitted to ED with c/o SOB. CXR: Cardiomegaly and probable diffuse pulmonary edema. COVID: negative. PMHx includes:  Acute systolic (congestive) heart failure ([address] Edema, Hearing loss, Hyperlipidemia, Hypertension, Morbid obesity due to excess calories ([address] (12/29/2015), Pre-diabetes, and PUD (peptic ulcer disease). Family / Caregiver Present: Yes(son)  Referring Practitioner: Akbar Wallace MD  Diagnosis: CHF  Subjective  Subjective: Pt semi supine in bed upon arrival, agreeable to OT eval and treat.   Patient Currently in Pain: Denies  Vital Signs  Patient Currently in Pain: Denies  Social/Functional History  Social/Functional History  Lives With: Alone(has clients that live with her)  Type of Home: House  Home Layout: Two level, Able to Live on Main level with bedroom/bathroom  Home Access: Ramped entrance  Bathroom Shower/Tub: Tub only(walk in tub)  Bathroom Toilet: Handicap height(sink nearby for leverage)  Bathroom Equipment: Grab bars in shower(built in seat in walk in tub)  Bathroom Accessibility: Wheelchair accessible, Walker accessible  Home Equipment: Rolling walker, Cane  ADL Assistance: Independent  Homemaking Assistance: Needs assistance(patient does cooking and laundry, has assistance with cleaning 2-3x/week)  Ambulation Assistance: Independent(uses FWW or cane \"when my legs are hurting\")  Transfer Assistance: Independent  Active : Yes  Occupation: Full time employment  Additional Comments: patient does grocery shopping, 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, last fall in December of 2018 Objective        Orientation  Overall Orientation Status: Within Functional Limits     Balance  Sitting Balance: Supervision  Standing Balance: Supervision  Standing Balance  Time: 15 mins total  Activity: mobiltiy into bathroom, in hallway  Comment: with RW, without RW into bathroom  Functional Mobility  Functional - Mobility Device: Other(in hallway with RW, into bathroom without RW)  Activity: Other; To/from bathroom  Assist Level: Supervision  Toilet Transfers  Toilet - Technique: Ambulating  Equipment Used: Standard toilet  Toilet Transfer: Supervision  ADL  UE Dressing: Setup(donning robe seated EOB)  LE Dressing: Maximum assistance(assist to don socks)  Toileting: Supervision(simulated as pt did not need to void)  Additional Comments: donning socks limited 2/2 body habitus; pt reporting typically wearing slippers at home           Transfers  Sit to stand: Supervision  Stand to sit: Supervision     Cognition  Overall Cognitive Status: WFL        Sensation  Overall Sensation Status: WFL        LUE AROM (degrees)  LUE AROM : WFL  Left Hand AROM (degrees)  Left Hand AROM: WFL  RUE AROM (degrees)  RUE AROM : WFL  Right Hand AROM (degrees)  Right Hand AROM: WFL                  Treatment included functional transfer training, ADLs, and patient education. Plan   Plan  Times per week: eval and dc    Goals  Patient Goals   Patient goals : eval and dc       Therapy Time   Individual Concurrent Group Co-treatment   Time In 1430         Time Out 1516         Minutes 46         Timed Code Treatment Minutes: 31 Minutes(+15 min eval)    Vani BRAUN  [address] [facility], OTR/L [id number]

## 2020-10-01 NOTE — CONSULTS
Nutrition Education    Pt seen per [facility] for CHF diet education. Provided pt with written and verbal instruction on HF nutrition therapy. Discussed low sodium diet, daily weights, mg of sodium for low sodium foods, and high vs low sodium food options. Pt voiced understanding. Encouraged pt to contact RD with any questions/concerns should they arise. Time spent: 15 minutes    · Verbally reviewed information with Patient  · Written educational materials provided. · Contact name and number provided. · Refer to Patient Education activity for more details.     Electronically signed by Sergey Mann RD, RANI on 10/1/20 at 10:01 AM EDT    Contact: [phone]

## 2020-10-01 NOTE — PROGRESS NOTES
Pt orders for strict bedrest released. Pt ordered for up as tolerated with contact guard due to reyes cath and O2 in place. Pt compliant with calling out as needed for assistance.

## 2020-10-01 NOTE — PROGRESS NOTES
[facility] Daily Progress Note      Admit Date:  9/30/2020    CC: Follow up HF    Subjective:  Ms. Leola Bahena is breathing much more easily today per pt and her son. Down 5.3 L thus far. Objective:   BP (!) 149/76   Pulse 63   Temp 97.4 °F (36.3 °C) (Oral)   Resp 20   Wt (!) 359 lb 2.1 oz (162.9 kg)   SpO2 95%   BMI 57.96 kg/m²       Intake/Output Summary (Last 24 hours) at 10/1/2020 1823  Last data filed at 10/1/2020 1607  Gross per 24 hour   Intake 700 ml   Output 2700 ml   Net -2000 ml       TELEMETRY: Sinus    Physical Exam:  General:  Awake, alert, NAD  Eyes:  EOMI PERRL anicteric  Skin:  Warm and dry. Neck:  JVP normal  Chest:  Diminshed. No Rales. Cardiovascular:   RRR, Normal S1S2. No Murmur. No Rub. No Gallops. Abdomen:  Soft NT  + bs  Extremities:  +++edema  Neuro:  CN II-XII intact. No focal deficits. No weakness. No lateralizing findings. Psych:  Normal thought and affect. MSK:  No Cyanosis nor Clubbing.   Symmetrical strength upper and lower extremities    Medications:    furosemide  60 mg Oral BID    [START ON 10/2/2020] influenza virus vaccine  0.5 mL Intramuscular Once    apixaban  5 mg Oral BID    sodium chloride flush  10 mL Intravenous 2 times per day    insulin glargine  0.25 Units/kg Subcutaneous Nightly    insulin lispro  0-6 Units Subcutaneous TID WC    insulin lispro  0-3 Units Subcutaneous Nightly    losartan  100 mg Oral Daily    metoprolol succinate  200 mg Oral Daily      nitroGLYCERIN Stopped (09/30/20 0249)    dextrose       sodium chloride flush, acetaminophen **OR** acetaminophen, polyethylene glycol, promethazine **OR** ondansetron, glucose, dextrose, glucagon (rDNA), dextrose    Lab Data:  CBC:   Recent Labs     09/30/20 0120   WBC 11.0   HGB 13.9   HCT 43.6   MCV 81.5        BMP:   Recent Labs     09/30/20  0120 10/01/20  0542    140   K 4.7 3.8   CL 99 101   CO2 22 29   BUN 17 21*   CREATININE 0.9 0.9     LIVER PROFILE: Recent Labs     09/30/20  0120   AST 25   ALT 14   BILITOT 0.3   ALKPHOS 125       Assessment:  Patient Active Problem List    Diagnosis Date Noted    Abnormal result of other cardiovascular function study (CODE)      Priority: High    CHF NYHA class III, acute, diastolic (HCC) 04/74/7597    SOB (shortness of breath)     Abnormal ECG     Acute pulmonary edema (HCC)     Acute systolic (congestive) heart failure (HCC)     Atrial fibrillation with RVR ([facility] 12/23/2019    Right hip pain 11/20/2017    Abnormal nuclear stress test 10/06/2017    Hyperlipidemia LDL goal <100 02/10/2016    Benign essential HTN 12/29/2015    Morbid obesity due to excess calories ([address] 12/29/2015    Pure hypercholesterolemia 12/29/2015    Bilateral knee pain 12/29/2015    Pre-diabetes 12/29/2015       Plan:  1. CAD:  No CAD at cath 12/2019.  2. CHF:  Acute on chronic diastolic class II. Improving. Go to po lasix 60 mg bid. Discussed low salt diet and daily weights and compression hose. Pt's son was present.         Core Measures:  · Discharge instructions:   · LVEF documented:   · ACEI for LV dysfunction:   · Smoking Cessation:    Christopher Pimentel MD 10/1/2020 6:23 PM

## 2020-10-01 NOTE — CARE COORDINATION
Patient usually lives at home. Patient was admitted for acute systolic heart failure and severe hypertension. 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 If patient is not able to provide care for them, the agency will be able to arrange other caregivers. Patient is usually independent in ADLs. Patient uses a walker for mobility. CM will continue to follow patient until discharge. Patient on RA. Patient's primary Card is Dr. Tuan Jalloh.  Electronically signed by Cindie Eisenmenger, RN on 10/1/2020 at 11:12 AM

## 2020-10-01 NOTE — PROGRESS NOTES
Physical Therapy    Facility/Department: [address]  Initial Assessment and treatment/discharge    NAME: Chuyita Ireland  : 1940  MRN: [id number]    Date of Service: 10/1/2020    Discharge Recommendations:    Chuyita Ireland scored a 21/24 on the AM-PAC short mobility form. At this time, no further PT is recommended upon discharge due to patient at baseline mobility. Recommend patient returns to prior setting with prior services. PT Equipment Recommendations  Equipment Needed: No    Assessment   Assessment: Patient tolerated session well, transferring and ambulating in room and hallways as above with steady gait with and without use of FWW. Patient with wide base of support due to body habitus but no loss of balance noted. Patient reports mobility near baseline level and denies concerns regarding mobility upon discharge. Will sign off from acute care PT at this time. Treatment Diagnosis: impaired mobility associated with CHF  Prognosis: Good  Decision Making: Low Complexity  PT Education: PT Role;Plan of Care; Functional Mobility Training;Gait Training;General Safety  Patient Education: patient verbalized understanding. No Skilled PT: Safe to return home  REQUIRES PT FOLLOW UP: No  Activity Tolerance  Activity Tolerance: Patient Tolerated treatment well       Patient Diagnosis(es): The primary encounter diagnosis was Acute on chronic congestive heart failure, unspecified heart failure type ([address] A diagnosis of Acute respiratory failure with hypoxemia (HCC) was also pertinent to this visit. has a past medical history of Acute systolic (congestive) heart failure ([address] Edema, Hearing loss, Hyperlipidemia, Hypertension, Morbid obesity due to excess calories ([address] Pre-diabetes, and PUD (peptic ulcer disease). has a past surgical history that includes hernia repair and knee surgery (Bilateral).     Restrictions  Position Activity Restriction  Other position/activity restrictions: up with assist  Vision/Hearing  Vision: Impaired  Vision Exceptions: Wears glasses for reading  Hearing: Exceptions to [facility]  Hearing Exceptions: Hard of hearing/hearing concerns;Bilateral hearing aid     Subjective  General  Chart Reviewed: Yes  Patient assessed for rehabilitation services?: Yes  Additional Pertinent Hx: Patient is an 79 y/o female admitted 9/30 with shortness of breath. chest x-ray (+) for Cardiomegaly and probable diffuse pulmonary edema. PMH significant for HTN, HLD, CHF, morbid obesity, B TKA. Response To Previous Treatment: Not applicable  Family / Caregiver Present: Yes(son)  Referring Practitioner: Kelly Marte MD  Referral Date : 10/01/20  Diagnosis: CHF  Follows Commands: Within Functional Limits  General Comment  Comments: Patient sitting EOB upon arrival.  Subjective  Subjective: Patient agreeable to PT evaluation.   Pain Screening  Patient Currently in Pain: Denies  Vital Signs  Patient Currently in Pain: Denies       Orientation  Orientation  Overall Orientation Status: Within Functional Limits  Social/Functional History  Social/Functional History  Lives With: Alone(has clients that live with her)  Type of Home: House  Home Layout: Two level, Able to Live on Main level with bedroom/bathroom  Home Access: Ramped entrance  Bathroom Shower/Tub: Tub only(walk in tub)  Bathroom Toilet: Handicap height(sink nearby for leverage)  Bathroom Equipment: Grab bars in shower(built in seat in walk in tub)  Bathroom Accessibility: Wheelchair accessible, Walker accessible  Home Equipment: Rolling walker, Cane  ADL Assistance: Independent  Homemaking Assistance: Needs assistance(patient does cooking and laundry, has assistance with cleaning 2-3x/week)  Ambulation Assistance: Independent(uses FWW or cane \"when my legs are hurting\")  Transfer Assistance: Independent  Active : Yes  Occupation: Full time employment  Additional Comments: patient does grocery shopping, 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, last fall in December of 2018  Cognition   Cognition  Overall Cognitive Status: WFL    Objective          AROM RLE (degrees)  RLE AROM: WFL  AROM LLE (degrees)  LLE AROM : WFL  Strength RLE  Strength RLE: WFL  Strength LLE  Strength LLE: WFL        Bed mobility  Comment: patient sitting EOB upon arrival, sitting up in bedside chair at end of session  Transfers  Sit to Stand: Supervision(from EOB, from bedside chair and from toilet)  Stand to sit: Supervision(to multiple surfaces as outlined above)  Ambulation  Ambulation?: Yes  More Ambulation?: Yes  Ambulation 1  Surface: level tile  Device: Rolling Walker  Other Apparatus: O2(3L)  Assistance: Supervision  Quality of Gait: wide base of support due to body habitus, steady gait with no loss of balance noted  Distance: 250' in room and hallways returning to bedside chair  Ambulation 2  Surface - 2: level tile  Device 2: No device  Other Apparatus 2: O2(3L)  Assistance 2: Supervision  Quality of Gait 2: steady gait with no loss of balance noted, wide base of support     Balance  Sitting - Static: Good  Standing - Static: Fair;+  Standing - Dynamic: Fair;+(supervision to ambulate with FWW and without an assistive device)        Plan   Plan  Times per week: d/c from acute care PT  Safety Devices  Type of devices: Chair alarm in place, Nurse notified, Call light within reach, Left in chair      OutComes Score                                                  AM-PAC Score  AM-PAC Inpatient Mobility Raw Score : 21 (10/01/20 1530)  AM-PAC Inpatient T-Scale Score : 50.25 (10/01/20 1530)  Mobility Inpatient CMS 0-100% Score: 28.97 (10/01/20 1530)  Mobility Inpatient CMS G-Code Modifier : Chepe Martell (10/01/20 1530)          Goals  Short term goals  Time Frame for Short term goals: none; patient at baseline mobility and will be discharged from acute care PT  Patient Goals   Patient goals : to go home       Therapy Time   Individual Concurrent Group Co-treatment   Time In 6761         Time Out 1516         Minutes 30         Timed Code Treatment Minutes: 15 Minutes    Timed Code Treatment Minutes:  15 Minutes    Total Treatment Minutes:    15 minutes treatment + 15 minutes evaluation = 30 total treatment minutes    [place], [place] [id number]

## 2020-10-01 NOTE — PLAN OF CARE
Problem: Falls - Risk of:  Goal: Will remain free from falls  Description: Will remain free from falls  10/1/2020 1853 by Irvin Pat RN  Outcome: Ongoing  Note: Patient is a fall risk. See Fall Risk assessment for details. Bed is in low, lock position; call light/belongings within reach. No attempts to get out of bed have been made, calls appropriately when assistance is needed. Hourly rounds in place to secure appropriate safety.

## 2020-10-02 VITALS
OXYGEN SATURATION: 93 % | HEART RATE: 70 BPM | DIASTOLIC BLOOD PRESSURE: 82 MMHG | BODY MASS INDEX: 57.96 KG/M2 | RESPIRATION RATE: 16 BRPM | WEIGHT: 293 LBS | TEMPERATURE: 98 F | SYSTOLIC BLOOD PRESSURE: 113 MMHG

## 2020-10-02 LAB
ANION GAP SERPL CALCULATED.3IONS-SCNC: 9 MMOL/L (ref 3–16)
BUN BLDV-MCNC: 27 MG/DL (ref 7–20)
CALCIUM SERPL-MCNC: 8.7 MG/DL (ref 8.3–10.6)
CHLORIDE BLD-SCNC: 100 MMOL/L (ref 99–110)
CO2: 31 MMOL/L (ref 21–32)
CREAT SERPL-MCNC: 1 MG/DL (ref 0.6–1.2)
GFR AFRICAN AMERICAN: >60
GFR NON-AFRICAN AMERICAN: 53
GLUCOSE BLD-MCNC: 100 MG/DL (ref 70–99)
GLUCOSE BLD-MCNC: 106 MG/DL (ref 70–99)
GLUCOSE BLD-MCNC: 117 MG/DL (ref 70–99)
GLUCOSE BLD-MCNC: 123 MG/DL (ref 70–99)
MAGNESIUM: 1.9 MG/DL (ref 1.8–2.4)
PERFORMED ON: ABNORMAL
POTASSIUM SERPL-SCNC: 4 MMOL/L (ref 3.5–5.1)
SODIUM BLD-SCNC: 140 MMOL/L (ref 136–145)

## 2020-10-02 PROCEDURE — 6370000000 HC RX 637 (ALT 250 FOR IP): Performed by: INTERNAL MEDICINE

## 2020-10-02 PROCEDURE — 2580000003 HC RX 258: Performed by: INTERNAL MEDICINE

## 2020-10-02 PROCEDURE — 83735 ASSAY OF MAGNESIUM: CPT

## 2020-10-02 PROCEDURE — 36415 COLL VENOUS BLD VENIPUNCTURE: CPT

## 2020-10-02 PROCEDURE — 2700000000 HC OXYGEN THERAPY PER DAY

## 2020-10-02 PROCEDURE — 6370000000 HC RX 637 (ALT 250 FOR IP): Performed by: STUDENT IN AN ORGANIZED HEALTH CARE EDUCATION/TRAINING PROGRAM

## 2020-10-02 PROCEDURE — 94761 N-INVAS EAR/PLS OXIMETRY MLT: CPT

## 2020-10-02 PROCEDURE — 99232 SBSQ HOSP IP/OBS MODERATE 35: CPT | Performed by: NURSE PRACTITIONER

## 2020-10-02 PROCEDURE — 80048 BASIC METABOLIC PNL TOTAL CA: CPT

## 2020-10-02 RX ADMIN — LOSARTAN POTASSIUM 100 MG: 100 TABLET, FILM COATED ORAL at 09:39

## 2020-10-02 RX ADMIN — POLYETHYLENE GLYCOL 3350 17 G: 17 POWDER, FOR SOLUTION ORAL at 09:39

## 2020-10-02 RX ADMIN — Medication 10 ML: at 08:38

## 2020-10-02 RX ADMIN — FUROSEMIDE 60 MG: 40 TABLET ORAL at 09:39

## 2020-10-02 RX ADMIN — METOPROLOL SUCCINATE 200 MG: 100 TABLET, EXTENDED RELEASE ORAL at 09:39

## 2020-10-02 RX ADMIN — ACETAMINOPHEN 650 MG: 325 TABLET ORAL at 09:39

## 2020-10-02 RX ADMIN — APIXABAN 5 MG: 5 TABLET, FILM COATED ORAL at 09:40

## 2020-10-02 ASSESSMENT — PAIN SCALES - GENERAL: PAINLEVEL_OUTOF10: 4

## 2020-10-02 NOTE — FLOWSHEET NOTE
10/02/20 1407   Encounter Summary   Services provided to: Patient   Referral/Consult From: [facility]   (10/2/2020, MARIAH. )   Complexity of Encounter Moderate   Length of Encounter 15 minutes   Routine   Type Follow up   Assessment Approachable   Intervention Nurtured hope   Outcome Expressed gratitude

## 2020-10-02 NOTE — PROGRESS NOTES
CC: acute on chronic systolic  CHF  HPI: 78 y.o. with SOB  S: Feeling better. Breathing ok. Edema improving. On room air    Tele: none    O:  Physical Exam:  /64   Pulse 62   Temp 98.5 °F (36.9 °C) (Oral)   Resp 18   Wt (!) 359 lb 2.1 oz (162.9 kg)   SpO2 93%   BMI 57.96 kg/m²    General (appearance):  No acute distress  Eyes: anicteric   Neck: soft, No JVD  Ears/Nose/Mouth/Thorat: No cyanosis  CV: RRR soft ADEBAYO  Respiratory:  Diminished, normal effort  GI: soft, non-tender, non-distended  Skin: Warm, dry. No rashes  Neuro/Psych: Alert and oriented x 3.  Appropriate behavior  Ext:  No c/c. ++ BLE edema  Pulses:  2+ radial     I.O's= -6.7 L     Weight  Admission: Weight: (!) 360 lb 3.2 oz (163.4 kg)   Today: Weight: (!) 359 lb 2.1 oz (162.9 kg)    CBC:   Recent Labs     09/30/20  0120   WBC 11.0   HGB 13.9   HCT 43.6   MCV 81.5        BMP:   Recent Labs     09/30/20  0120 10/01/20  0542 10/02/20  0640    140 140   K 4.7 3.8 4.0   CL 99 101 100   CO2 22 29 31   BUN 17 21* 27*   CREATININE 0.9 0.9 1.0     Mag:   Lab Results   Component Value Date    MG 1.90 10/02/2020     LIVER PROFILE:   Recent Labs     09/30/20  0120   AST 25   ALT 14   BILITOT 0.3   ALKPHOS 125     Pro-BNP:   Lab Results   Component Value Date    PROBNP 1,602 09/30/2020    PROBNP 2,408 12/30/2019    PROBNP 4,435 12/28/2019     CARDIAC ENZYMES:   Recent Labs     09/30/20  0120 09/30/20  1006   TROPONINI 0.02* <0.01     TSH:   Lab Results   Component Value Date    TSH 2.05 09/14/2017     LIPIDS:   Lab Results   Component Value Date    CHOL 178 10/01/2020    CHOL 146 12/30/2019    CHOL 220 (H) 09/30/2019     Lab Results   Component Value Date    TRIG 57 10/01/2020    TRIG 61 12/30/2019    TRIG 56 09/30/2019     Lab Results   Component Value Date    HDL 60 10/01/2020    HDL 56 12/30/2019    HDL 74 (H) 09/30/2019     Lab Results   Component Value Date    LDLCALC 107 (H) 10/01/2020    LDLCALC 78 12/30/2019    LDLCALC 135 (H) 2019     Lab Results   Component Value Date    LABVLDL 11 10/01/2020    LABVLDL 12 2019    LABVLDL 11 2019     No results found for: CHOLHDLRATIO    Imagin2020 CXR:  Cardiomegaly and probable diffuse pulmonary edema. 2019 Echo:   Left ventricular cavity size is normal with asymmetric hypertrophy of the   basal septum. Overall left ventricular systolic function appears reduced with an estimated   ejection fraction of 35-40%. There is hypokinesis of the entire apex and the mid wall segments   Diastolic function is indeterminate due to abnormal heart rhythm (atrial   fibrillation). No evidence of left ventricular mass or thrombus   Mitral annular calcification with mild mitral regurgitation   Moderate tricuspid regurgitation. Estimated pulmonary artery systolic pressure is at 49 mmHg assuming a right   atrial pressure of 8 mmHg. There is a pleural effusion   Bi-atrial enlargement. 2019 Coronary angiogram  CONCLUSION:  1. Normal epicardial coronary arteries. 2.  LV ejection fraction 50% without regional wall motion abnormality. 3.  LVEDP 20 mmHg. 4.  No aortic valve gradient and no wall motion abnormality on left  Ventriculography. Assessment:  78 y.o. with acute/chronic systolic CHF  -Issues:  -Acute/chronic sCHF (EF 35-40%)  -HTN  -HLD  -pAF (eliquis)    Plan:  -Keep K>4, Mg>2.  -Lasix 60 mg po BID  -Toprol  -Losartan  -Eliquis 5 mg po bid   -Low salt diet     On room air and on PO lasix. Can hopefully d/t today or tomorrow.

## 2020-10-02 NOTE — PROGRESS NOTES
D/C order noted. SL removed. Pt and daughter verbalized understanding of d/c instructions and follow up. Transported off unit per wheelchair.

## 2020-10-02 NOTE — CARE COORDINATION
Patient to be discharged today. Will go home with no needs. PT=21/OT=21.  Electronically signed by Pat Hong RN on 10/2/2020 at 4:05 PM

## 2020-10-02 NOTE — DISCHARGE SUMMARY
Hospital Discharge Summary    Patient's PCP: Roe Cohn MD  Admit Date: 9/30/2020   Discharge Date: 10/2/2020    Admitting Physician: Dr. Marcus Washington MD  Discharge Physician: Dr. Tyron Hill: cardiology,     HPI: 77 yo admitted with shortness of breath  Brief hospital course:  Given the concern of the patients presentation and the concern of the possible multi-factorial etiology contributing to patients symptomatology. Patient was admitted and evaluated and found to have shortness of breath secondary to acut ediastolic chf; the patient had not been compliant with salt intake; she underwent diuresis on day of discharge she felt great; she was not short of breath has been on po lasix here with son, they both feel she is ready for discharge; discussed approrpaite diet at length; asked to monitor for recurrence of symptoms or new symptoms including but no tlimited to chest pain shortness of breath nausea vomiting fevers or chills and seek immediate medical attention or call 911.       Discharge Diagnoses:   Patient Active Problem List   Diagnosis    Benign essential HTN    Morbid obesity due to excess calories ([address]    Pure hypercholesterolemia    Bilateral knee pain    Pre-diabetes    Hyperlipidemia LDL goal <100    Abnormal nuclear stress test    Abnormal result of other cardiovascular function study (CODE)    Right hip pain    Atrial fibrillation with RVR (HCC)    Acute pulmonary edema (HCC)    Acute systolic (congestive) heart failure (HCC)    SOB (shortness of breath)    Abnormal ECG    CHF NYHA class III, acute, diastolic (HCC)    Acute on chronic systolic congestive heart failure (HCC)    Essential hypertension    Mixed hyperlipidemia    PAF (paroxysmal atrial fibrillation) (HCC)       Physical Exam: /64   Pulse 62   Temp 98.5 °F (36.9 °C) (Oral)   Resp 18   Wt (!) 359 lb 2.1 oz (162.9 kg)   SpO2 93%   BMI 57.96 kg/m²     Recent Labs     10/01/20  1617 10/01/20  2102 10/02/20  0749 10/02/20  1149 10/02/20  1656   POCGLU 111* 124* 117* 106* 100*       /82   Pulse 70   Temp 98 °F (36.7 °C) (Oral)   Resp 16   Wt (!) 359 lb 2.1 oz (162.9 kg)   SpO2 93%   BMI 57.96 kg/m²   General appearance: alert, appears stated age and cooperative  Head: Normocephalic, without obvious abnormality, atraumatic  Neck: no adenopathy, no carotid bruit, no JVD, supple, symmetrical, trachea midline and thyroid not enlarged, symmetric, no tenderness/mass/nodules  Heart: regular rate and rhythm, S1, S2 normal, no murmur, click, rub or gallop  Abdomen: soft, non-tender; bowel sounds normal; no masses,  no organomegaly  Extremities: extremities normal, atraumatic, no cyanosis or edema  Pulses: 2+ and symmetric    LABS:  Recent Labs     09/30/20  0120   WBC 11.0   HGB 13.9         Recent Labs     10/02/20  0640      K 4.0      CO2 31   BUN 27*   CREATININE 1.0   GLUCOSE 123*     No results for input(s): INR in the last 72 hours. Discharge Medications:   Nestor Whitehead   Home Medication Instructions [facility]:[id number]    Printed on:10/02/20 1700   Medication Information                      apixaban (ELIQUIS) 5 MG TABS tablet  Take 1 tablet by mouth 2 times daily             blood glucose monitor strips  Test 3 times a day & as needed for symptoms of irregular blood glucose.              dextromethorphan-guaiFENesin (ROBITUSSIN-DM)  MG/5ML syrup  Take 10 mLs by mouth every 4 hours as needed for Cough             furosemide (LASIX) 40 MG tablet  Take 1 tablet by mouth 3 times daily             losartan (COZAAR) 100 MG tablet  Take 1 tablet by mouth daily             metoprolol succinate (TOPROL XL) 200 MG extended release tablet  Take 1 tablet by mouth daily                Activity: activity as tolerated  Diet: cardiac diet  Wound Care: none needed    Disposition: home  Discharged Condition: Stable  Follow Up: Primary Care Physician in one week    Total

## 2020-10-03 ENCOUNTER — CARE COORDINATION (OUTPATIENT)
Dept: CASE MANAGEMENT | Age: 80
End: 2020-10-03

## 2020-10-03 NOTE — CARE COORDINATION
explanation of the CTN role. CTN reviewed discharge instructions, medical action plan, CHF Zone Mgt and red flags with patient who verbalized understanding and denies any sx of exacerbation. Reports she plans to by scale and bp cuff today. Patient given an opportunity to ask questions and does not have any further questions or concerns at this time. Were discharge instructions available to patient? Yes. No new rx upon discharge. Has stopped Apresoline as advised. Advised obtaining a 90-day supply of all daily and as-needed medications. Denies resource needs including hhc, dme, community assistance. Patient to schedule hosp f/u appt; has transportation. Advised to contact PCP 24/7 re: any health concerns. Covid Risk Education  Patient has following risk factors of: CHF, AgeEducation provided regarding infection prevention, and signs and symptoms of COVID-19 and when to seek medical attention with Patient who verbalized understanding and denies any Covid related sx. Discussed exposure protocols and quarantine From CDC: Are you at higher risk for severe illness?  and For more information on steps you can take to protect yourself, see CDC's How to Protect Yourself. No email for [facility]. Agreeable to ongoing folllow up.      Ramona Martell RN

## 2020-10-19 ENCOUNTER — CARE COORDINATION (OUTPATIENT)
Dept: CASE MANAGEMENT | Age: 80
End: 2020-10-19

## 2020-10-21 NOTE — CARE COORDINATION
[facility] Trans[facility] Initial Follow Up Call    10/19/2020    Patient:  Yelena Melo  Patient :  1940  MRN:  [id number]   Reason for Admission:  covid 19 monitoring final call   Discharge Date:  10/2/20  RARS:  Joanna      CTC attempt to reach Pt regarding recent hospital discharge. CTC unable to leave voice recording with call back number requesting a call back / mailbox full. Follow up appointments:    Future Appointments   Date Time Provider Carlos Enrique Diaz   10/29/2020  3:00 PM LAWRENCE Redding - CNP [facility]   2021 10:30 AM MD Adenike Noguera.  ANALIA Barriga, RN  Care Transition Coordinator  Contact Number:  [phone]

## 2020-10-27 ENCOUNTER — OFFICE VISIT (OUTPATIENT)
Dept: CARDIOLOGY CLINIC | Age: 80
End: 2020-10-27
Payer: MEDICARE

## 2020-10-27 VITALS
HEIGHT: 66 IN | WEIGHT: 293 LBS | HEART RATE: 64 BPM | DIASTOLIC BLOOD PRESSURE: 82 MMHG | TEMPERATURE: 97.3 F | SYSTOLIC BLOOD PRESSURE: 120 MMHG | BODY MASS INDEX: 47.09 KG/M2

## 2020-10-27 PROCEDURE — 99214 OFFICE O/P EST MOD 30 MIN: CPT | Performed by: NURSE PRACTITIONER

## 2020-10-27 NOTE — PROGRESS NOTES
CC/HPI:    78 y.o. patient of Guero Whatley and Santi Smith here for CHF, HTN, HLD, and pAF, She was recently hospitalized with acute/chronic HFpEF  She denies cp, sob, orthopnea or PND. Weight is trending down (328 lbs today). Edema is improving. No LH/dizziness, palpitations or syncope. No fever, chills, n/v/d or GI/ bleeding. C/o back/hip pain relieved with tylenol. Tolerating medications. Taking lasix TID with good urinary output. Limiting salt. Past Medical History:   Diagnosis Date    Acute systolic (congestive) heart failure (HCC)     Edema     Hearing loss     Hyperlipidemia     Hypertension     Morbid obesity due to excess calories ([facility] 12/29/2015    Pre-diabetes     PUD (peptic ulcer disease)     \"years ago\",      Past Surgical History:   Procedure Laterality Date    HERNIA REPAIR      umbilical     KNEE SURGERY Bilateral     TKR bilateral     Family History   Problem Relation Age of Onset    Other Mother         hernia    High Blood Pressure Father      Social History     Tobacco Use    Smoking status: Never Smoker    Smokeless tobacco: Never Used   Substance Use Topics    Alcohol use: No    Drug use: No     Allergies:Aspirin and Dye [iodides]    Review of Systems  General: No changes in  fatigue, or night sweats. HEENT: No blurry or decreased vision. No changes in hearing, nasal discharge or sore throat. Cardiovascular:  See HPI. Respiratory: No cough, hemoptysis, or wheezing. No history of asthma. Gastrointestinal:  No abdominal pain, hematochezia, melana, constipation, diarrhea, or history of GI ulcers. Genito-Urinary: No dysuria or hematuria. No urgency or polyuria. Musculoskeletal:  No complaints of joint pain, joint swelling or muscular weakness/soreness. Neurological:  No dizziness, headaches, numbness/tingling, speech problems or weakness. No history of a stroke or TIA. Psychological:  No anxiety or depression.   Hematological and Lymphatic: No abnormal bleeding or bruising, blood clots, jaundice or swollen lymph nodes. Endocrine:   No malaise/lethargy, palpitations, polydipsia/polyuria, temperature intolerance or unexpected weight changes  Skin:  No rashes or non-healing ulcers. Physical Exam:  /82   Pulse 64   Temp 97.3 °F (36.3 °C)   Ht 5' 6\" (1.676 m)   Wt (!) 328 lb (148.8 kg)   BMI 52.94 kg/m²    General (appearance):  No acute distress. + obese  Eyes: anicteric   Neck: soft, No JVD  Ears/Nose/Mouth/Thorat: No cyanosis  CV:RRR  Respiratory:  Clear, normal effort  GI: soft, non-tender, non-distended  Skin: Warm, dry. No rashes  Neuro/Psych: Alert and oriented x 3.  Appropriate behavior  Ext:  No c/c. + BLE edema L > R  Pulses:  2+ right radial.     Weight  Wt Readings from Last 3 Encounters:   10/27/20 (!) 328 lb (148.8 kg)   10/01/20 (!) 359 lb 2.1 oz (162.9 kg)   08/20/20 (!) 353 lb 6.4 oz (160.3 kg)          CBC:   Lab Results   Component Value Date    WBC 11.0 09/30/2020    HGB 13.9 09/30/2020    HCT 43.6 09/30/2020    MCV 81.5 09/30/2020     09/30/2020     BMP:  Lab Results   Component Value Date    CREATININE 1.0 10/02/2020    BUN 27 (H) 10/02/2020     10/02/2020    K 4.0 10/02/2020     10/02/2020    CO2 31 10/02/2020     Mag:   Lab Results   Component Value Date    MG 1.90 10/02/2020     LIVER PROFILE:   Lab Results   Component Value Date    ALT 14 09/30/2020    AST 25 09/30/2020    ALKPHOS 125 09/30/2020    BILITOT 0.3 09/30/2020     PT/INR:   Lab Results   Component Value Date    INR 1.70 (H) 05/12/2020    INR 1.42 (H) 05/11/2020    INR 1.24 (H) 12/27/2019    PROTIME 19.8 (H) 05/12/2020    PROTIME 16.5 (H) 05/11/2020    PROTIME 14.4 (H) 12/27/2019     Pro-BNP   Lab Results   Component Value Date    PROBNP 1,602 09/30/2020    PROBNP 2,408 12/30/2019    PROBNP 4,435 12/28/2019     LIPIDS:  No components found for: CHLPL  Lab Results   Component Value Date    TRIG 57 10/01/2020    TRIG 61 12/30/2019    TRIG 56 09/30/2019     Lab Results   Component Value Date    HDL 60 10/01/2020    HDL 56 2019    HDL 74 (H) 2019     Lab Results   Component Value Date    LDLCALC 107 (H) 10/01/2020    LDLCALC 78 2019    LDLCALC 135 (H) 2019     Lab Results   Component Value Date    LABVLDL 11 10/01/2020    LABVLDL 12 2019    LABVLDL 11 2019     TSH:  Lab Results   Component Value Date    TSH 2.05 2017       IMAGIN/30/2019 Coronary angiogram  1. Normal epicardial coronary arteries. 2.  LV ejection fraction 50% without regional wall motion abnormality. 3.  LVEDP 20 mmHg. 4.  No aortic valve gradient and no wall motion abnormality on left  Ventriculography. 2019 Echo:   Left ventricular cavity size is normal with asymmetric hypertrophy of the basal septum. Overall left ventricular systolic function appears reduced with an estimated ejection fraction of 35-40%. There is hypokinesis of the entire apex and the mid wall segments   Diastolic function is indeterminate due to abnormal heart rhythm (atrial fibrillation). No evidence of left ventricular mass or thrombus   Mitral annular calcification with mild mitral regurgitation   Moderate tricuspid regurgitation. Estimated pulmonary artery systolic pressure is at 49 mmHg assuming a right   atrial pressure of 8 mmHg. There is a pleural effusion   Bi-atrial enlargement.     Medications:   Current Outpatient Medications   Medication Sig Dispense Refill    metoprolol succinate (TOPROL XL) 200 MG extended release tablet Take 1 tablet by mouth daily 90 tablet 3    apixaban (ELIQUIS) 5 MG TABS tablet Take 1 tablet by mouth 2 times daily 180 tablet 3    losartan (COZAAR) 100 MG tablet Take 1 tablet by mouth daily 30 tablet 5    dextromethorphan-guaiFENesin (ROBITUSSIN-DM)  MG/5ML syrup Take 10 mLs by mouth every 4 hours as needed for Cough      blood glucose monitor strips Test 3 times a day & as needed for symptoms of irregular blood glucose. 100 strip 1    furosemide (LASIX) 40 MG tablet Take 1 tablet by mouth 3 times daily 270 tablet 3     No current facility-administered medications for this visit. Assessment:   1. Chronic heart failure with preserved ejection fraction ([address]    2. Essential hypertension    3. Dyslipidemia    4. Chronic atrial fibrillation (HCC)          Plan:    HFpEF   Weight down, edema improved.  No sob   Cont lasix 40 mg TID   Cont toprol and losartan  A fib   Toprol and eliquis   HTN   /82   Losartan   Toprol   HLD      Diet and exercise     Follow up with me in 1 week  Follow up with Aaron in February

## 2020-10-28 NOTE — ED PROVIDER NOTES
[address] [facility] ENCOUNTER          PHYSICIAN ASSISTANT NOTE       Date of evaluation: 9/30/2020    Chief Complaint     Shortness of Breath      History of Present Illness     Jose Roblero is a 78 y.o. female with a past medical history as noted below who presents to the Emergency Department with a complaint of shortness of breath. The patient, who presents with her son, reports that she has been experiencing some dyspnea on exertion for the past couple of days, but it got much worse earlier today around 1300 hrs. when she began to have severe dyspnea on exertion when attempting to go to a class earlier today. When the patient was coming out of the class, she had a difficult time ambulating without stopping multiple times to take breath. Her dyspnea has continued to worsen since that time. The patient has a history of congestive heart failure, peripheral edema, dyslipidemia and hypertension. The son reports that this is similar to exacerbations that she has had of her CHF in the past.  The patient is completing short sentences with few words only. She is conscious awake and oriented. She has been taking her medicine as appropriate. She has not taken anything for her current symptoms. Denies any fevers, chills, sweats or other constitutional symptoms. Denies any known exposures to patients with COVID-19 or under investigation for the disease. She has been wearing her mask in public and socially distancing. Review of Systems     Review of Systems   Constitutional: Negative for chills, diaphoresis and fever. HENT: Negative for congestion and sore throat. Respiratory: Positive for shortness of breath and wheezing. Negative for cough. Dyspnea on exertion, orthopnea. Cardiovascular: Negative for chest pain, palpitations and leg swelling. Gastrointestinal: Negative for abdominal pain, diarrhea, nausea and vomiting. Genitourinary: Negative for dysuria. Musculoskeletal: Negative for myalgias. Skin: Negative for rash. Neurological: Negative for dizziness, seizures, weakness and headaches. Hematological: Does not bruise/bleed easily. Psychiatric/Behavioral: Negative for hallucinations and suicidal ideas. All other systems reviewed and are negative. Past Medical, Surgical, Family, and Social History     She has a past medical history of Acute systolic (congestive) heart failure ([address] Edema, Hearing loss, Hyperlipidemia, Hypertension, Morbid obesity due to excess calories ([address] Pre-diabetes, and PUD (peptic ulcer disease). She has a past surgical history that includes hernia repair and knee surgery (Bilateral). Her family history includes High Blood Pressure in her father; Other in her mother. She reports that she has never smoked. She has never used smokeless tobacco. She reports that she does not drink alcohol or use drugs. Medications     Discharge Medication List as of 10/2/2020  5:04 PM      CONTINUE these medications which have NOT CHANGED    Details   metoprolol succinate (TOPROL XL) 200 MG extended release tablet Take 1 tablet by mouth daily, Disp-90 tablet,R-3Normal      apixaban (ELIQUIS) 5 MG TABS tablet Take 1 tablet by mouth 2 times daily, Disp-180 tablet,R-3Normal      furosemide (LASIX) 40 MG tablet Take 1 tablet by mouth 3 times daily, Disp-270 tablet,R-3Normal      losartan (COZAAR) 100 MG tablet Take 1 tablet by mouth daily, Disp-30 tablet,R-5Normal      dextromethorphan-guaiFENesin (ROBITUSSIN-DM)  MG/5ML syrup Take 10 mLs by mouth every 4 hours as needed for CoughHistorical Med      blood glucose monitor strips Test 3 times a day & as needed for symptoms of irregular blood glucose., Disp-100 strip, R-1, Print             Allergies     She is allergic to aspirin and dye [iodides].     Physical Exam     INITIAL VITALS: BP: (!) 166/110, Temp: 97.6 °F (36.4 °C), Pulse: 91, Resp: (!) 33, SpO2: 100 %  Physical Exam  Vitals signs and nursing note reviewed. Constitutional:       General: She is in acute distress. Appearance: She is well-developed. She is diaphoretic. HENT:      Head: Normocephalic and atraumatic. Mouth/Throat:      Pharynx: No pharyngeal swelling or oropharyngeal exudate. Eyes:      General: No scleral icterus. Extraocular Movements: Extraocular movements intact. Pupils: Pupils are equal, round, and reactive to light. Neck:      Musculoskeletal: Normal range of motion. Vascular: JVD present. Cardiovascular:      Rate and Rhythm: Regular rhythm. Tachycardia present. Heart sounds: No murmur. No gallop. Pulmonary:      Effort: Tachypnea, accessory muscle usage and respiratory distress present. Breath sounds: No stridor. Wheezing and rales present. Abdominal:      Palpations: Abdomen is soft. Tenderness: There is no abdominal tenderness. Musculoskeletal: Normal range of motion. Right lower leg: Edema present. Left lower leg: Edema present. Skin:     General: Skin is warm. Findings: No rash. Neurological:      Mental Status: She is alert and oriented to person, place, and time. Diagnostic Results     EKG   Interpreted in conjunction with emergency department physician Gabriela Borrego MD.  Rhythm: sinus tachycardia  Rate: 120-130  Axis: left  Ectopy: none  Conduction: normal  ST Segments: normal  T Waves: normal  Q Waves:none  Clinical Impression: Sinus tachycardia, rate 126, left axis deviation, physiologic, no ectopy, normal QTC, no evidence of ischemia, injury or infarct  Comparison: Consistent with prior ECGs other than rate    RADIOLOGY:  XR CHEST PORTABLE   Final Result   Cardiomegaly and probable diffuse pulmonary edema.               LABS:   Results for orders placed or performed during the hospital encounter of 09/30/20   CBC Auto Differential   Result Value Ref Range    WBC 11.0 4.0 - 11.0 K/uL    RBC 5.35 (H) 4.00 - 5.20 M/uL Hemoglobin 13.9 12.0 - 16.0 g/dL    Hematocrit 43.6 36.0 - 48.0 %    MCV 81.5 80.0 - 100.0 fL    MCH 25.9 (L) 26.0 - 34.0 pg    MCHC 31.8 31.0 - 36.0 g/dL    RDW 16.9 (H) 12.4 - 15.4 %    Platelets 277 548 - 383 K/uL    MPV 8.7 5.0 - 10.5 fL    Neutrophils % 43.3 %    Lymphocytes % 47.3 %    Monocytes % 6.4 %    Eosinophils % 1.3 %    Basophils % 1.7 %    Neutrophils Absolute 4.8 1.7 - 7.7 K/uL    Lymphocytes Absolute 5.2 (H) 1.0 - 5.1 K/uL    Monocytes Absolute 0.7 0.0 - 1.3 K/uL    Eosinophils Absolute 0.1 0.0 - 0.6 K/uL    Basophils Absolute 0.2 0.0 - 0.2 K/uL   Comprehensive Metabolic Panel w/ Reflex to MG   Result Value Ref Range    Sodium 136 136 - 145 mmol/L    Potassium reflex Magnesium 4.7 3.5 - 5.1 mmol/L    Chloride 99 99 - 110 mmol/L    CO2 22 21 - 32 mmol/L    Anion Gap 15 3 - 16    Glucose 247 (H) 70 - 99 mg/dL    BUN 17 7 - 20 mg/dL    CREATININE 0.9 0.6 - 1.2 mg/dL    GFR Non-African American 60 (A) >60    GFR African American >60 >60    Calcium 9.6 8.3 - 10.6 mg/dL    Total Protein 8.8 (H) 6.4 - 8.2 g/dL    Alb 4.1 3.4 - 5.0 g/dL    Albumin/Globulin Ratio 0.9 (L) 1.1 - 2.2    Total Bilirubin 0.3 0.0 - 1.0 mg/dL    Alkaline Phosphatase 125 40 - 129 U/L    ALT 14 10 - 40 U/L    AST 25 15 - 37 U/L    Globulin 4.7 g/dL   Troponin   Result Value Ref Range    Troponin 0.02 (H) <0.01 ng/mL   Brain Natriuretic Peptide   Result Value Ref Range    Pro-BNP 1,602 (H) 0 - 449 pg/mL   Blood Gas, Venous   Result Value Ref Range    pH, Clemente 7.161 (LL) 7.350 - 7.450    pCO2, Clemente 72.1 (H) 41.0 - 51.0 mmHg    pO2, Clemente 85.6 (H) 25.0 - 40.0 mmHg    HCO3, Venous 24.7 24.0 - 28.0 mmol/L    Base Excess, Clemente -5.4 (L) -2.0 - 3.0 mmol/L    O2 Sat, Clemente 93 Not established %    Carboxyhemoglobin 1.1 0.0 - 1.5 %    MetHgb, Clemente 1.0 0.0 - 1.5 %    TC02 (Calc), Clemente 27 mmol/L    Hemoglobin, Clemente, Reduced 6.90 %   Urinalysis Reflex to Culture    Specimen: Urine, clean catch   Result Value Ref Range    Color, UA Yellow Straw/Yellow SL tablet 0.4 mg    DISCONTD: nitroGLYCERIN 50 mg in dextrose 5% 250 mL infusion    furosemide (LASIX) 10 MG/ML injection     STEPHANIE GALICIA: cabinet override       CONSULTS:  Eddi Bettencourt / ANDREAS / Jayro Abigail is admitted to the Emergency Department for evaluation of her chief complaint as described in the history of present illness. Complete history and physical was performed by me and my attending. Nursing notes, past medical history, surgical history, family history and social history were reviewed and addressed in the HPI. Femi Boo is a 78 y.o. female who presents to the emergency department with a complaint of acute respiratory distress. The patient has had increasing dyspnea, orthopnea and dyspnea on exertion over the past 2 days. Today after attending a class around midday, the patient began to experience significant dyspnea on exertion including having to stop after walking short distances. This is progressively gotten worse through the day. Eventually the symptoms became severe and unable to tolerate at home. She was brought to the emergency department by her son. On arrival to the emergency department, the patient is in acute respiratory distress with audible crackles and inspiratory/expiratory wheezes. She is able to complete short sentences with minimal words. The patient is tripoding in order to be able to breathe. Her physical examination reveals Rales bilaterally to the mid chest.  The patient is tachycardic and tachypneic. No cyanosis. She demonstrates bilateral lower extremity edema as well as mild JVD. Her initial oxygen saturation was 100%, but the patient was on oxygen at this time. On diagnostic evaluation, a stat chest x-ray demonstrates diffuse pulmonary edema with cardiomegaly.   Her blood gas demonstrates a severe, uncompensated respiratory acidosis with a pH of 7.161, a PCO2 of 72 and a base deficit of -5.  Her bicarb is normal.  The patient's initial troponin is slightly elevated at 0.02. Nitroglycerin was initiated on the patient as well as BiPAP started as soon as possible. Her proBNP shows elevation at 1602. CBC demonstrates no leukocytosis but she has mild lymphocytosis. Her RDW is significantly elevated at 16.9. No evidence of anemia. The patient's BMP demonstrates mild hyperglycemia but otherwise normal electrolytes. Liver function test demonstrates normal albumin with slightly elevated protein but otherwise unremarkable, making this episode of edema less likely nephrotic syndrome. The patient demonstrated significant improvement with BiPAP and nitroglycerin. Diuresis was initiated with Lasix. Repeat arterial blood gas after a couple of hours on BiPAP demonstrates a mild, compensated respiratory acidosis with a pH of 7.34 and a PCO2 of 50.9. Repeat troponin is normal at 0.01 suggesting that her last troponin may have been the result of demand ischemia. The patient was transitioned to a nitroglycerin drip. We will admit to the hospital for further evaluation and management of acute CHF exacerbation. I discussed this plan at length the patient who verbalizes understanding and is in agreement. The patient was seen and evaluated by myself and the attending physician, Ramsey Dalton MD, who agrees with my assessment, treatment and plan. Critical Care:  Due to the immediate potential for life-threatening deterioration due to hypoxia and respiratory failure secondary to acute congestive heart failure exacerbation, I spent 40 minutes providing critical care. This time excludes time spent performing procedures but includes time spent on direct patient care, history retrieval, review of the chart, and discussions with patient, family, and consultant(s). Clinical Impression     1. Acute on chronic congestive heart failure, unspecified heart failure type ([address]    2.  Acute respiratory failure with hypoxemia (HCC)        Disposition     PATIENT REFERRED TO:  No follow-up provider specified.     DISCHARGE MEDICATIONS:  Discharge Medication List as of 10/2/2020  5:04 PM          DISPOSITION Admitted 09/30/2020 02:51:26 AM                                                                                   [address], [place]  10/29/20 5329

## 2020-10-29 ASSESSMENT — ENCOUNTER SYMPTOMS
SORE THROAT: 0
DIARRHEA: 0
WHEEZING: 1
COUGH: 0
NAUSEA: 0
ABDOMINAL PAIN: 0
VOMITING: 0
SHORTNESS OF BREATH: 1

## 2020-11-16 NOTE — PROGRESS NOTES
ICU Progress Note    Admit Date: 12/23/2019  Vent Day: None  IV Access: Peripheral  IV Fluids: None  Vasopressors: None                Antibiotics: None  Diet: Diet NPO Effective Now    CC:   Chief Complaint   Patient presents with    Shortness of Breath       Interval history: No acute events overnight. Patient states they are feeling much better compared to yesterday. Heart rates have improved in the 90-100s. Her breathing has improved as well but she has a persistent cough. Patient denies fevers, chills, chest pain, palpitations, leg swelling, abdominal pain, nausea, vomiting, diarrhea.     Medications:     Scheduled Meds:   sodium chloride flush  10 mL Intravenous 2 times per day    furosemide  40 mg Intravenous BID    insulin lispro  0-6 Units Subcutaneous TID WC    insulin lispro  0-3 Units Subcutaneous Nightly    apixaban  5 mg Oral BID     Continuous Infusions:   diltiazem 7.5 mg/hr (12/24/19 0035)    dextrose       PRN Meds:sodium chloride flush, magnesium hydroxide, ondansetron, acetaminophen, perflutren lipid microspheres, glucose, dextrose, glucagon (rDNA), dextrose    Objective:   Vitals:   T-max:  Patient Vitals for the past 8 hrs:   BP Temp Temp src Pulse Resp SpO2 Weight   12/24/19 0700 115/85 -- -- 90 26 99 % --   12/24/19 0600 -- -- -- 109 20 99 % --   12/24/19 0500 125/89 -- -- 88 20 97 % --   12/24/19 0400 128/79 -- -- 105 (!) 33 99 % --   12/24/19 0335 -- -- -- -- 19 100 % --   12/24/19 0300 (!) 127/110 97.9 °F (36.6 °C) Axillary 85 27 99 % (!) 348 lb 5.2 oz (158 kg)   12/24/19 0200 117/81 -- -- 84 20 100 % --   12/24/19 0153 -- -- -- -- 22 100 % --   12/24/19 0100 (!) 117/105 -- -- 80 20 100 % --   12/24/19 0026 -- -- -- -- 22 99 % --   12/24/19 0000 122/87 -- -- 89 21 100 % --       Intake/Output Summary (Last 24 hours) at 12/24/2019 0715  Last data filed at 12/24/2019 0700  Gross per 24 hour   Intake 152 ml   Output 1897 ml   Net -1745 ml       Physical Exam  Vitals signs and nursing AM    This patient has been staffed and discussed with Dr. Susy Aguirre. Patient was seen, examined and discussed with Dr. Braydon Rehman and the ICU staff. I agree with the interval history. My physical exam confirms the findings listed below  Chart was reviewed including labs, Echo and medical records confirm the findings noted    Pulmonary edema - improving. Hypoxia is better. Down to 2 liters   Acute CHF - Echo reviewed. EF is reduced. A.fib with RVR - better controlled. Lactic acidosis     Agree on switching to metoprolol given her reduced EF  Continue diuresis  Wean FiO2 as tolerated to keep sats > 90%  Continue Eliquis   OK to transfer to the floor. No Repair - Repaired With Adjacent Surgical Defect Text (Leave Blank If You Do Not Want): After obtaining clear surgical margins the defect was repaired concurrently with another surgical defect which was in close approximation.

## 2020-12-14 ENCOUNTER — OFFICE VISIT (OUTPATIENT)
Dept: CARDIOLOGY CLINIC | Age: 80
End: 2020-12-14
Payer: MEDICARE

## 2020-12-14 VITALS
WEIGHT: 293 LBS | BODY MASS INDEX: 47.09 KG/M2 | OXYGEN SATURATION: 93 % | HEIGHT: 66 IN | HEART RATE: 80 BPM | DIASTOLIC BLOOD PRESSURE: 80 MMHG | SYSTOLIC BLOOD PRESSURE: 128 MMHG | TEMPERATURE: 96.6 F

## 2020-12-14 PROCEDURE — 99214 OFFICE O/P EST MOD 30 MIN: CPT | Performed by: NURSE PRACTITIONER

## 2020-12-14 RX ORDER — FUROSEMIDE 40 MG/1
80 TABLET ORAL 2 TIMES DAILY
Qty: 360 TABLET | Refills: 3
Start: 2020-12-14 | End: 2020-12-28 | Stop reason: SDUPTHER

## 2020-12-14 RX ORDER — POTASSIUM CHLORIDE 750 MG/1
10 TABLET, EXTENDED RELEASE ORAL DAILY
COMMUNITY
End: 2022-03-24

## 2020-12-14 NOTE — PROGRESS NOTES
CC/HPI:    78 y.o. patient of Guero Zelaya and Jose Pen here for HFpEF, HTN, HLD, and pAF. She notes increase weight and LE edema R>L. She has RUBIO and audible upper respiratory wheezes. She denies CP, LH/dizziness, palpitations or syncope. No orthopnea. No n/v/d or GI/ bleeding. She c/o flank pain. Toleratng mediations. Has frequent urination. Past Medical History:   Diagnosis Date    Acute systolic (congestive) heart failure (HCC)     Edema     Hearing loss     Hyperlipidemia     Hypertension     Morbid obesity due to excess calories ([facility] [address] 12/29/2015    Pre-diabetes     PUD (peptic ulcer disease)     \"years ago\",      Past Surgical History:   Procedure Laterality Date    HERNIA REPAIR      umbilical     KNEE SURGERY Bilateral     TKR bilateral     Family History   Problem Relation Age of Onset    Other Mother         hernia    High Blood Pressure Father      Social History     Tobacco Use    Smoking status: Never Smoker    Smokeless tobacco: Never Used   Substance Use Topics    Alcohol use: No    Drug use: No     Allergies:Aspirin and Dye [iodides]    Review of Systems  General: No changes in  fatigue, or night sweats. HEENT: No blurry or decreased vision. No changes in hearing, nasal discharge or sore throat. Cardiovascular:  See HPI. Respiratory: No cough, hemoptysis. Gastrointestinal:  No abdominal pain, hematochezia, melana, constipation, diarrhea, or history of GI ulcers. Genito-Urinary: No dysuria or hematuria. No urgency or polyuria. Musculoskeletal:  No complaints of joint pain, joint swelling or muscular weakness/soreness. Neurological:  No dizziness, headaches, numbness/tingling, speech problems or weakness. No history of a stroke or TIA. Psychological:  No anxiety or depression. Hematological and Lymphatic: No abnormal bleeding or bruising, blood clots, jaundice or swollen lymph nodes.   Endocrine:   No malaise/lethargy, palpitations, polydipsia/polyuria, temperature intolerance or unexpected weight changes  Skin:  No rashes or non-healing ulcers. Physical Exam:  Vitals:    12/14/20 1100   BP: 128/80   Pulse: 80   Temp: 96.6 °F (35.9 °C)   SpO2: 93%       General (appearance):  No acute distress. + obese  Eyes: anicteric   Neck: soft, No JVD  Ears/Nose/Mouth/Thorat: No cyanosis  CV:RRR  Respiratory: Audible upper respiratory wheeze. No lower lung wheeze or crackles. Tachypneic with exertion. GI: soft, non-tender, non-distended  Skin: Warm, dry. No rashes  Neuro/Psych: Alert and oriented x 3.  Appropriate behavior  Ext:  No c/c. ++ BLE edema L > R  Pulses:  2+ right radial.     Weight  Wt Readings from Last 3 Encounters:   12/14/20 (!) 339 lb (153.8 kg)   10/27/20 (!) 328 lb (148.8 kg)   10/01/20 (!) 359 lb 2.1 oz (162.9 kg)     CBC:   Lab Results   Component Value Date    WBC 11.0 09/30/2020    HGB 13.9 09/30/2020    HCT 43.6 09/30/2020    MCV 81.5 09/30/2020     09/30/2020     BMP:  Lab Results   Component Value Date    CREATININE 1.0 10/02/2020    BUN 27 (H) 10/02/2020     10/02/2020    K 4.0 10/02/2020     10/02/2020    CO2 31 10/02/2020     Mag:   Lab Results   Component Value Date    MG 1.90 10/02/2020     LIVER PROFILE:   Lab Results   Component Value Date    ALT 14 09/30/2020    AST 25 09/30/2020    ALKPHOS 125 09/30/2020    BILITOT 0.3 09/30/2020     PT/INR:   Lab Results   Component Value Date    INR 1.70 (H) 05/12/2020    INR 1.42 (H) 05/11/2020    INR 1.24 (H) 12/27/2019    PROTIME 19.8 (H) 05/12/2020    PROTIME 16.5 (H) 05/11/2020    PROTIME 14.4 (H) 12/27/2019     Pro-BNP   Lab Results   Component Value Date    PROBNP 1,602 09/30/2020    PROBNP 2,408 12/30/2019    PROBNP 4,435 12/28/2019     LIPIDS:  No components found for: CHLPL  Lab Results   Component Value Date    TRIG 57 10/01/2020    TRIG 61 12/30/2019    TRIG 56 09/30/2019     Lab Results   Component Value Date    HDL 60 10/01/2020    HDL 56 12/30/2019    HDL 74 (H) 09/30/2019     Lab No current facility-administered medications for this visit. Assessment:   1. Chronic heart failure with preserved ejection fraction ([address]    2. Essential hypertension    3. Mixed hyperlipidemia    4.  Chronic atrial fibrillation (HCC)        Plan:    HFpEF   Increase lasix to 80 mg po bid   BNP/BMP in 3-4 days   Low salt diet   Daily weights    HTN   /80   Losartan   Toprol   HLD      Diet and exercise   A fib   HR 80   Toprol and eliquis     Follow up with me in 2 week  Follow up with Aaron in February

## 2020-12-28 ENCOUNTER — OFFICE VISIT (OUTPATIENT)
Dept: CARDIOLOGY CLINIC | Age: 80
End: 2020-12-28
Payer: MEDICARE

## 2020-12-28 VITALS
SYSTOLIC BLOOD PRESSURE: 130 MMHG | WEIGHT: 293 LBS | DIASTOLIC BLOOD PRESSURE: 70 MMHG | BODY MASS INDEX: 56.98 KG/M2 | HEART RATE: 80 BPM

## 2020-12-28 LAB
B-TYPE NATRIURETIC PEPTIDE: 298.4 PG/ML
BUN BLDV-MCNC: 20 MG/DL
CALCIUM SERPL-MCNC: 9.4 MG/DL
CHLORIDE BLD-SCNC: 99 MMOL/L
CO2: 28 MMOL/L
CREAT SERPL-MCNC: 0.94 MG/DL
GFR CALCULATED: 57
GLUCOSE BLD-MCNC: NORMAL MG/DL
POTASSIUM SERPL-SCNC: 4 MMOL/L
SODIUM BLD-SCNC: 141 MMOL/L

## 2020-12-28 PROCEDURE — 99214 OFFICE O/P EST MOD 30 MIN: CPT | Performed by: NURSE PRACTITIONER

## 2020-12-28 RX ORDER — FUROSEMIDE 40 MG/1
40 TABLET ORAL 2 TIMES DAILY
Qty: 360 TABLET | Refills: 3
Start: 2020-12-28 | End: 2022-03-24

## 2020-12-28 NOTE — PROGRESS NOTES
lymph nodes. Endocrine:   No malaise/lethargy, palpitations, polydipsia/polyuria, temperature intolerance or unexpected weight changes  Skin:  No rashes or non-healing ulcers. Physical Exam:  Vitals:    12/28/20 1042   BP: 130/70   Pulse:      General (appearance):  No acute distress. + obese  Eyes: anicteric   Neck: soft, No JVD  Ears/Nose/Mouth/Thorat: No cyanosis  CV:RRR  Respiratory:  Clear, normal effort   GI: soft, non-tender, non-distended  Skin: Warm, dry. No rashes  Neuro/Psych: Alert and oriented x 3. Appropriate behavior  Ext:  No c/c. + LE lymphedema .   ++ BLE edema R> L   Pulses:  2+ right radial.     Weight  Wt Readings from Last 3 Encounters:   12/28/20 (!) 353 lb (160.1 kg)   12/14/20 (!) 339 lb (153.8 kg)   10/27/20 (!) 328 lb (148.8 kg)     CBC:   Lab Results   Component Value Date    WBC 11.0 09/30/2020    HGB 13.9 09/30/2020    HCT 43.6 09/30/2020    MCV 81.5 09/30/2020     09/30/2020     BMP:  Lab Results   Component Value Date    CREATININE 1.0 10/02/2020    BUN 27 (H) 10/02/2020     10/02/2020    K 4.0 10/02/2020     10/02/2020    CO2 31 10/02/2020     Mag:   Lab Results   Component Value Date    MG 1.90 10/02/2020     LIVER PROFILE:   Lab Results   Component Value Date    ALT 14 09/30/2020    AST 25 09/30/2020    ALKPHOS 125 09/30/2020    BILITOT 0.3 09/30/2020     PT/INR:   Lab Results   Component Value Date    INR 1.70 (H) 05/12/2020    INR 1.42 (H) 05/11/2020    INR 1.24 (H) 12/27/2019    PROTIME 19.8 (H) 05/12/2020    PROTIME 16.5 (H) 05/11/2020    PROTIME 14.4 (H) 12/27/2019     Pro-BNP   Lab Results   Component Value Date    PROBNP 1,602 09/30/2020    PROBNP 2,408 12/30/2019    PROBNP 4,435 12/28/2019     LIPIDS:  No components found for: CHLPL  Lab Results   Component Value Date    TRIG 57 10/01/2020    TRIG 61 12/30/2019    TRIG 56 09/30/2019     Lab Results   Component Value Date    HDL 60 10/01/2020    HDL 56 12/30/2019    HDL 74 (H) 09/30/2019     Lab Results Component Value Date    LDLCALC 107 (H) 10/01/2020    LDLCALC 78 2019    LDLCALC 135 (H) 2019     Lab Results   Component Value Date    LABVLDL 11 10/01/2020    LABVLDL 12 2019    LABVLDL 11 2019     TSH:  Lab Results   Component Value Date    TSH 2.05 2017       IMAGIN/30/2019 Coronary angiogram  1. Normal epicardial coronary arteries. 2.  LV ejection fraction 50% without regional wall motion abnormality. 3.  LVEDP 20 mmHg. 4.  No aortic valve gradient and no wall motion abnormality on left  Ventriculography. 2019 Echo:   Left ventricular cavity size is normal with asymmetric hypertrophy of the basal septum. Overall left ventricular systolic function appears reduced with an estimated ejection fraction of 35-40%. There is hypokinesis of the entire apex and the mid wall segments   Diastolic function is indeterminate due to abnormal heart rhythm (atrial fibrillation). No evidence of left ventricular mass or thrombus   Mitral annular calcification with mild mitral regurgitation   Moderate tricuspid regurgitation. Estimated pulmonary artery systolic pressure is at 49 mmHg assuming a right   atrial pressure of 8 mmHg. There is a pleural effusion   Bi-atrial enlargement.     Medications:   Current Outpatient Medications   Medication Sig Dispense Refill    potassium chloride (KLOR-CON M) 10 MEQ extended release tablet Take 10 mEq by mouth daily      furosemide (LASIX) 40 MG tablet Take 2 tablets by mouth 2 times daily 360 tablet 3    metoprolol succinate (TOPROL XL) 200 MG extended release tablet Take 1 tablet by mouth daily 90 tablet 3    apixaban (ELIQUIS) 5 MG TABS tablet Take 1 tablet by mouth 2 times daily 180 tablet 3    losartan (COZAAR) 100 MG tablet Take 1 tablet by mouth daily 30 tablet 5    dextromethorphan-guaiFENesin (ROBITUSSIN-DM)  MG/5ML syrup Take 10 mLs by mouth every 4 hours as needed for Cough      blood glucose monitor strips Test 3 times a day & as needed for symptoms of irregular blood glucose. 100 strip 1     No current facility-administered medications for this visit. Assessment:   1. Chronic heart failure with preserved ejection fraction ([address]    2. Essential hypertension    3. Mixed hyperlipidemia    4.  Chronic atrial fibrillation (HCC)        Plan:    HFpEF   Lasix 40 mg po bid   Calling [facility] for recent lab results    Low salt diet   Daily weights    HTN   /70   Losartan   Toprol   HLD      Diet and exercise   A fib   HR 80   Toprol and eliquis       Addendum:  Labs 12/15/2020    K 4  CL 99   CO2 28  BUN 20  Cr 0.94  .4

## 2021-02-01 ENCOUNTER — HOSPITAL ENCOUNTER (OUTPATIENT)
Dept: NON INVASIVE DIAGNOSTICS | Age: 81
Discharge: HOME | End: 2021-02-01
Payer: MEDICARE

## 2021-02-01 DIAGNOSIS — I48.19 PERSISTENT ATRIAL FIBRILLATION (HCC): ICD-10-CM

## 2021-02-01 LAB
LV EF: 58 %
LVEF MODALITY: NORMAL

## 2021-02-01 PROCEDURE — 93306 TTE W/DOPPLER COMPLETE: CPT

## 2021-02-05 ENCOUNTER — TELEPHONE (OUTPATIENT)
Dept: CARDIOLOGY CLINIC | Age: 81
End: 2021-02-05

## 2021-02-22 NOTE — PROGRESS NOTES
[facility]   Cardiac Electrophysiology Consultation   Date: 2/22/2021  Reason for Consultation:  Atrial Fibrillation  Consult Requesting Physician: No att. providers found     Chief Complaint:   No chief complaint on file. HPI: Shanta Perla is a 78 y.o. female with PMH significant for HLD, HTN, DM, lymphedema, obesity, who originally p/w (12/2019) a 3 day h/o SOB, + PND x 10 days, noted to be in AF/RVR with rates in the 150's, placed on dilt, HR controlled, EF 20-25% per echo, changed to Toprol XL, lasix, losartan, LHC showed no CAD. 2-day CAM (1/2020) showed persistent AF with suboptimal rate control with max rate of 171 bpm.  She was scheduled for a DCCV; however, canceled this due to [place]. She eventually had a DCCV on 5/12/20 with symptomatic improvement. Outpatient monitor (8/2020) showed SR with brief PAT, but no AF. Echo (2/2021) showed EF improved to 55-60% since restoration of SR. Interval History: Today, she is here for 6 month f/u, presenting in [facility]. She denies to have any recurrence of chest pain, shortness of breath, palpitations or syncopal episodes. Overall, her quality of life has been improved after being in sinus rhythm. Her predominant complaint is hip and leg pain. In addition to this, secondary to lymphedema, she also have significant discomfort in her right leg. She is using compression stockings for the same. She uses a walker for ambulation. She was recently initiated on Invokana by her PCP. She was also changed from furosemide to torsemide by her PCP. She was told to stop losartan. However, patient did not make this changes as she wanted to discuss with us before making any changes. Her blood pressure runs high in the home. She does have a history of nonischemic cardiomyopathy, with excellent reverse remodeling with almost normal LV ejection fraction after being in sinus rhythm.     Past Medical History:   Diagnosis Date    Acute systolic (congestive) heart failure (HCC)     Edema     Hearing loss     Hyperlipidemia     Hypertension     Morbid obesity due to excess calories ([facility] [address] 12/29/2015    Pre-diabetes     PUD (peptic ulcer disease)     \"years ago\",         Past Surgical History:   Procedure Laterality Date    HERNIA REPAIR      umbilical     KNEE SURGERY Bilateral     TKR bilateral       Allergies: Allergies   Allergen Reactions    Aspirin      Caused ulcers, burns stomach    Dye [Iodides] Itching, Swelling and Rash       Medication:   Prior to Admission medications    Medication Sig Start Date End Date Taking? Authorizing Provider   losartan (COZAAR) 100 MG tablet TAKE 1 TABLET BY MOUTH EVERY DAY 1/9/21   LAWRENCE Kumari CNP   furosemide (LASIX) 40 MG tablet Take 1 tablet by mouth 2 times daily 12/28/20   LAWRENCE Colon CNP   potassium chloride (KLOR-CON M) 10 MEQ extended release tablet Take 10 mEq by mouth daily    Historical Provider, MD   metoprolol succinate (TOPROL XL) 200 MG extended release tablet Take 1 tablet by mouth daily 8/21/20   LAWRENCE Colon CNP   apixaban (ELIQUIS) 5 MG TABS tablet Take 1 tablet by mouth 2 times daily 8/21/20   LAWRENCE Colon CNP   dextromethorphan-guaiFENesin (ROBITUSSIN-DM)  MG/5ML syrup Take 10 mLs by mouth every 4 hours as needed for Cough    Historical Provider, MD   blood glucose monitor strips Test 3 times a day & as needed for symptoms of irregular blood glucose. 12/31/19   Nerissa Busby MD       Social History:   reports that she has never smoked. She has never used smokeless tobacco. She reports that she does not drink alcohol or use drugs. Family History:  family history includes High Blood Pressure in her father; Other in her mother. Reviewed.  Denies family history of sudden cardiac death, arrhythmia, premature CAD    Review of System:    · General ROS: negative for - chills, fever   · Psychological ROS: negative for - anxiety or depression  · Ophthalmic ROS: negative for - eye pain or loss of vision  · ENT ROS: negative for - epistaxis, headaches, nasal discharge, sore throat   · Allergy and Immunology ROS: negative for - hives, nasal congestion   · Hematological and Lymphatic ROS: negative for - bleeding problems, blood clots, bruising or jaundice  · Endocrine ROS: negative for - skin changes, temperature intolerance or unexpected weight changes  · Respiratory ROS: negative for - cough, hemoptysis, pleuritic pain, SOB, sputum changes or wheezing  · Cardiovascular ROS: Per HPI. · Gastrointestinal ROS: negative for - abdominal pain, blood in stools, diarrhea, hematemesis, melena, nausea/vomiting or swallowing difficulty/pain  · Genito-Urinary ROS: negative for - dysuria or incontinence  · Musculoskeletal ROS: negative for - joint swelling or muscle pain  · Neurological ROS: negative for - confusion, dizziness, gait disturbance, headaches, numbness/tingling, seizures, speech problems, tremors, visual changes or weakness  · Dermatological ROS: negative for - rash    Physical Examination:  Vitals:    03/09/21 1109   BP: (!) 160/90   Pulse: 89   Temp: 97.3 °F (36.3 °C)       · Constitutional: Oriented. No distress. · Head: Normocephalic and atraumatic. · Mouth/Throat: Oropharynx is clear and moist.   · Eyes: Conjunctivae normal. EOM are normal.   · Neck: Normal range of motion. Neck supple. No rigidity. No JVD present. · Cardiovascular: Normal rate, regular rhythm, S1&S2 and intact distal pulses. · Pulmonary/Chest: Bilateral respiratory sounds. No wheezes. No rhonchi. · Abdominal: Soft. Bowel sounds present. No distension, No tenderness. · Musculoskeletal: No tenderness. No edema    · Lymphadenopathy: Has no cervical adenopathy. · Neurological: Alert and oriented. Cranial nerve appears intact, No Gross deficit   · Skin: Skin is warm and dry. No rash noted. · Psychiatric: Has a normal mood, affect and behavior     Labs:  Reviewed.      ECG: reviewed, Sinus  Rhythm, with QRS duration 92 ms. No pathologic Q waves, ventricular pre-excitation, or QT prolongation. Studies:   1. 2 day CAM (8/20-8/22/20):  SR with average HR 80 (), 11 runs of PAT with the longest lasting 6 beats and max rate of 145 bpm      2-day CAM (1/6-1/8/20): 100% AF with average HR 84 () with less than 1% PVCs    2. Echo 2/1/21  Summary   Normal left ventricle size, wall thickness, and systolic function with an   estimated ejection fraction of 55-60%. No regional wall motion abnormalities   are seen. Diastolic filling parameters suggests grade II diastolic   dysfunction. Increased LVEDP and LAP. Mild mitral and tricuspid regurgitation is present. Echo 12/24/19:   EF 35-40%. 3. Stress Test 9/26/17:    1. Abnormal myocardial perfusion study. Exercised induced    perfusion abnormality at the apex and anterior lateral wall    compatible with moderate reversible ischemia. 2. Normal wall motion and ejection fraction. 4. Cath 12/30/19:   CONCLUSION:  1. Normal epicardial coronary arteries. 2.  LV ejection fraction 50% without regional wall motion abnormality. 3.  LVEDP 20 mmHg. 4.  No aortic valve gradient and no wall motion abnormality on left  Ventriculography. The MCOT, echocardiogram, stress test, and coronary angiography/PCI were reviewed by myself and used for my plan of care. Procedures:  1. None    Assessment/Plan:  1. Persistent AF, on Eliquis  2. CMP, resolved, on Toprol and losartan  3. HTN, stable on hydralazine  4. HLD  5. DM  6. Obesity  7. Lymphedema    AF symptomatic with symptoms of HF and EF 35-40%  S/p [facility] 5/2020 with symptomatic improvement  Monitor showed no AF  EF has improved to 55-60% after restoration of SR  Recommend to continue on Toprol and losartan for management of blood pressure and cardiomyopathy. Agree with changing furosemide to torsemide for better efficacy.     Highly encouraged her to touch base with her primary care provider for further evaluation of the hip and leg pain. - The patient is counseled to follow a low salt diet to assure blood pressure remains controlled for cardiovascular risk factor modification.   - The patient is counseled to avoid excess caffeine, and energy drinks as this may exacerbated ectopy and arrhythmia. - The patient is counseled to get regular exercise 3-5 times per week to control cardiovascular risk factors. - The patient is counseled to lose weigt to control cardiovascular risk factors. -The patient is counseled about the health hazards of smoking including cardiovascular side effects and its impact on morbidity and mortality. Follow up with Dr. Roshni Dunn for the pain in her legs  Follow up in 6 months with Cameron Zhang NP    Thank you for allowing me to participate in the care of Rashaun Jones. All questions and concerns were addressed to the patient/family. Alternatives to my treatment were discussed.      Bjorn Hernandez MD  Cardiac Electrophysiology  [facility]

## 2021-03-09 ENCOUNTER — OFFICE VISIT (OUTPATIENT)
Dept: CARDIOLOGY CLINIC | Age: 81
End: 2021-03-09
Payer: MEDICARE

## 2021-03-09 VITALS
HEART RATE: 89 BPM | TEMPERATURE: 97.3 F | DIASTOLIC BLOOD PRESSURE: 90 MMHG | WEIGHT: 293 LBS | BODY MASS INDEX: 59.27 KG/M2 | SYSTOLIC BLOOD PRESSURE: 160 MMHG

## 2021-03-09 DIAGNOSIS — I42.8 NONISCHEMIC CARDIOMYOPATHY (HCC): ICD-10-CM

## 2021-03-09 DIAGNOSIS — I50.22 CHRONIC SYSTOLIC CHF (CONGESTIVE HEART FAILURE) (HCC): ICD-10-CM

## 2021-03-09 DIAGNOSIS — I48.19 PERSISTENT ATRIAL FIBRILLATION (HCC): Primary | ICD-10-CM

## 2021-03-09 DIAGNOSIS — E78.2 MIXED HYPERLIPIDEMIA: ICD-10-CM

## 2021-03-09 DIAGNOSIS — E66.01 MORBID OBESITY DUE TO EXCESS CALORIES (HCC): ICD-10-CM

## 2021-03-09 DIAGNOSIS — E66.01 MORBID OBESITY (HCC): ICD-10-CM

## 2021-03-09 DIAGNOSIS — I10 BENIGN ESSENTIAL HTN: ICD-10-CM

## 2021-03-09 PROCEDURE — 93000 ELECTROCARDIOGRAM COMPLETE: CPT | Performed by: INTERNAL MEDICINE

## 2021-03-09 PROCEDURE — 99214 OFFICE O/P EST MOD 30 MIN: CPT | Performed by: INTERNAL MEDICINE

## 2021-09-16 NOTE — PROGRESS NOTES
[facility]   Electrophysiology  Office Visit  Date: 9/23/2021    Chief Complaint   Patient presents with    Atrial Fibrillation    Hypertension    Congestive Heart Failure    Edema       Cardiac HX: Jonh Cardona is a 78 y.o. woman with a h/o HLD, HTN, DM, obesity, who originally p/w (12/2019) a 3 day h/o SOB, + PND x 10 days, noted to be in AF/RVR with rates in the 150's, placed on dilt, HR controlled, EF 20-25% per echo, changed to Toprol XL, lasix, losartan, LHC showed no CAD, 2-day CAM (1/2020) showed persistent AF with uncontrolled HR, DCCV scheduled however canceled due to Covid, s/p DCCV to NSR (5/12/2020) with symptomatic improvement, 2 day CAM (8/20/2020-8/22/2020) showed NSR, 11 brief AT episodes with the longest lasting 6 beats in length, 2% PAC burden, less than 1% PVC burden, echo (2/2021) showed EF improved to 55 to 60% after restoration of NSR. Bina Graven Interval History/HPI: Patient is here for f/u for pAF, HTN and NICMP. Patient presents in normal sinus rhythm today. She has not felt any heart racing, palpitations or irregular heartbeats. Her blood pressures well controlled in the office today. Per her last echo her EF is improved to 55 to 60%. She does have a grade 2 diastolic dysfunction per her most recent echo. She does have 3+ right lower extremity edema and 2+ left lower extremity edema. She has been unable to get her bilateral ADAN hose on and would like ones made that have zippers in them so that she is able to get them on. She states that her insurance will cover. She denies any chest pain, PND, orthopnea. She does have short of breath with exertion. She does use a walker for ambulation. Her weight is down 10 pounds from March. She has not had labs since December of last year. She is taking Lasix 40 mg 3 times daily.     Home medications:   Current Outpatient Medications on File Prior to Visit   Medication Sig Dispense Refill    acarbose (PRECOSE) 25 MG tablet Take 25 mg by mouth 3 times daily (with meals)      vitamin D3 (CHOLECALCIFEROL) 10 MCG (400 UNIT) TABS tablet Take 400 Units by mouth daily      losartan (COZAAR) 100 MG tablet TAKE 1 TABLET BY MOUTH EVERY DAY 90 tablet 3    furosemide (LASIX) 40 MG tablet Take 1 tablet by mouth 2 times daily 360 tablet 3    potassium chloride (KLOR-CON M) 10 MEQ extended release tablet Take 10 mEq by mouth daily      metoprolol succinate (TOPROL XL) 200 MG extended release tablet Take 1 tablet by mouth daily 90 tablet 3    apixaban (ELIQUIS) 5 MG TABS tablet Take 1 tablet by mouth 2 times daily 180 tablet 3    dextromethorphan-guaiFENesin (ROBITUSSIN-DM)  MG/5ML syrup Take 10 mLs by mouth every 4 hours as needed for Cough      blood glucose monitor strips Test 3 times a day & as needed for symptoms of irregular blood glucose. 100 strip 1     No current facility-administered medications on file prior to visit. Past Medical History:   Diagnosis Date    Acute systolic (congestive) heart failure (HCC)     Edema     Hearing loss     Hyperlipidemia     Hypertension     Morbid obesity due to excess calories ([facility] [address] 12/29/2015    Pre-diabetes     PUD (peptic ulcer disease)     \"years ago\",         Past Surgical History:   Procedure Laterality Date    HERNIA REPAIR      umbilical     KNEE SURGERY Bilateral     TKR bilateral       Allergies   Allergen Reactions    Aspirin      Caused ulcers, burns stomach    Dye [Iodides] Itching, Swelling and Rash       Social History:  Reviewed. reports that she has never smoked. She has never used smokeless tobacco. She reports that she does not drink alcohol and does not use drugs. Family History:  Reviewed. family history includes High Blood Pressure in her father; Other in her mother. Review of System:    · Constitutional: No fevers, chills. · Eyes: No visual changes or diplopia. No scleral icterus. · ENT: No Headaches.  No mouth sores or sore throat. · Cardiovascular: No for chest pain, Yes for dyspnea on exertion, No for palpitations or No for loss of consciousness. No cough, hemoptysis, No for pleuritic pain, or phlebitis. · Respiratory: No for cough or wheezing. No hematemesis. · Gastrointestinal: No abdominal pain, blood in stools. · Genitourinary: No dysuria, or hematuria. · Musculoskeletal: No gait disturbance,    · Integumentary: No rash or pruritis. · Neurological: No headache, change in muscle strength, numbness or tingling. · Psychiatric: No anxiety, or depression. · Endocrine: No temperature intolerance. No excessive thirst, fluid intake, or urination. · Hem/Lymph: No abnormal bruising or bleeding, blood clots or swollen lymph nodes. · Allergic/Immunologic: No nasal congestion or hives. Physical Examination:  Vitals:    09/23/21 1046   BP: 124/76   Pulse: 64         Wt Readings from Last 3 Encounters:   09/23/21 (!) 357 lb 3.2 oz (162 kg)   03/09/21 (!) 367 lb 3.2 oz (166.6 kg)   12/28/20 (!) 353 lb (160.1 kg)       · Constitutional: Oriented. No distress. · Head: Normocephalic and atraumatic. · Mouth/Throat: Oropharynx is clear and moist.   · Eyes: Conjunctivae clear without jaunduice. PERRL. · Neck: Neck supple. No rigidity. No JVD present. · Cardiovascular: Normal rate, regular rhythm, S1&S2. · Pulmonary/Chest: Bilateral respiratory sounds. No wheezes, No rhonchi. · Abdominal: Soft. Bowel sounds present. No distension, No tenderness. · Musculoskeletal: No tenderness. 1-2 + bilat LE edema    · Lymphadenopathy: Has no cervical adenopathy. · Neurological: Alert and oriented. Cranial nerve appears intact, No Gross deficit   · Skin: Skin is warm and dry. No rash noted. · Psychiatric: Has a normal mood, affect and behavior     Labs:  Reviewed. No results for input(s): NA, K, CL, CO2, PHOS, BUN, CREATININE in the last 72 hours.     Invalid input(s): CA,  TSH  No results for input(s): WBC, HGB, HCT, MCV, PLT in the last 72 hours. Lab Results   Component Value Date    TROPONINI <0.01 09/30/2020     Lab Results   Component Value Date    .4 12/15/2020     Lab Results   Component Value Date    PROTIME 19.8 05/12/2020    PROTIME 16.5 05/11/2020    PROTIME 14.4 12/27/2019    INR 1.70 05/12/2020    INR 1.42 05/11/2020    INR 1.24 12/27/2019     Lab Results   Component Value Date    CHOL 178 10/01/2020    HDL 60 10/01/2020    TRIG 57 10/01/2020       ECG: Personally reviewed: NSR, HR 64, , QRS 92, QTc 4 7, occasional PAC    ECHO: 2.1.2021    Summary   Normal left ventricle size, wall thickness, and systolic function with an   estimated ejection fraction of 55-60%. No regional wall motion abnormalities   are seen. Diastolic filling parameters suggests grade II diastolic   dysfunction. Increased LVEDP and LAP. Mild mitral and tricuspid regurgitation is present. Stress Test: 9.26.2017  1. Abnormal myocardial perfusion study. Exercised induced   perfusion abnormality at the apex and anterior lateral wall   compatible with moderate reversible ischemia. 2. Normal wall motion and ejection fraction.         Cardiac Angiography: 12/23/2019   CONCLUSION:  1. Normal epicardial coronary arteries. 2.  LV ejection fraction 50% without regional wall motion abnormality. 3.  LVEDP 20 mmHg. 4.  No aortic valve gradient and no wall motion abnormality on left  ventriculography.     PLAN:  Hydration, bedrest, restart Eliquis later tonight, and potential  discharge tomorrow.     Problem List:   Patient Active Problem List    Diagnosis Date Noted    Abnormal result of other cardiovascular function study (CODE)     Acute on chronic systolic congestive heart failure (HCC)     Essential hypertension     Mixed hyperlipidemia     [facility]ox[facility]     CHF NYHA class III, acute, diastolic ([facility] [address] 63/64/0800    SOB (shortness of breath)     Abnormal ECG     Acute pulmonary edema ([facility]     Acute systolic (congestive) heart failure (HCC)     Atrial fibrillation with RVR ([facility] [address] 12/23/2019    Right hip pain 11/20/2017    Abnormal nuclear stress test 10/06/2017    Hyperlipidemia LDL goal <100 02/10/2016    Morbid obesity due to excess calories ([facility] [address] 12/29/2015    Pure hypercholesterolemia 12/29/2015    Bilateral knee pain 12/29/2015    Pre-diabetes 12/29/2015        Assessment:   1. Paroxysmal atrial fibrillation (HCC)    2. Nonischemic cardiomyopathy ([facility] [address]    3. Chronic diastolic CHF (congestive heart failure) ([facility] [address]    4. Localized edema         Cardiac HX: Shanice Abrams is a 78 y.o. woman with a h/o HLD, HTN, DM, obesity, who originally p/w (12/2019) a 3 day h/o SOB, + PND x 10 days, noted to be in AF/RVR with rates in the 150's, placed on dilt, HR controlled, EF 20-25% per echo, changed to Toprol XL, lasix, losartan, LHC showed no CAD, 2-day CAM (1/2020) showed persistent AF with uncontrolled HR, DCCV scheduled however canceled due to Covid, s/p DCCV to NSR (5/12/2020) with symptomatic improvement, 2 day CAM (8/20/2020-8/22/2020) showed NSR, 11 brief AT episodes with the longest lasting 6 beats in length, 2% PAC burden, less than 1% PVC burden, echo (2/2021) showed EF improved to 55 to 60% after restoration of NSR. .   [id number] 5. TSH 1.85 (12/23/19). 2-day CAM (1/6/2020 to 1/8/2020) that showed 100% AF, minimum HR 52, average 84, max 171 with less than 1% PVCs.   Heart rate histogram shows heart rate fairly well controlled with some elevated heart rates.      pAF  - In NSR  - On Toprol  mg QD  - Echo - LA 5.2 , vol 120  - On apixaban 5 mg BID - no s/s bleeding - continue  - Will check a TSH  - F/u in 6 months  - ECG ordered and results personally reviewed      CMP/chronic s/dCHF  - EF 35-40%, EF 55% (2021)  - Grade II DD per echo  - NYHA class III  - QRS 92  - On lasix 40 mg TID  - Will check a BMP, Mg  - On Toprol 200 mg QD, losartan 100 mg QD   - S/p LHC that showed no CAD    HTN  - BP well controlled  - On Toprol  mg QD, losartan 100 mg QD     BLE edema  - Bilat support hose  - Check BMP      EF of 55%  ARB, BB for systolic HF  No CAD  Anticoagulation for AF and heart failure  No Tobacco use. All questions and concerns were addressed to the patient/family. Alternatives to my treatment were discussed. The note was completed using EMR. Every effort was made to ensure accuracy; however, inadvertent computerized transcription errors may be present. Patient received education regarding their diagnosis, treatment and medications while in the office today.       Thierry Mckeon [address] [facility]

## 2021-09-23 ENCOUNTER — OFFICE VISIT (OUTPATIENT)
Dept: CARDIOLOGY CLINIC | Age: 81
End: 2021-09-23
Payer: MEDICARE

## 2021-09-23 VITALS
WEIGHT: 293 LBS | HEIGHT: 66 IN | HEART RATE: 64 BPM | DIASTOLIC BLOOD PRESSURE: 76 MMHG | BODY MASS INDEX: 47.09 KG/M2 | SYSTOLIC BLOOD PRESSURE: 124 MMHG

## 2021-09-23 DIAGNOSIS — I48.0 PAROXYSMAL ATRIAL FIBRILLATION (HCC): Primary | ICD-10-CM

## 2021-09-23 DIAGNOSIS — I50.32 CHRONIC DIASTOLIC CHF (CONGESTIVE HEART FAILURE) (HCC): ICD-10-CM

## 2021-09-23 DIAGNOSIS — R60.0 LOCALIZED EDEMA: ICD-10-CM

## 2021-09-23 DIAGNOSIS — I10 ESSENTIAL HYPERTENSION: ICD-10-CM

## 2021-09-23 DIAGNOSIS — I42.8 NONISCHEMIC CARDIOMYOPATHY (HCC): ICD-10-CM

## 2021-09-23 PROCEDURE — 99214 OFFICE O/P EST MOD 30 MIN: CPT | Performed by: NURSE PRACTITIONER

## 2021-09-23 PROCEDURE — 93000 ELECTROCARDIOGRAM COMPLETE: CPT | Performed by: NURSE PRACTITIONER

## 2021-09-23 RX ORDER — OMEGA-3S/DHA/EPA/FISH OIL/D3 300MG-1000
400 CAPSULE ORAL DAILY
COMMUNITY
End: 2022-03-01 | Stop reason: SDUPTHER

## 2021-09-23 RX ORDER — ACARBOSE 25 MG/1
25 TABLET ORAL
COMMUNITY
End: 2022-03-02 | Stop reason: SDUPTHER

## 2021-09-27 RX ORDER — APIXABAN 5 MG/1
TABLET, FILM COATED ORAL
Qty: 180 TABLET | Refills: 3 | Status: SHIPPED | OUTPATIENT
Start: 2021-09-27 | End: 2022-11-01

## 2021-09-27 NOTE — TELEPHONE ENCOUNTER
Requested Prescriptions     Pending Prescriptions Disp Refills    ELIQUIS 5 MG TABS tablet [Pharmacy Med Name: ELIQUIS 5 MG TABLET] 180 tablet 3     Sig: TAKE 1 TABLET BY MOUTH TWICE A DAY                  Last Office Visit: 9/23/2021     Next Office Visit: 12/23/2021

## 2021-11-01 NOTE — TELEPHONE ENCOUNTER
Requested Prescriptions     Pending Prescriptions Disp Refills    metoprolol succinate (TOPROL XL) 200 MG extended release tablet [Pharmacy Med Name: METOPROLOL SUCC  MG TAB] 90 tablet 3     Sig: TAKE 1 TABLET BY MOUTH EVERY DAY                  Last Office Visit: 9/23/2021     Next Office Visit: 12/23/2021

## 2021-11-02 RX ORDER — METOPROLOL SUCCINATE 200 MG/1
TABLET, EXTENDED RELEASE ORAL
Qty: 90 TABLET | Refills: 3 | Status: SHIPPED | OUTPATIENT
Start: 2021-11-02 | End: 2022-03-02 | Stop reason: SDUPTHER

## 2022-02-17 RX ORDER — OMEGA-3S/DHA/EPA/FISH OIL/D3 300MG-1000
400 CAPSULE ORAL DAILY
Qty: 90 TABLET | Refills: 2 | OUTPATIENT
Start: 2022-02-17

## 2022-02-17 NOTE — TELEPHONE ENCOUNTER
Pt needs refill:    vitamin D3 (CHOLECALCIFEROL) 10 MCG (400 UNIT) TABS tablet  Take 400 Units by mouth daily Last Dose: Not Recorded    Last visit: 9/23/21  Next visit: 3/24/22

## 2022-03-01 RX ORDER — OMEGA-3S/DHA/EPA/FISH OIL/D3 300MG-1000
400 CAPSULE ORAL DAILY
Qty: 90 TABLET | Refills: 3 | Status: SHIPPED | OUTPATIENT
Start: 2022-03-01 | End: 2022-03-02 | Stop reason: SDUPTHER

## 2022-03-02 ENCOUNTER — OFFICE VISIT (OUTPATIENT)
Dept: FAMILY MEDICINE CLINIC | Age: 82
End: 2022-03-02
Payer: MEDICARE

## 2022-03-02 VITALS
WEIGHT: 293 LBS | TEMPERATURE: 97.7 F | RESPIRATION RATE: 16 BRPM | OXYGEN SATURATION: 97 % | HEART RATE: 76 BPM | HEIGHT: 66 IN | SYSTOLIC BLOOD PRESSURE: 128 MMHG | BODY MASS INDEX: 47.09 KG/M2 | DIASTOLIC BLOOD PRESSURE: 76 MMHG

## 2022-03-02 DIAGNOSIS — Z00.00 MEDICARE ANNUAL WELLNESS VISIT, SUBSEQUENT: ICD-10-CM

## 2022-03-02 DIAGNOSIS — Z00.00 MEDICARE ANNUAL WELLNESS VISIT, SUBSEQUENT: Primary | ICD-10-CM

## 2022-03-02 DIAGNOSIS — I10 ESSENTIAL HYPERTENSION: ICD-10-CM

## 2022-03-02 DIAGNOSIS — E55.9 VITAMIN D DEFICIENCY: ICD-10-CM

## 2022-03-02 DIAGNOSIS — I89.0 LYMPHEDEMA: ICD-10-CM

## 2022-03-02 DIAGNOSIS — E78.00 PURE HYPERCHOLESTEROLEMIA: ICD-10-CM

## 2022-03-02 DIAGNOSIS — I50.31: ICD-10-CM

## 2022-03-02 DIAGNOSIS — R73.03 PRE-DIABETES: ICD-10-CM

## 2022-03-02 DIAGNOSIS — I48.91 ATRIAL FIBRILLATION WITH RVR (HCC): ICD-10-CM

## 2022-03-02 PROCEDURE — 36415 COLL VENOUS BLD VENIPUNCTURE: CPT | Performed by: FAMILY MEDICINE

## 2022-03-02 PROCEDURE — G0439 PPPS, SUBSEQ VISIT: HCPCS | Performed by: FAMILY MEDICINE

## 2022-03-02 PROCEDURE — 99214 OFFICE O/P EST MOD 30 MIN: CPT | Performed by: FAMILY MEDICINE

## 2022-03-02 RX ORDER — OMEGA-3S/DHA/EPA/FISH OIL/D3 300MG-1000
400 CAPSULE ORAL DAILY
Qty: 90 TABLET | Refills: 3 | Status: SHIPPED | OUTPATIENT
Start: 2022-03-02

## 2022-03-02 RX ORDER — METOPROLOL SUCCINATE 200 MG/1
TABLET, EXTENDED RELEASE ORAL
Qty: 90 TABLET | Refills: 3 | Status: SHIPPED | OUTPATIENT
Start: 2022-03-02 | End: 2022-09-19

## 2022-03-02 RX ORDER — TORSEMIDE 20 MG/1
TABLET ORAL
COMMUNITY
Start: 2021-12-30 | End: 2022-03-02 | Stop reason: SDUPTHER

## 2022-03-02 RX ORDER — ACARBOSE 25 MG/1
25 TABLET ORAL
Qty: 90 TABLET | Refills: 1 | Status: SHIPPED | OUTPATIENT
Start: 2022-03-02 | End: 2022-03-24

## 2022-03-02 RX ORDER — LOSARTAN POTASSIUM 100 MG/1
TABLET ORAL
Qty: 90 TABLET | Refills: 3 | Status: SHIPPED | OUTPATIENT
Start: 2022-03-02 | End: 2022-09-19

## 2022-03-02 RX ORDER — TORSEMIDE 20 MG/1
TABLET ORAL
Qty: 90 TABLET | Refills: 1 | Status: SHIPPED | OUTPATIENT
Start: 2022-03-02 | End: 2022-09-19

## 2022-03-02 ASSESSMENT — PATIENT HEALTH QUESTIONNAIRE - PHQ9
2. FEELING DOWN, DEPRESSED OR HOPELESS: 0
8. MOVING OR SPEAKING SO SLOWLY THAT OTHER PEOPLE COULD HAVE NOTICED. OR THE OPPOSITE, BEING SO FIGETY OR RESTLESS THAT YOU HAVE BEEN MOVING AROUND A LOT MORE THAN USUAL: 0
5. POOR APPETITE OR OVEREATING: 0
SUM OF ALL RESPONSES TO PHQ9 QUESTIONS 1 & 2: 0
SUM OF ALL RESPONSES TO PHQ QUESTIONS 1-9: 0
SUM OF ALL RESPONSES TO PHQ QUESTIONS 1-9: 0
4. FEELING TIRED OR HAVING LITTLE ENERGY: 0
6. FEELING BAD ABOUT YOURSELF - OR THAT YOU ARE A FAILURE OR HAVE LET YOURSELF OR YOUR FAMILY DOWN: 0
3. TROUBLE FALLING OR STAYING ASLEEP: 0
7. TROUBLE CONCENTRATING ON THINGS, SUCH AS READING THE NEWSPAPER OR WATCHING TELEVISION: 0
10. IF YOU CHECKED OFF ANY PROBLEMS, HOW DIFFICULT HAVE THESE PROBLEMS MADE IT FOR YOU TO DO YOUR WORK, TAKE CARE OF THINGS AT HOME, OR GET ALONG WITH OTHER PEOPLE: 0
1. LITTLE INTEREST OR PLEASURE IN DOING THINGS: 0
9. THOUGHTS THAT YOU WOULD BE BETTER OFF DEAD, OR OF HURTING YOURSELF: 0
SUM OF ALL RESPONSES TO PHQ QUESTIONS 1-9: 0
SUM OF ALL RESPONSES TO PHQ QUESTIONS 1-9: 0

## 2022-03-02 ASSESSMENT — LIFESTYLE VARIABLES: HOW OFTEN DO YOU HAVE A DRINK CONTAINING ALCOHOL: NEVER

## 2022-03-02 NOTE — PROGRESS NOTES
Medicare Annual Wellness Visit    Salma Cardenas is here for Medicare AWV, Back Pain, and Leg Swelling (bilateral, and pain)    Assessment & Bony 85 was seen today for medicare awv, back pain and leg swelling. Diagnoses and all orders for this visit:    Medicare annual wellness visit, subsequent  -     Comprehensive Metabolic Panel  -     Lipid Panel  -     Hemoglobin A1C  Recommended: shingrix and Tdap (will go to pharmacy)      Pure hypercholesterolemia  -     Lipid Panel    Pre-diabetes  -     acarbose (PRECOSE) 25 MG tablet; Take 1 tablet by mouth 3 times daily (with meals)  -     Hemoglobin A1C    Atrial fibrillation with RVR (HCC)    CHF NYHA class III, acute, diastolic (HCC)  -     metoprolol succinate (TOPROL XL) 200 MG extended release tablet; TAKE 1 TABLET BY MOUTH EVERY DAY  -     torsemide (DEMADEX) 20 MG tablet; TAKE 1 TABLET BY MOUTH THREE TIMES A DAY    Essential hypertension  -     metoprolol succinate (TOPROL XL) 200 MG extended release tablet; TAKE 1 TABLET BY MOUTH EVERY DAY  -     losartan (COZAAR) 100 MG tablet; TAKE 1 TABLET BY MOUTH EVERY DAY  -     Comprehensive Metabolic Panel  -     TSH; Future  -     CBC with Auto Differential; Future  -     Magnesium; Future    Vitamin D deficiency  -     vitamin D3 (CHOLECALCIFEROL) 10 MCG (400 UNIT) TABS tablet; Take 1 tablet by mouth daily    Lymphedema  -     External Referral To Lymphedema Clinic         Recommendations for Preventive Services Due: see orders and patient instructions/AVS.  Recommended screening schedule for the next 5-10 years is provided to the patient in written form: see Patient Instructions/AVS.     No follow-ups on file. Subjective   The following acute and/or chronic problems were also addressed today:  See note     Patient's complete Health Risk Assessment and screening values have been reviewed and are found in Flowsheets.  The following problems were reviewed today and where indicated follow up appointments were made and/or referrals ordered.     Positive Risk Factor Screenings with Interventions:             General Health and ACP:  General  In general, how would you say your health is?: (!) Poor  In the past 7 days, have you experienced any of the following: New or Increased Pain, New or Increased Fatigue, Loneliness, Social Isolation, Stress or Anger?: (!) Yes  Select all that apply: (!) New or Increased Pain (leg and back pain)  Do you get the social and emotional support that you need?: Yes  Do you have a Living Will?: (!) No (Dose not want one, her daughter takes care of everything.)    Advance Directives     Power of  Living Will ACP-Advance Directive ACP-Power of     Not on File [place] on 10/13/17 Filed Not on File      General Health Risk Interventions:  · Poor self-assessment of health status: continue fu with specialist ,     Health Habits/Nutrition:     Physical Activity: Inactive    Days of Exercise per Week: 0 days    Minutes of Exercise per Session: 10 min     Have you lost any weight without trying in the past 3 months?: No  Body mass index: (!) 56.39  Have you seen the dentist within the past year?: N/A - wear dentures    Health Habits/Nutrition Interventions:  · Inadequate physical activity:  patient is not ready to increase his/her physical activity level at this time    Hearing/Vision:  Do you or your family notice any trouble with your hearing that hasn't been managed with hearing aids?: (!) Yes  Do you have difficulty driving, watching TV, or doing any of your daily activities because of your eyesight?: No  Have you had an eye exam within the past year?: (!) No  No exam data present    Hearing/Vision Interventions:  · Hearing concerns:  not wearing her hearing aids    Safety:  Do you have working smoke detectors?: Yes  Do you have any tripping hazards - loose or unsecured carpets or rugs?: No  Do you have any tripping hazards - clutter in doorways, halls, or stairs?: No  Do you have either shower bars, grab bars, non-slip mats or non-slip surfaces in your shower or bathtub?: Yes  Do all of your stairways have a railing or banister?: (!) No  Do you always fasten your seatbelt when you are in a car?: (!) No    Safety Interventions:  · Patient declines any further evaluation/treatment for this issue           Objective   Vitals:    03/02/22 1025   BP: 128/76   Pulse: 76   Resp: 16   Temp: 97.7 °F (36.5 °C)   TempSrc: Tympanic   SpO2: 97%   Weight: (!) 349 lb 6.4 oz (158.5 kg)   Height: 5' 6\" (1.676 m)      Body mass index is 56.39 kg/m². General Appearance: alert and oriented to person, place and time, well developed and well- nourished, in no acute distress  Skin: warm and dry, no rash or erythema  Head: normocephalic and atraumatic  Eyes: pupils equal, round, and reactive to light, extraocular eye movements intact, conjunctivae normal  ENT: tympanic membrane, external ear and ear canal normal bilaterally, nose without deformity, nasal mucosa and turbinates normal without polyps  Neck: supple and non-tender without mass, no thyromegaly or thyroid nodules, no cervical lymphadenopathy  Pulmonary/Chest: clear to auscultation bilaterally- no wheezes, rales or rhonchi, normal air movement, no respiratory distress  Cardiovascular: normal rate, occasional extra heart beats no  rubs, clicks, or gallops, distal pulses intact, no carotid bruits     Extremities: no cyanosis, clubbing, bilateral  2+ non pitting edema  Musculoskeletal:  Slow gait,           Allergies   Allergen Reactions    Aspirin      Caused ulcers, burns stomach    Dye [Iodides] Itching, Swelling and Rash     Prior to Visit Medications    Medication Sig Taking?  Authorizing Provider   vitamin D3 (CHOLECALCIFEROL) 10 MCG (400 UNIT) TABS tablet Take 1 tablet by mouth daily Yes Irma Thomas MD   metoprolol succinate (TOPROL XL) 200 MG extended release tablet TAKE 1 TABLET BY MOUTH Denice Sousa MD   losartan (COZAAR) 100 MG tablet TAKE 1 TABLET BY MOUTH EVERY DAY Yes Itzel Price MD   torsemide (DEMADEX) 20 MG tablet TAKE 1 TABLET BY MOUTH THREE TIMES A DAY Yes Itzel Price MD   acarbose (PRECOSE) 25 MG tablet Take 1 tablet by mouth 3 times daily (with meals) Yes Itzel Price MD   ELIQUIS 5 MG TABS tablet TAKE 1 TABLET BY MOUTH TWICE A DAY Yes Gladies Thao, APRN - CNP   blood glucose monitor strips Test 3 times a day & as needed for symptoms of irregular blood glucose.  Yes Kenna Roblero MD   furosemide (LASIX) 40 MG tablet Take 1 tablet by mouth 2 times daily  Patient not taking: Reported on 3/2/2022  Gladies Spar, APRN - CNP   potassium chloride (KLOR-CON M) 10 MEQ extended release tablet Take 10 mEq by mouth daily  Patient not taking: Reported on 3/2/2022  Historical Provider, MD   dextromethorphan-guaiFENesin (ROBITUSSIN-DM)  MG/5ML syrup Take 10 mLs by mouth every 4 hours as needed for Cough  Patient not taking: Reported on 3/2/2022  Historical Provider, MD Malik (Including outside providers/suppliers regularly involved in providing care):   Patient Care Team:  Itzel Price MD as PCP - General (Family Medicine)  Itzel Price MD as PCP - [facility] Empaneled Provider    Reviewed and updated this visit:  Tobacco  Allergies  Meds  Med Hx  Surg Hx  Soc Hx  Fam Hx

## 2022-03-02 NOTE — PROGRESS NOTES
Subjective:      Patient ID: Jose Figueredo is a 78 y.o. female. HPI       Treatment Adherence:   Medication compliance:  compliant most of the time  Diet compliance:  compliant most of the time  Weight trend: stable  Current exercise: no regular exercise  Barriers: back, hip, legs pain     PRE: Diabetes Mellitus Type 2: Current symptoms/problems include none. Home blood sugar records: patient does not test  Any episodes of hypoglycemia? no  Eye exam current (within one year): no  Tobacco history: She  reports that she has never smoked. She has never used smokeless tobacco.   Daily Aspirin? No: anticoagulated     Hypertension:  Home blood pressure monitoring: No.  She is adherent to a low sodium diet. Patient denies chest pain, shortness of breath and lightheadedness. Antihypertensive medication side effects: no medication side effects noted. Use of agents associated with hypertension: none. Hyperlipidemia:  Diet , not on medication . CHF  Followed by heart failure clinic  C/o of LE edema , constant   Denies pnd/orthopnea       Pre diabetes  Takes acarbose         Morbid obesity   unchange since last visit     C/o of back/hips and legs pain   Med tried? none     Vit d def   Needs refills      Lab Results   Component Value Date    LABA1C 6.5 09/30/2020    LABA1C 6.6 12/23/2019    LABA1C 6.2 09/30/2019     Lab Results   Component Value Date    LABMICR YES 09/30/2020    CREATININE 0.94 12/15/2020     Lab Results   Component Value Date    ALT 14 09/30/2020    AST 25 09/30/2020     Lab Results   Component Value Date    CHOL 178 10/01/2020    TRIG 57 10/01/2020    HDL 60 10/01/2020    LDLCALC 107 (H) 10/01/2020        Review of Systems    Objective:  Blood pressure 128/76, pulse 76, temperature 97.7 °F (36.5 °C), temperature source Tympanic, resp. rate 16, height 5' 6\" (1.676 m), weight (!) 349 lb 6.4 oz (158.5 kg), SpO2 97 %, not currently breastfeeding.        Physical Exam    Assessment: Diagnosis Orders   1. Medicare annual wellness visit, subsequent  Comprehensive Metabolic Panel    Lipid Panel    Hemoglobin A1C   2. Pure hypercholesterolemia  Lipid Panel   3. Pre-diabetes  acarbose (PRECOSE) 25 MG tablet    Hemoglobin A1C   4. Atrial fibrillation with RVR ([address]     5. CHF NYHA class III, acute, diastolic (HCC)  metoprolol succinate (TOPROL XL) 200 MG extended release tablet    torsemide (DEMADEX) 20 MG tablet   6. Essential hypertension  metoprolol succinate (TOPROL XL) 200 MG extended release tablet    losartan (COZAAR) 100 MG tablet    Comprehensive Metabolic Panel    TSH    CBC with Auto Differential    Magnesium   7. Vitamin D deficiency  vitamin D3 (CHOLECALCIFEROL) 10 MCG (400 UNIT) TABS tablet   8. Lymphedema  External Referral To Lymphedema Clinic           Plan:      2. Check lipid panel     3. Check a1c   Refills    4. Continue eliquis and fu with cardiology     5. No current sx   Edema is non pitting possible lymphedema  Refills    6. Stable  Continue same medications no changes needed at this time   Refills    7 check vit d   Refills    8.  Referral to lymphedema clinic             Pietro Alan MD

## 2022-03-02 NOTE — PATIENT INSTRUCTIONS
Personalized Preventive Plan for Roberto Davion - 3/2/2022  Medicare offers a range of preventive health benefits. Some of the tests and screenings are paid in full while other may be subject to a deductible, co-insurance, and/or copay. Some of these benefits include a comprehensive review of your medical history including lifestyle, illnesses that may run in your family, and various assessments and screenings as appropriate. After reviewing your medical record and screening and assessments performed today your provider may have ordered immunizations, labs, imaging, and/or referrals for you. A list of these orders (if applicable) as well as your Preventive Care list are included within your After Visit Summary for your review. Other Preventive Recommendations:    · A preventive eye exam performed by an eye specialist is recommended every 1-2 years to screen for glaucoma; cataracts, macular degeneration, and other eye disorders. · A preventive dental visit is recommended every 6 months. · Try to get at least 150 minutes of exercise per week or 10,000 steps per day on a pedometer . · Order or download the FREE \"Exercise & Physical Activity: Your Everyday Guide\" from [employer]. Call 5-141.562.8247 or search [employer] online. · You need 9610-9967 mg of calcium and 7627-2869 IU of vitamin D per day. It is possible to meet your calcium requirement with diet alone, but a vitamin D supplement is usually necessary to meet this goal.  · When exposed to the sun, use a sunscreen that protects against both UVA and UVB radiation with an SPF of 30 or greater. Reapply every 2 to 3 hours or after sweating, drying off with a towel, or swimming. · Always wear a seat belt when traveling in a car. Always wear a helmet when riding a bicycle or motorcycle.

## 2022-03-03 DIAGNOSIS — D64.9 ANEMIA, UNSPECIFIED TYPE: ICD-10-CM

## 2022-03-03 DIAGNOSIS — D64.9 ANEMIA, UNSPECIFIED TYPE: Primary | ICD-10-CM

## 2022-03-03 LAB
A/G RATIO: 1.2 (ref 1.1–2.2)
ALBUMIN SERPL-MCNC: 4.1 G/DL (ref 3.4–5)
ALP BLD-CCNC: 81 U/L (ref 40–129)
ALT SERPL-CCNC: 10 U/L (ref 10–40)
ANION GAP SERPL CALCULATED.3IONS-SCNC: 20 MMOL/L (ref 3–16)
AST SERPL-CCNC: 15 U/L (ref 15–37)
BASOPHILS ABSOLUTE: 0 K/UL (ref 0–0.2)
BASOPHILS RELATIVE PERCENT: 0.8 %
BILIRUB SERPL-MCNC: 0.5 MG/DL (ref 0–1)
BUN BLDV-MCNC: 32 MG/DL (ref 7–20)
CALCIUM SERPL-MCNC: 10 MG/DL (ref 8.3–10.6)
CHLORIDE BLD-SCNC: 97 MMOL/L (ref 99–110)
CHOLESTEROL, TOTAL: 211 MG/DL (ref 0–199)
CO2: 24 MMOL/L (ref 21–32)
CREAT SERPL-MCNC: 1.1 MG/DL (ref 0.6–1.2)
EOSINOPHILS ABSOLUTE: 0.1 K/UL (ref 0–0.6)
EOSINOPHILS RELATIVE PERCENT: 1.5 %
ESTIMATED AVERAGE GLUCOSE: 145.6 MG/DL
FERRITIN: 302.2 NG/ML (ref 15–150)
GFR AFRICAN AMERICAN: 57
GFR NON-AFRICAN AMERICAN: 47
GLUCOSE BLD-MCNC: 126 MG/DL (ref 70–99)
HBA1C MFR BLD: 6.7 %
HCT VFR BLD CALC: 37.5 % (ref 36–48)
HDLC SERPL-MCNC: 58 MG/DL (ref 40–60)
HEMOGLOBIN: 11.9 G/DL (ref 12–16)
IRON SATURATION: 15 % (ref 15–50)
IRON: 43 UG/DL (ref 37–145)
LDL CHOLESTEROL CALCULATED: 139 MG/DL
LYMPHOCYTES ABSOLUTE: 2.1 K/UL (ref 1–5.1)
LYMPHOCYTES RELATIVE PERCENT: 36.9 %
MAGNESIUM: 2.5 MG/DL (ref 1.8–2.4)
MCH RBC QN AUTO: 24.4 PG (ref 26–34)
MCHC RBC AUTO-ENTMCNC: 31.8 G/DL (ref 31–36)
MCV RBC AUTO: 76.8 FL (ref 80–100)
MONOCYTES ABSOLUTE: 0.4 K/UL (ref 0–1.3)
MONOCYTES RELATIVE PERCENT: 7.7 %
NEUTROPHILS ABSOLUTE: 3.1 K/UL (ref 1.7–7.7)
NEUTROPHILS RELATIVE PERCENT: 53.1 %
PDW BLD-RTO: 16.7 % (ref 12.4–15.4)
PLATELET # BLD: 257 K/UL (ref 135–450)
PMV BLD AUTO: 8.8 FL (ref 5–10.5)
POTASSIUM SERPL-SCNC: 3.9 MMOL/L (ref 3.5–5.1)
RBC # BLD: 4.88 M/UL (ref 4–5.2)
SODIUM BLD-SCNC: 141 MMOL/L (ref 136–145)
TOTAL IRON BINDING CAPACITY: 289 UG/DL (ref 260–445)
TOTAL PROTEIN: 7.6 G/DL (ref 6.4–8.2)
TRIGL SERPL-MCNC: 71 MG/DL (ref 0–150)
TSH SERPL DL<=0.05 MIU/L-ACNC: 1.29 UIU/ML (ref 0.27–4.2)
VLDLC SERPL CALC-MCNC: 14 MG/DL
WBC # BLD: 5.8 K/UL (ref 4–11)

## 2022-03-08 ENCOUNTER — TELEMEDICINE (OUTPATIENT)
Dept: FAMILY MEDICINE CLINIC | Age: 82
End: 2022-03-08
Payer: MEDICARE

## 2022-03-08 DIAGNOSIS — D63.8 ANEMIA, CHRONIC DISEASE: ICD-10-CM

## 2022-03-08 DIAGNOSIS — R79.89 HIGH SERUM LOW-DENSITY LIPOPROTEIN (LDL): ICD-10-CM

## 2022-03-08 DIAGNOSIS — N18.31 STAGE 3A CHRONIC KIDNEY DISEASE (HCC): ICD-10-CM

## 2022-03-08 DIAGNOSIS — E11.9 TYPE 2 DIABETES MELLITUS WITHOUT COMPLICATION, WITHOUT LONG-TERM CURRENT USE OF INSULIN (HCC): Primary | ICD-10-CM

## 2022-03-08 LAB
HEMOGLOBIN ELECTROPHORESIS: NORMAL
HGB ELECTROPHORESIS INTERP: NORMAL

## 2022-03-08 PROCEDURE — 99422 OL DIG E/M SVC 11-20 MIN: CPT | Performed by: FAMILY MEDICINE

## 2022-03-08 NOTE — PROGRESS NOTES
Subjective:      Patient ID: Darek Shepard is a 78 y.o. female. Darek Shepard is a 78 y.o. female evaluated via telephone on 3/8/2022. Consent:  She and/or health care decision maker is aware that that she may receive a bill for this telephone service, which includes applicable co-pays, depending on her insurance coverage, and has provided verbal consent to proceed. Documentation:  I communicated with the patient and/or health care decision maker about cc. Details of this discussion including any medical advice provided: y3s        I affirm this is a Patient Initiated Episode with a Patient who has not had a related appointment within my department in the past 7 days or scheduled within the next 24 hours. Patient identification was verified at the start of the visit: Yes    Total Time: 12 min       Darek Shepard was evaluated through a synchronous (real-time) audio encounter. The patient was located at home in a state where the provider was licensed to provide care. Note: not billable if this call serves to triage the patient into an appointment for the relevant concern      Wendy Aragon MD     Apt to discuss recent labs. Diabetes  She presents for her initial diabetic visit. She has type 2 diabetes mellitus. Her disease course has been stable. Pertinent negatives for diabetes include no fatigue, no polydipsia, no polyphagia, no polyuria and no weight loss. Risk factors for coronary artery disease include obesity, post-menopausal, sedentary lifestyle, hypertension and dyslipidemia. Current diabetic treatments: acarbose. She is compliant with treatment all of the time. She never participates in exercise. Review of Systems   Constitutional: Negative for fatigue and weight loss. Endocrine: Negative for polydipsia, polyphagia and polyuria. Objective:   Physical Exam  Constitutional:       General: She is not in acute distress.   HENT:      Head:      Comments: Hearing intact to nml conversation     Neurological:      Mental Status: She is alert and oriented to person, place, and time. Psychiatric:         Mood and Affect: Mood normal.         Thought Content: Thought content normal.       Results for Chun Owens (MRN [id number]) as of 3/8/2022 12:22   Ref.  Range 3/2/2022 11:20   Sodium Latest Ref Range: 136 - 145 mmol/L 141   Potassium Latest Ref Range: 3.5 - 5.1 mmol/L 3.9   Chloride Latest Ref Range: 99 - 110 mmol/L 97 (L)   CO2 Latest Ref Range: 21 - 32 mmol/L 24   BUN,BUNPL Latest Ref Range: 7 - 20 mg/dL 32 (H)   Creatinine Latest Ref Range: 0.6 - 1.2 mg/dL 1.1   Anion Gap Latest Ref Range: 3 - 16  20 (H)   GFR Non- Latest Ref Range: >60  47 (A)   GFR  Latest Ref Range: >60  57 (A)   Magnesium Latest Ref Range: 1.80 - 2.40 mg/dL 2.50 (H)   GLUCOSE, FASTING,GF Latest Ref Range: 70 - 99 mg/dL 126 (H)   CALCIUM, SERUM, [id number] Latest Ref Range: 8.3 - 10.6 mg/dL 10.0   Total Protein Latest Ref Range: 6.4 - 8.2 g/dL 7.6   CHOLESTEROL, TOTAL, [id number] Latest Ref Range: 0 - 199 mg/dL 211 (H)   HDL Cholesterol Latest Ref Range: 40 - 60 mg/dL 58   LDL Calculated Latest Ref Range: <100 mg/dL 139 (H)   Triglycerides Latest Ref Range: 0 - 150 mg/dL 71   VLDL Cholesterol Calculated Latest Ref Range: Not Established mg/dL 14   Albumin Latest Ref Range: 3.4 - 5.0 g/dL 4.1   Albumin/Globulin Ratio Latest Ref Range: 1.1 - 2.2  1.2   Alk Phos Latest Ref Range: 40 - 129 U/L 81   ALT Latest Ref Range: 10 - 40 U/L 10   AST Latest Ref Range: 15 - 37 U/L 15   Bilirubin Latest Ref Range: 0.0 - 1.0 mg/dL 0.5   Hemoglobin A1C Latest Ref Range: See comment % 6.7   eAG (mg/dL) Latest Units: mg/dL 145.6   TSH Latest Ref Range: 0.27 - 4.20 uIU/mL 1.29   WBC Latest Ref Range: 4.0 - 11.0 K/uL 5.8   RBC Latest Ref Range: 4.00 - 5.20 M/uL 4.88   Hemoglobin Quant Latest Ref Range: 12.0 - 16.0 g/dL 11.9 (L)   Hematocrit Latest Ref Range: 36.0 - 48.0 % 37.5   MCV Latest Ref Range: 80.0 - 100.0 fL 76.8 (L)   MCH Latest Ref Range: 26.0 - 34.0 pg 24.4 (L)   MCHC Latest Ref Range: 31.0 - 36.0 g/dL 31.8   MPV Latest Ref Range: 5.0 - 10.5 fL 8.8   RDW Latest Ref Range: 12.4 - 15.4 % 16.7 (H)   Platelet Count Latest Ref Range: 135 - 450 K/uL 257   Neutrophils % Latest Units: % 53.1   Lymphocyte % Latest Units: % 36.9   Monocytes % Latest Units: % 7.7   Eosinophils % Latest Units: % 1.5   Basophils % Latest Units: % 0.8   Neutrophils Absolute Latest Ref Range: 1.7 - 7.7 K/uL 3.1   Lymphocytes Absolute Latest Ref Range: 1.0 - 5.1 K/uL 2.1   Monocytes Absolute Latest Ref Range: 0.0 - 1.3 K/uL 0.4   Eosinophils Absolute Latest Ref Range: 0.0 - 0.6 K/uL 0.1   Basophils Absolute Latest Ref Range: 0.0 - 0.2 K/uL 0.0   Ferritin Latest Ref Range: 15.0 - 150.0 ng/mL 302.2 (H)   Iron Latest Ref Range: 37 - 145 ug/dL 43   Iron Saturation Latest Ref Range: 15 - 50 % 15   TIBC Latest Ref Range: 260 - 445 ug/dL 289   Hemoglobin, Elp Unknown See Comment     Assessment:       Diagnosis Orders   1. Type 2 diabetes mellitus without complication, without long-term current use of insulin ([facility]     2. Anemia, chronic disease     3. High serum low-density lipoprotein (LDL)     4. Stage 3a chronic kidney disease ([facility] [address]             Plan:      1. New dx   Diet and exercise discussed with her during this apt  She will continue acarbose  Fu in 1 mo    2. Iron studies with high ferritin   Continue to monitor  No need to tx with iron   patient agrees and verbalizes understanding    3. Will monitor, she may benefit with med but will try diet first and tx with statin if one month not improved. (goal LDL less 100)    4.  Continue to monitor           Yo Dey MD

## 2022-03-16 NOTE — PROGRESS NOTES
[address]   Cardiac Electrophysiology Consultation   Date: 3/16/2022  Reason for Consultation:  Atrial Fibrillation  Consult Requesting Physician: No att. providers found     Chief Complaint:   No chief complaint on file. HPI: Saba Toledo is a 78 y.o. female with PMH significant for HLD, HTN, DM, lymphedema, obesity, who originally p/w (12/2019) a 3 day h/o SOB, + PND x 10 days, noted to be in AF/RVR with rates in the 150's, placed on dilt, HR controlled, EF 20-25% per echo, changed to Toprol XL, lasix, losartan, LHC showed no CAD. 2-day CAM (1/2020) showed persistent AF with suboptimal rate control with max rate of 171 bpm.  She was scheduled for a DCCV; however, canceled this due to [place]. She eventually had a DCCV on 5/12/20 with symptomatic improvement. Outpatient monitor (8/2020) showed SR with brief PAT, but no AF. Echo (2/2021) showed EF improved to 55-60% since restoration of SR. Interval History: Today, she is here for 6 month f/u, presenting in [place]. She denies any recurrence of chest pain, SOB or palpitations or syncopal episodes. Overall, her quality of life has been improved after being in sinus rhythm. Her predominant complaint is hip and leg pain. In addition to this, secondary to lymphedema, she also has significant discomfort in her right leg. She uses a walker for ambulation. She does have a history of nonischemic cardiomyopathy, with excellent reverse remodeling with almost normal LV ejection fraction after being in sinus rhythm. Assessment:  1. Persistent AF, on Eliquis 5 mg twice daily, Toprol 200 mg daily,  2. CMP, Toprol, Torsemide and losartan  3. HTN, stable, Toprol and losartan  4. HLD, stable   5. DM  6. Obesity  7. Lymphedema      Plan:   1. Follow up with EP NP in 6 months   2.  No changes to current medications       Past Medical History:   Diagnosis Date    Acute systolic (congestive) heart failure (HCC)     Edema     Hearing loss     Hyperlipidemia     Hypertension     Morbid obesity due to excess calories ([facility] [address] 12/29/2015    Pre-diabetes     PUD (peptic ulcer disease)     \"years ago\",         Past Surgical History:   Procedure Laterality Date    HERNIA REPAIR      umbilical     KNEE SURGERY Bilateral     TKR bilateral       Allergies: Allergies   Allergen Reactions    Aspirin      Caused ulcers, burns stomach    Dye [Iodides] Itching, Swelling and Rash       Medication:   Prior to Admission medications    Medication Sig Start Date End Date Taking? Authorizing Provider   vitamin D3 (CHOLECALCIFEROL) 10 MCG (400 UNIT) TABS tablet Take 1 tablet by mouth daily 3/2/22   Ariel Baez MD   metoprolol succinate (TOPROL XL) 200 MG extended release tablet TAKE 1 TABLET BY MOUTH EVERY DAY 3/2/22   Ariel Baez MD   losartan (COZAAR) 100 MG tablet TAKE 1 TABLET BY MOUTH EVERY DAY 3/2/22   Ariel Baez MD   torsemide (DEMADEX) 20 MG tablet TAKE 1 TABLET BY MOUTH THREE TIMES A DAY 3/2/22   Ariel Baez MD   acarbose (PRECOSE) 25 MG tablet Take 1 tablet by mouth 3 times daily (with meals) 3/2/22   Ariel Baez MD   ELIQUIS 5 MG TABS tablet TAKE 1 TABLET BY MOUTH TWICE A DAY 9/27/21   LAWRENCE Alexander CNP   furosemide (LASIX) 40 MG tablet Take 1 tablet by mouth 2 times daily  Patient not taking: Reported on 3/8/2022 12/28/20   LAWRENCE Alexander CNP   potassium chloride (KLOR-CON M) 10 MEQ extended release tablet Take 10 mEq by mouth daily     Historical Provider, MD   dextromethorphan-guaiFENesin (ROBITUSSIN-DM)  MG/5ML syrup Take 10 mLs by mouth every 4 hours as needed for Cough     Historical Provider, MD   blood glucose monitor strips Test 3 times a day & as needed for symptoms of irregular blood glucose. 12/31/19   Gloria Chavira MD       Social History:   reports that she has never smoked. She has never used smokeless tobacco. She reports that she does not drink alcohol and does not use drugs.         Family History:  family history includes High Blood Pressure in her father; Other in her mother. Reviewed. Denies family history of sudden cardiac death, arrhythmia, premature CAD    Review of System:    · General ROS: negative for - chills, fever   · Psychological ROS: negative for - anxiety or depression  · Ophthalmic ROS: negative for - eye pain or loss of vision  · ENT ROS: negative for - epistaxis, headaches, nasal discharge, sore throat   · Allergy and Immunology ROS: negative for - hives, nasal congestion   · Hematological and Lymphatic ROS: negative for - bleeding problems, blood clots, bruising or jaundice  · Endocrine ROS: negative for - skin changes, temperature intolerance or unexpected weight changes  · Respiratory ROS: negative for - cough, hemoptysis, pleuritic pain, SOB, sputum changes or wheezing  · Cardiovascular ROS: Per HPI. · Gastrointestinal ROS: negative for - abdominal pain, blood in stools, diarrhea, hematemesis, melena, nausea/vomiting or swallowing difficulty/pain  · Genito-Urinary ROS: negative for - dysuria or incontinence  · Musculoskeletal ROS: negative for - joint swelling or muscle pain  · Neurological ROS: negative for - confusion, dizziness, gait disturbance, headaches, numbness/tingling, seizures, speech problems, tremors, visual changes or weakness  · Dermatological ROS: negative for - rash    Physical Examination:  Vitals:    03/24/22 1250   BP: (!) 150/100   Pulse: 53       · Constitutional: Oriented. No distress. · Head: Normocephalic and atraumatic. · Mouth/Throat: Oropharynx is clear and moist.   · Eyes: Conjunctivae normal. EOM are normal.   · Neck: Normal range of motion. Neck supple. No rigidity. No JVD present. · Cardiovascular: Normal rate, regular rhythm, S1&S2 and intact distal pulses. · Pulmonary/Chest: Bilateral respiratory sounds. No wheezes. No rhonchi. · Abdominal: Soft. Bowel sounds present. No distension, No tenderness.    · Musculoskeletal: No tenderness. No edema    · Lymphadenopathy: Has no cervical adenopathy. · Neurological: Alert and oriented. Cranial nerve appears intact, No Gross deficit   · Skin: Skin is warm and dry. No rash noted. · Psychiatric: Has a normal mood, affect and behavior     Labs:  Reviewed. ECG: reviewed, Sinus  Rhythm, with QRS duration    ms. QT  ms    Studies:   1. 2 day CAM (8/20-8/22/20):  SR with average HR 80 (), 11 runs of PAT with the longest lasting 6 beats and max rate of 145 bpm      2-day CAM (1/6-1/8/20): 100% AF with average HR 84 () with less than 1% PVCs    2. Echo 2/1/21  Summary   Normal left ventricle size, wall thickness, and systolic function with an   estimated ejection fraction of 55-60%. No regional wall motion abnormalities   are seen. Diastolic filling parameters suggests grade II diastolic   dysfunction. Increased LVEDP and LAP. Mild mitral and tricuspid regurgitation is present. Echo 12/24/19:   EF 35-40%. 3. Stress Test 9/26/17:    1. Abnormal myocardial perfusion study. Exercised induced    perfusion abnormality at the apex and anterior lateral wall    compatible with moderate reversible ischemia. 2. Normal wall motion and ejection fraction. 4. Cath 12/30/19:   CONCLUSION:  1. Normal epicardial coronary arteries. 2.  LV ejection fraction 50% without regional wall motion abnormality. 3.  LVEDP 20 mmHg. 4.  No aortic valve gradient and no wall motion abnormality on left  Ventriculography. The MCOT, echocardiogram, stress test, and coronary angiography/PCI were reviewed by myself and used for my plan of care. - The patient is counseled to follow a low salt diet to assure blood pressure remains controlled for cardiovascular risk factor modification.   - The patient is counseled to avoid excess caffeine, and energy drinks as this may exacerbated ectopy and arrhythmia.    - The patient is counseled to get regular exercise 3-5 times per week to control cardiovascular risk factors. - The patient is counseled to lose weigt to control cardiovascular risk factors. -The patient is counseled about the health hazards of smoking including cardiovascular side effects and its impact on morbidity and mortality. Eloy Fountain RN, am scribing for and in the presence of Dr. Rudy Metzger. 03/16/22 2:14 PM  CHAPARRITA Morales MD, personally performed the services prescribed in this documentation as scribed in my presence and it is both accurate and complete. Thank you for allowing me to participate in the care of Griselda Alejandra. All questions and concerns were addressed to the patient/family. Alternatives to my treatment were discussed.      Judi Ponce MD  Cardiac Electrophysiology  [facility]

## 2022-03-24 ENCOUNTER — OFFICE VISIT (OUTPATIENT)
Dept: CARDIOLOGY CLINIC | Age: 82
End: 2022-03-24
Payer: MEDICARE

## 2022-03-24 VITALS
DIASTOLIC BLOOD PRESSURE: 100 MMHG | HEART RATE: 53 BPM | WEIGHT: 293 LBS | SYSTOLIC BLOOD PRESSURE: 150 MMHG | BODY MASS INDEX: 56.94 KG/M2

## 2022-03-24 DIAGNOSIS — I48.91 ATRIAL FIBRILLATION WITH RVR (HCC): Primary | ICD-10-CM

## 2022-03-24 DIAGNOSIS — R73.03 PRE-DIABETES: ICD-10-CM

## 2022-03-24 DIAGNOSIS — R06.02 SOB (SHORTNESS OF BREATH): ICD-10-CM

## 2022-03-24 DIAGNOSIS — R94.31 ABNORMAL ECG: ICD-10-CM

## 2022-03-24 PROCEDURE — 93000 ELECTROCARDIOGRAM COMPLETE: CPT | Performed by: INTERNAL MEDICINE

## 2022-03-24 PROCEDURE — 99214 OFFICE O/P EST MOD 30 MIN: CPT | Performed by: INTERNAL MEDICINE

## 2022-03-24 RX ORDER — ACARBOSE 25 MG/1
TABLET ORAL
Qty: 90 TABLET | Refills: 1 | Status: SHIPPED | OUTPATIENT
Start: 2022-03-24 | End: 2022-04-11 | Stop reason: SDUPTHER

## 2022-03-24 NOTE — PROGRESS NOTES
[address]   Cardiac Electrophysiology Consultation   Date: 3/24/2022  Reason for Consultation:  Atrial Fibrillation  Consult Requesting Physician: No att. providers found     Chief Complaint:   Chief Complaint   Patient presents with    6 Month Follow-Up      HPI: Margarita Larios is a 78 y.o. female with PMH significant for HLD, HTN, DM, lymphedema, obesity, who originally p/w (12/2019) a 3 day h/o SOB, + PND x 10 days, noted to be in AF/RVR with rates in the 150's, placed on dilt, HR controlled, EF 20-25% per echo, changed to Toprol XL, lasix, losartan, LHC showed no CAD. 2-day CAM (1/2020) showed persistent AF with suboptimal rate control with max rate of 171 bpm.  She was scheduled for a DCCV; however, canceled this due to [place]. She eventually had a DCCV on 5/12/20 with symptomatic improvement. Outpatient monitor (8/2020) showed SR with brief PAT, but no AF. Echo (2/2021) showed EF improved to 55-60% since restoration of SR. Interval History: Today, she is here for 6 month f/u, presenting in [place]. She denies any recurrence of chest pain, SOB or palpitations or syncopal episodes. Overall, her quality of life has been improved after being in sinus rhythm. Her predominant complaint is hip and leg pain. In addition to this, secondary to lymphedema, she also has significant discomfort in her right leg. She uses a cane for ambulation. She does have a history of nonischemic cardiomyopathy, with excellent reverse remodeling with almost normal LV ejection fraction after being in sinus rhythm. Assessment:  1. Persistent AF, on Eliquis 5 mg twice daily, Toprol 200 mg daily,  2. CMP, Toprol, Torsemide and losartan  3. HTN, stable, Toprol and losartan  4. HLD, stable   5. DM  6. Obesity  7. Lymphedema      Plan:   1. Follow up with EP NP in 6 months   2.  No changes to current medications       Past Medical History:   Diagnosis Date    Acute systolic (congestive) heart failure (HCC)     Edema  Hearing loss     Hyperlipidemia     Hypertension     Morbid obesity due to excess calories ([facility] [address] 12/29/2015    Pre-diabetes     PUD (peptic ulcer disease)     \"years ago\",         Past Surgical History:   Procedure Laterality Date    HERNIA REPAIR      umbilical     KNEE SURGERY Bilateral     TKR bilateral       Allergies: Allergies   Allergen Reactions    Aspirin      Caused ulcers, burns stomach    Dye [Iodides] Itching, Swelling and Rash       Medication:   Prior to Admission medications    Medication Sig Start Date End Date Taking? Authorizing Provider   vitamin D3 (CHOLECALCIFEROL) 10 MCG (400 UNIT) TABS tablet Take 1 tablet by mouth daily 3/2/22  Yes Blanche Santiago MD   metoprolol succinate (TOPROL XL) 200 MG extended release tablet TAKE 1 TABLET BY MOUTH EVERY DAY 3/2/22  Yes Blanche Santiago MD   losartan (COZAAR) 100 MG tablet TAKE 1 TABLET BY MOUTH EVERY DAY 3/2/22  Yes Blanche Santiago MD   torsemide (DEMADEX) 20 MG tablet TAKE 1 TABLET BY MOUTH THREE TIMES A DAY 3/2/22  Yes Blanche Santiago MD   acarbose (PRECOSE) 25 MG tablet Take 1 tablet by mouth 3 times daily (with meals) 3/2/22  Yes Blanche Santiago MD   ELIQUIS 5 MG TABS tablet TAKE 1 TABLET BY MOUTH TWICE A DAY 9/27/21  Yes LAWRENCE Crespo CNP   dextromethorphan-guaiFENesin (ROBITUSSIN-DM)  MG/5ML syrup Take 10 mLs by mouth every 4 hours as needed for Cough    Yes Historical Provider, MD   blood glucose monitor strips Test 3 times a day & as needed for symptoms of irregular blood glucose. 12/31/19  Yes Merrick Bazzi MD   furosemide (LASIX) 40 MG tablet Take 1 tablet by mouth 2 times daily  Patient not taking: Reported on 3/8/2022 12/28/20   LAWRENCE Crespo CNP   potassium chloride (KLOR-CON M) 10 MEQ extended release tablet Take 10 mEq by mouth daily   Patient not taking: Reported on 3/24/2022    Historical Provider, MD       Social History:   reports that she has never smoked.  She has never used smokeless tobacco. She reports that she does not drink alcohol and does not use drugs. Family History:  family history includes High Blood Pressure in her father; Other in her mother. Reviewed. Denies family history of sudden cardiac death, arrhythmia, premature CAD    Review of System:    · General ROS: negative for - chills, fever   · Psychological ROS: negative for - anxiety or depression  · Ophthalmic ROS: negative for - eye pain or loss of vision  · ENT ROS: negative for - epistaxis, headaches, nasal discharge, sore throat   · Allergy and Immunology ROS: negative for - hives, nasal congestion   · Hematological and Lymphatic ROS: negative for - bleeding problems, blood clots, bruising or jaundice  · Endocrine ROS: negative for - skin changes, temperature intolerance or unexpected weight changes  · Respiratory ROS: negative for - cough, hemoptysis, pleuritic pain, SOB, sputum changes or wheezing  · Cardiovascular ROS: Per HPI. · Gastrointestinal ROS: negative for - abdominal pain, blood in stools, diarrhea, hematemesis, melena, nausea/vomiting or swallowing difficulty/pain  · Genito-Urinary ROS: negative for - dysuria or incontinence  · Musculoskeletal ROS: negative for - joint swelling or muscle pain  · Neurological ROS: negative for - confusion, dizziness, gait disturbance, headaches, numbness/tingling, seizures, speech problems, tremors, visual changes or weakness  · Dermatological ROS: negative for - rash    Physical Examination:  Vitals:    03/24/22 1250   BP: (!) 150/100   Pulse: 53       · Constitutional: Oriented. No distress. · Head: Normocephalic and atraumatic. · Mouth/Throat: Oropharynx is clear and moist.   · Eyes: Conjunctivae normal. EOM are normal.   · Neck: Normal range of motion. Neck supple. No rigidity. No JVD present. · Cardiovascular: Normal rate, regular rhythm, S1&S2 and intact distal pulses. · Pulmonary/Chest: Bilateral respiratory sounds. No wheezes. No rhonchi. · Abdominal: Soft. Bowel sounds present. No distension, No tenderness. · Musculoskeletal: No tenderness. No edema    · Lymphadenopathy: Has no cervical adenopathy. · Neurological: Alert and oriented. Cranial nerve appears intact, No Gross deficit   · Skin: Skin is warm and dry. No rash noted. · Psychiatric: Has a normal mood, affect and behavior     Labs:  Reviewed. ECG: reviewed, Sinus  Rhythm, with QRS duration  98  ms.  ms    Studies:   1. 2 day CAM (8/20-8/22/20):  SR with average HR 80 (), 11 runs of PAT with the longest lasting 6 beats and max rate of 145 bpm      2-day CAM (1/6-1/8/20): 100% AF with average HR 84 () with less than 1% PVCs    2. Echo 2/1/21  Summary   Normal left ventricle size, wall thickness, and systolic function with an   estimated ejection fraction of 55-60%. No regional wall motion abnormalities   are seen. Diastolic filling parameters suggests grade II diastolic   dysfunction. Increased LVEDP and LAP. Mild mitral and tricuspid regurgitation is present. Echo 12/24/19:   EF 35-40%. 3. Stress Test 9/26/17:    1. Abnormal myocardial perfusion study. Exercised induced    perfusion abnormality at the apex and anterior lateral wall    compatible with moderate reversible ischemia. 2. Normal wall motion and ejection fraction. 4. Cath 12/30/19:   CONCLUSION:  1. Normal epicardial coronary arteries. 2.  LV ejection fraction 50% without regional wall motion abnormality. 3.  LVEDP 20 mmHg. 4.  No aortic valve gradient and no wall motion abnormality on left  Ventriculography. The MCOT, echocardiogram, stress test, and coronary angiography/PCI were reviewed by myself and used for my plan of care. - The patient is counseled to follow a low salt diet to assure blood pressure remains controlled for cardiovascular risk factor modification.   - The patient is counseled to avoid excess caffeine, and energy drinks as this may exacerbated ectopy and arrhythmia.    - The patient is counseled to get regular exercise 3-5 times per week to control cardiovascular risk factors. - The patient is counseled to lose weigt to control cardiovascular risk factors. -The patient is counseled about the health hazards of smoking including cardiovascular side effects and its impact on morbidity and mortality. Clara Crowe RN, am scribing for and in the presence of Dr. James Horner. 03/24/22 1:00 PM  Myron Pascal RN      Thank you for allowing me to participate in the care of Kylah Bucio. All questions and concerns were addressed to the patient/family. Alternatives to my treatment were discussed.      Traci Stern MD  Cardiac Electrophysiology  [facility]

## 2022-03-24 NOTE — TELEPHONE ENCOUNTER
Medication:   Requested Prescriptions     Pending Prescriptions Disp Refills    acarbose (PRECOSE) 25 MG tablet [Pharmacy Med Name: ACARBOSE 25 MG TABLET] 90 tablet 1     Sig: TAKE 1 TABLET BY MOUTH 3 TIMES DAILY WITH MEALS        Last Filled:      Patient Phone Number: [phone] (home)     Last appt: 3/8/2022   Next appt: Visit date not found    Last OARRS: No flowsheet data found.

## 2022-04-11 DIAGNOSIS — R73.03 PRE-DIABETES: ICD-10-CM

## 2022-04-12 RX ORDER — ACARBOSE 25 MG/1
TABLET ORAL
Qty: 90 TABLET | Refills: 1 | Status: SHIPPED | OUTPATIENT
Start: 2022-04-12 | End: 2022-06-06

## 2022-05-31 ENCOUNTER — TELEPHONE (OUTPATIENT)
Dept: FAMILY MEDICINE CLINIC | Age: 82
End: 2022-05-31

## 2022-05-31 DIAGNOSIS — R05.9 COUGH: Primary | ICD-10-CM

## 2022-05-31 RX ORDER — AZITHROMYCIN 250 MG/1
250 TABLET, FILM COATED ORAL SEE ADMIN INSTRUCTIONS
Qty: 6 TABLET | Refills: 0 | Status: SHIPPED | OUTPATIENT
Start: 2022-05-31 | End: 2022-06-05

## 2022-05-31 RX ORDER — BENZONATATE 100 MG/1
100 CAPSULE ORAL 3 TIMES DAILY PRN
Qty: 21 CAPSULE | Refills: 0 | Status: SHIPPED | OUTPATIENT
Start: 2022-05-31 | End: 2022-06-07

## 2022-05-31 NOTE — TELEPHONE ENCOUNTER
rx transmitted electronically to the pharmacy via [employer]   Let patient know and verify pharmacy    If sx persist will need an apt    If severe sx specially if sob go to ED

## 2022-05-31 NOTE — TELEPHONE ENCOUNTER
Pt is requesting a cough med and antibiotic. Pt states that she just returned from out of town. Pt has a cough, head congestion, facial and eye pain/burning, and belly sore from coughing. Sx started Saturday. Pt was tested for covid on Saturday, it was negative. Please advise.     Last appt: 3/8/2022

## 2022-06-06 DIAGNOSIS — R73.03 PRE-DIABETES: ICD-10-CM

## 2022-06-06 RX ORDER — ACARBOSE 25 MG/1
TABLET ORAL
Qty: 90 TABLET | Refills: 1 | Status: ON HOLD | OUTPATIENT
Start: 2022-06-06 | End: 2022-09-14

## 2022-06-06 NOTE — TELEPHONE ENCOUNTER
Medication:   Requested Prescriptions     Pending Prescriptions Disp Refills    acarbose (PRECOSE) 25 mg tablet [Pharmacy Med Name: ACARBOSE 25 MG TABLET] 90 tablet 1     Sig: TAKE 1 TABLET BY MOUTH 3 TIMES DAILY WITH MEALS. Last Filled:      Patient Phone Number: [phone] (home)     Last appt: 3/8/2022   Next appt: Visit date not found    Last OARRS: No flowsheet data found.

## 2022-09-11 ENCOUNTER — APPOINTMENT (OUTPATIENT)
Dept: GENERAL RADIOLOGY | Age: 82
DRG: 280 | End: 2022-09-11
Payer: MEDICARE

## 2022-09-11 ENCOUNTER — HOSPITAL ENCOUNTER (INPATIENT)
Age: 82
LOS: 8 days | Discharge: HOME HEALTH | DRG: 280 | End: 2022-09-19
Attending: STUDENT IN AN ORGANIZED HEALTH CARE EDUCATION/TRAINING PROGRAM | Admitting: INTERNAL MEDICINE
Payer: MEDICARE

## 2022-09-11 DIAGNOSIS — I50.23 ACUTE ON CHRONIC SYSTOLIC CONGESTIVE HEART FAILURE (HCC): ICD-10-CM

## 2022-09-11 DIAGNOSIS — J81.0 ACUTE PULMONARY EDEMA (HCC): ICD-10-CM

## 2022-09-11 DIAGNOSIS — I21.3 ST ELEVATION MYOCARDIAL INFARCTION (STEMI), UNSPECIFIED ARTERY (HCC): Primary | ICD-10-CM

## 2022-09-11 DIAGNOSIS — I50.31: ICD-10-CM

## 2022-09-11 DIAGNOSIS — R73.03 PRE-DIABETES: ICD-10-CM

## 2022-09-11 PROBLEM — I21.4 NSTEMI (NON-ST ELEVATED MYOCARDIAL INFARCTION) (HCC): Status: ACTIVE | Noted: 2022-09-11

## 2022-09-11 LAB
A/G RATIO: 1.1 (ref 1.1–2.2)
ALBUMIN SERPL-MCNC: 4.1 G/DL (ref 3.4–5)
ALP BLD-CCNC: 84 U/L (ref 40–129)
ALT SERPL-CCNC: 15 U/L (ref 10–40)
ANION GAP SERPL CALCULATED.3IONS-SCNC: 17 MMOL/L (ref 3–16)
AST SERPL-CCNC: 41 U/L (ref 15–37)
BASE EXCESS VENOUS: -5.3 MMOL/L (ref -2–3)
BASOPHILS ABSOLUTE: 0 K/UL (ref 0–0.2)
BASOPHILS RELATIVE PERCENT: 0 %
BILIRUB SERPL-MCNC: 0.8 MG/DL (ref 0–1)
BUN BLDV-MCNC: 15 MG/DL (ref 7–20)
CALCIUM SERPL-MCNC: 9.3 MG/DL (ref 8.3–10.6)
CARBOXYHEMOGLOBIN: 0.4 % (ref 0–1.5)
CHLORIDE BLD-SCNC: 101 MMOL/L (ref 99–110)
CO2: 21 MMOL/L (ref 21–32)
CREAT SERPL-MCNC: 0.9 MG/DL (ref 0.6–1.2)
EOSINOPHILS ABSOLUTE: 0 K/UL (ref 0–0.6)
EOSINOPHILS RELATIVE PERCENT: 0 %
GFR AFRICAN AMERICAN: >60
GFR NON-AFRICAN AMERICAN: 60
GLUCOSE BLD-MCNC: 128 MG/DL (ref 70–99)
GLUCOSE BLD-MCNC: 155 MG/DL (ref 70–99)
GLUCOSE BLD-MCNC: 292 MG/DL (ref 70–99)
HCO3 VENOUS: 22.8 MMOL/L (ref 24–28)
HCT VFR BLD CALC: 38.1 % (ref 36–48)
HEMOGLOBIN, VEN, REDUCED: 2.6 %
HEMOGLOBIN: 11.8 G/DL (ref 12–16)
INR BLD: 1.22 (ref 0.87–1.14)
LEFT VENTRICULAR EJECTION FRACTION MODE: NORMAL
LV EF: 15 %
LYMPHOCYTES ABSOLUTE: 3.6 K/UL (ref 1–5.1)
LYMPHOCYTES RELATIVE PERCENT: 47 %
MCH RBC QN AUTO: 23.9 PG (ref 26–34)
MCHC RBC AUTO-ENTMCNC: 30.9 G/DL (ref 31–36)
MCV RBC AUTO: 77.4 FL (ref 80–100)
METHEMOGLOBIN VENOUS: 0.5 % (ref 0–1.5)
MONOCYTES ABSOLUTE: 0.3 K/UL (ref 0–1.3)
MONOCYTES RELATIVE PERCENT: 4 %
NEUTROPHILS ABSOLUTE: 3.8 K/UL (ref 1.7–7.7)
NEUTROPHILS RELATIVE PERCENT: 49 %
O2 SAT, VEN: 97 %
PCO2, VEN: 54.7 MMHG (ref 41–51)
PDW BLD-RTO: 18.2 % (ref 12.4–15.4)
PERFORMED ON: ABNORMAL
PERFORMED ON: ABNORMAL
PH VENOUS: 7.23 (ref 7.35–7.45)
PLATELET # BLD: 328 K/UL (ref 135–450)
PMV BLD AUTO: 8.5 FL (ref 5–10.5)
PO2, VEN: 127 MMHG (ref 25–40)
POTASSIUM REFLEX MAGNESIUM: 5 MMOL/L (ref 3.5–5.1)
PRO-BNP: 4635 PG/ML (ref 0–449)
PROTHROMBIN TIME: 15.3 SEC (ref 11.7–14.5)
RBC # BLD: 4.92 M/UL (ref 4–5.2)
SODIUM BLD-SCNC: 139 MMOL/L (ref 136–145)
TCO2 CALC VENOUS: 24 MMOL/L
TOTAL PROTEIN: 8 G/DL (ref 6.4–8.2)
TROPONIN: 0.22 NG/ML
WBC # BLD: 7.7 K/UL (ref 4–11)

## 2022-09-11 PROCEDURE — B2111ZZ FLUOROSCOPY OF MULTIPLE CORONARY ARTERIES USING LOW OSMOLAR CONTRAST: ICD-10-PCS | Performed by: INTERNAL MEDICINE

## 2022-09-11 PROCEDURE — C1769 GUIDE WIRE: HCPCS

## 2022-09-11 PROCEDURE — 94660 CPAP INITIATION&MGMT: CPT

## 2022-09-11 PROCEDURE — 85610 PROTHROMBIN TIME: CPT

## 2022-09-11 PROCEDURE — 83880 ASSAY OF NATRIURETIC PEPTIDE: CPT

## 2022-09-11 PROCEDURE — 94640 AIRWAY INHALATION TREATMENT: CPT

## 2022-09-11 PROCEDURE — 84484 ASSAY OF TROPONIN QUANT: CPT

## 2022-09-11 PROCEDURE — 82803 BLOOD GASES ANY COMBINATION: CPT

## 2022-09-11 PROCEDURE — 4A023N7 MEASUREMENT OF CARDIAC SAMPLING AND PRESSURE, LEFT HEART, PERCUTANEOUS APPROACH: ICD-10-PCS | Performed by: INTERNAL MEDICINE

## 2022-09-11 PROCEDURE — 93005 ELECTROCARDIOGRAM TRACING: CPT | Performed by: STUDENT IN AN ORGANIZED HEALTH CARE EDUCATION/TRAINING PROGRAM

## 2022-09-11 PROCEDURE — 85025 COMPLETE CBC W/AUTO DIFF WBC: CPT

## 2022-09-11 PROCEDURE — 80053 COMPREHEN METABOLIC PANEL: CPT

## 2022-09-11 PROCEDURE — 2580000003 HC RX 258: Performed by: INTERNAL MEDICINE

## 2022-09-11 PROCEDURE — 6360000002 HC RX W HCPCS

## 2022-09-11 PROCEDURE — 71045 X-RAY EXAM CHEST 1 VIEW: CPT

## 2022-09-11 PROCEDURE — 6360000004 HC RX CONTRAST MEDICATION: Performed by: INTERNAL MEDICINE

## 2022-09-11 PROCEDURE — C1760 CLOSURE DEV, VASC: HCPCS

## 2022-09-11 PROCEDURE — 36415 COLL VENOUS BLD VENIPUNCTURE: CPT

## 2022-09-11 PROCEDURE — 2500000003 HC RX 250 WO HCPCS

## 2022-09-11 PROCEDURE — 2709999900 HC NON-CHARGEABLE SUPPLY

## 2022-09-11 PROCEDURE — 93458 L HRT ARTERY/VENTRICLE ANGIO: CPT | Performed by: INTERNAL MEDICINE

## 2022-09-11 PROCEDURE — 6370000000 HC RX 637 (ALT 250 FOR IP)

## 2022-09-11 PROCEDURE — 6370000000 HC RX 637 (ALT 250 FOR IP): Performed by: STUDENT IN AN ORGANIZED HEALTH CARE EDUCATION/TRAINING PROGRAM

## 2022-09-11 PROCEDURE — 94761 N-INVAS EAR/PLS OXIMETRY MLT: CPT

## 2022-09-11 PROCEDURE — 99285 EMERGENCY DEPT VISIT HI MDM: CPT

## 2022-09-11 PROCEDURE — 1200000000 HC SEMI PRIVATE

## 2022-09-11 PROCEDURE — 99222 1ST HOSP IP/OBS MODERATE 55: CPT | Performed by: INTERNAL MEDICINE

## 2022-09-11 PROCEDURE — B2151ZZ FLUOROSCOPY OF LEFT HEART USING LOW OSMOLAR CONTRAST: ICD-10-PCS | Performed by: INTERNAL MEDICINE

## 2022-09-11 PROCEDURE — 99152 MOD SED SAME PHYS/QHP 5/>YRS: CPT | Performed by: INTERNAL MEDICINE

## 2022-09-11 PROCEDURE — C1894 INTRO/SHEATH, NON-LASER: HCPCS

## 2022-09-11 PROCEDURE — 99152 MOD SED SAME PHYS/QHP 5/>YRS: CPT

## 2022-09-11 PROCEDURE — 93458 L HRT ARTERY/VENTRICLE ANGIO: CPT

## 2022-09-11 PROCEDURE — 6370000000 HC RX 637 (ALT 250 FOR IP): Performed by: NURSE PRACTITIONER

## 2022-09-11 RX ORDER — NITROGLYCERIN 0.4 MG/1
TABLET SUBLINGUAL
Status: DISPENSED
Start: 2022-09-11 | End: 2022-09-11

## 2022-09-11 RX ORDER — ATORVASTATIN CALCIUM 40 MG/1
80 TABLET, FILM COATED ORAL ONCE
Status: DISCONTINUED | OUTPATIENT
Start: 2022-09-11 | End: 2022-09-19 | Stop reason: HOSPADM

## 2022-09-11 RX ORDER — SODIUM CHLORIDE 9 MG/ML
INJECTION, SOLUTION INTRAVENOUS CONTINUOUS
Status: ACTIVE | OUTPATIENT
Start: 2022-09-11 | End: 2022-09-11

## 2022-09-11 RX ORDER — IPRATROPIUM BROMIDE AND ALBUTEROL SULFATE 2.5; .5 MG/3ML; MG/3ML
1 SOLUTION RESPIRATORY (INHALATION) ONCE
Status: COMPLETED | OUTPATIENT
Start: 2022-09-11 | End: 2022-09-11

## 2022-09-11 RX ORDER — BENZONATATE 100 MG/1
100 CAPSULE ORAL 3 TIMES DAILY PRN
Status: ON HOLD | COMMUNITY
End: 2022-09-19 | Stop reason: HOSPADM

## 2022-09-11 RX ORDER — NITROGLYCERIN 20 MG/100ML
5-200 INJECTION INTRAVENOUS CONTINUOUS
Status: DISCONTINUED | OUTPATIENT
Start: 2022-09-11 | End: 2022-09-12

## 2022-09-11 RX ORDER — SODIUM CHLORIDE 9 MG/ML
INJECTION, SOLUTION INTRAVENOUS PRN
Status: DISCONTINUED | OUTPATIENT
Start: 2022-09-11 | End: 2022-09-19 | Stop reason: HOSPADM

## 2022-09-11 RX ORDER — IPRATROPIUM BROMIDE AND ALBUTEROL SULFATE 2.5; .5 MG/3ML; MG/3ML
SOLUTION RESPIRATORY (INHALATION)
Status: COMPLETED
Start: 2022-09-11 | End: 2022-09-11

## 2022-09-11 RX ORDER — ACETAMINOPHEN 500 MG
500 TABLET ORAL EVERY 6 HOURS PRN
COMMUNITY

## 2022-09-11 RX ORDER — SODIUM CHLORIDE 0.9 % (FLUSH) 0.9 %
5-40 SYRINGE (ML) INJECTION PRN
Status: DISCONTINUED | OUTPATIENT
Start: 2022-09-11 | End: 2022-09-19 | Stop reason: HOSPADM

## 2022-09-11 RX ORDER — SODIUM CHLORIDE 0.9 % (FLUSH) 0.9 %
5-40 SYRINGE (ML) INJECTION EVERY 12 HOURS SCHEDULED
Status: DISCONTINUED | OUTPATIENT
Start: 2022-09-11 | End: 2022-09-19 | Stop reason: HOSPADM

## 2022-09-11 RX ORDER — ASPIRIN 81 MG/1
324 TABLET, CHEWABLE ORAL ONCE
Status: COMPLETED | OUTPATIENT
Start: 2022-09-11 | End: 2022-09-11

## 2022-09-11 RX ORDER — METOPROLOL SUCCINATE 50 MG/1
50 TABLET, EXTENDED RELEASE ORAL ONCE
Status: COMPLETED | OUTPATIENT
Start: 2022-09-11 | End: 2022-09-11

## 2022-09-11 RX ORDER — ACETAMINOPHEN 325 MG/1
650 TABLET ORAL EVERY 4 HOURS PRN
Status: DISCONTINUED | OUTPATIENT
Start: 2022-09-11 | End: 2022-09-19 | Stop reason: HOSPADM

## 2022-09-11 RX ADMIN — IOHEXOL 150 ML: 350 INJECTION, SOLUTION INTRAVENOUS at 11:59

## 2022-09-11 RX ADMIN — METOPROLOL SUCCINATE 50 MG: 50 TABLET, FILM COATED, EXTENDED RELEASE ORAL at 20:43

## 2022-09-11 RX ADMIN — IPRATROPIUM BROMIDE AND ALBUTEROL SULFATE 3 ML: 2.5; .5 SOLUTION RESPIRATORY (INHALATION) at 09:00

## 2022-09-11 RX ADMIN — ASPIRIN 324 MG: 81 TABLET, CHEWABLE ORAL at 09:07

## 2022-09-11 RX ADMIN — TICAGRELOR 180 MG: 90 TABLET ORAL at 09:07

## 2022-09-11 RX ADMIN — IPRATROPIUM BROMIDE AND ALBUTEROL SULFATE 1 AMPULE: 2.5; .5 SOLUTION RESPIRATORY (INHALATION) at 09:01

## 2022-09-11 RX ADMIN — SODIUM CHLORIDE, PRESERVATIVE FREE 10 ML: 5 INJECTION INTRAVENOUS at 20:46

## 2022-09-11 RX ADMIN — NITROGLYCERIN: 20 OINTMENT TOPICAL at 09:00

## 2022-09-11 RX ADMIN — SODIUM CHLORIDE: 9 INJECTION, SOLUTION INTRAVENOUS at 12:57

## 2022-09-11 ASSESSMENT — PAIN DESCRIPTION - FREQUENCY
FREQUENCY: CONTINUOUS
FREQUENCY: CONTINUOUS

## 2022-09-11 ASSESSMENT — ENCOUNTER SYMPTOMS
DIARRHEA: 0
CHEST TIGHTNESS: 0
WHEEZING: 1
VOMITING: 1
BACK PAIN: 0
ABDOMINAL PAIN: 0
APNEA: 0
COUGH: 0
SHORTNESS OF BREATH: 1
CONSTIPATION: 0
STRIDOR: 0
NAUSEA: 0
ABDOMINAL DISTENTION: 0
RHINORRHEA: 0

## 2022-09-11 ASSESSMENT — PAIN DESCRIPTION - ONSET
ONSET: ON-GOING
ONSET: ON-GOING

## 2022-09-11 ASSESSMENT — PAIN DESCRIPTION - ORIENTATION
ORIENTATION: RIGHT
ORIENTATION: RIGHT

## 2022-09-11 ASSESSMENT — PAIN DESCRIPTION - LOCATION
LOCATION: LEG
LOCATION: LEG

## 2022-09-11 ASSESSMENT — PAIN SCALES - GENERAL
PAINLEVEL_OUTOF10: 5
PAINLEVEL_OUTOF10: 0
PAINLEVEL_OUTOF10: 5

## 2022-09-11 ASSESSMENT — PAIN - FUNCTIONAL ASSESSMENT
PAIN_FUNCTIONAL_ASSESSMENT: ACTIVITIES ARE NOT PREVENTED
PAIN_FUNCTIONAL_ASSESSMENT: ACTIVITIES ARE NOT PREVENTED

## 2022-09-11 ASSESSMENT — PAIN DESCRIPTION - PAIN TYPE
TYPE: CHRONIC PAIN
TYPE: CHRONIC PAIN

## 2022-09-11 ASSESSMENT — PAIN DESCRIPTION - DESCRIPTORS
DESCRIPTORS: ACHING
DESCRIPTORS: ACHING

## 2022-09-11 NOTE — CONSULTS
[facility]    Cardiology Consult/H&P  Consulting Cardiologist Rosario Kearney MD , [facility]  Referring Provider:  Lucy Villalta MD    9/11/2022, 9:44 AM    Chief Complaint   Patient presents with    Shortness of Breath     Pt arrives on CPAP due to severe SOB       Primary Cardiologist: Jeri Dsouza  Asked by No admitting provider for patient encounter./Ekta Ambrocio MD to evaluate his patient    Reason for consult STEMI    History of Present Illness:   Anuradha Jarrell is a 78 y.o. female brought in by [facility] today with difficulty breathing. This started yesterday. 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 This caused a significant anxiety and panic on her part and the police were called and the 's office was called they brought out sniffing dogs and the patient apparently was going up and down the stairs trying to find this client. She develops pain at that time and started having shortness of breath. The pain went away and his shortness of breath persisted through the night and got worse to this morning where she could not continue. .  She [employer] was called and she was in significant respiratory distress. Was placed on BiPAP in the ED and EKG raise a possibility of an anterior septal ST segment elevation MI. She was brought to the Cath Lab emergently. To Cath Lab for possible acute intervention. She has a known history of paroxysmal atrial fibrillation. She did have a heart cath in 2019 with clear coronary arteries and ejection fraction in January 2021 was 55%. Paroxysmal atrial fibrillation and has in the past experienced electrical cardioversion. Recent EKGs have shown atrial fibrillation. Severe hypertension  Trivial coronary lesions based on cath in 2019  Hyperlipidemia     Echo 2017: normal     Echo 12/2019: nl LV, EF 35-40%, mid to apical HK, indeterminate diastolic, mod TR, mild RV dysfunction, RVSP 49 (8), mod BLAKE.       [facility] 12/30/19: normal coronaries, EF 50%, LVEDP 20     Past Medical History:   has a past medical history of Acute systolic (congestive) heart failure ([address] Edema, Hearing loss, Hyperlipidemia, Hypertension, Morbid obesity due to excess calories ([address] Pre-diabetes, and PUD (peptic ulcer disease). Surgical History:   has a past surgical history that includes hernia repair and knee surgery (Bilateral). Social History:   reports that she has never smoked. She has never used smokeless tobacco. She reports that she does not drink alcohol and does not use drugs. Family History:  family history includes High Blood Pressure in her father; Other in her mother. Home Medications:  Prior to Admission medications    Medication Sig Start Date End Date Taking? Authorizing Provider   acarbose (PRECOSE) 25 mg tablet TAKE 1 TABLET BY MOUTH 3 TIMES DAILY WITH MEALS. 6/6/22   Lucy Villalta MD   vitamin D3 (CHOLECALCIFEROL) 10 MCG (400 UNIT) TABS tablet Take 1 tablet by mouth daily 3/2/22   Lucy Villalta MD   metoprolol succinate (TOPROL XL) 200 MG extended release tablet TAKE 1 TABLET BY MOUTH EVERY DAY 3/2/22   Lucy Villalta MD   losartan (COZAAR) 100 MG tablet TAKE 1 TABLET BY MOUTH EVERY DAY 3/2/22   Lucy Villalta MD   torsemide (DEMADEX) 20 MG tablet TAKE 1 TABLET BY MOUTH THREE TIMES A DAY 3/2/22   Lucy Villalta MD   ELIQUIS 5 MG TABS tablet TAKE 1 TABLET BY MOUTH TWICE A DAY 9/27/21   LAWRENCE Castillo - CNP   dextromethorphan-guaiFENesin (ROBITUSSIN-DM)  MG/5ML syrup Take 10 mLs by mouth every 4 hours as needed for Cough     Historical Provider, MD   blood glucose monitor strips Test 3 times a day & as needed for symptoms of irregular blood glucose.  12/31/19   Manuella Olszewski, MD        Current Medications:  Current Facility-Administered Medications   Medication Dose Route Frequency Provider Last Rate Last Admin    nitroGLYCERIN (NITROSTAT) 0.4 MG SL tablet             nitroGLYCERIN 50 mg in dextrose 5% 250 mL infusion 5-200 mcg/min IntraVENous Continuous Kavitha Lobo MD        atorvastatin (LIPITOR) tablet 80 mg  80 mg Oral Once Kavitha Lobo MD         Current Outpatient Medications   Medication Sig Dispense Refill    acarbose (PRECOSE) 25 mg tablet TAKE 1 TABLET BY MOUTH 3 TIMES DAILY WITH MEALS. 90 tablet 1    vitamin D3 (CHOLECALCIFEROL) 10 MCG (400 UNIT) TABS tablet Take 1 tablet by mouth daily 90 tablet 3    metoprolol succinate (TOPROL XL) 200 MG extended release tablet TAKE 1 TABLET BY MOUTH EVERY DAY 90 tablet 3    losartan (COZAAR) 100 MG tablet TAKE 1 TABLET BY MOUTH EVERY DAY 90 tablet 3    torsemide (DEMADEX) 20 MG tablet TAKE 1 TABLET BY MOUTH THREE TIMES A DAY 90 tablet 1    ELIQUIS 5 MG TABS tablet TAKE 1 TABLET BY MOUTH TWICE A  tablet 3    dextromethorphan-guaiFENesin (ROBITUSSIN-DM)  MG/5ML syrup Take 10 mLs by mouth every 4 hours as needed for Cough       blood glucose monitor strips Test 3 times a day & as needed for symptoms of irregular blood glucose. 100 strip 1       Allergies:  Aspirin and Dye [iodides]     Review of Systems:   Constitutional: there has been no unanticipated weight loss. Eyes: No visual changes or diplopia. No scleral icterus. ENT: No Headaches, hearing loss or vertigo. No mouth sores or sore throat. Cardiovascular: Reviewed in HPI   Pulmonary:  no cough or sputum production. Gastrointestinal: No abdominal pain, appetite loss, blood in stools. No change in bowel or bladder habits. Genitourinary: No dysuria, trouble voiding, or hematuria. Musculoskeletal:  No gait disturbance, weakness or joint complaints. Integumentary: No rash or pruritis. Endocrine: No malaise, fatigue or temperature intolerance. Hematologic/Lymphatic: No abnormal bruising or bleeding, blood clots or swollen lymph nodes. Allergic/Immunologic: No nasal congestion or hives. All other ROS are reviewed and are unremarkable.     Physical Examination:    Vitals:    09/11/22 8017 09/11/22 0919 09/11/22 0920 09/11/22 0921   BP: (!) 170/111  (!) 187/105    Pulse: (!) 120 (!) 115 (!) 118 (!) 113   Resp: (!) 47 24 (!) 36 23   SpO2: (!) 73%      Weight:            EXAMl and General Appearance:  Healthy. And alert . HEENT: eyes and ears intact. No nasal masses  THYROID: not enlarged  LUNGS:  Clear to auscultation and percussion  HEART and VASCULAR:  The apical impulses not displaced  Heart tones are crisp and normal  Cervical veins are not engorged  The carotid upstroke is normal in amplitude and contour without delay or bruit  Peripheral pulses are symmetrical and full  There is no clubbing, cyanosis of the extremities. No peripheral edema  Femoral Arteries: 2+ and equal  Pedal Pulses:2+ and equal   ABDOMEN[de-identified]  No masses or tenderness  Liver/Spleen: No Abnormalities Noted  NEUROLOGICAL:  . Moves all extremities to command. Cranial nerves 2-12 are in tact.   PSYCHIATRIC: alert and lucid  and oriented and appropriate  SKIN: No lesions or rashes  LYMPH NODES: none enlarged            CBC with Differential:    Lab Results   Component Value Date/Time    WBC 7.7 09/11/2022 09:04 AM    RBC 4.92 09/11/2022 09:04 AM    HGB 11.8 09/11/2022 09:04 AM    HCT 38.1 09/11/2022 09:04 AM     09/11/2022 09:04 AM    MCV 77.4 09/11/2022 09:04 AM    MCH 23.9 09/11/2022 09:04 AM    MCHC 30.9 09/11/2022 09:04 AM    RDW 18.2 09/11/2022 09:04 AM    LYMPHOPCT 36.9 03/02/2022 11:20 AM    MONOPCT 7.7 03/02/2022 11:20 AM    BASOPCT 0.8 03/02/2022 11:20 AM    MONOSABS 0.4 03/02/2022 11:20 AM    LYMPHSABS 2.1 03/02/2022 11:20 AM    EOSABS 0.1 03/02/2022 11:20 AM    BASOSABS 0.0 03/02/2022 11:20 AM     BMP:    Lab Results   Component Value Date/Time     09/11/2022 09:04 AM    K 5.0 09/11/2022 09:04 AM     09/11/2022 09:04 AM    CO2 21 09/11/2022 09:04 AM    BUN 15 09/11/2022 09:04 AM    LABALBU 4.1 09/11/2022 09:04 AM    CREATININE 0.9 09/11/2022 09:04 AM    CALCIUM 9.3 09/11/2022 09:04 AM    GFRAA >60 09/11/2022 09:04 AM    LABGLOM 60 09/11/2022 09:04 AM     Uric Acid:  No components found for: URIC  PT/INR:    Lab Results   Component Value Date/Time    PROTIME 15.3 09/11/2022 09:04 AM    INR 1.22 09/11/2022 09:04 AM     Last 3 Troponin:    Lab Results   Component Value Date/Time    TROPONINI 0.22 09/11/2022 09:04 AM    TROPONINI <0.01 09/30/2020 10:06 AM    TROPONINI 0.02 09/30/2020 01:20 AM     FLP:    Lab Results   Component Value Date/Time    TRIG 71 03/02/2022 11:20 AM    HDL 58 03/02/2022 11:20 AM    LDLCALC 139 03/02/2022 11:20 AM    LABVLDL 14 03/02/2022 11:20 AM       EKG: 3/24/2022. Sinus  Rhythm  -With rate variation   cv = 10. WITHIN NORMAL LIMITS  echocardiogramSummary 2/2/21   Normal left ventricle size, wall thickness, and systolic function with an   estimated ejection fraction of 55-60%. No regional wall motion abnormalities   are seen. Diastolic filling parameters suggests grade II diastolic   dysfunction. Increased LVEDP and LAP. Mild mitral and tricuspid regurgitation is present. Assessment/ Plan     Patient Active Problem List    Diagnosis Date Noted    Abnormal result of other cardiovascular function study (CODE)     Acute on chronic systolic congestive heart failure (HCC)     Essential hypertension     Mixed hyperlipidemia     PAF (paroxysmal atrial fibrillation) (HCC)     CHF NYHA class III, acute, diastolic ([address] 84/00/3195    SOB (shortness of breath)     Abnormal ECG     Acute pulmonary edema (HCC)     Acute systolic (congestive) heart failure (HCC)     Atrial fibrillation with RVR ([address] 12/23/2019    Right hip pain 11/20/2017    Abnormal nuclear stress test 10/06/2017    Hyperlipidemia LDL goal <100 02/10/2016    Morbid obesity due to excess calories ([address] 12/29/2015    Pure hypercholesterolemia 12/29/2015    Bilateral knee pain 12/29/2015    Pre-diabetes 12/29/2015     Patient with acute severe shortness of breath and recent chest discomfort and EKG abnormalities possibly STEMI. To the Cath Lab for coronary evaluation and potential intervention. Thanks for allowing me the opportunity  to participate in the evaluation and care of your patients.  Please call if we can assist further [phone]    This chart was likely completed using voice recognition technology and may contain unintended grammatical , phraseology,and/or punctuation errors  Nabil Cisse MD , [address] [facility]  9/11/20229:44 AM

## 2022-09-11 NOTE — ANESTHESIA PRE-OP
Brief Pre-Op Note/Sedation Assessment      Nona Kendall  1940  [id number]  11:09 AM    Planned Procedure: Cardiac Catheterization Procedure  Post Procedure Plan: Return to same level of care  Consent: I have discussed with the patient and/or the patient representative the indication, alternatives, and the possible risks and/or complications of the planned procedure and the anesthesia methods. The patient and/or patient representative appear to understand and agree to proceed. Chief Complaint:   Chest Pain/Pressure  Anginal Equivalent  Dyspnea      Indications for Cath Procedure:  Presentation:  ACS <= 24 hrs and Suspected CAD  2. Anginal Classification within 2 weeks:  CCS II - Slight limitation, with angina only during vigorous physical activity  3. Angina Symptoms Assessment:  Atypical Chest Pain  4. Heart Failure Class within last 2 weeks:  No symptoms  5. Cardiovascular Instability:  No    Prior Ischemic Workup/Eval:  Pre-Procedural Medications: Yes: Antiarrhythmic Agent Other, Aspirin, and STATIN  2. Stress Test Completed? No    Does Patient need surgery? Cath Valve Surgery:  No    Pre-Procedure Medical History:  Vital Signs:  BP (!) 187/105   Pulse (!) 113   Resp 23   Wt (!) 353 lb 14.4 oz (160.5 kg)   SpO2 (!) 73%   BMI 57.12 kg/m²     Allergies:     Allergies   Allergen Reactions    Aspirin      Caused ulcers, burns stomach    Dye [Iodides] Itching, Swelling and Rash     Medications:    Current Facility-Administered Medications   Medication Dose Route Frequency Provider Last Rate Last Admin    nitroGLYCERIN (NITROSTAT) 0.4 MG SL tablet             nitroGLYCERIN 50 mg in dextrose 5% 250 mL infusion  5-200 mcg/min IntraVENous Continuous Dolly Trejo MD        atorvastatin (LIPITOR) tablet 80 mg  80 mg Oral Once Dolly Trejo MD        iohexol (OMNIPAQUE 350) solution 150 mL  150 mL Other ONCE PRN Urban Li MD         Current Outpatient Medications   Medication Sig Dispense Refill    acarbose (PRECOSE) 25 mg tablet TAKE 1 TABLET BY MOUTH 3 TIMES DAILY WITH MEALS. 90 tablet 1    vitamin D3 (CHOLECALCIFEROL) 10 MCG (400 UNIT) TABS tablet Take 1 tablet by mouth daily 90 tablet 3    metoprolol succinate (TOPROL XL) 200 MG extended release tablet TAKE 1 TABLET BY MOUTH EVERY DAY 90 tablet 3    losartan (COZAAR) 100 MG tablet TAKE 1 TABLET BY MOUTH EVERY DAY 90 tablet 3    torsemide (DEMADEX) 20 MG tablet TAKE 1 TABLET BY MOUTH THREE TIMES A DAY 90 tablet 1    ELIQUIS 5 MG TABS tablet TAKE 1 TABLET BY MOUTH TWICE A  tablet 3    dextromethorphan-guaiFENesin (ROBITUSSIN-DM)  MG/5ML syrup Take 10 mLs by mouth every 4 hours as needed for Cough       blood glucose monitor strips Test 3 times a day & as needed for symptoms of irregular blood glucose. 100 strip 1       Past Medical History:    Past Medical History:   Diagnosis Date    Acute systolic (congestive) heart failure (HCC)     Edema     Hearing loss     Hyperlipidemia     Hypertension     Morbid obesity due to excess calories ([facility] [address] 12/29/2015    Pre-diabetes     PUD (peptic ulcer disease)     \"years ago\",        Surgical History:    Past Surgical History:   Procedure Laterality Date    HERNIA REPAIR      umbilical     KNEE SURGERY Bilateral     TKR bilateral             Pre-Sedation:  Pre-Sedation Documentation and Exam:  I have personally completed a history, physical exam & review of systems for this patient (see notes). Prior History of Anesthesia Complications:   none    Modified Mallampati:  I (soft palate, uvula, fauces, tonsillar pillars visible)    ASA Classification:  Class 3 - A patient with severe systemic disease that limits activity but is not incapacitating    Leah Scale:   Activity:  2 - Able to move 4 extremities voluntarily on command  Respiration:  2 - Able to breathe deeply and cough freely  Circulation:  2 - BP+/- 20mmHg of normal  Consciousness:  2 - Fully awake  Oxygen Saturation (color):  2 - Able to maintain oxygen saturation >92% on room air    Sedation/Anesthesia Plan:  Guard the patient's safety and welfare. Minimize physical discomfort and pain. Minimize negative psychological responses to treatment by providing sedation and analgesia and maximize the potential amnesia. Patient to meet pre-procedure discharge plan.     Medication Planned:  midazolam intravenously and fentanyl intravenously  We also administered Solu-Medrol and an Benadryl because of some previous questionable history of x-ray dye allergy  Patient is an appropriate candidate for plan of sedation:   yes      Electronically signed by Urban Li MD on 9/11/2022 at 11:09 AM

## 2022-09-11 NOTE — PROGRESS NOTES
Pt admitted to room 4320 from cath lab. Pt arrived on bipap and had to continue with bipap d/t hypoxia. Pt with R radial cath site, and R femoral cath site, both clean, dry, and intact with no hematoma. 2ml air removed from radial compression device. Vss. Continuous pulse ox and camera in room for safety while pt is on bipap. IV fluids infusing per MD. Family updated at bedside. Will continue to monitor.

## 2022-09-11 NOTE — PROGRESS NOTES
Attempted to place pt on NC and take bipap off but pt requested to keep bipap on. Pt with expiratory wheezes. VSS. Will continue to monitor.

## 2022-09-11 NOTE — ED PROVIDER NOTES
[address] ENCOUNTER          ATTENDING PHYSICIAN NOTE       Date of evaluation: 9/11/2022    Chief Complaint     Shortness of Breath (Pt arrives on CPAP due to severe SOB )    History of Present Illness     Chilango Kern is a 78 y.o. female who presents with shortness of breath that began yesterday. Patient states after an emotional event yesterday, she developed pain in her left side as well as shortness of breath. Overnight, her symptoms worsened, and her shortness of breath became severe, prompting her to call 911. On EMS arrival, patient was severely dyspneic with audible wheezing. They placed her on CPAP and gave her IM Solu-Medrol with concern for COPD exacerbation. They also obtained an EKG that was concerning for STEMI. Review of Systems     Review of Systems   Constitutional:  Negative for fatigue and fever. HENT:  Negative for congestion and rhinorrhea. Respiratory:  Positive for shortness of breath and wheezing. Negative for apnea, cough, chest tightness and stridor. Cardiovascular:  Positive for leg swelling. Negative for chest pain and palpitations. Gastrointestinal:  Positive for vomiting. Negative for abdominal distention, abdominal pain, constipation, diarrhea and nausea. Genitourinary:  Negative for difficulty urinating. Musculoskeletal:  Negative for back pain and neck pain. Skin:  Negative for pallor, rash and wound. Neurological:  Negative for dizziness, seizures, syncope, weakness and light-headedness. Psychiatric/Behavioral:  Negative for behavioral problems and confusion. Past Medical, Surgical, Family, and Social History     She has a past medical history of Acute systolic (congestive) heart failure ([address] Edema, Hearing loss, Hyperlipidemia, Hypertension, Morbid obesity due to excess calories ([address] Pre-diabetes, and PUD (peptic ulcer disease).   She has a past surgical history that includes hernia repair and knee surgery (Bilateral). Her family history includes High Blood Pressure in her father; Other in her mother. She reports that she has never smoked. She has never used smokeless tobacco. She reports that she does not drink alcohol and does not use drugs. Medications     Previous Medications    ACARBOSE (PRECOSE) 25 MG TABLET    TAKE 1 TABLET BY MOUTH 3 TIMES DAILY WITH MEALS. BLOOD GLUCOSE MONITOR STRIPS    Test 3 times a day & as needed for symptoms of irregular blood glucose. DEXTROMETHORPHAN-GUAIFENESIN (ROBITUSSIN-DM)  MG/5ML SYRUP    Take 10 mLs by mouth every 4 hours as needed for Cough     ELIQUIS 5 MG TABS TABLET    TAKE 1 TABLET BY MOUTH TWICE A DAY    LOSARTAN (COZAAR) 100 MG TABLET    TAKE 1 TABLET BY MOUTH EVERY DAY    METOPROLOL SUCCINATE (TOPROL XL) 200 MG EXTENDED RELEASE TABLET    TAKE 1 TABLET BY MOUTH EVERY DAY    TORSEMIDE (DEMADEX) 20 MG TABLET    TAKE 1 TABLET BY MOUTH THREE TIMES A DAY    VITAMIN D3 (CHOLECALCIFEROL) 10 MCG (400 UNIT) TABS TABLET    Take 1 tablet by mouth daily       Allergies     She is allergic to aspirin and dye [iodides]. Physical Exam     INITIAL VITALS: BP: (!) 243/143,  , Heart Rate: (!) 108, Resp: 30, SpO2: 90 %   Physical Exam  Constitutional:       General: She is in acute distress. Appearance: She is ill-appearing and diaphoretic. HENT:      Head: Normocephalic and atraumatic. Eyes:      Extraocular Movements: Extraocular movements intact. Cardiovascular:      Rate and Rhythm: Regular rhythm. Tachycardia present. Pulmonary:      Effort: Tachypnea and respiratory distress present. Comments: Breath sounds globally diminished with audible wheezing. Chest:      Chest wall: No tenderness or edema. Abdominal:      Palpations: Abdomen is soft. Tenderness: There is no abdominal tenderness. There is no guarding or rebound. Musculoskeletal:      Cervical back: Normal range of motion. Right lower leg: Edema present.       Left lower leg: (H) 25.0 - 40.0 mmHg    HCO3, Venous 22.8 (L) 24.0 - 28.0 mmol/L    Base Excess, Clemente -5.3 (L) -2.0 - 3.0 mmol/L    O2 Sat, Clemente 97 Not established %    Carboxyhemoglobin 0.4 0.0 - 1.5 %    MetHgb, Clemente 0.5 0.0 - 1.5 %    TC02 (Calc), Clemente 24 mmol/L    Hemoglobin, Clemente, Reduced 2.60 %       ED BEDSIDE ULTRASOUND:  No results found. RECENT VITALS:  BP: (!) 187/105, , Heart Rate: (!) 113, Resp: 23, SpO2: (!) 73 %     Procedures     None    ED Course     Nursing Notes, Past Medical Hx, Past Surgical Hx, Social Hx,Allergies, and Family Hx were reviewed. Patient was given the following medications:  Orders Placed This Encounter   Medications    ipratropium-albuterol (DUONEB) nebulizer solution 1 ampule     Order Specific Question:   Initiate RT Bronchodilator Protocol     Answer:   Yes - ED protocol    nitroglycerin (NITRO-BID) 2 % ointment     Jacey Neff: cabinet override    nitroGLYCERIN (NITROSTAT) 0.4 MG SL tablet     Jacey Neff: cabinet override    ipratropium-albuterol (DUONEB) 0.5-2.5 (3) MG/3ML nebulizer solution     NONA DAILY: cabinet override    nitroGLYCERIN 50 mg in dextrose 5% 250 mL infusion     Order Specific Question:   Titrate Infusion? Answer:   No     Order Specific Question:   Infusion Dose: Answer:   5 mcg/min    aspirin chewable tablet 324 mg    ticagrelor (BRILINTA) tablet 180 mg    atorvastatin (LIPITOR) tablet 80 mg       CONSULTS:  None    MEDICAL DECISIONMAKING / ASSESSMENT / Arturo Eugenie is a 78 y.o. female who presented with shortness of breath x24 hours, worse for the last 5. On arrival, she was ill-appearing and in acute respiratory distress with audible wheezing. She was profoundly diaphoretic and somnolent on CPAP from EMS with an SpO2 in the 70s. She was transitioned to our BiPAP and given a DuoNeb treatment.   Repeat twelve-lead was obtained confirming anterior ST elevation, patient was discussed with the interventional cardiologist, and the Cath Lab was activated. Bedside ultrasound showed widespread B-lines consistent with pulmonary edema, and patient was markedly hypertensive with an initial blood pressure in the 240s/140s. 1 inch transdermal Nitropaste was applied with rapid reduction in blood pressure and near immediate improvement in patient's respiratory status and increase in her SpO2 to the high 90s. A nitro drip was ordered but was held given the brisk response to transdermal nitro. Patient was given 324 mg ASA, 180 mg Brilinta, and 80 mg atorvastatin. Chest x-ray was obtained showing cardiomegaly and pulmonary edema. Cath Lab staff arrived and took patient emergently to the cardiac catheterization lab. Critical Care:  Due to the immediate potential for life-threatening deterioration due to STEMI and acute hypoxic respiratory failure secondary to pulmonary edema, I spent 38 minutes providing critical care. This time excludes time spent performing procedures but includes time spent on direct patient care, history retrieval, review of the chart, and discussions with patient, family, and consultant(s). Clinical Impression     1. ST elevation myocardial infarction (STEMI), unspecified artery ([facility] [address]    2. Acute pulmonary edema (HCC)        Disposition     PATIENT REFERRED TO:  No follow-up provider specified.     DISCHARGE MEDICATIONS:  New Prescriptions    No medications on file       DISPOSITION  09/11/2022 09:44:05 AM Send to Cath Lab         Mary Banuelos MD  09/11/22 [id number]

## 2022-09-11 NOTE — PROCEDURES
CARDIOLOGY CATH LAB NOTE  [facility]  INTERVENTIONAL CARDIOLOGY       Left heart catheterization with coronary angiography    Chilango Kern  78 y.o.  [id number]  9/11/2022, 11:11 AM  Referring MD : Piper Coates MD  Procedure Coronary Angiogram   Left heart cath  Selective coronary angiogram  Left ventriculogram  Right ileofemoral angiogram  Closure device (Angioseal)    Procedure performed by Dr. Jose Olivares MD, [facility]  Procedural assistants : none  Indication: Cardiac cath to rule out ischemic CAD, Possible angioplasty, The procedure and risks described to patient including risk of CVA, MI, bleeding, emergency surgery, death, patient presented to the emergency department with some chest discomfort on yesterday associated with shortness of breath and his shortness of breath persisted through the night and this morning and caused her to come into the emergency department. EKG suggested some anterior wall ST segment changes possibly STEMI., or Consent signed    She gives a history of significant traumatic stress in her life. 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 The police were called including the  department and deputies and the snapping dogs. They did finally find the client. She was running around the house \"\" going up the stairs and started having pain and shortness of breath and it persisted through the night. Anesthesia: Moderate sedation with Versed and Fentanyl IV  Any and all anesthesia was administered by my staff under my direct supervision and with me personally monitoring the patient inside the room. Estimated blood loss :minimal  Specimen removed : NONE    We initially attempted to approach from the right radial artery.   After multiple attempts we were not able to actually cannulate the right radial and we aborted that location to go to the right femoral.  This was done very quickly and easily without complications. After informed consent was obtained and  History and exam reviewed the patient was taken to the cardiac cath lab. The patient had the right groin prepped and draped in sterile fashion. The groin was anesthetized with 2% Xylocaine. Using a thin walled needle the right femoral artery was entered and and .035mm guide wire was inserted. A 5 Setswana arterial introducer was advanced through the needle and the wire was removed. The sheath was aspirated and flushed with normal saline. A 5 left Deborah  JL4 catheter was advanced through the sheath over a guide wire and advanced under fluoroscopic guidance into the ascending aorta. The wire was withdrawn and the catheter was aspirated and flushed with normal saline. The catheter was then advanced into the ostium of the left coronary artery under fluoroscopic guidance. Multiple cine images were obtained in different projections of the left coronary artery. The catheter was then withdrawn. A JR4 catheter was subsequently advanced  Through the sheath over a wire and advanced under fluoroscopic guidance in to the ascending aorta. The guide wire was removed and the catheter was aspirated and flushed and subsequently advanced in to the ostium of the right coronary artery. Multiple cine images were then obtained of the right coronary artery. The catheter was then withdrawn. A pigtail catheter was then advanced through the sheath over a guide wire and advanced in to the ascending aorta. The wire was then used to help prolapse the catheter across the aortic valve. Once the catheter was across the valve the wire was withdrawn and the catheter was aspirated and flushed with normal saline. Pressure measurements of the left ventricle were then obtained. Then catheter was then connected to a mechanical injection device for contrast ventriculography that was performed in an SOLITARIO projection.     The catheter was then reconnected to a

## 2022-09-11 NOTE — PROGRESS NOTES
4 Eyes Admission Assessment     I agree as the admission nurse that 2 RN's have performed a thorough Head to Toe Skin Assessment on the patient. ALL assessment sites listed below have been assessed on admission. Areas assessed by both nurses:   [x]   Head, Face, and Ears   [x]   Shoulders, Back, and Chest  [x]   Arms, Elbows, and Hands   [x]   Coccyx, Sacrum, and Ischium  [x]   Legs, Feet, and Heels    R radial post cath surgical site  R femoral post cath surgical site         Does the Patient have Skin Breakdown?   No         Damien Prevention initiated:  Yes   Wound Care Orders initiated:  No      WOC nurse consulted for Pressure Injury (Stage 3,4, Unstageable, DTI, NWPT, and Complex wounds) or Damien score 18 or lower:  No      Nurse 1 eSignature: Electronically signed by Darrian Ma RN on 9/11/22 at 5:35 PM EDT    **SHARE this note so that the co-signing nurse is able to place an eSignature**    Nurse 2 eSignature: Electronically signed by Marcie Quinones RN on 9/11/22 at 5:35 PM EDT

## 2022-09-11 NOTE — PROGRESS NOTES
Pt transported from cath lab to 4320 while on BIPAP. Attempted to take BIPAP mask off and place on 5lpm per verbal order from Dr. Kandi Maciel that if pt maintains 02 saturations, BIPAP can be taken off as pt tolerates. Pt did not tolerate being off BIPAP, c/o difficulty breathing and requested to be placed on BIPAP.      Pt currently resting on BIPAP 15/8/80%, continuous pulse ox in place SP02 100%

## 2022-09-12 ENCOUNTER — APPOINTMENT (OUTPATIENT)
Dept: GENERAL RADIOLOGY | Age: 82
DRG: 280 | End: 2022-09-12
Payer: MEDICARE

## 2022-09-12 PROBLEM — J96.02 ACUTE RESPIRATORY FAILURE WITH HYPOXIA AND HYPERCAPNIA (HCC): Status: ACTIVE | Noted: 2022-09-12

## 2022-09-12 PROBLEM — J96.01 ACUTE RESPIRATORY FAILURE WITH HYPOXIA AND HYPERCAPNIA (HCC): Status: ACTIVE | Noted: 2022-09-12

## 2022-09-12 LAB
ANION GAP SERPL CALCULATED.3IONS-SCNC: 14 MMOL/L (ref 3–16)
BUN BLDV-MCNC: 19 MG/DL (ref 7–20)
CALCIUM SERPL-MCNC: 9.3 MG/DL (ref 8.3–10.6)
CHLORIDE BLD-SCNC: 104 MMOL/L (ref 99–110)
CHOLESTEROL, TOTAL: 245 MG/DL (ref 0–199)
CO2: 23 MMOL/L (ref 21–32)
CREAT SERPL-MCNC: 0.8 MG/DL (ref 0.6–1.2)
EKG ATRIAL RATE: 125 BPM
EKG DIAGNOSIS: NORMAL
EKG P AXIS: 50 DEGREES
EKG P-R INTERVAL: 152 MS
EKG Q-T INTERVAL: 296 MS
EKG QRS DURATION: 78 MS
EKG QTC CALCULATION (BAZETT): 427 MS
EKG R AXIS: 29 DEGREES
EKG T AXIS: 12 DEGREES
EKG VENTRICULAR RATE: 125 BPM
GFR AFRICAN AMERICAN: >60
GFR NON-AFRICAN AMERICAN: >60
GLUCOSE BLD-MCNC: 135 MG/DL (ref 70–99)
HDLC SERPL-MCNC: 68 MG/DL (ref 40–60)
LDL CHOLESTEROL CALCULATED: 166 MG/DL
POTASSIUM SERPL-SCNC: 4.7 MMOL/L (ref 3.5–5.1)
SODIUM BLD-SCNC: 141 MMOL/L (ref 136–145)
TRIGL SERPL-MCNC: 56 MG/DL (ref 0–150)
VLDLC SERPL CALC-MCNC: 11 MG/DL

## 2022-09-12 PROCEDURE — 2580000003 HC RX 258: Performed by: INTERNAL MEDICINE

## 2022-09-12 PROCEDURE — 80048 BASIC METABOLIC PNL TOTAL CA: CPT

## 2022-09-12 PROCEDURE — 94761 N-INVAS EAR/PLS OXIMETRY MLT: CPT

## 2022-09-12 PROCEDURE — 2060000000 HC ICU INTERMEDIATE R&B

## 2022-09-12 PROCEDURE — 36415 COLL VENOUS BLD VENIPUNCTURE: CPT

## 2022-09-12 PROCEDURE — 6370000000 HC RX 637 (ALT 250 FOR IP): Performed by: INTERNAL MEDICINE

## 2022-09-12 PROCEDURE — 80061 LIPID PANEL: CPT

## 2022-09-12 PROCEDURE — 2700000000 HC OXYGEN THERAPY PER DAY

## 2022-09-12 PROCEDURE — 94660 CPAP INITIATION&MGMT: CPT

## 2022-09-12 PROCEDURE — 6360000002 HC RX W HCPCS: Performed by: INTERNAL MEDICINE

## 2022-09-12 PROCEDURE — 99233 SBSQ HOSP IP/OBS HIGH 50: CPT | Performed by: INTERNAL MEDICINE

## 2022-09-12 PROCEDURE — 94640 AIRWAY INHALATION TREATMENT: CPT

## 2022-09-12 PROCEDURE — 71046 X-RAY EXAM CHEST 2 VIEWS: CPT

## 2022-09-12 PROCEDURE — 99223 1ST HOSP IP/OBS HIGH 75: CPT | Performed by: INTERNAL MEDICINE

## 2022-09-12 PROCEDURE — 6370000000 HC RX 637 (ALT 250 FOR IP)

## 2022-09-12 RX ORDER — IPRATROPIUM BROMIDE AND ALBUTEROL SULFATE 2.5; .5 MG/3ML; MG/3ML
1 SOLUTION RESPIRATORY (INHALATION)
Status: DISCONTINUED | OUTPATIENT
Start: 2022-09-12 | End: 2022-09-16

## 2022-09-12 RX ORDER — IPRATROPIUM BROMIDE AND ALBUTEROL SULFATE 2.5; .5 MG/3ML; MG/3ML
SOLUTION RESPIRATORY (INHALATION)
Status: COMPLETED
Start: 2022-09-12 | End: 2022-09-12

## 2022-09-12 RX ORDER — FUROSEMIDE 40 MG/1
40 TABLET ORAL 2 TIMES DAILY
Status: DISCONTINUED | OUTPATIENT
Start: 2022-09-12 | End: 2022-09-12

## 2022-09-12 RX ORDER — METHYLPREDNISOLONE SODIUM SUCCINATE 125 MG/2ML
80 INJECTION, POWDER, LYOPHILIZED, FOR SOLUTION INTRAMUSCULAR; INTRAVENOUS ONCE
Status: COMPLETED | OUTPATIENT
Start: 2022-09-12 | End: 2022-09-12

## 2022-09-12 RX ORDER — NITROGLYCERIN 80 MG/1
1 PATCH TRANSDERMAL DAILY
Status: DISCONTINUED | OUTPATIENT
Start: 2022-09-12 | End: 2022-09-19

## 2022-09-12 RX ORDER — FUROSEMIDE 10 MG/ML
40 INJECTION INTRAMUSCULAR; INTRAVENOUS 2 TIMES DAILY
Status: DISCONTINUED | OUTPATIENT
Start: 2022-09-12 | End: 2022-09-17

## 2022-09-12 RX ORDER — METHYLPREDNISOLONE SODIUM SUCCINATE 40 MG/ML
40 INJECTION, POWDER, LYOPHILIZED, FOR SOLUTION INTRAMUSCULAR; INTRAVENOUS DAILY
Status: DISCONTINUED | OUTPATIENT
Start: 2022-09-13 | End: 2022-09-13

## 2022-09-12 RX ADMIN — SODIUM CHLORIDE, PRESERVATIVE FREE 10 ML: 5 INJECTION INTRAVENOUS at 19:48

## 2022-09-12 RX ADMIN — FUROSEMIDE 40 MG: 10 INJECTION, SOLUTION INTRAMUSCULAR; INTRAVENOUS at 19:49

## 2022-09-12 RX ADMIN — IPRATROPIUM BROMIDE AND ALBUTEROL SULFATE 1 AMPULE: 2.5; .5 SOLUTION RESPIRATORY (INHALATION) at 22:30

## 2022-09-12 RX ADMIN — SODIUM CHLORIDE, PRESERVATIVE FREE 10 ML: 5 INJECTION INTRAVENOUS at 09:00

## 2022-09-12 RX ADMIN — FUROSEMIDE 40 MG: 10 INJECTION, SOLUTION INTRAMUSCULAR; INTRAVENOUS at 13:11

## 2022-09-12 RX ADMIN — METHYLPREDNISOLONE SODIUM SUCCINATE 80 MG: 125 INJECTION, POWDER, FOR SOLUTION INTRAMUSCULAR; INTRAVENOUS at 13:11

## 2022-09-12 RX ADMIN — IPRATROPIUM BROMIDE AND ALBUTEROL SULFATE 3 ML: .5; 3 SOLUTION RESPIRATORY (INHALATION) at 13:12

## 2022-09-12 RX ADMIN — IPRATROPIUM BROMIDE AND ALBUTEROL SULFATE 1 AMPULE: 2.5; .5 SOLUTION RESPIRATORY (INHALATION) at 16:57

## 2022-09-12 ASSESSMENT — PAIN SCALES - GENERAL
PAINLEVEL_OUTOF10: 0
PAINLEVEL_OUTOF10: 0

## 2022-09-12 NOTE — CONSULTS
Initial Pulmonary & Critical Care Consult Note      Reason for Consult: Dyspnea  Requesting Physician: Dr. Vázquez Basil:   [facility] / Hospitals in [place]:                The patient is a 78 y.o. female with significant past medical history of hypertension, hyperlipidemia, peptic ulcer disease, A. Fib s/p cardioversion, CHF morbid obesity presented to [facility] ER yesterday morning for 24 hours of of dyspnea associated with left-sided chest pain or a significant stressful event. Dyspnea became more severe prompting patient to call 911. On their arrival they noted her to be severely dyspneic with audible wheezing and placed her on CPAP. Given concern for COPD/asthma they gave her IM Solu-Medrol but also obtain EKG that was concerning for a STEMI    On arrival in the ER she appeared ill and in acute respiratory distress with audible wheezing. She was diaphoretic and somnolent had an oxygen saturation in the 70s. She was transitioned to BiPAP and given a DuoNeb treatment. EKG yesterday confirmed anterior ST segment elevation so Cath Lab was activated. Patient initially hypertensive to 240/140 and bedside ultrasound showed B-lines consistent with pulmonary edema. Nitropaste was applied with significant improvement in hypertension and respiratory failure. Chest x-ray showed pulmonary edema and VBG was 7.23/55/127    Of note she had a left heart catheterization in December 2019 that showed normal coronaries with an EF of 50% and LVEDP of 20    [facility] yesterday showed the following:  Angiographic Findings:  Left Main: Normal     Left Anterior Descending: Normal        Circumflex: Mild plaque. No significant stenosis. Right Coronary: Dominant normal        Left Ventriculogram: Consistent with Takotsubo. Ejection fraction 15%. Distal anterior wall apex is akinetic was the base is contractile.      Right ileofemoral: Normal        Complications : None     Hemodynamics:   Left Ventricular Pressure: 25  Central Aortic Pressure: 113/82 after administration of IV metoprolol     Assessment:  Acute coronary syndrome with Takotsubo. We did not find coronary lesions or need intervention. \  LV function is abnormal showing evidence for Takotsubo and low ejection fraction of 15%. Recommendations: Continue support with diuretics. Nitroglycerin as noted. She will be going to the floor on BiPAP. Beta-blocker. She normally takes Eliquis we will resume that. Also losartan 100 mg. Torsemide 20 mg. Metoprolol succinate 200 mg/day. Currently Ms Kinjal Bran is moderately dyspneic with diffuse wheezing and peripheral edema    Past Medical History:      Diagnosis Date    Acute systolic (congestive) heart failure ([address]     Edema     Hearing loss     Hyperlipidemia     Hypertension     Morbid obesity due to excess calories ([facility] 12/29/2015    Pre-diabetes     PUD (peptic ulcer disease)     \"years ago\",       Past Surgical History:        Procedure Laterality Date    HERNIA REPAIR      umbilical     KNEE SURGERY Bilateral     TKR bilateral     Current Medications:     nitroGLYCERIN  1 patch TransDERmal Daily    furosemide  40 mg Oral BID    atorvastatin  80 mg Oral Once    sodium chloride flush  5-40 mL IntraVENous 2 times per day        Social History:    Lifelong non-smoker  Does not use alcohol    Family History:   Hypertension    REVIEW OF SYSTEMS:    CONSTITUTIONAL:  negative for fevers, chills, activity change, appetite change, fatigue, night sweats and unexpected weight change.    EYES:  negative for blurred vision, eye discharge, visual disturbance and icterus  HEENT:  negative for hearing loss, tinnitus, ear drainage, sinus pressure, nasal congestion, epistaxis and snoring  RESPIRATORY:  See HPI  CARDIOVASCULAR:  negative for  palpitations, exertional chest pressure/discomfort, edema, syncope  GASTROINTESTINAL:  negative for nausea, vomiting, diarrhea, constipation, blood in stool and abdominal pain  GENITOURINARY:  negative for No obvious depression or anxiety. MUSCULOSKELETAL: No obvious misalignment or effusion of the joints. No clubbing, cyanosis of the digits. SKIN:  normal skin color, texture, turgor and no redness, warmth, or swelling.  No palpable nodules    DATA:    Old records have been reviewed  CBC with Differential:    Lab Results   Component Value Date/Time    WBC 7.7 09/11/2022 09:04 AM    RBC 4.92 09/11/2022 09:04 AM    HGB 11.8 09/11/2022 09:04 AM    HCT 38.1 09/11/2022 09:04 AM     09/11/2022 09:04 AM    MCV 77.4 09/11/2022 09:04 AM    MCH 23.9 09/11/2022 09:04 AM    MCHC 30.9 09/11/2022 09:04 AM    RDW 18.2 09/11/2022 09:04 AM    LYMPHOPCT 47.0 09/11/2022 09:04 AM    MONOPCT 4.0 09/11/2022 09:04 AM    BASOPCT 0.0 09/11/2022 09:04 AM    MONOSABS 0.3 09/11/2022 09:04 AM    LYMPHSABS 3.6 09/11/2022 09:04 AM    EOSABS 0.0 09/11/2022 09:04 AM    BASOSABS 0.0 09/11/2022 09:04 AM     BMP:    Lab Results   Component Value Date/Time     09/12/2022 05:23 AM    K 4.7 09/12/2022 05:23 AM    K 5.0 09/11/2022 09:04 AM     09/12/2022 05:23 AM    CO2 23 09/12/2022 05:23 AM    BUN 19 09/12/2022 05:23 AM    CREATININE 0.8 09/12/2022 05:23 AM    CALCIUM 9.3 09/12/2022 05:23 AM    GFRAA >60 09/12/2022 05:23 AM    LABGLOM >60 09/12/2022 05:23 AM    GLUCOSE 135 09/12/2022 05:23 AM     Hepatic Function Panel:    Lab Results   Component Value Date/Time    ALKPHOS 84 09/11/2022 09:04 AM    ALT 15 09/11/2022 09:04 AM    AST 41 09/11/2022 09:04 AM    PROT 8.0 09/11/2022 09:04 AM    BILITOT 0.8 09/11/2022 09:04 AM     ABG:    Lab Results   Component Value Date/Time    NFM3CYU 27 09/30/2020 02:29 AM    BEART 1.1 09/30/2020 02:29 AM    U0LOFBMO 96 09/30/2020 02:29 AM    PHART 7.343 09/30/2020 02:29 AM    MVU1YQH 50.9 09/30/2020 02:29 AM    PO2ART 110.0 09/30/2020 02:29 AM    GRS3ZHI 29 09/30/2020 02:29 AM     proBNP 4635  Troponin 0.22  VBG 7.23/55    Radiology Review:  All pertinent images / reports were reviewed as a part of this visit.  Chest x-ray 9/12/202 reveals the following:  Pulmonary edema, mildly improved from previous day     PFTs:  None on file    Echo: 2/2021  Summary   Normal left ventricle size, wall thickness, and systolic function with an   estimated ejection fraction of 55-60%. No regional wall motion abnormalities   are seen. Diastolic filling parameters suggests grade II diastolic   dysfunction. Increased LVEDP and LAP. Mild mitral and tricuspid regurgitation is present. Assessment       CHF exac  Takotsubo  Acute Bronchospasm - pulmonary edema vs asthma, although no Hx of  HTN  Morbid Obesity  6. Acute Hypoxemic/Hypercapnic Resp Failure     Plan    Change Lasix to 40 mg IV BID with first dose now  Duonebs q 4 hrs WA  Solumedrol 80 mg IV x 1 then 40 mg IV daily  ECHO pending  Bipap prn resp distress  Will need outpt PSG/PFTs  Wean oxygen as tolerated for goal O2 sat 88%  8.    Full Code      Meseret Sers

## 2022-09-12 NOTE — PROGRESS NOTES
Pt states she is feeling much better after treatments. Now speaking in full sentences with unlabored respirations. O2 turned down to 4L, and SPO2 maintains ~94%.

## 2022-09-12 NOTE — PROGRESS NOTES
The [address] [place] [address]  Cardiology Daily Progress Note  9/12/2022,9:28 AM      Christine Yancey MD ,Duane Davies, MD  Primary cardiologist:    Admit Date:  9/11/2022  Hospital Day: Hospital Day: 2  Subjective:  Ms. Sylvia Salmeron is better today. Breathing better. No chest pain. Leg edema is chronic and lymphedema. May be better as well. No further chest discomfort or pain and her rhythm remained stable sinus at 72/min. She did have a cath yesterday showing Takotsubo cardiomyopathy. Takotsubo cardiomyopathy diagnosed 9/11/2022. Hypertension. Hyperlipidemia. Objective:   BP (!) 145/88   Pulse 78   Temp 97.8 °F (36.6 °C) (Axillary)   Resp 20   Ht 5' 6\" (1.676 m)   Wt (!) 346 lb 12.5 oz (157.3 kg)   SpO2 100%   BMI 55.97 kg/m²     Intake/Output Summary (Last 24 hours) at 9/12/2022 0928  Last data filed at 9/12/2022 0120  Gross per 24 hour   Intake 480 ml   Output 750 ml   Net -270 ml       TELEMETRY: Sinus 76     Physical Examination:    Vitals:    09/12/22 0022 09/12/22 0301 09/12/22 0400 09/12/22 0600   BP:  (!) 145/88     Pulse: 66 78     Resp:  20     Temp:  97.8 °F (36.6 °C)     TempSrc:  Axillary     SpO2: 100% 100% 100% 100%   Weight:  (!) 346 lb 12.5 oz (157.3 kg)     Height:  5' 6\" (1.676 m)          Constitutional and General Appearance:  Healthy. And alert . HEENT: eyes and ears intact. No nasal masses  THYROID: not enlarged  LUNGS:  Clear to auscultation and percussion  HEART and VASCULAR:  The apical impulses not displaced  Heart tones are crisp and normal  Cervical veins are not engorged  The carotid upstroke is normal in amplitude and contour without delay or bruit  Peripheral pulses are symmetrical and full  There is no clubbing, cyanosis of the extremities.   Severe and bilateral I believe lymph edema peripheral edema  Femoral Arteries: 2+ and equal  Pedal Pulses: 2+ and equal   ABDOMEN[de-identified]  No masses or tenderness  Liver/Spleen: No Abnormalities Noted  NEUROLOGICAL:  . Moves all extremities to command. Cranial nerves 2-12 are in tact. PSYCHIATRIC: alert and lucid and oriented and appropriate  SKIN: No lesions or rashes  LYMPH NODES: none enlarged      Medications:    atorvastatin  80 mg Oral Once    sodium chloride flush  5-40 mL IntraVENous 2 times per day      nitroGLYCERIN      sodium chloride       iohexol, sodium chloride flush, sodium chloride, acetaminophen    Lab Data:  CBC:   Recent Labs     09/11/22 0904   WBC 7.7   HGB 11.8*   HCT 38.1   MCV 77.4*        BMP:   Recent Labs     09/11/22  0904 09/12/22  0523    141   K 5.0 4.7    104   CO2 21 23   BUN 15 19   CREATININE 0.9 0.8     Troponin:Troponin:   Lab Results   Component Value Date/Time    TROPONINI 0.22 09/11/2022 09:04 AM     LIVER PROFILE:   Recent Labs     09/11/22 0904   AST 41*   ALT 15   BILITOT 0.8   ALKPHOS 84     PT/INR:   Recent Labs     09/11/22 0904   PROTIME 15.3*   INR 1.22*     APTT: No results for input(s): APTT in the last 72 hours. BNP:  No results for input(s): BNP in the last 72 hours.   IMAGING: as noted    Assessment:  Patient Active Problem List    Diagnosis Date Noted    Abnormal result of other cardiovascular function study (CODE)     NSTEMI (non-ST elevated myocardial infarction) ([facility] 09/11/2022    Acute on chronic systolic congestive heart failure (HCC)     Essential hypertension     Mixed hyperlipidemia     PAF (paroxysmal atrial fibrillation) ([facility])     CHF NYHA class III, acute, diastolic ([facility] 50/24/9445    SOB (shortness of breath)     Abnormal ECG     Acute pulmonary edema (HCC)     Acute systolic (congestive) heart failure ([facility])     Atrial fibrillation with RVR ([facility] 12/23/2019    Right hip pain 11/20/2017    Abnormal nuclear stress test 10/06/2017    Hyperlipidemia LDL goal <100 02/10/2016    Morbid obesity due to excess calories ([facility] 12/29/2015    Pure hypercholesterolemia 12/29/2015    Bilateral knee pain 12/29/2015    Pre-diabetes 12/29/2015       Plan:   Continue current medications. Core Measures:  Discharge instructions:   LVEF documented:   ACEI for LV dysfunction:   Takotsubo cardiomyopathy. Severe LV dysfunction. We will get an echocardiogram today. Discontinue nitroglycerin drip. Continue the metoprolol as necessary for blood pressure. We will also get fasting lipid levels. Wean off and discontinue the BiPAP. Thanks for allowing me  the opportunity to participate in the evaluation and care of your patient.  If there are questions please call me [phone]    This note was likely completed using voice recognition technology and may contain unintended grammatical, phraseology and/or punctuation  errors      Thalia Frederick MD ,[facility]  9/12/2022 9:28 AM

## 2022-09-12 NOTE — PLAN OF CARE
Problem: Discharge Planning  Goal: Discharge to home or other facility with appropriate resources  Outcome: Progressing     Problem: Respiratory - Adult  Goal: Achieves optimal ventilation and oxygenation  Outcome: Progressing     Problem: Cardiovascular - Adult  Goal: Maintains optimal cardiac output and hemodynamic stability  Outcome: Progressing  Goal: Absence of cardiac dysrhythmias or at baseline  Outcome: Progressing     Problem: Safety - Adult  Goal: Free from fall injury  Outcome: Progressing  Flowsheets (Taken 9/12/2022 1927)  Free From Fall Injury: Instruct family/caregiver on patient safety     Problem: Pain  Goal: Verbalizes/displays adequate comfort level or baseline comfort level  Outcome: Progressing     Problem: Skin/Tissue Integrity  Goal: Absence of new skin breakdown  Description: 1. Monitor for areas of redness and/or skin breakdown  2. Assess vascular access sites hourly  3. Every 4-6 hours minimum:  Change oxygen saturation probe site  4. Every 4-6 hours:  If on nasal continuous positive airway pressure, respiratory therapy assess nares and determine need for appliance change or resting period.   Outcome: Progressing

## 2022-09-12 NOTE — CARE COORDINATION
Case Management Assessment           Initial Evaluation                Date / Time of Evaluation: 9/12/2022 2:32 PM                 Assessment Completed by: Erinn Cavazos RN    Patient Name: Arcadio Mcgowan     YOB: 1940  Diagnosis: Acute pulmonary edema ([facility] [J81.0]  NSTEMI (non-ST elevated myocardial infarction) ([facility] [facility] [I21.4]  ST elevation myocardial infarction (STEMI), unspecified artery ([facility] [I21.3]     Date / Time: 9/11/2022  8:45 AM    Patient Admission Status: Inpatient    If patient is discharged prior to next notation, then this note serves as note for discharge by case management. Current PCP: Helder Salazar MD  Clinic Patient: No    Chart Reviewed: Yes  Patient/ Family Interviewed: Yes    Initial assessment completed at bedside with: patient     Hospitalization in the last 30 days: No    Emergency Contacts:  Extended Emergency Contact Information  Primary Emergency Contact: [address] Phone: [phone]  Mobile Phone: [phone]  Relation: Child  Secondary Emergency Contact: [address] [place] [address],[address] Phone: [phone]  Relation: Brother/Sister    Advance Directives:   Code Status: Full Code        Financial  Payor: Fox Hardin / Plan: Kay Romero ESSENTIAL/PLUS / Product Type: *No Product type* /     Pre-cert required for SNF: Yes    Pharmacy    [facility] [address] [place] [address] [phone] [address] [facility] [place]  Phone: [phone] Fax: [phone]      Potential assistance Purchasing Medications: Potential Assistance Purchasing Medications: No  Does Patient want to participate in local refill/ meds to beds program?:      Meds To Beds General Rules:  1. Can ONLY be done Monday- Friday between 8:30am-5pm  2. Prescription(s) must be in pharmacy by 3pm to be filled same day  3. Copy of patient's insurance/ prescription drug card and patient face sheet must be sent along with the prescription(s)  4. Cost of Rx cannot be added to hospital bill. If financial assistance is needed, please contact unit  or ;  or  CANNOT provide pharmacy voucher for patients co-pays  5. Patients can then  the prescription on their way out of the hospital at discharge, or pharmacy can deliver to the bedside if staff is available. (payment due at time of pick-up or delivery - cash, check, or card accepted)     Able to afford home medications/ co-pay costs: Yes    ADLS  Support Systems: Family Members, Children    PT AM-PAC:   /24  OT AM-PAC:   /24    [place]: 1 level  Steps: ramp    Plans to RETURN to current housing: Yes  Barriers to RETURNING to current housing: none     [address]  Currently ACTIVE with [address]: No  Home Care Agency: Not Applicable          Durable Medical Equipment  DME Provider: n/a  Equipment: wheelchair and walker    Home Oxygen and [address] prior to admission: No  [address]: Not Applicable    Dialysis  Active with HD/PD prior to admission: No  Nephrologist:     HD Center:  Not Applicable    DISCHARGE PLAN:  Disposition: Home- No Services Needed    Transportation PLAN for discharge: family     Factors facilitating achievement of predicted outcomes: Family support, Cooperative, and Pleasant    Barriers to discharge: Medical complications    Additional Case Management Notes: Patient is from home alone, states her grandson and nephew will be staying with her at discharge. Patient reports she is independent, no services at home, drives self. Patient's daughter to transport home.      The Plan for Transition of Care is related to the following treatment goals of Acute pulmonary edema (HCC) [J81.0]  NSTEMI (non-ST elevated myocardial infarction) ([facility] [I21.4]  ST elevation myocardial infarction (STEMI), unspecified artery ([facility] [I21.3]    The Patient and/or patient representative Bella Berrios and her family were provided with a choice of provider and agrees with the discharge plan Yes    Freedom of choice list was provided with basic dialogue that supports the patient's individualized plan of care/goals and shares the quality data associated with the providers.  Yes    Care Transition patient: No    Rosie Marie RN  [facility]  Case Management Department  Ph: [phone]   Fax: [phone]

## 2022-09-12 NOTE — PROGRESS NOTES
Pt placed on specialty mattress at this time.     Electronically signed by Alexandra Arzola RN on 7/94/0687 at 5:45 AM

## 2022-09-12 NOTE — PROGRESS NOTES
Pt's BP elevated at 170/111. No home or prn meds on board, despite med rec being completed. On call cardiology notified, placing order for 50mg po metoprolol.     Electronically signed by Leighton Wynn RN on 5/63/8642 at 8:34 PM

## 2022-09-12 NOTE — PLAN OF CARE
of [facility]. Problem: Safety - Adult  Goal: Free from fall injury  Outcome: Progressing  Flowsheets (Taken 9/12/2022 0049)  Free From Fall Injury: Instruct family/caregiver on patient safety  Note: Bed in low position with alarm on. Call light within reach and urged to call for assistance when needed. Problem: Pain  Goal: Verbalizes/displays adequate comfort level or baseline comfort level  Outcome: Progressing  Flowsheets (Taken 9/12/2022 0049)  Verbalizes/displays adequate comfort level or baseline comfort level:   Encourage patient to monitor pain and request assistance   Assess pain using appropriate pain scale   Administer analgesics based on type and severity of pain and evaluate response   Implement non-pharmacological measures as appropriate and evaluate response   Consider cultural and social influences on pain and pain management  Note: Pt complaining of chronic right sided pain but says that it is normal for her. Resting and repositioned for comfort. Denies needing any pharmacological pain relief.

## 2022-09-12 NOTE — PROGRESS NOTES
Dr. Glo Coffey from pulmonology at bedside for assessment, plan to add IV steroids, diuretics and PRN breathing treatments. Pt continues to speak in 2-3 word bursts but RR ~26 and SPO2 100% on 8L, down titrated to 6L. Will administer medications per order and reassess.

## 2022-09-12 NOTE — PROGRESS NOTES
Pt remains without med orders or titration parameters for nitro drip. CHF RN notified who will reach out to cardiology NP to clarify plan of care.

## 2022-09-13 PROBLEM — I21.3 ST ELEVATION MYOCARDIAL INFARCTION (STEMI) (HCC): Status: ACTIVE | Noted: 2022-09-13

## 2022-09-13 LAB
ALBUMIN SERPL-MCNC: 3.5 G/DL (ref 3.4–5)
ANION GAP SERPL CALCULATED.3IONS-SCNC: 14 MMOL/L (ref 3–16)
BUN BLDV-MCNC: 24 MG/DL (ref 7–20)
CALCIUM SERPL-MCNC: 9.1 MG/DL (ref 8.3–10.6)
CHLORIDE BLD-SCNC: 103 MMOL/L (ref 99–110)
CO2: 23 MMOL/L (ref 21–32)
CREAT SERPL-MCNC: 0.9 MG/DL (ref 0.6–1.2)
GFR AFRICAN AMERICAN: >60
GFR NON-AFRICAN AMERICAN: 60
GLUCOSE BLD-MCNC: 147 MG/DL (ref 70–99)
LV EF: 28 %
LVEF MODALITY: NORMAL
PHOSPHORUS: 3.6 MG/DL (ref 2.5–4.9)
POTASSIUM SERPL-SCNC: 5.5 MMOL/L (ref 3.5–5.1)
SODIUM BLD-SCNC: 140 MMOL/L (ref 136–145)

## 2022-09-13 PROCEDURE — 2060000000 HC ICU INTERMEDIATE R&B

## 2022-09-13 PROCEDURE — C8929 TTE W OR WO FOL WCON,DOPPLER: HCPCS

## 2022-09-13 PROCEDURE — 94640 AIRWAY INHALATION TREATMENT: CPT

## 2022-09-13 PROCEDURE — 80069 RENAL FUNCTION PANEL: CPT

## 2022-09-13 PROCEDURE — 6370000000 HC RX 637 (ALT 250 FOR IP): Performed by: NURSE PRACTITIONER

## 2022-09-13 PROCEDURE — 84443 ASSAY THYROID STIM HORMONE: CPT

## 2022-09-13 PROCEDURE — 2700000000 HC OXYGEN THERAPY PER DAY

## 2022-09-13 PROCEDURE — 6370000000 HC RX 637 (ALT 250 FOR IP): Performed by: INTERNAL MEDICINE

## 2022-09-13 PROCEDURE — 94761 N-INVAS EAR/PLS OXIMETRY MLT: CPT

## 2022-09-13 PROCEDURE — 99233 SBSQ HOSP IP/OBS HIGH 50: CPT | Performed by: NURSE PRACTITIONER

## 2022-09-13 PROCEDURE — 99233 SBSQ HOSP IP/OBS HIGH 50: CPT | Performed by: INTERNAL MEDICINE

## 2022-09-13 PROCEDURE — 6360000002 HC RX W HCPCS: Performed by: INTERNAL MEDICINE

## 2022-09-13 PROCEDURE — 94660 CPAP INITIATION&MGMT: CPT

## 2022-09-13 PROCEDURE — 2580000003 HC RX 258: Performed by: INTERNAL MEDICINE

## 2022-09-13 PROCEDURE — 36415 COLL VENOUS BLD VENIPUNCTURE: CPT

## 2022-09-13 RX ORDER — CARVEDILOL 3.12 MG/1
3.12 TABLET ORAL 2 TIMES DAILY WITH MEALS
Status: DISCONTINUED | OUTPATIENT
Start: 2022-09-13 | End: 2022-09-19 | Stop reason: HOSPADM

## 2022-09-13 RX ORDER — METHYLPREDNISOLONE SODIUM SUCCINATE 40 MG/ML
40 INJECTION, POWDER, LYOPHILIZED, FOR SOLUTION INTRAMUSCULAR; INTRAVENOUS EVERY 12 HOURS
Status: DISCONTINUED | OUTPATIENT
Start: 2022-09-13 | End: 2022-09-15

## 2022-09-13 RX ORDER — METHYLPREDNISOLONE SODIUM SUCCINATE 40 MG/ML
40 INJECTION, POWDER, LYOPHILIZED, FOR SOLUTION INTRAMUSCULAR; INTRAVENOUS EVERY 12 HOURS
Status: DISCONTINUED | OUTPATIENT
Start: 2022-09-13 | End: 2022-09-13

## 2022-09-13 RX ORDER — ALBUTEROL SULFATE 2.5 MG/3ML
2.5 SOLUTION RESPIRATORY (INHALATION) EVERY 4 HOURS PRN
Status: DISCONTINUED | OUTPATIENT
Start: 2022-09-13 | End: 2022-09-19 | Stop reason: HOSPADM

## 2022-09-13 RX ADMIN — METHYLPREDNISOLONE SODIUM SUCCINATE 40 MG: 40 INJECTION, POWDER, FOR SOLUTION INTRAMUSCULAR; INTRAVENOUS at 21:02

## 2022-09-13 RX ADMIN — ALBUTEROL SULFATE 2.5 MG: 2.5 SOLUTION RESPIRATORY (INHALATION) at 09:15

## 2022-09-13 RX ADMIN — SODIUM CHLORIDE, PRESERVATIVE FREE 10 ML: 5 INJECTION INTRAVENOUS at 10:28

## 2022-09-13 RX ADMIN — IPRATROPIUM BROMIDE AND ALBUTEROL SULFATE 1 AMPULE: 2.5; .5 SOLUTION RESPIRATORY (INHALATION) at 15:41

## 2022-09-13 RX ADMIN — ALBUTEROL SULFATE 2.5 MG: 2.5 SOLUTION RESPIRATORY (INHALATION) at 09:25

## 2022-09-13 RX ADMIN — SACUBITRIL AND VALSARTAN 1 TABLET: 24; 26 TABLET, FILM COATED ORAL at 21:02

## 2022-09-13 RX ADMIN — IPRATROPIUM BROMIDE AND ALBUTEROL SULFATE 1 AMPULE: 2.5; .5 SOLUTION RESPIRATORY (INHALATION) at 05:35

## 2022-09-13 RX ADMIN — FUROSEMIDE 40 MG: 10 INJECTION, SOLUTION INTRAMUSCULAR; INTRAVENOUS at 10:13

## 2022-09-13 RX ADMIN — IPRATROPIUM BROMIDE AND ALBUTEROL SULFATE 1 AMPULE: 2.5; .5 SOLUTION RESPIRATORY (INHALATION) at 21:15

## 2022-09-13 RX ADMIN — CARVEDILOL 3.12 MG: 3.12 TABLET, FILM COATED ORAL at 17:18

## 2022-09-13 RX ADMIN — IPRATROPIUM BROMIDE AND ALBUTEROL SULFATE 1 AMPULE: 2.5; .5 SOLUTION RESPIRATORY (INHALATION) at 12:28

## 2022-09-13 RX ADMIN — FUROSEMIDE 40 MG: 10 INJECTION, SOLUTION INTRAMUSCULAR; INTRAVENOUS at 17:18

## 2022-09-13 RX ADMIN — METHYLPREDNISOLONE SODIUM SUCCINATE 40 MG: 40 INJECTION, POWDER, FOR SOLUTION INTRAMUSCULAR; INTRAVENOUS at 10:12

## 2022-09-13 RX ADMIN — SODIUM CHLORIDE, PRESERVATIVE FREE 10 ML: 5 INJECTION INTRAVENOUS at 21:04

## 2022-09-13 ASSESSMENT — ENCOUNTER SYMPTOMS
VOMITING: 0
DIARRHEA: 0
ABDOMINAL PAIN: 0
SHORTNESS OF BREATH: 1
WHEEZING: 1
CONSTIPATION: 0
COUGH: 1
NAUSEA: 0

## 2022-09-13 ASSESSMENT — PAIN SCALES - GENERAL
PAINLEVEL_OUTOF10: 0
PAINLEVEL_OUTOF10: 0

## 2022-09-13 NOTE — PROGRESS NOTES
Pulmonology Progress Note    Admit Date: 9/11/2022  Diet: ADULT DIET; Regular; Low Fat/Low Chol/High Fiber/2 gm Na    Consult reason: dyspnea     Interval history:   Pt has intermittently used bipap. She had dyspnea this morning and received a breathing treatment with improvement. However she did become hypoxic later and required bipap. Pt seen while on bipap. States she feels well with treatments. Ruslan any new complaints. VS otherwise  stable   UOP 3.5L, net -3    Medications:     Scheduled Meds:   methylPREDNISolone  40 mg IntraVENous Q12H    nitroGLYCERIN  1 patch TransDERmal Daily    furosemide  40 mg IntraVENous BID    ipratropium-albuterol  1 ampule Inhalation Q4H WA    atorvastatin  80 mg Oral Once    sodium chloride flush  5-40 mL IntraVENous 2 times per day     Continuous Infusions:   sodium chloride       PRN Meds:albuterol, iohexol, sodium chloride flush, sodium chloride, acetaminophen    Objective:   Vitals:   T-max:  Patient Vitals for the past 8 hrs:   BP Temp Temp src Pulse Resp SpO2 Weight   09/13/22 0947 -- -- -- 100 (!) 32 100 % --   09/13/22 0915 -- -- -- -- -- 92 % --   09/13/22 0802 (!) 167/92 97.6 °F (36.4 °C) Oral 91 20 96 % --   09/13/22 0555 -- -- -- 77 -- 98 % --   09/13/22 0540 -- -- -- -- 26 98 % --   09/13/22 0535 -- -- -- 81 26 94 % --   09/13/22 0450 -- -- -- -- -- -- (!) 333 lb (151 kg)   09/13/22 0411 -- -- -- 79 -- -- --   09/13/22 0311 (!) 142/87 97.5 °F (36.4 °C) Oral 84 18 94 % --       Intake/Output Summary (Last 24 hours) at 9/13/2022 1044  Last data filed at 9/13/2022 0311  Gross per 24 hour   Intake 130 ml   Output 3550 ml   Net -3420 ml       Review of Systems   Constitutional:  Negative for chills, fatigue and fever. Respiratory:  Positive for cough, shortness of breath and wheezing. Cardiovascular:  Positive for leg swelling. Negative for chest pain and palpitations. Gastrointestinal:  Negative for abdominal pain, constipation, diarrhea, nausea and vomiting. hours.  Cultures:  -----------------------------------------------------------------  RAD:   XR CHEST (2 VW)   Final Result      Mild pulmonary edema, demonstrating improvement since 9/11/2022. Stable cardiomegaly. XR CHEST PORTABLE   Final Result   Cardia megaly with diffuse pulmonary vascular congestion of early congestive heart failure          Assessment/Plan:     Decompensated HF insetting of takotubo cardiomyopathy   Echo w/ systolic dysfunction EF 92-66%  She has morbid obesity. Continues wheezing with improvement w/ inhaler therapy  . - continue with diuresis lasix 40 BID   - increased steroid dose today to 40 BID  - continue with duonebs q4 WA  - bipap prn   - wean oxygen for sats 88%    Full code     Kenna Taylor MD, PGY-3  09/13/22  10:44 AM    This patient has been staffed and discussed with Dr She Tian    Patient seen and examined. I agree with Dr. Lauren Hernandez history, physical, lab findings, assessment and plan. Mitchell Plaza responded quite well to IV diuresis, IV Solu-Medrol, and bronchodilators yesterday with resolution of her dyspnea and weaning of her oxygen to 1 L nasal cannula. Unfortunately her improvement was not sustained and this morning she developed recurrent bronchospasm and dyspnea requiring placement back on BiPAP. Lasix 40 mg IV every 12 hours she actually diuresed about 3.5 L    Echo performed today shows severely reduced left ventricular ejection fraction estimated 25 to 30% with severe hypokinesis/akinesis of the apex and mid to distal anteroseptal and anterior walls.   Diastolic filling parameters suggest grade 3 diastolic dysfunction with increased filling pressures.    -Will increase Solu-Medrol to 40 mg IV every 12 hours  -Continue Lasix 40 mg IV every 12 hours  -Continue DuoNebs every 4 hours when awake  -Continue BiPAP at night and when needed during the day for dyspnea    Full code    Patrice Christian MD

## 2022-09-13 NOTE — PLAN OF CARE
Problem: Respiratory - Adult  Goal: Achieves optimal ventilation and oxygenation   [id number] by Ilene Vila RN  Outcome: Progressing  Flowsheets (Taken 2022 by Mesfin Hart RN)  Achieves optimal ventilation and oxygenation:   Assess for changes in mentation and behavior   Assess for changes in respiratory status   Position to facilitate oxygenation and minimize respiratory effort   Oxygen supplementation based on oxygen saturation or arterial blood gases   Assess the need for suctioning and aspirate as needed   Assess and instruct to report shortness of breath or any respiratory difficulty   Respiratory therapy support as indicated  Note: SpO2 > 90% on 1L  nasal cannula. Will continue to wean as tolerated. Pt diuersing well with IVP Lasix. Strict I&Os in place. Problem: Safety - Adult  Goal: Free from fall injury  1647 by Ilene Vila RN  Outcome: Progressing  Flowsheets (Taken 2022 by Bubba Sherwood RN)  Free From Fall Injury: Instruct family/caregiver on patient safety  Note: Pt is a Fall Risk. See Izell Sandwich Fall Risk Score. Pt bed in low position and side rails up. Call light and belongings in reach. Pt encouraged to call for assistance. Will continue with hourly rounds for PO intake, pain needs, toileting, and repositioning as needed.

## 2022-09-13 NOTE — PROGRESS NOTES
Pt with increased WOB and wheezing when turned on left side for ECHO. Back to back Albuterol nebs given to help alleviate distress. Pt states states breathing is feeling slightly better upon completion of first treatment. SPO2 92% on 3 lpm NC O2. Pt encouraged to wear BP when returned to room. WOB still appears increased and audible expiratory wheezes present.

## 2022-09-13 NOTE — CARE COORDINATION
Case Management Assessment           Daily Note                 Date/ Time of Note: 9/13/2022 2:42 PM         Note completed by: Julianna Crain RN    Patient Name: Alessandra Jorgensen  YOB: 1940    Diagnosis:Acute pulmonary edema ([facility] [J81.0]  NSTEMI (non-ST elevated myocardial infarction) ([facility] [I21.4]  ST elevation myocardial infarction (STEMI), unspecified artery ([facility] [I21.3]  Patient Admission Status: Inpatient    Date of Admission:9/11/2022  8:45 AM Length of Stay: 2 GLOS:      Current Plan of Care: echo today  ________________________________________________________________________________________  PT AM-PAC:   / 24 per last evaluation on: not ordered    OT AM-PAC:   / 24 per last evaluation on: not ordered    DME Needs for discharge: tbd   ________________________________________________________________________________________  Discharge Plan: Home    Tentative discharge date: tbd     Current barriers to discharge: medical clearance    ________________________________________________________________________________________  Case Management Notes:  Patient is from home, has family support. She reports independent at home. CM discussed with CHAPARRITA Holm, will hope to get PT/OT orders. Patient is not on O2 at home. Judy Noel and her family were provided with choice of provider; she and her family are in agreement with the discharge plan.     Care Transition Patient: Hyun Crain RN  [facility]  Case Management Department  Ph: [phone]  Fax: [phone]

## 2022-09-13 NOTE — PROGRESS NOTES
[address]   Cardiology  Note   Dr Anne Drew MD, Rashad Solitario RN, FNP APRN CVNP  Date: 9/13/2022  Admit Date: 9/11/2022       CC:SOB (Pt arrives on CPAP due to severe SOB )  Cardiology consult: STEMI      Interval Hx /  Subjective: in NSR VSS /returned from TTE lab  No new complaints today. No major events overnight. [facility] Takotsubo cardiomyopathy diagnosed 9/11/2022. Acute coronary syndrome with Takotsubo. We did not find coronary lesions or need intervention. \LV function is abnormal showing evidence for Takotsubo and low ejection fraction of 15%. Recommendations: Continue support with diuretics. Nitroglycerin as noted. She will be going to the floor on BiPAP. Beta-blocker. She normally takes Eliquis we will resume that. Also losartan 100 mg. Torsemide 20 mg. Metoprolol succinate 200 mg/day. 9/13/2022 TTE Left ventricular cavity size is normal. There is mild concentric left ventricular hypertrophy. Overall left ventricular systolic function appears severely reduced with an ejection fraction of 25-30%. There is severe hypokinesis / akinesis of the apex and mid to distal  anteroseptal and anterior walls. Diastolic filling parameters suggest grade III diastolic dysfunction with increased filling pressures. No  evidence of left ventricular mass or thrombus noted. Tricuspid valve leaflets are structurally normal. Moderate tricuspid regurgitation. No evidence of tricuspid stenosis. Patient seen and examined. Clinical notes reviewed.  Telemetry reviewed / Pertinent labs, diagnostic, device, and imaging results reviewed as a part of this visit  I spent a total of 35 minutes and greater than 50% of the time was spent counseling with patient  coordinating care regarding her diagnosis, treatments and plan of care    Scheduled Meds:   methylPREDNISolone  40 mg IntraVENous Q12H    nitroGLYCERIN  1 patch TransDERmal Daily    furosemide  40 mg IntraVENous BID    ipratropium-albuterol  1 ampule Inhalation Q4H WA    atorvastatin  80 mg Oral Once    sodium chloride flush  5-40 mL IntraVENous 2 times per day     Continuous Infusions:   sodium chloride       PRN Meds:albuterol, iohexol, sodium chloride flush, sodium chloride, acetaminophen       Physical Examination:  Vitals:    09/13/22 1230   BP:    Pulse: 97   Resp: (!) 34   Temp:    SpO2: 99%      In: 130 [P.O.:120; I.V.:10]  Out: 4425    Wt Readings from Last 3 Encounters:   09/13/22 (!) 333 lb (151 kg)   03/24/22 (!) 352 lb 12.8 oz (160 kg)   03/02/22 (!) 349 lb 6.4 oz (158.5 kg)       Intake/Output Summary (Last 24 hours) at 9/13/2022 1424  Last data filed at 9/13/2022 1140  Gross per 24 hour   Intake 130 ml   Output 4425 ml   Net -4295 ml       Telemetry: Personally Reviewed    Constitutional: Cooperative and in no apparent distress, and appears well nourished  Skin: Warm and pink; no pallor, cyanosis, clubbing, or bruising   HEENT: Symmetric and normocephalic  Cardiovascular: Regular rate and rhythm. S1/S2 present without murmurs, no rubs or gallops. peripheral edema  Respiratory: Respirations symmetric and unlabored. Lungs clear to auscultation bilaterally, no wheezing, crackles, or rhonchi  Gastrointestinal: Abdomen soft and round. Bowel sounds normoactive without tenderness or masses. Musculoskeletal: Bilateral upper and lower extremity strength 5/5 with full ROM  Neurologic/Psych: Awake and orientated to person, place and time.  Calm affect, appropriate mood      Patient Active Problem List    Diagnosis Date Noted    Abnormal result of other cardiovascular function study (CODE)     Acute respiratory failure with hypoxia and hypercapnia ([facility] 09/12/2022    NSTEMI (non-ST elevated myocardial infarction) ([facility] 09/11/2022    Acute on chronic systolic congestive heart failure (HCC)     Essential hypertension     Mixed hyperlipidemia     PAF (paroxysmal atrial fibrillation) ([facility])     CHF NYHA class III, acute, diastolic ([facility] 81/93/3785    SOB (shortness of breath)     Abnormal ECG     Acute pulmonary edema (HCC)     Acute systolic (congestive) heart failure (HCC)     Atrial fibrillation with RVR ([facility] 12/23/2019    Right hip pain 11/20/2017    Abnormal nuclear stress test 10/06/2017    Hyperlipidemia LDL goal <100 02/10/2016    Morbid obesity due to excess calories ([facility] 12/29/2015    Pure hypercholesterolemia 12/29/2015    Bilateral knee pain 12/29/2015    Pre-diabetes 12/29/2015        Assessment     SOB (Pt arrives on CPAP due to severe SOB ) multifactorial   Decompensated HFrEF   On lasix IVP   sCr wnl   Strict I &0 wt daily   Net 2,520 out     Acute Bronchospasm   pulmonary edema vs asthma  Dr Donice Bence following     STEMI    [facility] Takotsubo cardiomyopathy diagnosed 9/11/2022. Acute coronary syndrome with Takotsubo. We did not find coronary lesions or need intervention. \LV function is abnormal showing evidence for Takotsubo and low ejection fraction of 15%. Recommendations: Continue support with diuretics. Nitroglycerin as noted. She will be going to the floor on BiPAP. Beta-blocker. She normally takes Eliquis we will resume that. Also losartan 100 mg. Torsemide 20 mg. Metoprolol succinate 200 mg/day. 9/13/2022 TTE Left ventricular cavity size is normal. There is mild concentric left ventricular hypertrophy. Overall left ventricular systolic function appears severely reduced with an ejection fraction of 25-30%. There is severe hypokinesis / akinesis of the apex and mid to distal  anteroseptal and anterior walls. Diastolic filling parameters suggest grade III diastolic dysfunction with increased filling pressures. No  evidence of left ventricular mass or thrombus noted. Tricuspid valve leaflets are structurally normal. Moderate tricuspid regurgitation. No evidence of tricuspid stenosis. HTN  Wnl    HLD   sub optimal     Morbid obesity   Body mass index is 53.75 kg/m².   Diet activity discussed     Hx PUD    Hgb stable Lymphedema  wrap legs with ace     Plan   On lasix IVP /sCr wnl / strict I &0 wt daily / Net 2,520 out   Continue cardiac meds Lipitor lasix   Start: Entresto 25/26mg BID hold for b/p <472 systolic  / follow CMP   Start Coreg 3.125 mg BID with parameters   Will plan to add SGLT2/aldactone /hydralazine as b/p and renals permit life vest on discharge if pt consents  TTE in 3 months may need AICD   Wrap legs with ace   Will follow       Thank you for allowing to us to participate in the care of Selestino Nissen. Siria Peer APRN-CNP-CVNP    [address]   Interventional cardiology note  Patient is clinically stable. No additional chest pains and no shortness of breath. Echo confirms low ejection fraction and consistent with Takotsubo cardiomyopathy. We did review her angiograms from a couple days ago. At this time she needs to be on Entresto and we will start her on 50 mg twice daily and low-dose carvedilol at 3.125 mg twice daily. Hopefully can discharge her home tomorrow and add an SGLT2 later and Aldactone     Patient still having dyspnea and still requiring BiPAP. Hopefully can transition her over to nasal cannula. Continue diuresis. Later.   Eris Wood MD, [facility] [place]

## 2022-09-14 LAB
ALBUMIN SERPL-MCNC: 4.2 G/DL (ref 3.4–5)
ANION GAP SERPL CALCULATED.3IONS-SCNC: 14 MMOL/L (ref 3–16)
BUN BLDV-MCNC: 20 MG/DL (ref 7–20)
CALCIUM SERPL-MCNC: 9.5 MG/DL (ref 8.3–10.6)
CHLORIDE BLD-SCNC: 96 MMOL/L (ref 99–110)
CO2: 30 MMOL/L (ref 21–32)
CREAT SERPL-MCNC: 0.7 MG/DL (ref 0.6–1.2)
GFR AFRICAN AMERICAN: >60
GFR NON-AFRICAN AMERICAN: >60
GLUCOSE BLD-MCNC: 183 MG/DL (ref 70–99)
MAGNESIUM: 2 MG/DL (ref 1.8–2.4)
PHOSPHORUS: 3.3 MG/DL (ref 2.5–4.9)
POTASSIUM SERPL-SCNC: 4.4 MMOL/L (ref 3.5–5.1)
SODIUM BLD-SCNC: 140 MMOL/L (ref 136–145)
TSH REFLEX: 0.76 UIU/ML (ref 0.27–4.2)
VITAMIN D 25-HYDROXY: 32.9 NG/ML

## 2022-09-14 PROCEDURE — 99233 SBSQ HOSP IP/OBS HIGH 50: CPT | Performed by: INTERNAL MEDICINE

## 2022-09-14 PROCEDURE — 2580000003 HC RX 258: Performed by: INTERNAL MEDICINE

## 2022-09-14 PROCEDURE — 6370000000 HC RX 637 (ALT 250 FOR IP): Performed by: INTERNAL MEDICINE

## 2022-09-14 PROCEDURE — 94640 AIRWAY INHALATION TREATMENT: CPT

## 2022-09-14 PROCEDURE — 97535 SELF CARE MNGMENT TRAINING: CPT

## 2022-09-14 PROCEDURE — 97116 GAIT TRAINING THERAPY: CPT

## 2022-09-14 PROCEDURE — 6370000000 HC RX 637 (ALT 250 FOR IP): Performed by: NURSE PRACTITIONER

## 2022-09-14 PROCEDURE — 94761 N-INVAS EAR/PLS OXIMETRY MLT: CPT

## 2022-09-14 PROCEDURE — 2060000000 HC ICU INTERMEDIATE R&B

## 2022-09-14 PROCEDURE — 97530 THERAPEUTIC ACTIVITIES: CPT

## 2022-09-14 PROCEDURE — 97162 PT EVAL MOD COMPLEX 30 MIN: CPT

## 2022-09-14 PROCEDURE — 6360000002 HC RX W HCPCS: Performed by: INTERNAL MEDICINE

## 2022-09-14 PROCEDURE — 94660 CPAP INITIATION&MGMT: CPT

## 2022-09-14 PROCEDURE — 99232 SBSQ HOSP IP/OBS MODERATE 35: CPT | Performed by: NURSE PRACTITIONER

## 2022-09-14 PROCEDURE — 92610 EVALUATE SWALLOWING FUNCTION: CPT

## 2022-09-14 PROCEDURE — 2700000000 HC OXYGEN THERAPY PER DAY

## 2022-09-14 PROCEDURE — 83735 ASSAY OF MAGNESIUM: CPT

## 2022-09-14 PROCEDURE — 80069 RENAL FUNCTION PANEL: CPT

## 2022-09-14 PROCEDURE — 97166 OT EVAL MOD COMPLEX 45 MIN: CPT

## 2022-09-14 PROCEDURE — 82306 VITAMIN D 25 HYDROXY: CPT

## 2022-09-14 PROCEDURE — 36415 COLL VENOUS BLD VENIPUNCTURE: CPT

## 2022-09-14 RX ORDER — BENZONATATE 100 MG/1
100 CAPSULE ORAL 3 TIMES DAILY PRN
Status: DISCONTINUED | OUTPATIENT
Start: 2022-09-14 | End: 2022-09-19 | Stop reason: HOSPADM

## 2022-09-14 RX ORDER — OMEGA-3S/DHA/EPA/FISH OIL/D3 300MG-1000
400 CAPSULE ORAL DAILY
Status: DISCONTINUED | OUTPATIENT
Start: 2022-09-14 | End: 2022-09-19 | Stop reason: HOSPADM

## 2022-09-14 RX ORDER — FUROSEMIDE 10 MG/ML
40 INJECTION INTRAMUSCULAR; INTRAVENOUS ONCE
Status: COMPLETED | OUTPATIENT
Start: 2022-09-14 | End: 2022-09-14

## 2022-09-14 RX ORDER — ALOGLIPTIN 6.25 MG/1
6.25 TABLET, FILM COATED ORAL DAILY
Status: DISCONTINUED | OUTPATIENT
Start: 2022-09-14 | End: 2022-09-19

## 2022-09-14 RX ADMIN — APIXABAN 5 MG: 5 TABLET, FILM COATED ORAL at 20:30

## 2022-09-14 RX ADMIN — IPRATROPIUM BROMIDE AND ALBUTEROL SULFATE 1 AMPULE: 2.5; .5 SOLUTION RESPIRATORY (INHALATION) at 19:54

## 2022-09-14 RX ADMIN — BENZONATATE 100 MG: 100 CAPSULE ORAL at 04:34

## 2022-09-14 RX ADMIN — CARVEDILOL 3.12 MG: 3.12 TABLET, FILM COATED ORAL at 08:02

## 2022-09-14 RX ADMIN — IPRATROPIUM BROMIDE AND ALBUTEROL SULFATE 1 AMPULE: 2.5; .5 SOLUTION RESPIRATORY (INHALATION) at 08:46

## 2022-09-14 RX ADMIN — SACUBITRIL AND VALSARTAN 1 TABLET: 24; 26 TABLET, FILM COATED ORAL at 08:02

## 2022-09-14 RX ADMIN — FUROSEMIDE 40 MG: 10 INJECTION, SOLUTION INTRAMUSCULAR; INTRAVENOUS at 05:31

## 2022-09-14 RX ADMIN — IPRATROPIUM BROMIDE AND ALBUTEROL SULFATE 1 AMPULE: 2.5; .5 SOLUTION RESPIRATORY (INHALATION) at 11:50

## 2022-09-14 RX ADMIN — IPRATROPIUM BROMIDE AND ALBUTEROL SULFATE 1 AMPULE: 2.5; .5 SOLUTION RESPIRATORY (INHALATION) at 16:24

## 2022-09-14 RX ADMIN — SODIUM CHLORIDE, PRESERVATIVE FREE 10 ML: 5 INJECTION INTRAVENOUS at 20:30

## 2022-09-14 RX ADMIN — ALBUTEROL SULFATE 2.5 MG: 2.5 SOLUTION RESPIRATORY (INHALATION) at 04:24

## 2022-09-14 RX ADMIN — SACUBITRIL AND VALSARTAN 1 TABLET: 24; 26 TABLET, FILM COATED ORAL at 20:28

## 2022-09-14 RX ADMIN — CHOLECALCIFEROL (VITAMIN D3) 10 MCG (400 UNIT) TABLET 400 UNITS: at 16:55

## 2022-09-14 RX ADMIN — ALOGLIPTIN 6.25 MG: 6.25 TABLET, FILM COATED ORAL at 16:54

## 2022-09-14 RX ADMIN — FUROSEMIDE 40 MG: 10 INJECTION, SOLUTION INTRAMUSCULAR; INTRAVENOUS at 16:55

## 2022-09-14 RX ADMIN — CARVEDILOL 3.12 MG: 3.12 TABLET, FILM COATED ORAL at 16:55

## 2022-09-14 RX ADMIN — METHYLPREDNISOLONE SODIUM SUCCINATE 40 MG: 40 INJECTION, POWDER, FOR SOLUTION INTRAMUSCULAR; INTRAVENOUS at 08:02

## 2022-09-14 RX ADMIN — BENZONATATE 100 MG: 100 CAPSULE ORAL at 20:29

## 2022-09-14 RX ADMIN — SODIUM CHLORIDE, PRESERVATIVE FREE 10 ML: 5 INJECTION INTRAVENOUS at 08:02

## 2022-09-14 RX ADMIN — METHYLPREDNISOLONE SODIUM SUCCINATE 40 MG: 40 INJECTION, POWDER, FOR SOLUTION INTRAMUSCULAR; INTRAVENOUS at 20:29

## 2022-09-14 ASSESSMENT — ENCOUNTER SYMPTOMS
COUGH: 1
WHEEZING: 1
ANAL BLEEDING: 0
VOMITING: 0
NAUSEA: 0
SHORTNESS OF BREATH: 1
DIARRHEA: 0

## 2022-09-14 ASSESSMENT — PAIN SCALES - GENERAL: PAINLEVEL_OUTOF10: 0

## 2022-09-14 NOTE — CONSULTS
Hospital Medicine  Consult History & Physical        Chief Complaint:  SOB    Date of Service: Pt seen/examined in consultation on 9/14/22    History Of Present Illness:      78 y.o. female who we are asked to see/evaluate by Leatha Gomez MD for medical management. Patient is a 78 y.o. female who presents with shortness of breath  on 9/11/22. After an emotional event, she developed pain in her left side as well as shortness of breath. Her symptoms worsened, and her shortness of breath became severe, prompting her to call 911. On EMS arrival, patient was severely dyspneic with audible wheezing. They placed her on CPAP and gave her IM Solu-Medrol with concern for COPD exacerbation. They also obtained an EKG that was concerning for STEMI. She had emergent cardiac cath which was unremarkable for obstruction but was consistent with Takotsubo with EF 15%. Pulmonology was consulted and started on solumedrol. Hospitalist team was consulted for medical management. Past Medical History:        Diagnosis Date    Acute systolic (congestive) heart failure ([address]     Edema     Hearing loss     Hyperlipidemia     Hypertension     Morbid obesity due to excess calories ([facility] 12/29/2015    Pre-diabetes     PUD (peptic ulcer disease)     \"years ago\",        Past Surgical History:        Procedure Laterality Date    HERNIA REPAIR      umbilical     KNEE SURGERY Bilateral     TKR bilateral       Medications Prior to Admission:    Prior to Admission medications    Medication Sig Start Date End Date Taking?  Authorizing Provider   benzonatate (TESSALON) 100 MG capsule Take 100 mg by mouth 3 times daily as needed for Cough   Yes Historical Provider, MD   acetaminophen (TYLENOL) 500 MG tablet Take 500 mg by mouth every 6 hours as needed for Pain   Yes Historical Provider, MD   acarbose (PRECOSE) 25 mg tablet TAKE 1 TABLET BY MOUTH 3 TIMES DAILY WITH MEALS. 6/6/22   Joy Juan MD   vitamin D3 (CHOLECALCIFEROL) 10 MCG (400 UNIT) TABS tablet Take 1 tablet by mouth daily 3/2/22   Maribeth Cyr MD   metoprolol succinate (TOPROL XL) 200 MG extended release tablet TAKE 1 TABLET BY MOUTH EVERY DAY 3/2/22   Maribeth Cyr MD   losartan (COZAAR) 100 MG tablet TAKE 1 TABLET BY MOUTH EVERY DAY 3/2/22   Maribeth Cyr MD   torsemide (DEMADEX) 20 MG tablet TAKE 1 TABLET BY MOUTH THREE TIMES A DAY 3/2/22   Maribeth Cyr MD   ELIQUIS 5 MG TABS tablet TAKE 1 TABLET BY MOUTH TWICE A DAY 9/27/21   LAWRENCE Arevalo - CNP   dextromethorphan-guaiFENesin (ROBITUSSIN-DM)  MG/5ML syrup Take 10 mLs by mouth every 4 hours as needed for Cough     Historical Provider, MD   blood glucose monitor strips Test 3 times a day & as needed for symptoms of irregular blood glucose. 12/31/19   Jessie Alegria MD       Allergies:  Aspirin and Dye [iodides]    Social History:      The patient currently lives at home    TOBACCO:   reports that she has never smoked. She has never used smokeless tobacco.  ETOH:   reports no history of alcohol use. Family History:      Reviewed in detail and negative for DM, CAD, Cancer, CVA. Positive as follows:        Problem Relation Age of Onset    Other Mother         hernia    High Blood Pressure Father        REVIEW OF SYSTEMS COMPLETED:   Pertinent positives as noted in the HPI. All other systems reviewed and negative. PHYSICAL EXAM PERFORMED:  BP (!) 143/84   Pulse 100   Temp 98 °F (36.7 °C) (Oral)   Resp 18   Ht 5' 6\" (1.676 m)   Wt (!) 331 lb 3.2 oz (150.2 kg)   SpO2 96%   BMI 53.46 kg/m²   General appearance: No apparent distress, appears stated age and cooperative. HEENT: Normal cephalic, atraumatic without obvious deformity. Respiratory: bilaterally Wheezes  Cardiovascular: Regular rate and rhythm with normal S1/S2 without murmurs, rubs or gallops. Abdomen: Soft, non-tender, non-distended with normal bowel sounds.   Musculoskeletal: LE wrapped b/l  Neurologic:  Neurovascularly intact without any focal sensory/motor deficits. Psychiatric: Alert and oriented, thought content appropriate, normal insight      Labs:     No results for input(s): WBC, HGB, HCT, PLT in the last 72 hours. Recent Labs     09/12/22  0523 09/13/22  0511 09/14/22  1040    140 140   K 4.7 5.5* 4.4    103 96*   CO2 23 23 30   BUN 19 24* 20   CREATININE 0.8 0.9 0.7   CALCIUM 9.3 9.1 9.5   PHOS  --  3.6 3.3     No results for input(s): AST, ALT, BILIDIR, BILITOT, ALKPHOS in the last 72 hours. No results for input(s): INR in the last 72 hours. No results for input(s): Loraine Highman in the last 72 hours. Urinalysis:    Lab Results   Component Value Date/Time    NITRU Negative 09/30/2020 01:47 AM    WBCUA 0-2 09/30/2020 01:47 AM    BACTERIA 4+ 11/24/2019 08:49 PM    RBCUA 5-10 09/30/2020 01:47 AM    BLOODU TRACE-INTACT 09/30/2020 01:47 AM    SPECGRAV 1.020 09/30/2020 01:47 AM    GLUCOSEU Negative 09/30/2020 01:47 AM    GLUCOSEU NEGATIVE 09/23/2010 10:50 AM       Radiology: I have reviewed the radiology reports with the following interpretation:     XR CHEST (2 VW)   Final Result      Mild pulmonary edema, demonstrating improvement since 9/11/2022. Stable cardiomegaly. XR CHEST PORTABLE   Final Result   Cardia megaly with diffuse pulmonary vascular congestion of early congestive heart failure            ASSESSMENT:    Active Hospital Problems    Diagnosis Date Noted    ST elevation myocardial infarction (STEMI) ([facility] [I21.3] 09/13/2022     Priority: Medium    Acute respiratory failure with hypoxia and hypercapnia (HCC) [J96.01, J96.02] 09/12/2022     Priority: Medium    NSTEMI (non-ST elevated myocardial infarction) ([facility] [I21.4] 09/11/2022     Priority: Medium   Takotsubo cardiomyopathy   HTN  HLD  Morbid obesity   Lymphedema   Type II DM  Chronic Afib    PLAN:    -patient remains on 2 L O2. Wean as tolerated. Continue steroids and inhalers per Pulm  -continue diuresis and monitor renal function. -started on entresto and coreg. Will need life vest on discharge.   -start sitagliptin.   -resume eliquis    DVT Prophylaxis: eliquis  Diet: ADULT DIET;  Regular; Low Fat/Low Chol/High Fiber/2 gm Na  Code Status: Full Code    PT/OT Eval Status: Active and ongoing    Dispo - TBD    Thank you for the consultation, will follow up as needed    Electronically signed by Galileo Collins MD on 9/14/22 at 12:20 PM EDT

## 2022-09-14 NOTE — PROGRESS NOTES
Progress Note    Admit Date: 9/11/2022  Diet: ADULT DIET; Regular; Low Fat/Low Chol/High Fiber/2 gm Na    Consult reason: dyspnea     Interval history:   Feels much better, dyspnea has improved. Continues to have some wheezing  On 4L NC, on intermittent bipap overnight   uop 4.5L, net negative 8 since admit     Medications:     Scheduled Meds:   methylPREDNISolone  40 mg IntraVENous Q12H    carvedilol  3.125 mg Oral BID WC    sacubitril-valsartan  1 tablet Oral BID    nitroGLYCERIN  1 patch TransDERmal Daily    furosemide  40 mg IntraVENous BID    ipratropium-albuterol  1 ampule Inhalation Q4H WA    atorvastatin  80 mg Oral Once    sodium chloride flush  5-40 mL IntraVENous 2 times per day     Continuous Infusions:   sodium chloride       PRN Meds:benzonatate, albuterol, iohexol, sodium chloride flush, sodium chloride, acetaminophen    Objective:   Vitals:   T-max:  Patient Vitals for the past 8 hrs:   BP Temp Temp src Pulse Resp SpO2 Weight   09/14/22 0848 -- -- -- -- -- 100 % --   09/14/22 0758 (!) 166/105 97.5 °F (36.4 °C) Oral 92 20 100 % --   09/14/22 0540 -- -- -- -- -- -- (!) 331 lb 3.2 oz (150.2 kg)   09/14/22 0433 -- -- -- -- (!) 36 99 % --   09/14/22 0345 (!) 168/110 98 °F (36.7 °C) Oral 98 24 97 % --       Intake/Output Summary (Last 24 hours) at 9/14/2022 1009  Last data filed at 9/14/2022 0800  Gross per 24 hour   Intake 0 ml   Output 5425 ml   Net -5425 ml       Review of Systems   Constitutional:  Negative for fatigue and fever. Respiratory:  Positive for cough, shortness of breath and wheezing. Cardiovascular:  Negative for chest pain, palpitations and leg swelling. Gastrointestinal:  Negative for anal bleeding, diarrhea, nausea and vomiting. Genitourinary:  Negative for dysuria. Musculoskeletal:  Positive for arthralgias. Physical Exam  Constitutional:       General: She is not in acute distress. Appearance: She is obese. HENT:      Head: Normocephalic and atraumatic. Mouth/Throat:      Mouth: Mucous membranes are moist.   Cardiovascular:      Rate and Rhythm: Normal rate and regular rhythm. Pulmonary:      Effort: Pulmonary effort is normal.      Breath sounds: Wheezing present. Abdominal:      General: There is no distension. Palpations: Abdomen is soft. Tenderness: There is no abdominal tenderness. Musculoskeletal:      Right lower leg: No edema. Left lower leg: No edema. Skin:     General: Skin is dry. Neurological:      General: No focal deficit present. Mental Status: She is alert and oriented to person, place, and time. Mental status is at baseline. Psychiatric:         Mood and Affect: Mood normal.       LABS:    CBC: No results for input(s): WBC, HGB, HCT, PLT, MCV in the last 72 hours. Renal:    Recent Labs     09/12/22  0523 09/13/22  0511 09/14/22  0534    140  --    K 4.7 5.5*  --     103  --    CO2 23 23  --    BUN 19 24*  --    CREATININE 0.8 0.9  --    GLUCOSE 135* 147*  --    CALCIUM 9.3 9.1  --    MG  --   --  2.00   PHOS  --  3.6  --    ANIONGAP 14 14  --      Hepatic:   Recent Labs     09/13/22  0511   LABALBU 3.5     Troponin: No results for input(s): TROPONINI in the last 72 hours. BNP: No results for input(s): BNP in the last 72 hours. Lipids:   Recent Labs     09/12/22  0521   CHOL 245*   HDL 68*     ABGs:  No results for input(s): PHART, VAL1RVG, PO2ART, GXA1HVU, BEART, THGBART, K6EYMGDO, WXT3RMM in the last 72 hours. INR: No results for input(s): INR in the last 72 hours. Lactate: No results for input(s): LACTATE in the last 72 hours. Cultures:  -----------------------------------------------------------------  RAD:   XR CHEST (2 VW)   Final Result      Mild pulmonary edema, demonstrating improvement since 9/11/2022. Stable cardiomegaly.             XR CHEST PORTABLE   Final Result   Cardia megaly with diffuse pulmonary vascular congestion of early congestive heart failure          Assessment/Plan: Decompensated HF in setting of takotubo cardiomyopathy   Echo 3/34 w/ systolic dysfunction EF 78-29%  She has morbid obesity. Continues to have wheezing with improvement w/ inhaler therapy  . - continue with diuresis lasix 40 BID   - cont steroid 40 BID  - continue with duonebs q4 WA  - bipap prn and qHs  - wean oxygen for sats 88%  - f/u outpatient with pulmonology for PFTs and sleep study     FULL code      Camille Calderon MD, PGY-3  09/14/22  10:09 AM    This patient has been staffed and discussed with Dr Pierce Hassan     Patient seen and examined. I agree with Dr. Mary Doss history, physical, lab findings, assessment and plan. Daisy Oglesby is doing quite a bit better today than yesterday. She is remained off BiPAP since 1 AM and feels that her breathing is quite a bit better. She still notices some wheezing. She is down to 1 L of oxygen with O2 sat of 99     Echo performed yesterday shows severely reduced left ventricular ejection fraction estimated 25 to 30% with severe hypokinesis/akinesis of the apex and mid to distal anteroseptal and anterior walls.   Diastolic filling parameters suggest grade 3 diastolic dysfunction with increased filling pressures.     -Continue Solu-Medrol to 40 mg IV every 12 hours  -Continue Lasix 40 mg IV every 12 hours  -Continue DuoNebs every 4 hours when awake  -Continue BiPAP at night and when needed during the day for dyspnea  -Wean oxygen for goal O2 sat of 88     Full code     Jeri Ramirez MD

## 2022-09-14 NOTE — PROGRESS NOTES
[facility]   Cardiology  Note   Dr Lion Husain MD, Rk Mace RN, FNP APRN CVNP  Date: 9/14/2022  Admit Date: 9/11/2022       CC:SOB (Pt arrives on CPAP due to severe SOB )  Cardiology consult: STEMI      Interval Hx /  Subjective: in NSR VSS /returned from TTE lab  No new complaints today. No major events overnight. [facility] Takotsubo cardiomyopathy diagnosed 9/11/2022. Acute coronary syndrome with Takotsubo. We did not find coronary lesions or need intervention. \LV function is abnormal showing evidence for Takotsubo and low ejection fraction of 15%. Recommendations: Continue support with diuretics. Nitroglycerin as noted. She will be going to the floor on BiPAP. Beta-blocker. She normally takes Eliquis we will resume that. Also losartan 100 mg. Torsemide 20 mg. Metoprolol succinate 200 mg/day. 9/13/2022 TTE Left ventricular cavity size is normal. There is mild concentric left ventricular hypertrophy. Overall left ventricular systolic function appears severely reduced with an ejection fraction of 25-30%. There is severe hypokinesis / akinesis of the apex and mid to distal  anteroseptal and anterior walls. Diastolic filling parameters suggest grade III diastolic dysfunction with increased filling pressures. No  evidence of left ventricular mass or thrombus noted. Tricuspid valve leaflets are structurally normal. Moderate tricuspid regurgitation. No evidence of tricuspid stenosis. Patient seen and examined. Clinical notes reviewed.  Telemetry reviewed / Pertinent labs, diagnostic, device, and imaging results reviewed as a part of this visit  I spent a total of 35 minutes and greater than 50% of the time was spent counseling with patient  coordinating care regarding her diagnosis, treatments and plan of care    Scheduled Meds:   apixaban  5 mg Oral BID    vitamin D3  400 Units Oral Daily    alogliptin  6.25 mg Oral Daily    methylPREDNISolone  40 mg IntraVENous Q12H    carvedilol 3.125 mg Oral BID     sacubitril-valsartan  1 tablet Oral BID    nitroGLYCERIN  1 patch TransDERmal Daily    furosemide  40 mg IntraVENous BID    ipratropium-albuterol  1 ampule Inhalation Q4H WA    atorvastatin  80 mg Oral Once    sodium chloride flush  5-40 mL IntraVENous 2 times per day     Continuous Infusions:   sodium chloride       PRN Meds:benzonatate, albuterol, iohexol, sodium chloride flush, sodium chloride, acetaminophen       Physical Examination:  Vitals:    09/14/22 1546   BP: 136/85   Pulse: 86   Resp: 18   Temp: 97.6 °F (36.4 °C)   SpO2:       In: 0   Out: 6325    Wt Readings from Last 3 Encounters:   09/14/22 (!) 331 lb 3.2 oz (150.2 kg)   03/24/22 (!) 352 lb 12.8 oz (160 kg)   03/02/22 (!) 349 lb 6.4 oz (158.5 kg)       Intake/Output Summary (Last 24 hours) at 9/14/2022 1610  Last data filed at 9/14/2022 1546  Gross per 24 hour   Intake 0 ml   Output 4550 ml   Net -4550 ml       Telemetry: Personally Reviewed    Constitutional: Cooperative and in no apparent distress, and appears well nourished  Skin: Warm and pink; no pallor, cyanosis, clubbing, or bruising   HEENT: Symmetric and normocephalic  Cardiovascular: Regular rate and rhythm. S1/S2 present without murmurs, no rubs or gallops. peripheral edema  Respiratory: Respirations symmetric and unlabored. Lungs clear to auscultation bilaterally, no wheezing, crackles, or rhonchi  Gastrointestinal: Abdomen soft and round. Bowel sounds normoactive without tenderness or masses. Musculoskeletal: Bilateral upper and lower extremity strength 5/5 with full ROM  Neurologic/Psych: Awake and orientated to person, place and time.  Calm affect, appropriate mood      Patient Active Problem List    Diagnosis Date Noted    Abnormal result of other cardiovascular function study (CODE)     ST elevation myocardial infarction (STEMI) ([facility] 09/13/2022    Acute respiratory failure with hypoxia and hypercapnia ([facility] 09/12/2022    NSTEMI (non-ST elevated myocardial infarction) ([facility] 09/11/2022    Acute on chronic systolic congestive heart failure (HCC)     Essential hypertension     Mixed hyperlipidemia     PAF (paroxysmal atrial fibrillation) ([facility])     CHF NYHA class III, acute, diastolic ([facility] 88/50/2849    SOB (shortness of breath)     Abnormal ECG     Acute pulmonary edema (HCC)     Acute systolic (congestive) heart failure (HCC)     Atrial fibrillation with RVR ([facility] 12/23/2019    Right hip pain 11/20/2017    Abnormal nuclear stress test 10/06/2017    Hyperlipidemia LDL goal <100 02/10/2016    Morbid obesity due to excess calories ([facility] 12/29/2015    Pure hypercholesterolemia 12/29/2015    Bilateral knee pain 12/29/2015    Pre-diabetes 12/29/2015        Assessment     SOB (Pt arrives on CPAP due to severe SOB ) multifactorial   Decompensated HFrEF   On lasix IVP   sCr wnl   Strict I &0 wt daily   Net 2,520 out     Acute Bronchospasm   pulmonary edema vs asthma  Dr Azalia Halsted following     STEMI    [facility] Takotsubo cardiomyopathy diagnosed 9/11/2022. Acute coronary syndrome with Takotsubo. We did not find coronary lesions or need intervention. \LV function is abnormal showing evidence for Takotsubo and low ejection fraction of 15%. Recommendations: Continue support with diuretics. Nitroglycerin as noted. She will be going to the floor on BiPAP. Beta-blocker. She normally takes Eliquis we will resume that. Also losartan 100 mg. Torsemide 20 mg. Metoprolol succinate 200 mg/day. 9/13/2022 TTE Left ventricular cavity size is normal. There is mild concentric left ventricular hypertrophy. Overall left ventricular systolic function appears severely reduced with an ejection fraction of 25-30%. There is severe hypokinesis / akinesis of the apex and mid to distal  anteroseptal and anterior walls. Diastolic filling parameters suggest grade III diastolic dysfunction with increased filling pressures. No  evidence of left ventricular mass or thrombus noted.   Tricuspid valve leaflets are structurally normal. Moderate tricuspid regurgitation. No evidence of tricuspid stenosis. HTN  Wnl    HLD   sub optimal     Morbid obesity   Body mass index is 53.46 kg/m². Diet activity discussed     Hx PUD    Hgb stable     Lymphedema  wrap legs with ace     Plan   On lasix IVP /sCr wnl / strict I &0 wt daily / Net 2,520 out   Continue cardiac meds Lipitor lasix   Start: Entresto 25/26mg BID hold for b/p <952 systolic  / follow CMP   Start Coreg 3.125 mg BID with parameters   Will plan to add SGLT2/aldactone /hydralazine as b/p and renals permit life vest on discharge if pt consents  TTE in 3 months may need AICD   Wrap legs with ace   Will follow     Thank you for allowing to us to participate in the care of Orlando Dalal. Melanie Echavarria APRN-CNP-CVNP    Today patient is up and ambulating and actually looks quite well. She is on nasal O2 and at times is able to walk to the bathroom without oxygen supplementation. Hopefully will be able to get her out in the next day or 2. She is being seen by pulmonary. Cardiac wise otherwise is doing fine and rhythm looks great. Kathy Price MD, [facility]    Patient still having dyspnea and still requiring BiPAP. Hopefully can transition her over to nasal cannula. Continue diuresis. Later.   Kathy Price MD, [facility]

## 2022-09-14 NOTE — RT PROTOCOL NOTE
RT Nebulizer Bronchodilator Protocol Note    There is a bronchodilator order in the chart from a provider indicating to follow the RT Bronchodilator Protocol and there is an Initiate RT Bronchodilator Protocol order as well (see protocol at bottom of note). CXR Findings:  No results found. The findings from the last RT Protocol Assessment were as follows:  Smoking: None or smoker <15 pack years  Respiratory Pattern: Mild dyspnea at rest, irregular pattern, or RR 21-25 bpm  Breath Sounds: Inspiratory and expiratory or bilateral wheezing and/or rhonchi  Cough: Strong, productive  Indication for Bronchodilator Therapy:    Bronchodilator Assessment Score: 11    Aerosolized bronchodilator medication orders have been revised according to the RT Nebulizer Bronchodilator Protocol below. Respiratory Therapist to perform RT Therapy Protocol Assessment initially then follow the protocol. Repeat RT Therapy Protocol Assessment PRN for score 0-3 or on second treatment, BID, and PRN for scores above 3. No Indications - adjust the frequency to every 6 hours PRN wheezing or bronchospasm, if no treatments needed after 48 hours then discontinue using Per Protocol order mode. If indication present, adjust the RT bronchodilator orders based on the Bronchodilator Assessment Score as indicated below. If a patient is on this medication at home then do not decrease Frequency below that used at home. 0-3 - enter or revise RT bronchodilator order(s) to equivalent RT Bronchodilator order with Frequency of every 4 hours PRN for wheezing or increased work of breathing using Per Protocol order mode. 4-6 - enter or revise RT Bronchodilator order(s) to two equivalent RT bronchodilator orders with one order with BID Frequency and one order with Frequency of every 4 hours PRN wheezing or increased work of breathing using Per Protocol order mode.          7-10 - enter or revise RT Bronchodilator order(s) to two equivalent

## 2022-09-14 NOTE — CARE COORDINATION
Case Management Assessment           Daily Note                 Date/ Time of Note: 9/14/2022 11:31 AM         Note completed by: Maria Del Carmen Christopher RN    Patient Name: Candace Herzog  YOB: 1940    Diagnosis:Acute pulmonary edema ([facility] [J81.0]  NSTEMI (non-ST elevated myocardial infarction) ([facility] [I21.4]  ST elevation myocardial infarction (STEMI), unspecified artery ([facility] [I21.3]  Patient Admission Status: Inpatient    Date of Admission:9/11/2022  8:45 AM Length of Stay: 3 GLOS: GMLOS: 4.1    Current Plan of Care: Iv lasix, Iv steroids, 4L O2  ________________________________________________________________________________________  PT AM-PAC:   / 24 per last evaluation on: pending    OT AM-PAC:   / 24 per last evaluation on: pending     DME Needs for discharge: tbd  ________________________________________________________________________________________  Discharge Plan: Home    Tentative discharge date: tbd     Current barriers to discharge: medical clearance      ________________________________________________________________________________________  Case Management Notes: Patient is from home, reports independence at home, not on O2 at home. CM following for discharge planning, therapy evals pending at this time. Gwen Borges and her family were provided with choice of provider; she and her family are in agreement with the discharge plan.     Care Transition Patient: Hyun Christopher RN  [facility] [employer]  Case Management Department  Ph: [phone]  Fax: [phone]

## 2022-09-14 NOTE — PROGRESS NOTES
Pt taken off BIPAP and put on 2 L NC. O2 sat is 97%.  Electronically signed by Collis Eisenmenger, RN on 9/14/2022 at 1:37 AM

## 2022-09-14 NOTE — PROGRESS NOTES
Pt experiencing some anxiety surrounding being off BiPAP. Started to desat to 88% on 2L NC so I bumped her up to 4L NC. O2 sat is now 100%. Respiratory talked to pt and she has agreed to stay off BiPAP. Will continue to monitor pt.  Electronically signed by Master Bailey RN on 9/14/2022 at 2:16 AM

## 2022-09-14 NOTE — PROGRESS NOTES
Pt's /110, pt asymptomatic. On-call hospitalist notified.  Electronically signed by Rosy Knowles RN on 9/14/2022 at 3:52 AM

## 2022-09-14 NOTE — PROGRESS NOTES
Occupational Therapy  Facility/Department: [facility] [address]  Occupational Therapy Initial Assessment    Name: Jewel Braswell  : 1940  MRN: [id number]  Date of Service: 2022    Discharge Recommendations: Jewel Braswell scored a 18/24 on the AM-PAC ADL Inpatient form. Current research shows that an AM-PAC score of 18 or greater is typically associated with a discharge to the patient's home setting. Based on the patient's AM-PAC score, and their current ADL deficits, it is recommended that the patient have 2-3 sessions per week of Occupational Therapy at d/c to increase the patient's independence. At this time, this patient demonstrates the endurance and safety to discharge home with home services and a follow up treatment frequency of 2-3x/wk. Please see assessment section for further patient specific details. If patient discharges prior to next session this note will serve as a discharge summary. Please see below for the latest assessment towards goals. OT Equipment Recommendations  Equipment Needed: No  Other: has recommended DME       Patient Diagnosis(es): The primary encounter diagnosis was ST elevation myocardial infarction (STEMI), unspecified artery ([address] A diagnosis of Acute pulmonary edema (HCC) was also pertinent to this visit. Past Medical History:  has a past medical history of Acute systolic (congestive) heart failure ([address] Edema, Hearing loss, Hyperlipidemia, Hypertension, Morbid obesity due to excess calories ([address] Pre-diabetes, and PUD (peptic ulcer disease). Past Surgical History:  has a past surgical history that includes hernia repair and knee surgery (Bilateral). Treatment Diagnosis: decreased endurance for ADLs and fx mobility      Assessment   Performance deficits / Impairments: Decreased functional mobility ; Decreased ADL status  Assessment: Pt is typically highly independent at baseline and from home alone. Pt presented with SOB and on 2L of O2 during session.  Pt is slightly below functional baseline requiring SBA for bed mobility and CGA for transfers and fx mobility with use of rollator. Pt required A for LB ADLS. Pt with no complaints of SOB throughout session and spO2 >90% throughout. Recommend initial 24hr supervision at discharge, which pt and family reports she will have. Will cont to follow while pt remains in acute care to maximize endurance and independence. Treatment Diagnosis: decreased endurance for ADLs and fx mobility  Prognosis: Good  Decision Making: Medium Complexity  REQUIRES OT FOLLOW-UP: Yes  Activity Tolerance  Activity Tolerance: Patient Tolerated treatment well        Plan   Plan  Times per Week: 2-5  Current Treatment Recommendations: Functional mobility training, Endurance training, Self-Care / ADL     Restrictions  Position Activity Restriction  Other position/activity restrictions: up with assist    Subjective   General  Chart Reviewed: Yes  Patient assessed for rehabilitation services?: Yes  Additional Pertinent Hx: 79 yo female presenting with severe SOB requiring bipap in ED, EKG showed ST elevation requiring L heart cath with coronary angiography  Family / Caregiver Present: Yes  Referring Practitioner: Cornelius Garcia MD  Diagnosis: NSTEMI  Subjective  Subjective: Pt in bed upon OT arrival and agreeable to OT treatment session. Pt very pleasant with family at bedside.      Social/Functional History  Social/Functional History  Lives With: Alone  Type of Home: House  Home Layout: Two level, Able to Live on Main level with bedroom/bathroom  Home Access: Ramped entrance  Bathroom Shower/Tub:  (walkin tub with bench)  Bathroom Toilet: Handicap height  Bathroom Equipment: 3-in-1 commode, Grab bars in shower  Bathroom Accessibility: Wheelchair accessible  Home Equipment: Ambrosio Javier, [address] scooter, Walker, 4 wheeled, Hospital bed  Has the patient had two or more falls in the past year or any fall with injury in the past year?: No  Nialy Help From: Family  ADL Assistance: [address]: Independent  Homemaking Responsibilities: Yes  Ambulation Assistance: Independent  Transfer Assistance: Independent  Active : Yes  Additional Comments: children can check on pt, hired help to with household tasks any time she calls       Objective   Heart Rate: 86  Heart Rate Source: Monitor  BP: 136/85  BP Location: Left upper arm  BP Method: Automatic  Patient Position: Semi fowlers  MAP (Calculated): 102  Resp: 18  SpO2: 96 %  O2 Device: Nasal cannula             Safety Devices  Type of Devices: Call light within reach; Chair alarm in place; Left in chair;Nurse notified;Gait belt  Balance  Sitting: Intact  Standing: With support (use of rollator)  Transfer Training  Transfer Training: Yes  Sit to Stand: Contact-guard assistance  Stand to Sit: Contact-guard assistance  Bed to Chair: Contact-guard assistance  Toilet Transfer: Contact-guard assistance  Gait  Overall Level of Assistance: Contact-guard assistance  Distance (ft):  (to/from bathroom and chair in room)  Assistive Device: Walker, rollator     AROM: Within functional limits  Strength: Within functional limits  Coordination: Within functional limits  Tone: Normal  Sensation: Intact  ADL  Feeding: Setup  Grooming: Stand by assistance  LE Dressing: Maximum assistance  LE Dressing Skilled Clinical Factors: for new brief and socks  Toileting:  Moderate assistance  Toileting Skilled Clinical Factors: pt able to perform own shahnaz care; required assistance for brief mngmt     Activity Tolerance  Activity Tolerance: Patient tolerated evaluation without incident;Patient limited by endurance  Bed mobility  Supine to Sit: Stand by assistance  Scooting: Stand by assistance     Vision  Vision: Impaired  Vision Exceptions: Wears glasses for reading  Hearing  Hearing: Exceptions to [facility]  Hearing Exceptions: Bilateral hearing aid  Cognition  Overall Cognitive Status: [facility]  Orientation  Overall Orientation Status: Within Functional Limits  Orientation Level: Oriented X4                  Education Given To: Patient; Family  Education Provided: Role of Therapy;Plan of Care;ADL Adaptive Strategies;Transfer Training;Family Education  Education Method: Verbal  Barriers to Learning: None  Education Outcome: Verbalized understanding                        G-Code     OutComes Score                                                  AM-PAC Score        AM-PAC Inpatient Daily Activity Raw Score: 18 (09/14/22 1551)  AM-PAC Inpatient ADL T-Scale Score : 38.66 (09/14/22 1551)  ADL Inpatient CMS 0-100% Score: 46.65 (09/14/22 1551)  ADL Inpatient CMS G-Code Modifier : CK (09/14/22 1551)       Goals  Short Term Goals  Time Frame for Short term goals: by discharge  Short Term Goal 1: Pt will perform LB dressing with CGA  Short Term Goal 2: Pt will perform fx transfers with SBA  Short Term Goal 3: Pt will perform standing endurance for 5 min with superivsion and spO2>90%  Patient Goals   Patient goals : to get back home       Therapy Time   Individual Concurrent Group Co-treatment   Time In 1052         Time Out 1130         Minutes 38         Timed Code Treatment Minutes: 455 Janeen Shaffer, OT

## 2022-09-14 NOTE — PROGRESS NOTES
Speech Language Pathology  Facility/Department: [place] [address] [facility]   CLINICAL BEDSIDE SWALLOW EVALUATION    NAME: Rell Gill  : 1940  MRN: [id number]    ADMISSION DATE: 2022  ADMITTING DIAGNOSIS: has Morbid obesity due to excess calories ([address] Pure hypercholesterolemia; Bilateral knee pain; Pre-diabetes; Hyperlipidemia LDL goal <100; Abnormal nuclear stress test; Abnormal result of other cardiovascular function study (CODE); Right hip pain; Atrial fibrillation with RVR ([address] Acute pulmonary edema ([address] Acute systolic (congestive) heart failure (HCC); SOB (shortness of breath); Abnormal ECG; CHF NYHA class III, acute, diastolic ([address] Acute on chronic systolic congestive heart failure ([address] Essential hypertension; Mixed hyperlipidemia; PAF (paroxysmal atrial fibrillation) ([address] NSTEMI (non-ST elevated myocardial infarction) ([address] Acute respiratory failure with hypoxia and hypercapnia (HCC); and ST elevation myocardial infarction (STEMI) ([address] on their problem list.  ONSET DATE: 2022    Recent Chest Xray  Impression   Cardia megaly with diffuse pulmonary vascular congestion of early congestive heart failure     Repeat Chest Xray: (2022)  Impression       Mild pulmonary edema, demonstrating improvement since 2022. Stable cardiomegaly. Date of Eval: 2022  Evaluating Therapist: CHRISTOPHER Gore    Current Diet level:  Current Diet : Regular      Primary Complaint  Patient Complaint: Eats soft foods.     Pain:  Pain Assessment  Pain Assessment: None - Denies Pain  Pain Level: 0  Patient's Stated Pain Goal: 0 - No pain  Pain Location: Leg  Pain Orientation: Right  Pain Descriptors: Aching  Functional Pain Assessment: Activities are not prevented  Pain Type: Chronic pain  Pain Frequency: Continuous  Pain Onset: On-going  Non-Pharmaceutical Pain Intervention(s): Declines  Response to Pain Intervention: None (pain unchanged or no improvement)    Reason for Referral  Sammie Collins was referred for a bedside swallow evaluation to assess the efficiency of her swallow function, identify signs and symptoms of aspiration and make recommendations regarding safe dietary consistencies, effective compensatory strategies, and safe eating environment. Impression  Dysphagia Diagnosis: Mild oral stage dysphagia  Dysphagia Impression : Mild oral dysphagia 2/2 reduced dentition. Oral motor exam grossly WNL. Edentulous (pt chose to not don uppers). Oral cavity moist/clean appearing. With patient seated up in bed, on O2 via nc, assessed tolerance thins via straw and regular texture solid. Patient with positive oral acceptance, slowed but adequate mastication, good oral clearance, no overt signs of aspiration or associated decline in respiratory status. Per d/w patient, she wishes to remain on regular texture diet, but independently select softer items. Will plan to monitor tolerance and f/u x1. Dysphagia Outcome Severity Scale: Level 5: Mild dysphagia- Distant supervision. May need one diet consistency restricted     Treatment Plan  Requires SLP Intervention: Yes  Duration of Treatment: 1-2 wks or LOS  D/C Recommendations: Ongoing speech therapy is recommended during this hospitalization       Recommended Diet and Intervention  Regular solids and thins  Recommended Form of Meds: PO  Recommendations: Dysphagia treatment  Therapeutic Interventions: Diet tolerance monitoring;Patient/Family education;Oral care    Compensatory Swallowing Strategies  Compensatory Swallowing Strategies : Upright as possible for all oral intake;Eat/Feed slowly; Alternate solids and liquids;Small bites/sips    Treatment/Goals  Short-term Goals  Timeframe for Short-term Goals: 1-2 wks or LOS  Goal 1: Patient will tolerate least restrictive diet without overt signs of aspiration or associated decline in respiratory status.   Goal 2: Patient/caregiver will demonstrate understanding of swallowing concerns/recommendations. General  Chart Reviewed: Yes  Comments: Per pulmonology consult note (09/12/2022): 'The patient is a 78 y.o. female with significant past medical history of hypertension, hyperlipidemia, peptic ulcer disease, A. Fib s/p cardioversion, CHF morbid obesity presented to [facility] ER yesterday morning for 24 hours of of dyspnea associated with left-sided chest pain or a significant stressful event. Dyspnea became more severe prompting patient to call 911. On their arrival they noted her to be severely dyspneic with audible wheezing and placed her on CPAP. Given concern for COPD/asthma they gave her IM Solu-Medrol but also obtain EKG that was concerning for a STEMI     On arrival in the ER she appeared ill and in acute respiratory distress with audible wheezing. She was diaphoretic and somnolent had an oxygen saturation in the 70s. She was transitioned to BiPAP and given a DuoNeb treatment. EKG yesterday confirmed anterior ST segment elevation so Cath Lab was activated. Patient initially hypertensive to 240/140 and bedside ultrasound showed B-lines consistent with pulmonary edema. Nitropaste was applied with significant improvement in hypertension and respiratory failure. Chest x-ray showed pulmonary edema and VBG was 7.23/55/127     Of note she had a left heart catheterization in December 2019 that showed normal coronaries with an EF of 50% and LVEDP of 20'  Subjective  Subjective: Patient awake, alert, pleasant, seen resting in bed, on 2L o2 via nc. Behavior/Cognition: Alert; Cooperative;Pleasant mood  Respiratory Status: O2 via nasual cannula  O2 Device: Nasal cannula  Liters of Oxygen: 2 L  Communication Observation: Functional  Follows Directions: Simple  Dentition: Edentulous  Patient Positioning: Upright in bed  Baseline Vocal Quality: Normal  Volitional Cough: Strong  Prior Dysphagia History: None found in chart review.     Vision/Hearing  Vision  Vision Exceptions: Wears glasses for reading  Hearing  Hearing: Exceptions to [facility]  Hearing Exceptions: Bilateral hearing aid    Oral Motor Deficits  Oral/Motor  Oral Hygiene: Moist;Clean    Prognosis  Individuals consulted  Consulted and agree with results and recommendations: Patient;RN    Education  Patient Education: Educated to purpose of visit, role of SLP, swallow function, recommendations  Patient Education Response: Verbalizes understanding  Safety Devices in place: Yes  Type of devices: All fall risk precautions in place       Therapy Time  SLP Individual Minutes  Time In: 0800  Time Out: 0821  Minutes: 21     SLP Total Treatment Time  Timed Code Treatment Minutes: 0 Minutes  Total Treatment Time: 21    Plan  Diet Recommendations: regular texture solids and thin liquids, meds po    Discharge Plan:  TBD  Discussed with RN, Drew Sanchez. Needs within reach. Electronically Signed by:  KOBI Samuels [address]   Speech-Language Pathologist  Pager #[phone]    This document will serve as a discharge summary if pt discharges before next treatment.

## 2022-09-14 NOTE — PROGRESS NOTES
Physical Therapy  Facility/Department: [place] [address] [facility]  Physical Therapy Initial Assessment    Name: Diann Parham  : 1940  MRN: [id number]  Date of Service: 2022    Discharge Recommendations: Diann Parham scored a 18/24 on the AM-PAC short mobility form. Current research shows that an AM-PAC score of 18 or greater is typically associated with a discharge to the patient's home setting. Based on the patient's AM-PAC score and their current functional mobility deficits, it is recommended that the patient have 2-3 sessions per week of Physical Therapy at d/c to increase the patient's independence. At this time, this patient demonstrates the endurance and safety to discharge home with home PT and a follow up treatment frequency of 2-3x/wk. Please see assessment section for further patient specific details. If patient discharges prior to next session this note will serve as a discharge summary. Please see below for the latest assessment towards goals. PT Equipment Recommendations  Equipment Needed: No      Patient Diagnosis(es): The primary encounter diagnosis was ST elevation myocardial infarction (STEMI), unspecified artery ([address] A diagnosis of Acute pulmonary edema (HCC) was also pertinent to this visit. Past Medical History:  has a past medical history of Acute systolic (congestive) heart failure ([address] Edema, Hearing loss, Hyperlipidemia, Hypertension, Morbid obesity due to excess calories ([address] Pre-diabetes, and PUD (peptic ulcer disease). Past Surgical History:  has a past surgical history that includes hernia repair and knee surgery (Bilateral). Assessment   Body Structures, Functions, Activity Limitations Requiring Skilled Therapeutic Intervention: Decreased strength;Decreased functional mobility ; Decreased endurance;Decreased balance;Decreased safe awareness  Assessment: pt is an 79 yo female from home alone and IND at baseline with all appropriate DME presenting with SOB still on 2Lo2. pt is close to baseline physically but continues to lack endurance and demonstrates decreased strength. pt performing mobility with CGA- SBA on this date with 4 WW and reports no SOB. pt expresses no concerns for returning home and will have assist from family as needed. pt would benefit from home PT upon dc to maximize independence. PT to f/u  Treatment Diagnosis: dec functional mobility  Therapy Prognosis: Good  Decision Making: Medium Complexity  Requires PT Follow-Up: Yes  Activity Tolerance  Activity Tolerance: Patient tolerated evaluation without incident;Patient limited by endurance     Plan   Plan  Plan:  (2-5)  Current Treatment Recommendations: Strengthening, Balance training, Functional mobility training, Transfer training, Gait training, Neuromuscular re-education, Endurance training, Therapeutic activities, Home exercise program, Safety education & training, Patient/Caregiver education & training  Safety Devices  Type of Devices: Call light within reach, Chair alarm in place, Left in chair, Nurse notified, Gait belt     Restrictions  Position Activity Restriction  Other position/activity restrictions: up with assist     Subjective   General  Chart Reviewed:  Yes  Additional Pertinent Hx: pt is an 77 yo female presenting with severe SOB requiring bipap in ED, EKG showed ST elevation requiring L heart cath with coronary angiography  Referring Practitioner: Cheryl Lee MD  Diagnosis: NSTEMI  Follows Commands: Within Functional Limits  Subjective  Subjective: pt supine in bed and agreeable to PT         Social/Functional History  Social/Functional History  Lives With: Alone  Type of Home: House  Home Layout: Two level, Able to Live on Main level with bedroom/bathroom  Home Access: Ramped entrance  Bathroom Shower/Tub:  (walkin tub with bench)  Bathroom Toilet: Handicap height  Bathroom Equipment: 3-in-1 commode, Grab bars in shower  Bathroom Accessibility: Wheelchair accessible  Home Equipment: Majo rubio, Walker, 4 wheeled, Hospital bed  Has the patient had two or more falls in the past year or any fall with injury in the past year?: No  Receives Help From: Family  ADL Assistance: [address]: Independent  Homemaking Responsibilities: Yes  Ambulation Assistance: Independent  Transfer Assistance: Independent  Active : Yes  Additional Comments: children can check on pt, hired help to with household tasks any time she calls  Vision/Hearing  Vision  Vision: Impaired  Vision Exceptions: Wears glasses for reading  Hearing  Hearing: Exceptions to [facility]  Hearing Exceptions: Bilateral hearing aid    Cognition   Orientation  Overall Orientation Status: Within Functional Limits  Orientation Level: Oriented X4  Cognition  Overall Cognitive Status: WFL              Strength RLE  Strength RLE: WFL  Strength LLE  Strength LLE: WFL           Bed mobility  Supine to Sit: Stand by assistance  Scooting: Stand by assistance  Transfers  Sit to Stand: Contact guard assistance  Stand to sit: Contact guard assistance  Comment: from EOB, recliner, and toilet at 4WW  Ambulation  Surface: level tile  Device: Rollator  Other Apparatus: O2 (2Lo2)  Quality of Gait: steady gait with no LOB  Gait Deviations: Slow Nicole; Increased PATRICIA  Distance: 8' + 15'  More Ambulation?: No     Balance  Posture: Fair  Sitting - Static: Fair;+ (SBA at EOB)  Sitting - Dynamic: Fair  Standing - Static: Fair  Standing - Dynamic: Fair (CGA at RW)           OutComes Score                                                  AM-PAC Score  AM-PAC Inpatient Mobility Raw Score : 18 (09/14/22 1322)  AM-PAC Inpatient T-Scale Score : 43.63 (09/14/22 1322)  Mobility Inpatient CMS 0-100% Score: 46.58 (09/14/22 1322)  Mobility Inpatient CMS G-Code Modifier : CK (09/14/22 1322)          Tinneti Score       Goals  Short Term Goals  Time Frame for Short term goals: by dc  Short term goal 1: pt will perform bed mobility mod I  Short term goal 2: pt will perform functional transfers with LRAD and mod I  Short term goal 3: pt will ambulate 80' with LRAD and supervision  Patient Goals   Patient goals : to go home       Education  Patient Education  Education Given To: Patient  Education Provided: Role of Therapy;Plan of Care; Fall Prevention Strategies  Education Method: Demonstration;Verbal  Barriers to Learning: None  Education Outcome: Verbalized understanding      Therapy Time   Individual Concurrent Group Co-treatment   Time In 1052         Time Out 1130         Minutes 38             Timed Code Treatment Minutes:  23 min    Total Treatment Minutes:  38 min      Tanisha Jones, PT

## 2022-09-14 NOTE — PLAN OF CARE
Problem: Respiratory - Adult  Goal: Achieves optimal ventilation and oxygenation  Outcome: Progressing  Flowsheets (Taken 9/14/2022 0246)  Achieves optimal ventilation and oxygenation:   Assess for changes in respiratory status   Position to facilitate oxygenation and minimize respiratory effort   Assess for changes in mentation and behavior   Oxygen supplementation based on oxygen saturation or arterial blood gases   Assess the need for suctioning and aspirate as needed   Assess and instruct to report shortness of breath or any respiratory difficulty   Respiratory therapy support as indicated  Note: Pt taken off BiPAP and placed on 4L NC. Pt O2 sat is between %. Pt encouraged to cough and deep breathe. Problem: Cardiovascular - Adult  Goal: Absence of cardiac dysrhythmias or at baseline  Outcome: Progressing  Flowsheets (Taken 9/14/2022 0246)  Absence of cardiac dysrhythmias or at baseline:   Monitor cardiac rate and rhythm   Assess for signs of decreased cardiac output   Administer antiarrhythmia medication and electrolyte replacement as ordered  Note: Pt has been sinus rhythm on the monitor with a regular heart rate. Pt has no complaints of chest pain. Pt has Coreg and Entresto ordered (see eMAR). Problem: Safety - Adult  Goal: Free from fall injury  Outcome: Progressing  Flowsheets (Taken 9/14/2022 0249)  Free From Fall Injury: Instruct family/caregiver on patient safety  Note: Bed in low position with alarm on. Call light within reach and instructed to call when needing assistance.       Problem: Pain  Goal: Verbalizes/displays adequate comfort level or baseline comfort level  Outcome: Progressing  Flowsheets (Taken 9/14/2022 0249)  Verbalizes/displays adequate comfort level or baseline comfort level:   Encourage patient to monitor pain and request assistance   Assess pain using appropriate pain scale   Administer analgesics based on type and severity of pain and evaluate response   Implement non-pharmacological measures as appropriate and evaluate response   Consider cultural and social influences on pain and pain management  Note: Pt has not been having any pain throughout shift. Tylenol is ordered (see eMAR). Pt being repositioned for comfort.

## 2022-09-14 NOTE — PROGRESS NOTES
Pt's sister Justin Left updated over the phone. All questions answered at this time.   Electronically signed by Master Bailey RN on 9/14/2022 at 12:23 AM

## 2022-09-15 PROCEDURE — 6370000000 HC RX 637 (ALT 250 FOR IP): Performed by: NURSE PRACTITIONER

## 2022-09-15 PROCEDURE — 6370000000 HC RX 637 (ALT 250 FOR IP): Performed by: INTERNAL MEDICINE

## 2022-09-15 PROCEDURE — 97110 THERAPEUTIC EXERCISES: CPT

## 2022-09-15 PROCEDURE — 94640 AIRWAY INHALATION TREATMENT: CPT

## 2022-09-15 PROCEDURE — 6360000002 HC RX W HCPCS: Performed by: INTERNAL MEDICINE

## 2022-09-15 PROCEDURE — 94761 N-INVAS EAR/PLS OXIMETRY MLT: CPT

## 2022-09-15 PROCEDURE — 2060000000 HC ICU INTERMEDIATE R&B

## 2022-09-15 PROCEDURE — 99233 SBSQ HOSP IP/OBS HIGH 50: CPT | Performed by: INTERNAL MEDICINE

## 2022-09-15 PROCEDURE — 2580000003 HC RX 258: Performed by: INTERNAL MEDICINE

## 2022-09-15 PROCEDURE — 94150 VITAL CAPACITY TEST: CPT

## 2022-09-15 PROCEDURE — 99232 SBSQ HOSP IP/OBS MODERATE 35: CPT | Performed by: INTERNAL MEDICINE

## 2022-09-15 PROCEDURE — 97116 GAIT TRAINING THERAPY: CPT

## 2022-09-15 PROCEDURE — 2700000000 HC OXYGEN THERAPY PER DAY

## 2022-09-15 RX ORDER — PREDNISONE 20 MG/1
40 TABLET ORAL DAILY
Status: DISCONTINUED | OUTPATIENT
Start: 2022-09-16 | End: 2022-09-16

## 2022-09-15 RX ADMIN — CARVEDILOL 3.12 MG: 3.12 TABLET, FILM COATED ORAL at 17:04

## 2022-09-15 RX ADMIN — FUROSEMIDE 40 MG: 10 INJECTION, SOLUTION INTRAMUSCULAR; INTRAVENOUS at 17:04

## 2022-09-15 RX ADMIN — SACUBITRIL AND VALSARTAN 1 TABLET: 24; 26 TABLET, FILM COATED ORAL at 10:33

## 2022-09-15 RX ADMIN — IPRATROPIUM BROMIDE AND ALBUTEROL SULFATE 1 AMPULE: 2.5; .5 SOLUTION RESPIRATORY (INHALATION) at 12:43

## 2022-09-15 RX ADMIN — SODIUM CHLORIDE, PRESERVATIVE FREE 10 ML: 5 INJECTION INTRAVENOUS at 10:55

## 2022-09-15 RX ADMIN — CARVEDILOL 3.12 MG: 3.12 TABLET, FILM COATED ORAL at 10:33

## 2022-09-15 RX ADMIN — METHYLPREDNISOLONE SODIUM SUCCINATE 40 MG: 40 INJECTION, POWDER, FOR SOLUTION INTRAMUSCULAR; INTRAVENOUS at 10:33

## 2022-09-15 RX ADMIN — IPRATROPIUM BROMIDE AND ALBUTEROL SULFATE 1 AMPULE: 2.5; .5 SOLUTION RESPIRATORY (INHALATION) at 16:41

## 2022-09-15 RX ADMIN — APIXABAN 5 MG: 5 TABLET, FILM COATED ORAL at 10:33

## 2022-09-15 RX ADMIN — APIXABAN 5 MG: 5 TABLET, FILM COATED ORAL at 20:45

## 2022-09-15 RX ADMIN — FUROSEMIDE 40 MG: 10 INJECTION, SOLUTION INTRAMUSCULAR; INTRAVENOUS at 10:33

## 2022-09-15 RX ADMIN — CHOLECALCIFEROL (VITAMIN D3) 10 MCG (400 UNIT) TABLET 400 UNITS: at 10:33

## 2022-09-15 RX ADMIN — IPRATROPIUM BROMIDE AND ALBUTEROL SULFATE 1 AMPULE: 2.5; .5 SOLUTION RESPIRATORY (INHALATION) at 09:20

## 2022-09-15 RX ADMIN — ALOGLIPTIN 6.25 MG: 6.25 TABLET, FILM COATED ORAL at 10:33

## 2022-09-15 RX ADMIN — SACUBITRIL AND VALSARTAN 1 TABLET: 24; 26 TABLET, FILM COATED ORAL at 20:45

## 2022-09-15 RX ADMIN — BENZONATATE 100 MG: 100 CAPSULE ORAL at 20:45

## 2022-09-15 RX ADMIN — SODIUM CHLORIDE, PRESERVATIVE FREE 10 ML: 5 INJECTION INTRAVENOUS at 20:46

## 2022-09-15 RX ADMIN — IPRATROPIUM BROMIDE AND ALBUTEROL SULFATE 1 AMPULE: 2.5; .5 SOLUTION RESPIRATORY (INHALATION) at 21:18

## 2022-09-15 ASSESSMENT — ENCOUNTER SYMPTOMS
DIARRHEA: 0
VOMITING: 0
WHEEZING: 1
COUGH: 0
SHORTNESS OF BREATH: 0
ANAL BLEEDING: 0
NAUSEA: 0

## 2022-09-15 ASSESSMENT — PAIN SCALES - GENERAL: PAINLEVEL_OUTOF10: 0

## 2022-09-15 NOTE — PROGRESS NOTES
Progress Note    Admit Date: 9/11/2022  Diet: ADULT DIET; Regular; Low Fat/Low Chol/High Fiber/2 gm Na    Consult reason: dyspnea     Interval history:   Feels much better, dyspnea has improved. Continues to have some wheezing  Down to 2L, did not use bipap overnight    uop 3.2L, net negative 3L/24      Medications:     Scheduled Meds:   apixaban  5 mg Oral BID    vitamin D3  400 Units Oral Daily    alogliptin  6.25 mg Oral Daily    methylPREDNISolone  40 mg IntraVENous Q12H    carvedilol  3.125 mg Oral BID     sacubitril-valsartan  1 tablet Oral BID    nitroGLYCERIN  1 patch TransDERmal Daily    furosemide  40 mg IntraVENous BID    ipratropium-albuterol  1 ampule Inhalation Q4H WA    atorvastatin  80 mg Oral Once    sodium chloride flush  5-40 mL IntraVENous 2 times per day     Continuous Infusions:   sodium chloride       PRN Meds:benzonatate, albuterol, iohexol, sodium chloride flush, sodium chloride, acetaminophen    Objective:   Vitals:   T-max:  Patient Vitals for the past 8 hrs:   BP Temp Temp src Pulse Resp SpO2   09/15/22 1243 -- -- -- 91 20 98 %   09/15/22 1037 (!) 156/80 97.8 °F (36.6 °C) Oral 92 20 98 %   09/15/22 1033 (!) 156/80 -- -- 92 -- --   09/15/22 0920 -- -- -- 88 20 98 %   09/15/22 0812 (!) 154/55 97.5 °F (36.4 °C) Axillary 93 20 96 %         Intake/Output Summary (Last 24 hours) at 9/15/2022 1434  Last data filed at 9/15/2022 0410  Gross per 24 hour   Intake 120 ml   Output 1400 ml   Net -1280 ml         Review of Systems   Constitutional:  Negative for fatigue and fever. Respiratory:  Positive for wheezing. Negative for cough and shortness of breath. Cardiovascular:  Negative for chest pain, palpitations and leg swelling. Gastrointestinal:  Negative for anal bleeding, diarrhea, nausea and vomiting. Genitourinary:  Negative for dysuria. Musculoskeletal:  Negative for arthralgias. Physical Exam  Constitutional:       General: She is not in acute distress.      Appearance: She is obese.   HENT:      Head: Normocephalic and atraumatic. Mouth/Throat:      Mouth: Mucous membranes are moist.   Cardiovascular:      Rate and Rhythm: Normal rate and regular rhythm. Pulmonary:      Effort: Pulmonary effort is normal.      Breath sounds: Wheezing present. Abdominal:      General: There is no distension. Palpations: Abdomen is soft. Tenderness: There is no abdominal tenderness. Musculoskeletal:      Right lower leg: No edema. Left lower leg: No edema. Skin:     General: Skin is dry. Neurological:      General: No focal deficit present. Mental Status: She is alert and oriented to person, place, and time. Mental status is at baseline. Psychiatric:         Mood and Affect: Mood normal.       LABS:    CBC: No results for input(s): WBC, HGB, HCT, PLT, MCV in the last 72 hours. Renal:    Recent Labs     09/13/22  0511 09/14/22  0534 09/14/22  1040     --  140   K 5.5*  --  4.4     --  96*   CO2 23  --  30   BUN 24*  --  20   CREATININE 0.9  --  0.7   GLUCOSE 147*  --  183*   CALCIUM 9.1  --  9.5   MG  --  2.00  --    PHOS 3.6  --  3.3   ANIONGAP 14  --  14       Hepatic:   Recent Labs     09/13/22  0511 09/14/22  1040   LABALBU 3.5 4.2       Troponin: No results for input(s): TROPONINI in the last 72 hours. BNP: No results for input(s): BNP in the last 72 hours. Lipids:   No results for input(s): CHOL, HDL in the last 72 hours. Invalid input(s): LDLCALCU, TRIGLYCERIDE    ABGs:  No results for input(s): PHART, MIY2ZPZ, PO2ART, FAT4YMP, BEART, THGBART, A7UCYCSG, NKQ2OZZ in the last 72 hours. INR: No results for input(s): INR in the last 72 hours. Lactate: No results for input(s): LACTATE in the last 72 hours. Cultures:  -----------------------------------------------------------------  RAD:   XR CHEST (2 VW)   Final Result      Mild pulmonary edema, demonstrating improvement since 9/11/2022. Stable cardiomegaly.             XR CHEST PORTABLE Final Result   Cardia megaly with diffuse pulmonary vascular congestion of early congestive heart failure          Assessment/Plan:     Decompensated HF in setting of takotubo cardiomyopathy   Echo 5/11 w/ systolic dysfunction EF 80-42%  She has morbid obesity. Continues to have wheezing with improvement w/ inhaler therapy  . - continue with diuresis lasix 40 BID, monitor renal function and electrolytes. Weight 322 from 353 on admission.   - decrease steroids to 40 daily prednisone  - continue with duonebs q4 WA  - bipap prn and qHs  - wean oxygen for sats 88%  - f/u outpatient with pulmonology for PFTs and sleep study     FULL code      Arlet Peraza MD, PGY-3  09/15/22  2:34 PM    This patient has been staffed and discussed with Dr Greta Allan     Patient seen and examined. I agree with Dr. Dane Wu history, physical, lab findings, assessment and plan. Patient clinically is improving. She has less dyspnea, has not required acute BiPAP in over 24 hours, and has less wheezing on auscultation of her lungs.   Her peripheral edema is mostly resolved  She is diuresing well on Lasix 40 mg IV twice daily and tolerating it without complication -we will continue for another 24-hour  Bronchospasm is better and will change to Solu-Medrol from 40 mg IV every 12 hours to prednisone 40 mg daily  Continue DuoNebs  She will need outpatient PSG and PFTs  Hopefully she is getting closer to discharge but anticipate potentially another 48 hours    Ludin Valentine MD

## 2022-09-15 NOTE — CARE COORDINATION
Case Management Assessment           Daily Note                 Date/ Time of Note: 9/15/2022 3:13 PM         Note completed by: Omi Meek RN    Patient Name: Erasmo Vogel  YOB: 1940    Diagnosis:Acute pulmonary edema ([facility] [address] [J81.0]  NSTEM[facility] (non-ST elevated [facility] infarction) [facility] [I21.4]  ST elevation myocardial infarction (STEMI), unspecified artery ([facility] [I21.3]  Patient Admission Status: Inpatient    Date of Admission:9/11/2022  8:45 AM Length of Stay: 4 GLOS: GMLOS: 4.1    Current Plan of Care: 2L O2, IV lasix  ________________________________________________________________________________________  PT AM-PAC: 18 / 24 per last evaluation on: 9/15    OT AM-PAC: 18 / 24 per last evaluation on: 9/15    DME Needs for discharge: n/a  ________________________________________________________________________________________  Discharge Plan: Home with [address]: tbd    Tentative discharge date: 9/16    Current barriers to discharge: medical clearance    Referrals completed: [address]: [facility]    Resources/ information provided: [address] List  ________________________________________________________________________________________  Case Management Notes:  CM spoke with patient at bedside today. She is agreeable to [facility] at discharge, no preference to agency. Patient is currently on 2L O2, none at home. Family to transport at discharge. Dylon Khan and her family were provided with choice of provider; she and her family are in agreement with the discharge plan.     Care Transition Patient: No    Omi Meek RN  [facility]  Case Management Department  Ph: [phone]  Fax: [phone]

## 2022-09-15 NOTE — PROGRESS NOTES
Interventional cardiology note    Patient is awake and alert and generally doing better and feeling better. Still has some expiratory wheezes and it sounds as if these are chronic and perhaps lung disease on a chronic basis. I am hopeful that her LV function has improved by now from the Takotsubo. We will plan to get a limited echo tomorrow to assess that. I think she still needs some additional diuretics before discharge. The blood pressure and other hemodynamics are very good at this time on current therapy. The creatinine remains in good shape at 0.7. Plan #1 repeat BNP  #2 Limited echo tomorrow   #3 she needs MiraLAX for constipation.     Aden Small MD, [facility]

## 2022-09-15 NOTE — CONSULTS
Hospital Medicine  Consult       Chief Complaint:  SOB    Date of Service: Pt seen/examined in consultation on 9/14/22    History Of Present Illness:      78 y.o. female who we are asked to see/evaluate by Temitope Murphy MD for medical management. Patient is a 78 y.o. female who presents with shortness of breath  on 9/11/22. After an emotional event, she developed pain in her left side as well as shortness of breath. Her symptoms worsened, and her shortness of breath became severe, prompting her to call 911. On EMS arrival, patient was severely dyspneic with audible wheezing. They placed her on CPAP and gave her IM Solu-Medrol with concern for COPD exacerbation. They also obtained an EKG that was concerning for STEMI. She had emergent cardiac cath which was unremarkable for obstruction but was consistent with Takotsubo with EF 15%. Pulmonology was consulted and started on solumedrol. Hospitalist team was consulted for medical management. Today pt states she is breathing \"a lot better than yesterday>' Cough productive of some thick sputum. REVIEW OF SYSTEMS COMPLETED:   Pertinent positives as noted in the HPI. All other systems reviewed and negative. PHYSICAL EXAM PERFORMED:  BP (!) 154/55   Pulse 88   Temp 97.5 °F (36.4 °C) (Axillary)   Resp 20   Ht 5' 6\" (1.676 m)   Wt (!) 322 lb 12.1 oz (146.4 kg)   SpO2 98%   BMI 52.09 kg/m²   General appearance: No apparent distress, appears stated age and cooperative. HEENT: Normal cephalic, atraumatic without obvious deformity. Respiratory: bilaterally Wheezes, coarse shonchi throughout  Cardiovascular: Regular rate and rhythm with normal S1/S2 without murmurs, rubs or gallops. Abdomen: Soft, non-tender, non-distended with normal bowel sounds. Musculoskeletal: LE wrapped b/l  Neurologic:  Neurovascularly intact without any focal sensory/motor deficits.   Psychiatric: Alert and oriented, thought content appropriate, normal insight      Labs:     No results for input(s): WBC, HGB, HCT, PLT in the last 72 hours. Recent Labs     09/13/22  0511 09/14/22  1040    140   K 5.5* 4.4    96*   CO2 23 30   BUN 24* 20   CREATININE 0.9 0.7   CALCIUM 9.1 9.5   PHOS 3.6 3.3       Urinalysis:    Lab Results   Component Value Date/Time    NITRU Negative 09/30/2020 01:47 AM    WBCUA 0-2 09/30/2020 01:47 AM    BACTERIA 4+ 11/24/2019 08:49 PM    RBCUA 5-10 09/30/2020 01:47 AM    BLOODU TRACE-INTACT 09/30/2020 01:47 AM    SPECGRAV 1.020 09/30/2020 01:47 AM    GLUCOSEU Negative 09/30/2020 01:47 AM    GLUCOSEU NEGATIVE 09/23/2010 10:50 AM       Radiology: I have reviewed the radiology reports with the following interpretation:     XR CHEST (2 VW)   Final Result      Mild pulmonary edema, demonstrating improvement since 9/11/2022. Stable cardiomegaly. XR CHEST PORTABLE   Final Result   Cardia megaly with diffuse pulmonary vascular congestion of early congestive heart failure            ASSESSMENT:    Active Hospital Problems    Diagnosis Date Noted    ST elevation myocardial infarction (STEMI) ([facility] [I21.3] 09/13/2022     Priority: Medium    Acute respiratory failure with hypoxia and hypercapnia (HCC) [J96.01, J96.02] 09/12/2022     Priority: Medium    NSTEMI (non-ST elevated myocardial infarction) ([facility] [I21.4] 09/11/2022     Priority: Medium   Takotsubo cardiomyopathy   HTN  HLD  Morbid obesity   Lymphedema   Type II DM  Chronic Afib    PLAN:    -patient remains on 2 L O2. Wean as tolerated. Continue steroids and inhalers per Pulm  -continue diuresis and monitor renal function.   -started on entresto and coreg. Will need life vest on discharge.   -start sitagliptin.   -resume eliquis    DVT Prophylaxis: eliquis  Diet: ADULT DIET;  Regular; Low Fat/Low Chol/High Fiber/2 gm Na  Code Status: Full Code    PT/OT Eval Status: Active and ongoing    Dispo - TBD    Thank you for the consultation, will follow up as needed    Electronically signed by Sun Mathis MD on 9/14/22 at 12:20 PM EDT

## 2022-09-15 NOTE — PLAN OF CARE
Problem: Discharge Planning  Goal: Discharge to home or other facility with appropriate resources  Outcome: Progressing  Flowsheets (Taken 9/14/2022 2019)  Discharge to home or other facility with appropriate resources: Identify barriers to discharge with patient and caregiver     Problem: Respiratory - Adult  Goal: Achieves optimal ventilation and oxygenation  Outcome: Progressing  Flowsheets (Taken 9/14/2022 2019)  Achieves optimal ventilation and oxygenation:   Assess for changes in respiratory status   Position to facilitate oxygenation and minimize respiratory effort   Oxygen supplementation based on oxygen saturation or arterial blood gases  Note: Pt on 2L nasal cannula; oxygenation >92% this shift. Problem: Cardiovascular - Adult  Goal: Maintains optimal cardiac output and hemodynamic stability  Outcome: Progressing  Flowsheets (Taken 9/14/2022 2019)  Maintains optimal cardiac output and hemodynamic stability:   Monitor blood pressure and heart rate   Monitor urine output and notify Licensed Independent Practitioner for values outside of normal range     Problem: Cardiovascular - Adult  Goal: Absence of cardiac dysrhythmias or at baseline  Outcome: Progressing  Flowsheets (Taken 9/14/2022 2019)  Absence of cardiac dysrhythmias or at baseline:   Monitor cardiac rate and rhythm   Assess for signs of decreased cardiac output     Problem: Safety - Adult  Goal: Free from fall injury  Outcome: Progressing  Flowsheets (Taken 9/14/2022 2027)  Free From Fall Injury: Instruct family/caregiver on patient safety  Note: Pt is a Fall Risk. See Debbrah Fass Fall Risk Score. Pt bed in low position and side rails up. Call light and belongings in reach. Pt encouraged to call for assistance. Will continue with hourly rounds for PO intake, pain needs, toileting, and repositioning as needed.          Problem: Pain  Goal: Verbalizes/displays adequate comfort level or baseline comfort level  Outcome: Progressing  Flowsheets (Taken 9/15/2022 0236)  Verbalizes/displays adequate comfort level or baseline comfort level:   Encourage patient to monitor pain and request assistance   Assess pain using appropriate pain scale  Note: No c/o pain this shift. Problem: Skin/Tissue Integrity  Goal: Absence of new skin breakdown  Description: 1. Monitor for areas of redness and/or skin breakdown  2. Assess vascular access sites hourly  3. Every 4-6 hours minimum:  Change oxygen saturation probe site  4. Every 4-6 hours:  If on nasal continuous positive airway pressure, respiratory therapy assess nares and determine need for appliance change or resting period.   Outcome: Progressing     Problem: ABCDS Injury Assessment  Goal: Absence of physical injury  Recent Flowsheet Documentation  Taken 9/14/2022 2027 by Gloria Connolly RN  Absence of Physical Injury: Implement safety measures based on patient assessment

## 2022-09-15 NOTE — PROGRESS NOTES
Physical Therapy  Facility/Department: [place] [address] [facility]  Daily Treatment Note  NAME: Jewel Braswell  : 1940  MRN: [id number]    Date of Service: 9/15/2022    Discharge Recommendations:    Jewel Braswell scored a 18/24 on the AM-PAC short mobility form. Current research shows that an AM-PAC score of 18 or greater is typically associated with a discharge to the patient's home setting. Based on the patient's AM-PAC score and their current functional mobility deficits, it is recommended that the patient have 2-3 sessions per week of Physical Therapy at d/c to increase the patient's independence. At this time, this patient demonstrates the endurance and safety to discharge home with home PT and a follow up treatment frequency of 2-3x/wk. Please see assessment section for further patient specific details. If patient discharges prior to next session this note will serve as a discharge summary. Please see below for the latest assessment towards goals. PT Equipment Recommendations  Equipment Needed: No    Patient Diagnosis(es): The primary encounter diagnosis was ST elevation myocardial infarction (STEMI), unspecified artery ([facility] A diagnosis of Acute pulmonary edema (HCC) was also pertinent to this visit. Assessment   Assessment: Pt progressing with mobility & endurance. Recommend home PT upon D/C. Will follow per plan of care. Activity Tolerance: Patient tolerated evaluation without incident;Patient limited by endurance  Equipment Needed: No     Plan    Plan  Plan:  (2-5)  Current Treatment Recommendations: Strengthening;Balance training;Functional mobility training;Transfer training;Gait training;Neuromuscular re-education; Endurance training; Therapeutic activities; Home exercise program;Safety education & training;Patient/Caregiver education & training     Restrictions  Position Activity Restriction  Other position/activity restrictions: up with assist     Subjective    Subjective  Subjective: Pt seated in chair & agreeable to PT. Pain: pt denies     Objective     Bed Mobility Training  Bed Mobility Training: No  Transfer Training  Transfer Training: Yes  Sit to Stand: Contact-guard assistance;Stand-by assistance (from chair & EOB)  Stand to Sit: Stand-by assistance  Gait Training  Gait Training: Yes  Gait  Overall Level of Assistance: Contact-guard assistance;Stand-by assistance (pt amb 15 ft, 125 ft with RW & CGA/SBA. distance limited by SOB. pt stated \"i feel weak in my knees\". no LOB.)     PT Exercises  Exercise Treatment: seated x 20 bilat LE: LAQ, marching, ankle DF/PF, hip abd,  heel slides     Safety Devices  Type of Devices: Call light within reach; Chair alarm in place; Left in chair;Nurse notified;Gait belt       Goals  Short Term Goals  Time Frame for Short term goals: by dc  Short term goal 1: pt will perform bed mobility mod I  Short term goal 2: pt will perform functional transfers with LRAD and mod I  Short term goal 3: pt will ambulate 100' with LRAD and supervision  Patient Goals   Patient goals : to go home    Education  Patient Education  Education Given To: Patient  Education Provided: Plan of Care  Education Method: Verbal  Education Outcome: Verbalized understanding    Therapy Time   Individual Concurrent Group Co-treatment   Time In 1119         Time Out 1151         Minutes Liz Russell, PT

## 2022-09-16 LAB
ALBUMIN SERPL-MCNC: 3.5 G/DL (ref 3.4–5)
ALBUMIN SERPL-MCNC: 3.9 G/DL (ref 3.4–5)
ANION GAP SERPL CALCULATED.3IONS-SCNC: 15 MMOL/L (ref 3–16)
ANION GAP SERPL CALCULATED.3IONS-SCNC: 15 MMOL/L (ref 3–16)
BUN BLDV-MCNC: 32 MG/DL (ref 7–20)
BUN BLDV-MCNC: 33 MG/DL (ref 7–20)
CALCIUM SERPL-MCNC: 9 MG/DL (ref 8.3–10.6)
CALCIUM SERPL-MCNC: 9.5 MG/DL (ref 8.3–10.6)
CHLORIDE BLD-SCNC: 94 MMOL/L (ref 99–110)
CHLORIDE BLD-SCNC: 96 MMOL/L (ref 99–110)
CO2: 29 MMOL/L (ref 21–32)
CO2: 30 MMOL/L (ref 21–32)
CREAT SERPL-MCNC: 0.9 MG/DL (ref 0.6–1.2)
CREAT SERPL-MCNC: 1.1 MG/DL (ref 0.6–1.2)
GFR AFRICAN AMERICAN: 57
GFR AFRICAN AMERICAN: >60
GFR NON-AFRICAN AMERICAN: 47
GFR NON-AFRICAN AMERICAN: 60
GLUCOSE BLD-MCNC: 120 MG/DL (ref 70–99)
GLUCOSE BLD-MCNC: 156 MG/DL (ref 70–99)
LV EF: 33 %
LVEF MODALITY: NORMAL
MAGNESIUM: 2 MG/DL (ref 1.8–2.4)
PHOSPHORUS: 4.1 MG/DL (ref 2.5–4.9)
PHOSPHORUS: 4.4 MG/DL (ref 2.5–4.9)
POTASSIUM SERPL-SCNC: 4 MMOL/L (ref 3.5–5.1)
POTASSIUM SERPL-SCNC: 4.1 MMOL/L (ref 3.5–5.1)
SODIUM BLD-SCNC: 139 MMOL/L (ref 136–145)
SODIUM BLD-SCNC: 140 MMOL/L (ref 136–145)

## 2022-09-16 PROCEDURE — 6370000000 HC RX 637 (ALT 250 FOR IP): Performed by: INTERNAL MEDICINE

## 2022-09-16 PROCEDURE — 97535 SELF CARE MNGMENT TRAINING: CPT

## 2022-09-16 PROCEDURE — 92526 ORAL FUNCTION THERAPY: CPT

## 2022-09-16 PROCEDURE — 94640 AIRWAY INHALATION TREATMENT: CPT

## 2022-09-16 PROCEDURE — 80069 RENAL FUNCTION PANEL: CPT

## 2022-09-16 PROCEDURE — 6370000000 HC RX 637 (ALT 250 FOR IP): Performed by: NURSE PRACTITIONER

## 2022-09-16 PROCEDURE — 94761 N-INVAS EAR/PLS OXIMETRY MLT: CPT

## 2022-09-16 PROCEDURE — 97530 THERAPEUTIC ACTIVITIES: CPT

## 2022-09-16 PROCEDURE — 6360000002 HC RX W HCPCS: Performed by: INTERNAL MEDICINE

## 2022-09-16 PROCEDURE — 2060000000 HC ICU INTERMEDIATE R&B

## 2022-09-16 PROCEDURE — 2580000003 HC RX 258: Performed by: INTERNAL MEDICINE

## 2022-09-16 PROCEDURE — 2700000000 HC OXYGEN THERAPY PER DAY

## 2022-09-16 PROCEDURE — 99232 SBSQ HOSP IP/OBS MODERATE 35: CPT | Performed by: INTERNAL MEDICINE

## 2022-09-16 PROCEDURE — 97110 THERAPEUTIC EXERCISES: CPT

## 2022-09-16 PROCEDURE — 83735 ASSAY OF MAGNESIUM: CPT

## 2022-09-16 PROCEDURE — 99233 SBSQ HOSP IP/OBS HIGH 50: CPT | Performed by: INTERNAL MEDICINE

## 2022-09-16 PROCEDURE — 99233 SBSQ HOSP IP/OBS HIGH 50: CPT | Performed by: NURSE PRACTITIONER

## 2022-09-16 PROCEDURE — 36415 COLL VENOUS BLD VENIPUNCTURE: CPT

## 2022-09-16 PROCEDURE — 6360000004 HC RX CONTRAST MEDICATION: Performed by: INTERNAL MEDICINE

## 2022-09-16 PROCEDURE — C8929 TTE W OR WO FOL WCON,DOPPLER: HCPCS

## 2022-09-16 RX ORDER — IPRATROPIUM BROMIDE AND ALBUTEROL SULFATE 2.5; .5 MG/3ML; MG/3ML
1 SOLUTION RESPIRATORY (INHALATION) EVERY 4 HOURS PRN
Status: DISCONTINUED | OUTPATIENT
Start: 2022-09-16 | End: 2022-09-19 | Stop reason: HOSPADM

## 2022-09-16 RX ORDER — PREDNISONE 20 MG/1
40 TABLET ORAL DAILY
Status: COMPLETED | OUTPATIENT
Start: 2022-09-17 | End: 2022-09-17

## 2022-09-16 RX ORDER — POLYETHYLENE GLYCOL 3350 17 G/17G
17 POWDER, FOR SOLUTION ORAL DAILY
Status: DISCONTINUED | OUTPATIENT
Start: 2022-09-16 | End: 2022-09-19 | Stop reason: HOSPADM

## 2022-09-16 RX ORDER — SENNA AND DOCUSATE SODIUM 50; 8.6 MG/1; MG/1
2 TABLET, FILM COATED ORAL DAILY PRN
Status: DISCONTINUED | OUTPATIENT
Start: 2022-09-16 | End: 2022-09-19 | Stop reason: HOSPADM

## 2022-09-16 RX ADMIN — MAGNESIUM HYDROXIDE 30 ML: 400 SUSPENSION ORAL at 21:18

## 2022-09-16 RX ADMIN — IPRATROPIUM BROMIDE AND ALBUTEROL SULFATE 1 AMPULE: 2.5; .5 SOLUTION RESPIRATORY (INHALATION) at 09:04

## 2022-09-16 RX ADMIN — SODIUM CHLORIDE, PRESERVATIVE FREE 5 ML: 5 INJECTION INTRAVENOUS at 21:18

## 2022-09-16 RX ADMIN — SODIUM CHLORIDE, PRESERVATIVE FREE 10 ML: 5 INJECTION INTRAVENOUS at 12:28

## 2022-09-16 RX ADMIN — ALOGLIPTIN 6.25 MG: 6.25 TABLET, FILM COATED ORAL at 12:18

## 2022-09-16 RX ADMIN — FUROSEMIDE 40 MG: 10 INJECTION, SOLUTION INTRAMUSCULAR; INTRAVENOUS at 12:29

## 2022-09-16 RX ADMIN — POLYETHYLENE GLYCOL (3350) 17 G: 17 POWDER, FOR SOLUTION ORAL at 20:18

## 2022-09-16 RX ADMIN — APIXABAN 5 MG: 5 TABLET, FILM COATED ORAL at 12:18

## 2022-09-16 RX ADMIN — SACUBITRIL AND VALSARTAN 1 TABLET: 24; 26 TABLET, FILM COATED ORAL at 21:18

## 2022-09-16 RX ADMIN — APIXABAN 5 MG: 5 TABLET, FILM COATED ORAL at 21:18

## 2022-09-16 RX ADMIN — IPRATROPIUM BROMIDE AND ALBUTEROL SULFATE 1 AMPULE: 2.5; .5 SOLUTION RESPIRATORY (INHALATION) at 11:51

## 2022-09-16 RX ADMIN — FUROSEMIDE 40 MG: 10 INJECTION, SOLUTION INTRAMUSCULAR; INTRAVENOUS at 20:18

## 2022-09-16 RX ADMIN — PERFLUTREN 1.65 MG: 6.52 INJECTION, SUSPENSION INTRAVENOUS at 10:08

## 2022-09-16 RX ADMIN — SACUBITRIL AND VALSARTAN 1 TABLET: 24; 26 TABLET, FILM COATED ORAL at 12:18

## 2022-09-16 RX ADMIN — CHOLECALCIFEROL (VITAMIN D3) 10 MCG (400 UNIT) TABLET 400 UNITS: at 12:18

## 2022-09-16 RX ADMIN — CARVEDILOL 3.12 MG: 3.12 TABLET, FILM COATED ORAL at 16:09

## 2022-09-16 ASSESSMENT — ENCOUNTER SYMPTOMS
VOMITING: 0
COUGH: 0
SHORTNESS OF BREATH: 0
ANAL BLEEDING: 0
NAUSEA: 0
WHEEZING: 1
DIARRHEA: 0

## 2022-09-16 NOTE — PLAN OF CARE
Problem: Discharge Planning  Goal: Discharge to home or other facility with appropriate resources  Outcome: Progressing  Flowsheets (Taken 9/16/2022 0339)  Discharge to home or other facility with appropriate resources:   Identify barriers to discharge with patient and caregiver   Arrange for needed discharge resources and transportation as appropriate     Problem: Respiratory - Adult  Goal: Achieves optimal ventilation and oxygenation  Outcome: Progressing  Flowsheets (Taken 9/15/2022 2031)  Achieves optimal ventilation and oxygenation: Assess for changes in respiratory status  Note: Pt on 2L nasal cannula. Oxygenation >94% this shift. Respirations even and unlabored. Problem: Cardiovascular - Adult  Goal: Maintains optimal cardiac output and hemodynamic stability  Outcome: Progressing  Flowsheets (Taken 9/15/2022 2031)  Maintains optimal cardiac output and hemodynamic stability:   Monitor blood pressure and heart rate   Monitor urine output and notify Licensed Independent Practitioner for values outside of normal range     Problem: Cardiovascular - Adult  Goal: Absence of cardiac dysrhythmias or at baseline  Outcome: Progressing  Flowsheets (Taken 9/15/2022 2031)  Absence of cardiac dysrhythmias or at baseline: Monitor cardiac rate and rhythm     Problem: Safety - Adult  Goal: Free from fall injury  Outcome: Progressing  Flowsheets (Taken 9/15/2022 2030)  Free From Fall Injury: Instruct family/caregiver on patient safety  Note: Pt is a Fall Risk. See Page Dadds Fall Risk Score. Pt bed in low position and side rails up. Call light and belongings in reach. Pt encouraged to call for assistance. Will continue with hourly rounds for PO intake, pain needs, toileting, and repositioning as needed.          Problem: Pain  Goal: Verbalizes/displays adequate comfort level or baseline comfort level  Outcome: Progressing  Flowsheets (Taken 9/16/2022 0339)  Verbalizes/displays adequate comfort level or baseline comfort level: Encourage patient to monitor pain and request assistance  Note: No c/o pain this shift. Problem: Skin/Tissue Integrity  Goal: Absence of new skin breakdown  Description: 1. Monitor for areas of redness and/or skin breakdown  2. Assess vascular access sites hourly  3. Every 4-6 hours minimum:  Change oxygen saturation probe site  4. Every 4-6 hours:  If on nasal continuous positive airway pressure, respiratory therapy assess nares and determine need for appliance change or resting period.   Outcome: Progressing     Problem: ABCDS Injury Assessment  Goal: Absence of physical injury  Recent Flowsheet Documentation  Taken 9/15/2022 2030 by Nida Russell RN  Absence of Physical Injury: Implement safety measures based on patient assessment

## 2022-09-16 NOTE — PROGRESS NOTES
Hospitalist Progress Note      Name:  James Hernandez /Age/Sex: 1940  (78 y.o. female)   MRN & CSN:  [id number] & [id number] Admission Date/Time: 2022  8:45 AM   Location:  4320/4320-01 PCP: Camila Sofia MD         Hospital Day: 6    Assessment and Plan:     78 y.o. female who we are asked to see/evaluate by Villa Manzano MD for medical management. Patient is a 78 y.o. female who presents with shortness of breath  on 22. After an emotional event, she developed pain in her left side as well as shortness of breath. Her symptoms worsened, and her shortness of breath became severe, prompting her to call 911. On EMS arrival, patient was severely dyspneic with audible wheezing. They placed her on CPAP and gave her IM Solu-Medrol with concern for COPD exacerbation. They also obtained an EKG that was concerning for STEMI. She had emergent cardiac cath which was unremarkable for obstruction but was consistent with Takotsubo with EF 15%. Pulmonology was consulted and started on solumedrol. Hospitalist team was consulted for medical management. Decompensated HF in setting of takotubo cardiomyopathy   HTN  HLD  Morbid obesity   Lymphedema   Type II DM  Chronic Afib     PLAN:     -patient remains on 2 L O2.,  Chest x-ray on   showing pulmonary edema   Continue on IV Lasix for effective diuresis and monitor renal function and serum electrolytes  wean as tolerated. Continue steroids and inhalers per Pulm  -started on entresto and coreg. Will need life vest on discharge.   -start sitagliptin.   -resume eliquis     DVT Prophylaxis: eliquis    Diet ADULT DIET;  Regular; Low Fat/Low Chol/High Fiber/2 gm Na   DVT Prophylaxis [] Lovenox, []  Heparin, [] SCDs, [] Ambulation   GI Prophylaxis [] PPI,  [] H2 Blocker,  [] Carafate,  [] Diet/Tube Feeds   Code Status Full Code   Disposition Patient requires continued admission due to    MDM [] Low, [] Moderate,[]  High  Patient's risk as above due to

## 2022-09-16 NOTE — PROGRESS NOTES
Progress Note    Admit Date: 9/11/2022  Diet: ADULT DIET; Regular; Low Fat/Low Chol/High Fiber/2 gm Na    Consult reason: dyspnea     Interval history:   Feels much better, dyspnea has improved. Reports some wheezing  Down to 2L, did not use bipap overnight    Uop 1L, net negative 950mL/24      Medications:     Scheduled Meds:   [START ON 9/17/2022] predniSONE  40 mg Oral Daily    apixaban  5 mg Oral BID    vitamin D3  400 Units Oral Daily    alogliptin  6.25 mg Oral Daily    carvedilol  3.125 mg Oral BID     sacubitril-valsartan  1 tablet Oral BID    nitroGLYCERIN  1 patch TransDERmal Daily    furosemide  40 mg IntraVENous BID    atorvastatin  80 mg Oral Once    sodium chloride flush  5-40 mL IntraVENous 2 times per day     Continuous Infusions:   sodium chloride       PRN Meds:ipratropium-albuterol, benzonatate, albuterol, iohexol, sodium chloride flush, sodium chloride, acetaminophen    Objective:   Vitals:   T-max:  Patient Vitals for the past 8 hrs:   BP Temp Temp src Pulse Resp SpO2 Weight   09/16/22 1153 -- -- -- -- -- 90 % --   09/16/22 0933 105/70 97.9 °F (36.6 °C) Oral (!) 109 18 95 % --   09/16/22 0906 -- -- -- 81 16 98 % --   09/16/22 0600 -- -- -- -- -- -- (!) 319 lb 0.1 oz (144.7 kg)   09/16/22 0413 (!) 137/92 97.9 °F (36.6 °C) Oral 97 18 96 % --       Intake/Output Summary (Last 24 hours) at 9/16/2022 1159  Last data filed at 9/16/2022 0600  Gross per 24 hour   Intake 100 ml   Output 1050 ml   Net -950 ml       Review of Systems   Constitutional:  Negative for fatigue and fever. Respiratory:  Positive for wheezing. Negative for cough and shortness of breath. Cardiovascular:  Negative for chest pain, palpitations and leg swelling. Gastrointestinal:  Negative for anal bleeding, diarrhea, nausea and vomiting. Genitourinary:  Negative for dysuria. Musculoskeletal:  Negative for arthralgias. Physical Exam  Constitutional:       General: She is not in acute distress.      Appearance: She is obese.   HENT:      Head: Normocephalic and atraumatic. Mouth/Throat:      Mouth: Mucous membranes are moist.   Cardiovascular:      Rate and Rhythm: Normal rate and regular rhythm. Pulmonary:      Effort: Pulmonary effort is normal.      Breath sounds: No wheezing. Abdominal:      General: There is no distension. Palpations: Abdomen is soft. Tenderness: There is no abdominal tenderness. Musculoskeletal:      Right lower leg: No edema. Left lower leg: No edema. Skin:     General: Skin is dry. Neurological:      General: No focal deficit present. Mental Status: She is alert and oriented to person, place, and time. Mental status is at baseline. Psychiatric:         Mood and Affect: Mood normal.       LABS:    CBC: No results for input(s): WBC, HGB, HCT, PLT, MCV in the last 72 hours. Renal:    Recent Labs     09/14/22  0534 09/14/22  1040 09/16/22  0027 09/16/22  0516   NA  --  140 139 140   K  --  4.4 4.1 4.0   CL  --  96* 94* 96*   CO2  --  30 30 29   BUN  --  20 33* 32*   CREATININE  --  0.7 1.1 0.9   GLUCOSE  --  183* 156* 120*   CALCIUM  --  9.5 9.5 9.0   MG 2.00  --   --   --    PHOS  --  3.3 4.4 4.1   ANIONGAP  --  14 15 15     Hepatic:   Recent Labs     09/14/22  1040 09/16/22  0027 09/16/22  0516   LABALBU 4.2 3.9 3.5     Troponin: No results for input(s): TROPONINI in the last 72 hours. BNP: No results for input(s): BNP in the last 72 hours. Lipids:   No results for input(s): CHOL, HDL in the last 72 hours. Invalid input(s): LDLCALCU, TRIGLYCERIDE    ABGs:  No results for input(s): PHART, JVE0EMA, PO2ART, HOO0GJK, BEART, THGBART, M2ZDIIBW, ZRT6SWQ in the last 72 hours. INR: No results for input(s): INR in the last 72 hours. Lactate: No results for input(s): LACTATE in the last 72 hours.   Cultures:  -----------------------------------------------------------------  RAD:   XR CHEST (2 VW)   Final Result      Mild pulmonary edema, demonstrating improvement since 9/11/2022. Stable cardiomegaly. XR CHEST PORTABLE   Final Result   Cardia megaly with diffuse pulmonary vascular congestion of early congestive heart failure          Assessment/Plan:     Decompensated HF in setting of takotubo cardiomyopathy   Echo 1/45 w/ systolic dysfunction EF 03-39%  She has morbid obesity. Her wheezing has resolved to my examination. Chest x-ray from 9/12 shows mild pulmonary edema. - continue with diuresis lasix 40 BID, monitor renal function and electrolytes. Weight 322 from 353 on admission.   - cont prednisone 40 daily for 1 more day  - continue with duonebs prn   - bipap prn and qHs  - wean oxygen for sats 88%  - f/u outpatient with pulmonology for PFTs and sleep study     FULL code      Thomas Russell MD, PGY-3  09/16/22  11:59 AM    This patient has been staffed and discussed with Dr Renate Martin     Patient seen and examined. I agree with Dr. Nohemy Peña history, physical, lab findings, assessment and plan. Patient clinically is much better then the initial day I saw Bhumi Chan.   She only has dyspnea on the occasion  Bilevel machine has been removed from her room  She has no wheezing  Oxygen saturation was 96% on 1 L    Plan  Will defer diuretics to cardiology  Continue prednisone 40 mg daily through September 18  Change DuoNebs to as needed  Give trial on room air with goal O2 sat of 88%  She will need outpatient PSG and PFTs  Close to discharge from a pulmonary perspective  I will put my office information on her discharge paperwork and I can facilitate scheduling of PSG and PFTs  Will sign off -coughing pulmonary issues    Qiana Boyce MD

## 2022-09-16 NOTE — PLAN OF CARE
Problem: Discharge Planning  Goal: Discharge to home or other facility with appropriate resources  Outcome: Progressing  Flowsheets (Taken 9/16/2022 0933)  Discharge to home or other facility with appropriate resources: Identify barriers to discharge with patient and caregiver     Problem: Respiratory - Adult  Goal: Achieves optimal ventilation and oxygenation  Outcome: Progressing  Flowsheets  Taken 9/16/2022 1953  Achieves optimal ventilation and oxygenation:   Assess for changes in respiratory status   Position to facilitate oxygenation and minimize respiratory effort  Taken 9/16/2022 0933  Achieves optimal ventilation and oxygenation: Assess for changes in respiratory status     Problem: Cardiovascular - Adult  Goal: Maintains optimal cardiac output and hemodynamic stability  Outcome: Progressing  Flowsheets  Taken 9/16/2022 1953  Maintains optimal cardiac output and hemodynamic stability: Monitor blood pressure and heart rate  Taken 9/16/2022 0933  Maintains optimal cardiac output and hemodynamic stability: Monitor blood pressure and heart rate     Problem: Cardiovascular - Adult  Goal: Absence of cardiac dysrhythmias or at baseline  Outcome: Progressing  Flowsheets (Taken 9/16/2022 0933)  Absence of cardiac dysrhythmias or at baseline: Monitor cardiac rate and rhythm     Problem: Safety - Adult  Goal: Free from fall injury  Outcome: Progressing  Flowsheets (Taken 9/16/2022 0810)  Free From Fall Injury: Instruct family/caregiver on patient safety     Problem: Pain  Goal: Verbalizes/displays adequate comfort level or baseline comfort level  Outcome: Progressing  Flowsheets (Taken 9/16/2022 1953)  Verbalizes/displays adequate comfort level or baseline comfort level:   Administer analgesics based on type and severity of pain and evaluate response   Encourage patient to monitor pain and request assistance     Problem: Skin/Tissue Integrity  Goal: Absence of new skin breakdown  Description: 1.   Monitor for areas of redness and/or skin breakdown  2. Assess vascular access sites hourly  3. Every 4-6 hours minimum:  Change oxygen saturation probe site  4. Every 4-6 hours:  If on nasal continuous positive airway pressure, respiratory therapy assess nares and determine need for appliance change or resting period.   Outcome: Progressing     Problem: Metabolic/Fluid and Electrolytes - Adult  Goal: Electrolytes maintained within normal limits  Outcome: Progressing  Flowsheets (Taken 9/16/2022 0933)  Electrolytes maintained within normal limits: Monitor labs and assess patient for signs and symptoms of electrolyte imbalances     Problem: Metabolic/Fluid and Electrolytes - Adult  Goal: Hemodynamic stability and optimal renal function maintained  Outcome: Progressing  Flowsheets (Taken 9/16/2022 0933)  Hemodynamic stability and optimal renal function maintained: Monitor labs and assess for signs and symptoms of volume excess or deficit     Problem: ABCDS Injury Assessment  Goal: Absence of physical injury  Outcome: Progressing  Flowsheets (Taken 9/16/2022 0810)  Absence of Physical Injury: Implement safety measures based on patient assessment     Problem: Chronic Conditions and Co-morbidities  Goal: Patient's chronic conditions and co-morbidity symptoms are monitored and maintained or improved  Outcome: Progressing  Flowsheets (Taken 9/16/2022 0933)  Care Plan - Patient's Chronic Conditions and Co-Morbidity Symptoms are Monitored and Maintained or Improved: Monitor and assess patient's chronic conditions and comorbid symptoms for stability, deterioration, or improvement

## 2022-09-16 NOTE — CARE COORDINATION
Case Management Assessment           Daily Note                 Date/ Time of Note: 9/16/2022 4:22 PM         Note completed by: West Jolly RN    Patient Name: Kevin Pearl  YOB: 1940    Diagnosis:Acute pulmonary edema ([facility] [J81.0]  NSTEMI (non-ST elevated myocardial infarction) ([facility] [I21.4]  ST elevation myocardial infarction (STEMI), unspecified artery ([facility] [I21.3]  Patient Admission Status: Inpatient    Date of Admission:9/11/2022  8:45 AM Length of Stay: 5 GLOS: GMLOS: 4.1    Current Plan of Care: weaned to room air  ________________________________________________________________________________________  PT AM-PAC: 18 / 24 per last evaluation on: 9/15    OT AM-PAC: 20 / 24 per last evaluation on: 9/16    DME Needs for discharge: n/a  ________________________________________________________________________________________  Discharge Plan: Home with [address] [place] [address] Way: [facility]    Tentative discharge date: tbd      Current barriers to discharge: medical clearance      ________________________________________________________________________________________  Case Management Notes:  Patient is from home with family, plans to return home. She is agreeable to Katherin Soto, [facility] following. Family to transport home. Lion Strauss and her family were provided with choice of provider; she and her family are in agreement with the discharge plan.     Care Transition Patient: Hyun Jolly RN  [facility]  Case Management Department  Ph: [phone]  Fax: [phone]

## 2022-09-16 NOTE — PROGRESS NOTES
[facility]   Cardiology  Note   Dr Anne Drew MD, Rashad Solitario RN, FNP APRN CVNP  Date: 9/16/2022  Admit Date: 9/11/2022       CC:SOB (Pt arrives on CPAP due to severe SOB )  Cardiology consult: STEMI      Interval Hx /  Subjective: in NSR VSS feels much better  Down to 2L, SV02 95% did not use bipap overnight    No new complaints today. No major events overnight. continue with diuresis lasix 40 BID, monitor renal function & electrolytes. sCr wnl Weight 322 from 353 on admission. Limited TTE this am    [facility] Takotsubo cardiomyopathy diagnosed 9/11/2022. Acute coronary syndrome with Takotsubo. We did not find coronary lesions or need intervention. \LV function is abnormal showing evidence for Takotsubo and low ejection fraction of 15%. Recommendations: Continue support with diuretics. Nitroglycerin as noted. She will be going to the floor on BiPAP. Beta-blocker. She normally takes Eliquis we will resume that. Also losartan 100 mg. Torsemide 20 mg. Metoprolol succinate 200 mg/day. 9/13/2022 TTE Left ventricular cavity size is normal. There is mild concentric left ventricular hypertrophy. Overall left ventricular systolic function appears severely reduced with an ejection fraction of 25-30%. There is severe hypokinesis / akinesis of the apex and mid to distal  anteroseptal and anterior walls. Diastolic filling parameters suggest grade III diastolic dysfunction with increased filling pressures. No  evidence of left ventricular mass or thrombus noted. Tricuspid valve leaflets are structurally normal. Moderate tricuspid regurgitation. No evidence of tricuspid stenosis. Patient seen and examined. Clinical notes reviewed.  Telemetry reviewed / Pertinent labs, diagnostic, device, and imaging results reviewed as a part of this visit  I spent a total of 35 minutes and greater than 50% of the time was spent counseling with patient  coordinating care regarding her diagnosis, treatments and plan of care    Scheduled Meds:   [START ON 9/17/2022] predniSONE  40 mg Oral Daily    apixaban  5 mg Oral BID    vitamin D3  400 Units Oral Daily    alogliptin  6.25 mg Oral Daily    carvedilol  3.125 mg Oral BID     sacubitril-valsartan  1 tablet Oral BID    nitroGLYCERIN  1 patch TransDERmal Daily    furosemide  40 mg IntraVENous BID    atorvastatin  80 mg Oral Once    sodium chloride flush  5-40 mL IntraVENous 2 times per day     Vitals:    09/16/22 1153   BP:    Pulse:    Resp:    Temp:    SpO2: 90%      In: 100 [P.O.:100]  Out: 1050    Wt Readings from Last 3 Encounters:   09/16/22 (!) 319 lb 0.1 oz (144.7 kg)   03/24/22 (!) 352 lb 12.8 oz (160 kg)   03/02/22 (!) 349 lb 6.4 oz (158.5 kg)       Intake/Output Summary (Last 24 hours) at 9/16/2022 1206  Last data filed at 9/16/2022 0600  Gross per 24 hour   Intake 100 ml   Output 1050 ml   Net -950 ml       Telemetry: Personally Reviewed    Constitutional: Cooperative and in no apparent distress, and appears well nourished  Skin: Warm and pink; no pallor, cyanosis, clubbing, or bruising   HEENT: Symmetric and normocephalic  Cardiovascular: Regular rate and rhythm. S1/S2 present without murmurs, no rubs or gallops. peripheral edema  Respiratory: Respirations symmetric and unlabored. Lungs clear to auscultation bilaterally, no wheezing, crackles, or rhonchi  Gastrointestinal: Abdomen soft and round. Bowel sounds normoactive without tenderness or masses. Musculoskeletal: Bilateral upper and lower extremity strength 5/5 with full ROM  Neurologic/Psych: Awake and orientated to person, place and time.  Calm affect, appropriate mood      Patient Active Problem List    Diagnosis Date Noted    Abnormal result of other cardiovascular function study (CODE)     ST elevation myocardial infarction (STEMI) ([facility] 09/13/2022    Acute respiratory failure with hypoxia and hypercapnia ([facility] 09/12/2022    NSTEMI (non-ST elevated myocardial infarction) ([facility] 09/11/2022    Acute on chronic systolic congestive heart failure (HCC)     Essential hypertension     Mixed hyperlipidemia     PAF (paroxysmal atrial fibrillation) (HCC)     CHF NYHA class III, acute, diastolic ([facility] 21/68/1790    SOB (shortness of breath)     Abnormal ECG     Acute pulmonary edema (HCC)     Acute systolic (congestive) heart failure (HCC)     Atrial fibrillation with RVR ([facility] 12/23/2019    Right hip pain 11/20/2017    Abnormal nuclear stress test 10/06/2017    Hyperlipidemia LDL goal <100 02/10/2016    Morbid obesity due to excess calories ([facility] 12/29/2015    Pure hypercholesterolemia 12/29/2015    Bilateral knee pain 12/29/2015    Pre-diabetes 12/29/2015        Assessment     SOB (Pt arrives on CPAP due to severe SOB ) multifactorial   Decompensated HFrEF   On lasix IVP   sCr wnl   Strict I &0 wt daily     Acute Bronchospasm   pulmonary edema vs asthma  Dr Austin Bejarano following     STEMI    [facility] Takotsubo cardiomyopathy diagnosed 9/11/2022. Acute coronary syndrome with Takotsubo. We did not find coronary lesions or need intervention. \LV function is abnormal showing evidence for Takotsubo and low ejection fraction of 15%. Recommendations: Continue support with diuretics. Nitroglycerin as noted. She will be going to the floor on BiPAP. Beta-blocker. She normally takes Eliquis we will resume that. Also losartan 100 mg. Torsemide 20 mg. Metoprolol succinate 200 mg/day. 9/13/2022 TTE Left ventricular cavity size is normal. There is mild concentric left ventricular hypertrophy. Overall left ventricular systolic function appears severely reduced with an ejection fraction of 25-30%. There is severe hypokinesis / akinesis of the apex and mid to distal  anteroseptal and anterior walls. Diastolic filling parameters suggest grade III diastolic dysfunction with increased filling pressures. No  evidence of left ventricular mass or thrombus noted. Tricuspid valve leaflets are structurally normal. Moderate tricuspid regurgitation. No evidence of tricuspid stenosis. HTN  Wnl    HLD   sub optimal     Morbid obesity   Body mass index is 51.49 kg/m². Diet activity discussed     Hx PUD    Hgb stable     Lymphedema  wrap legs with ace     Plan   Diuresing well Down to 2L, SV02 95% did not use bipap overnight    continue with diuresis lasix 40 BID, sCr wnl Weight 322 from 353   Continue cardiac meds Lipitor lasix eliquis   Entresto 25/26mg BID hold for b/p <228 systolic  / follow CMP   Coreg 3.125 mg BID with parameters   Will plan to add SGLT2/aldactone /hydralazine as b/p and renals permit life vest on discharge if pt consents  TTE in 3 months may need AICD   Limited TTE this am  Will follow     Thank you for allowing to us to partic    Interventional cardiology note  Patient admitted last weekend with shortness of breath and chest pains and found by angiogram to have Takotsubo cardiomyopathy. She had a low ejection fraction but echo today suggests improvement with ejection fraction 30 to 35%. There are no interventional lesions identified. At this time she is better and her breathing still has some expiratory wheezing which I think is some underlying lung disease. Being seen by pulmonary. From a cardiac perspective I think she is stable to go home. We will wait for pulmonary to clear her to go home. Continue current therapy and see us in office in a week or so.   Dashawn Calix MD, [facility]

## 2022-09-16 NOTE — PROGRESS NOTES
Occupational Therapy  Facility/Department: [place] [address] [facility]  Occupational Therapy Treatment    Name: John Ruiz  : 1940  MRN: [id number]  Date of Service: 2022    Discharge Recommendations:     OT Equipment Recommendations  Equipment Needed: No  Other: has recommended DME   John Ruiz scored a 20/24 on the AM-PAC ADL Inpatient form. Current research shows that an AM-PAC score of 18 or greater is typically associated with a discharge to the patient's home setting. Based on the patient's AM-PAC score, and their current ADL deficits, it is recommended that the patient have 2-3 sessions per week of Occupational Therapy at d/c to increase the patient's independence. At this time, this patient demonstrates the endurance and safety to discharge home wit home services and a follow up treatment frequency of 2-3x/wk. Please see assessment section for further patient specific details. If patient discharges prior to next session this note will serve as a discharge summary. Please see below for the latest assessment towards goals. Patient Diagnosis(es): The primary encounter diagnosis was ST elevation myocardial infarction (STEMI), unspecified artery ([address] Diagnoses of Acute pulmonary edema (HCC) and Acute on chronic systolic congestive heart failure ([address] were also pertinent to this visit. Past Medical History:  has a past medical history of Acute systolic (congestive) heart failure ([address] Edema, Hearing loss, Hyperlipidemia, Hypertension, Morbid obesity due to excess calories ([address] Pre-diabetes, and PUD (peptic ulcer disease). Past Surgical History:  has a past surgical history that includes hernia repair and knee surgery (Bilateral). Treatment Diagnosis: decreased endurance for ADLs and fx mobility      Assessment   Performance deficits / Impairments: Decreased functional mobility ; Decreased ADL status; Decreased high-level IADLs  Assessment: Pt demonstrated improved sit to stand transfers and improving with activity tolerance. Pt on 2lpm throughout session, O2 sats remaining in mid 90s. Pt tolerated toileting and bathing tasks from standing position, no LOB. Required min cueing for safe hand placement during transfers. Pt tolerated seated therex with min fatigue voiced. Plans to dc home with HHOT, continue OT POC. Treatment Diagnosis: decreased endurance for ADLs and fx mobility  Prognosis: Good  REQUIRES OT FOLLOW-UP: Yes  Activity Tolerance  Activity Tolerance: Patient Tolerated treatment well  Activity Tolerance Comments: Pt standing at sink for grooming routine x 5 mins, O2 sats on 2lpm O2 95%. MD arrived during session and discussed possibly weaning pt from O2 as pt does not wear at home. Plan   Plan  Times per Week: 2-5  Current Treatment Recommendations: Functional mobility training, Endurance training, Self-Care / ADL, Patient/Caregiver education & training, Safety education & training     Restrictions  Position Activity Restriction  Other position/activity restrictions: up with assist    Subjective   General  Chart Reviewed: Yes  Patient assessed for rehabilitation services?: Yes  Additional Pertinent Hx: 79 yo female presenting with severe SOB requiring bipap in ED, EKG showed ST elevation requiring L heart cath with coronary angiography  Family / Caregiver Present: No  Referring Practitioner: Dolly Gonzalez MD  Diagnosis: NSTEMI  Subjective  Subjective: Pt seated in bedside chair upon arrival, agreeable to OT session. Reporting having incontinent bladder event earlier from coughing, encouraged pt to use brief however pt declined. RN aware.      Social/Functional History  Social/Functional History  Lives With: Alone  Type of Home: House  Home Layout: Two level, Able to Live on Main level with bedroom/bathroom  Home Access: Ramped entrance  Bathroom Shower/Tub:  (walkin tub with bench)  Bathroom Toilet: Handicap height  Bathroom Equipment: 3-in-1 commode, Grab bars in shower  Bathroom Accessibility: Wheelchair accessible  Home Equipment: [address] scooter, Walker, 4 wheeled, Hospital bed  Has the patient had two or more falls in the past year or any fall with injury in the past year?: No  Receives Help From: Family  ADL Assistance: [address] [facility]: Independent  Homemaking Responsibilities: Yes  Ambulation Assistance: Independent  Transfer Assistance: Independent  Active : Yes  Additional Comments: children can check on pt, hired help to with household tasks any time she calls       Objective              Safety Devices  Type of Devices: Call light within reach; Chair alarm in place; Left in chair;Nurse notified;Gait belt     Toilet Transfers  Toilet - Technique: Ambulating  Equipment Used: Standard bedside commode  Toilet Transfer: Supervision     ADL  Feeding: Setup  Grooming: Supervision  Grooming Skilled Clinical Factors: Standing at sink for hand hygiene  UE Bathing: Setup  UE Bathing Skilled Clinical Factors: via bath wipes seated on BSC  UE Dressing: Setup  UE Dressing Skilled Clinical Factors: changing gown  LE Dressing: Maximum assistance  LE Dressing Skilled Clinical Factors: donning socks  Toileting: Supervision  Toileting Skilled Clinical Factors: pt completed shahnaz care from standing position, not wearing pants           Transfers  Sit to stand: Supervision  Stand to sit: Supervision  Transfer Comments: min verbal cueing to reach hands back for armrest of chair prior to sitting. Cueing for line mgmt with O2 tubing. Cognition  Overall Cognitive Status: WFL  Orientation  Overall Orientation Status: Within Functional Limits  Orientation Level: Oriented X4               Exercise Treatment: Performed the following exercises while seated in chair x 10 reps each: bicep curls, chest press, shoulder flexion, horizontal adduction/ abduction. Pt also performed 30 sec x 2 shoulder adduction/ abduction, reported min to mod muscle fatigue.  O2 sats throughout while on 2lpm 95%  Education Given To: Patient; Family  Education Provided: Role of Therapy;Plan of Care;ADL Adaptive Strategies;Transfer Training;Family Education  Education Provided Comments: Per pt request, provided written UE HEP of exercises completed today. Education Method: Verbal  Barriers to Learning: None  Education Outcome: Verbalized understanding              AM-PAC Score        AM-PAC Inpatient Daily Activity Raw Score: 20 (09/16/22 0929)  AM-PAC Inpatient ADL T-Scale Score : 42.03 (09/16/22 0929)  ADL Inpatient CMS 0-100% Score: 38.32 (09/16/22 0929)  ADL Inpatient CMS G-Code Modifier : CJ (09/16/22 0929)    Tinneti Score       Goals  Short Term Goals  Time Frame for Short term goals: by discharge  Short Term Goal 1: Pt will perform LB dressing with CGA -ongoing, continue  Short Term Goal 2: Pt will perform fx transfers with SBA -ongoing, continue  Short Term Goal 3: Pt will perform standing endurance for 5 min with superivsion and spO2>90% -goal met, continue as pt is titrated from O2  Patient Goals   Patient goals : to get back home       Therapy Time   Individual Concurrent Group Co-treatment   Time In 0830         Time Out 0913         Minutes 43         Timed Code Treatment Minutes: [address]  [address] [facility], OTR/L [id number]

## 2022-09-16 NOTE — RT PROTOCOL NOTE
RT Nebulizer Bronchodilator Protocol Note    There is a bronchodilator order in the chart from a provider indicating to follow the RT Bronchodilator Protocol and there is an Initiate RT Bronchodilator Protocol order as well (see protocol at bottom of note). CXR Findings:  No results found. The findings from the last RT Protocol Assessment were as follows:  Smoking: Smoker 15 pack years or more  Respiratory Pattern: Regular pattern and RR 12-20 bpm  Breath Sounds: Intermittent or unilateral wheezes  Cough: Strong, productive  Indication for Bronchodilator Therapy: Decreased or absent breath sounds  Bronchodilator Assessment Score: 6    Aerosolized bronchodilator medication orders have been revised according to the RT Nebulizer Bronchodilator Protocol below. Respiratory Therapist to perform RT Therapy Protocol Assessment initially then follow the protocol. Repeat RT Therapy Protocol Assessment PRN for score 0-3 or on second treatment, BID, and PRN for scores above 3. No Indications - adjust the frequency to every 6 hours PRN wheezing or bronchospasm, if no treatments needed after 48 hours then discontinue using Per Protocol order mode. If indication present, adjust the RT bronchodilator orders based on the Bronchodilator Assessment Score as indicated below. If a patient is on this medication at home then do not decrease Frequency below that used at home. 0-3 - enter or revise RT bronchodilator order(s) to equivalent RT Bronchodilator order with Frequency of every 4 hours PRN for wheezing or increased work of breathing using Per Protocol order mode. 4-6 - enter or revise RT Bronchodilator order(s) to two equivalent RT bronchodilator orders with one order with BID Frequency and one order with Frequency of every 4 hours PRN wheezing or increased work of breathing using Per Protocol order mode.          7-10 - enter or revise RT Bronchodilator order(s) to two equivalent RT bronchodilator orders with one order with TID Frequency and one order with Frequency of every 4 hours PRN wheezing or increased work of breathing using Per Protocol order mode. 11-13 - enter or revise RT Bronchodilator order(s) to one equivalent RT bronchodilator order with QID Frequency and an Albuterol order with Frequency of every 4 hours PRN wheezing or increased work of breathing using Per Protocol order mode. Greater than 13 - enter or revise RT Bronchodilator order(s) to one equivalent RT bronchodilator order with every 4 hours Frequency and an Albuterol order with Frequency of every 2 hours PRN wheezing or increased work of breathing using Per Protocol order mode. RT to enter RT Home Evaluation for COPD & MDI Assessment order using Per Protocol order mode.     Electronically signed by Tom Vail RCP on 9/16/2022 at 9:10 AM

## 2022-09-16 NOTE — PROGRESS NOTES
dysphagia 2/2 reduced dentition. Oral motor exam grossly WNL. Edentulous (pt chose to not don uppers). Oral cavity moist/clean appearing. With patient seated up in bed, on O2 via nc, assessed tolerance thins via straw and regular texture solid. Patient with positive oral acceptance, slowed but adequate mastication, good oral clearance, no overt signs of aspiration or associated decline in respiratory status. Per d/w patient, she wishes to remain on regular texture diet, but independently select softer items. Will plan to monitor tolerance and f/u x1. Dysphagia Diagnosis: Mild oral stage dysphagia    MBS results - does not appear indicated at this time    Pain: none indicated by any means    Current Diet : ADULT DIET; Regular; Low Fat/Low Chol/High Fiber/2 gm Na   Recommended Form of Meds: PO  Compensatory Swallowing Strategies : Upright as possible for all oral intake, Eat/Feed slowly, Alternate solids and liquids, Small bites/sips     Treatment:  Pt seen bedside to address the following goals:  Short-term Goals  Timeframe for Short-term Goals: 1-2 wks or LOS  Goal 1: Patient will tolerate least restrictive diet without overt signs of aspiration or associated decline in respiratory status. 9/16: Per chart review and pt report, she has been tolerating her diet well w/o difficulty. With pt seated up in bed, assessed tolerance thins via straw and soft solid; pt with positive oral acceptance, timely mastication good oral clearance, no overt signs of aspiration or penetration. Discussed diet recommendations, patient stated wish to remain on regular texture solids and independently select soft items. Goal met, d/c goal    Goal 2: Patient/caregiver will demonstrate understanding of swallowing concerns/recommendations. 9/16: Reviewed swallow function, aspiration, diet recommendations. Pt stated good comprehension. Goal met, d/c goal    Patient/Family/Caregiver Education:  See goal 2 above.     Compensatory Strategies:  Compensatory Swallowing Strategies : Upright as possible for all oral intake, Eat/Feed slowly, Alternate solids and liquids, Small bites/sips      Plan:  Discharge from dysphagia treatment having met goals per plan of care. Diet recommendations: Regular solids / thin liquids / meds PO  DC recommendation: no further acute speech therapy needs identified at this time  Treatment: 15    Needs met prior to leaving room, call button in reach. [place], [place], [address] [place] [address], .[phone]  Pg.  # [phone]

## 2022-09-17 LAB
ALBUMIN SERPL-MCNC: 3.4 G/DL (ref 3.4–5)
ANION GAP SERPL CALCULATED.3IONS-SCNC: 10 MMOL/L (ref 3–16)
BUN BLDV-MCNC: 41 MG/DL (ref 7–20)
CALCIUM SERPL-MCNC: 9 MG/DL (ref 8.3–10.6)
CHLORIDE BLD-SCNC: 94 MMOL/L (ref 99–110)
CO2: 34 MMOL/L (ref 21–32)
CREAT SERPL-MCNC: 1.4 MG/DL (ref 0.6–1.2)
GFR AFRICAN AMERICAN: 43
GFR NON-AFRICAN AMERICAN: 36
GLUCOSE BLD-MCNC: 149 MG/DL (ref 70–99)
PHOSPHORUS: 4.6 MG/DL (ref 2.5–4.9)
POTASSIUM SERPL-SCNC: 3.8 MMOL/L (ref 3.5–5.1)
SODIUM BLD-SCNC: 138 MMOL/L (ref 136–145)

## 2022-09-17 PROCEDURE — 6360000002 HC RX W HCPCS: Performed by: INTERNAL MEDICINE

## 2022-09-17 PROCEDURE — 6370000000 HC RX 637 (ALT 250 FOR IP): Performed by: INTERNAL MEDICINE

## 2022-09-17 PROCEDURE — 80069 RENAL FUNCTION PANEL: CPT

## 2022-09-17 PROCEDURE — 2060000000 HC ICU INTERMEDIATE R&B

## 2022-09-17 PROCEDURE — 2580000003 HC RX 258: Performed by: INTERNAL MEDICINE

## 2022-09-17 PROCEDURE — 99233 SBSQ HOSP IP/OBS HIGH 50: CPT | Performed by: NURSE PRACTITIONER

## 2022-09-17 PROCEDURE — 6370000000 HC RX 637 (ALT 250 FOR IP): Performed by: STUDENT IN AN ORGANIZED HEALTH CARE EDUCATION/TRAINING PROGRAM

## 2022-09-17 PROCEDURE — 6370000000 HC RX 637 (ALT 250 FOR IP): Performed by: NURSE PRACTITIONER

## 2022-09-17 PROCEDURE — 36415 COLL VENOUS BLD VENIPUNCTURE: CPT

## 2022-09-17 RX ORDER — FUROSEMIDE 10 MG/ML
40 INJECTION INTRAMUSCULAR; INTRAVENOUS DAILY
Status: DISCONTINUED | OUTPATIENT
Start: 2022-09-18 | End: 2022-09-18

## 2022-09-17 RX ADMIN — ACETAMINOPHEN 650 MG: 325 TABLET ORAL at 04:39

## 2022-09-17 RX ADMIN — SACUBITRIL AND VALSARTAN 1 TABLET: 24; 26 TABLET, FILM COATED ORAL at 20:57

## 2022-09-17 RX ADMIN — APIXABAN 5 MG: 5 TABLET, FILM COATED ORAL at 09:41

## 2022-09-17 RX ADMIN — PREDNISONE 40 MG: 20 TABLET ORAL at 09:41

## 2022-09-17 RX ADMIN — SACUBITRIL AND VALSARTAN 1 TABLET: 24; 26 TABLET, FILM COATED ORAL at 09:41

## 2022-09-17 RX ADMIN — FUROSEMIDE 40 MG: 10 INJECTION, SOLUTION INTRAMUSCULAR; INTRAVENOUS at 09:41

## 2022-09-17 RX ADMIN — ALOGLIPTIN 6.25 MG: 6.25 TABLET, FILM COATED ORAL at 09:42

## 2022-09-17 RX ADMIN — BENZONATATE 100 MG: 100 CAPSULE ORAL at 04:39

## 2022-09-17 RX ADMIN — APIXABAN 5 MG: 5 TABLET, FILM COATED ORAL at 20:57

## 2022-09-17 RX ADMIN — SODIUM CHLORIDE, PRESERVATIVE FREE 10 ML: 5 INJECTION INTRAVENOUS at 09:46

## 2022-09-17 RX ADMIN — POLYETHYLENE GLYCOL (3350) 17 G: 17 POWDER, FOR SOLUTION ORAL at 09:46

## 2022-09-17 RX ADMIN — CARVEDILOL 3.12 MG: 3.12 TABLET, FILM COATED ORAL at 09:41

## 2022-09-17 RX ADMIN — CHOLECALCIFEROL (VITAMIN D3) 10 MCG (400 UNIT) TABLET 400 UNITS: at 09:41

## 2022-09-17 RX ADMIN — SODIUM CHLORIDE, PRESERVATIVE FREE 10 ML: 5 INJECTION INTRAVENOUS at 22:04

## 2022-09-17 ASSESSMENT — PAIN DESCRIPTION - LOCATION
LOCATION: KNEE
LOCATION: KNEE

## 2022-09-17 ASSESSMENT — PAIN DESCRIPTION - ORIENTATION
ORIENTATION: RIGHT;LEFT
ORIENTATION: RIGHT;LEFT

## 2022-09-17 ASSESSMENT — PAIN SCALES - GENERAL
PAINLEVEL_OUTOF10: 6
PAINLEVEL_OUTOF10: 6
PAINLEVEL_OUTOF10: 0

## 2022-09-17 NOTE — PROGRESS NOTES
Contacted on call cardiology about coreg. Pt BP got as low as 88/60, at 1730 was 106/69, Pt said her headache and dizziness had finally went away. Dr. Alona Gaucher called back and said to hold dose for tonight and reassess in the morning.

## 2022-09-17 NOTE — PROGRESS NOTES
[facility], [place]   Cardiology  Note   Dr Taya Pat MD, Simona Wren RN, FNP APRN CVNP  Date: 9/17/2022  Admit Date: 9/11/2022       CC:SOB (Pt arrives on CPAP due to severe SOB)    Cardiology consult: STEMI      Interval Hx /  Subjective: in NSR VSS feels much better  She is up in chair   Now on RA SV02 93% did not use bipap overnight    c/o feeling mildly woozy at times / follow b/p closely  will not increase her HF meds at this time b/p soft and occas woozy   consult PT to walk her today   Her sCr was 0.9> 1.4 / will change lasix to daily from BID    Pulmonary has signed off and will fu as out pt    continue with diuresis lasix 40 BID, monitor renal function & electrolytes. sCr wnl Weight up 5# from yesterday  net -950ml   sCr 0.9 >>1.4 follow   plan home tomorrow if doing well/ planning home assistance per   Discuses life vest and she isn't interested  Cont GDMT  / TTE  in 3 months / consider AICD      Limited TTE 9/16/2022   Left ventricular cavity size is normal with mild LVH  Indeterminate diastolic function. [place] and distal septal hypokinetic. No evidence of left ventricular mass or thrombus  Ejection fraction is estimated to be 30-35 %. Overall left ventricular systolic function is mildly depressed . The aortic valve leaflets are not well visualized. The aortic valve is mildly thickened/calcified but opens well with normal gradients. Unable to estimate pulmonary artery pressure secondary to incomplete TR jet envelope      [facility] Takotsubo cardiomyopathy diagnosed 9/11/2022. Acute coronary syndrome with Takotsubo. We did not find coronary lesions or need intervention. \LV function is abnormal showing evidence for Takotsubo and low ejection fraction of 15%. Recommendations: Continue support with diuretics. Nitroglycerin as noted. She will be going to the floor on BiPAP. Beta-blocker. She normally takes Eliquis we will resume that. Also losartan 100 mg. Torsemide 20 mg. Metoprolol succinate 200 mg/day. 9/13/2022 TTE Left ventricular cavity size is normal. There is mild concentric left ventricular hypertrophy. Overall left ventricular systolic function appears severely reduced with an ejection fraction of 25-30%. There is severe hypokinesis / akinesis of the apex and mid to distal  anteroseptal and anterior walls. Diastolic filling parameters suggest grade III diastolic dysfunction with increased filling pressures. No  evidence of left ventricular mass or thrombus noted. Tricuspid valve leaflets are structurally normal. Moderate tricuspid regurgitation. No evidence of tricuspid stenosis. Patient seen and examined. Clinical notes reviewed. Telemetry reviewed / Pertinent labs, diagnostic, device, and imaging results reviewed as a part of this visit  I spent a total of 35 minutes and greater than 50% of the time was spent counseling with patient  coordinating care regarding her diagnosis, treatments and plan of care    Scheduled Meds:   polyethylene glycol  17 g Oral Daily    apixaban  5 mg Oral BID    vitamin D3  400 Units Oral Daily    alogliptin  6.25 mg Oral Daily    carvedilol  3.125 mg Oral BID WC    sacubitril-valsartan  1 tablet Oral BID    nitroGLYCERIN  1 patch TransDERmal Daily    furosemide  40 mg IntraVENous BID    atorvastatin  80 mg Oral Once    sodium chloride flush  5-40 mL IntraVENous 2 times per day     Vitals:    09/17/22 0742   BP: 112/68   Pulse: 95   Resp: 18   Temp: 98.4 °F (36.9 °C)   SpO2: 93%      In: 250 [P.O.:240;  I.V.:10]  Out: 600    Wt Readings from Last 3 Encounters:   09/17/22 (!) 324 lb 4.8 oz (147.1 kg)   03/24/22 (!) 352 lb 12.8 oz (160 kg)   03/02/22 (!) 349 lb 6.4 oz (158.5 kg)       Intake/Output Summary (Last 24 hours) at 9/17/2022 1113  Last data filed at 9/16/2022 1937  Gross per 24 hour   Intake 250 ml   Output 600 ml   Net -350 ml       Telemetry: Personally Reviewed    Constitutional: Cooperative and in no apparent distress, and appears well nourished  Skin: Warm and pink; no pallor, cyanosis, clubbing, or bruising   HEENT: Symmetric and normocephalic  Cardiovascular: Regular rate and rhythm. S1/S2 present without murmurs, no rubs or gallops. peripheral edema  Respiratory: Respirations symmetric and unlabored. Lungs clear to auscultation bilaterally, no wheezing, crackles, or rhonchi  Gastrointestinal: Abdomen soft and round. Bowel sounds normoactive without tenderness or masses. Musculoskeletal: Bilateral upper and lower extremity strength 5/5 with full ROM  Neurologic/Psych: Awake and orientated to person, place and time. Calm affect, appropriate mood      Patient Active Problem List    Diagnosis Date Noted    Abnormal result of other cardiovascular function study (CODE)     ST elevation myocardial infarction (STEMI) ([facility] 09/13/2022    Acute respiratory failure with hypoxia and hypercapnia ([facility] 09/12/2022    NSTEMI (non-ST elevated myocardial infarction) ([facility] 09/11/2022    Acute on chronic systolic congestive heart failure (HCC)     Essential hypertension     Mixed hyperlipidemia     PAF (paroxysmal atrial fibrillation) ([facility])     CHF NYHA class III, acute, diastolic ([address] 29/00/5313    SOB (shortness of breath)     Abnormal ECG     Acute pulmonary edema (HCC)     Acute systolic (congestive) heart failure (HCC)     Atrial fibrillation with RVR ([facility] 12/23/2019    Right hip pain 11/20/2017    Abnormal nuclear stress test 10/06/2017    Hyperlipidemia LDL goal <100 02/10/2016    Morbid obesity due to excess calories ([address] 12/29/2015    Pure hypercholesterolemia 12/29/2015    Bilateral knee pain 12/29/2015    Pre-diabetes 12/29/2015        Assessment     SOB (Pt arrives on CPAP due to severe SOB ) multifactorial   Decompensated HFrEF   On lasix IVP   sCr wnl   Strict I &0 wt daily     Acute Bronchospasm   pulmonary edema vs asthma  Dr Azalia Halsted following     STEMI    [facility] Takotsubo cardiomyopathy diagnosed 9/11/2022.   Acute coronary syndrome with Takotsubo. We did not find coronary lesions or need intervention. \LV function is abnormal showing evidence for Takotsubo and low ejection fraction of 15%. Recommendations: Continue support with diuretics. Nitroglycerin as noted. She will be going to the floor on BiPAP. Beta-blocker. She normally takes Eliquis we will resume that. Also losartan 100 mg. Torsemide 20 mg. Metoprolol succinate 200 mg/day. Limited TTE 9/16/2022   Left ventricular cavity size is normal with mild LVH  Indeterminate diastolic function. [place] and distal septal hypokinetic. No evidence of left ventricular mass or thrombus  Ejection fraction is estimated to be 30-35 %. Overall left ventricular systolic function is mildly depressed . The aortic valve leaflets are not well visualized. The aortic valve is mildly thickened/calcified but opens well with normal gradients. Unable to estimate pulmonary artery pressure secondary to incomplete TR jet envelope      9/13/2022 TTE Left ventricular cavity size is normal. There is mild concentric left ventricular hypertrophy. Overall left ventricular systolic function appears severely reduced with an ejection fraction of 25-30%. There is severe hypokinesis / akinesis of the apex and mid to distal  anteroseptal and anterior walls. Diastolic filling parameters suggest grade III diastolic dysfunction with increased filling pressures. No  evidence of left ventricular mass or thrombus noted. Tricuspid valve leaflets are structurally normal. Moderate tricuspid regurgitation. No evidence of tricuspid stenosis. Afib  Eliquis   [id number] Score for Atrial Fibrillation Stroke Risk 7  No bleeding noted no NSAIDS only tylenol as needed     HTN  Wnl    HLD   sub optimal     Morbid obesity   Body mass index is 52.34 kg/m².   Diet activity discussed     Hx PUD    Hgb stable     Lymphedema  wrap legs with ace     Plan   c/o feeling mildly woozy at times / follow b/p closely  will not increase her HF meds at this time b/p soft and occas woozy   consult PT to walk her today   Her sCr was 0.9> 1.4 / will change lasix to daily from BID      Pulmonary has signed off and will fu as out pt    sCr wnl Weight up 5# from yesterday  net -950ml   plan home tomorrow if doing well /  planning home assistance per   Discuses life vest and she isn't interested  Cont GDMT  / TTE  in 3 months / consider AICD    Continue cardiac meds Lipitor lasix eliquis   Entresto 25/26mg BID hold for b/p <339 systolic  / follow CMP   Coreg 3.125 mg BID with parameters   Will plan to add SGLT2/aldactone /hydralazine as b/p and renals permit   Fu  in 1-2 weeks     Limited TTE 9/16/2022   Left ventricular cavity size is normal with mild LVH  Indeterminate diastolic function. [place] and distal septal hypokinetic. No evidence of left ventricular mass or thrombus  Ejection fraction is estimated to be 30-35 %. Overall left ventricular systolic function is mildly depressed . The aortic valve leaflets are not well visualized. The aortic valve is mildly thickened/calcified but opens well with normal gradients. Unable to estimate pulmonary artery pressure secondary to incomplete TR jet envelope

## 2022-09-17 NOTE — PROGRESS NOTES
Hospitalist Progress Note      PCP: Piper Coates MD    Date of Admission: 9/11/2022        Subjective:     Feels better. Shortness of breath is improved. No dizziness or lightness. Medications:  Reviewed    Infusion Medications    sodium chloride       Scheduled Medications    predniSONE  40 mg Oral Daily    polyethylene glycol  17 g Oral Daily    apixaban  5 mg Oral BID    vitamin D3  400 Units Oral Daily    alogliptin  6.25 mg Oral Daily    carvedilol  3.125 mg Oral BID     sacubitril-valsartan  1 tablet Oral BID    nitroGLYCERIN  1 patch TransDERmal Daily    furosemide  40 mg IntraVENous BID    atorvastatin  80 mg Oral Once    sodium chloride flush  5-40 mL IntraVENous 2 times per day     PRN Meds: ipratropium-albuterol, magnesium hydroxide, sennosides-docusate sodium, benzonatate, albuterol, iohexol, sodium chloride flush, sodium chloride, acetaminophen      Intake/Output Summary (Last 24 hours) at 9/17/2022 0921  Last data filed at 9/16/2022 1937  Gross per 24 hour   Intake 250 ml   Output 600 ml   Net -350 ml       Physical Exam Performed:    /68   Pulse 95   Temp 98.4 °F (36.9 °C) (Oral)   Resp 18   Ht 5' 6\" (1.676 m)   Wt (!) 324 lb 4.8 oz (147.1 kg)   SpO2 93%   BMI 52.34 kg/m²     General appearance: No apparent distress, appears stated age and cooperative. HEENT: Pupils equal, round, and reactive to light. Conjunctivae/corneas clear. Neck: Supple, with full range of motion. No jugular venous distention. Trachea midline. Respiratory:  Normal respiratory effort. Clear to auscultation, bilaterally without Rales/Wheezes/Rhonchi. Cardiovascular: Regular rate and rhythm with normal S1/S2 without murmurs, rubs or gallops. Abdomen: Soft, non-tender, non-distended with normal bowel sounds. Musculoskeletal: No clubbing, cyanosis or edema bilaterally. Full range of motion without deformity. Skin: Skin color, texture, turgor normal.  No rashes or lesions.   Neurologic: Neurovascularly intact without any focal sensory/motor deficits. Cranial nerves: II-XII intact, grossly non-focal.  Psychiatric: Alert and oriented, thought content appropriate, normal insight  Capillary Refill: Brisk, 3 seconds, normal   Peripheral Pulses: +2 palpable, equal bilaterally       Labs:   No results for input(s): WBC, HGB, HCT, PLT in the last 72 hours. Recent Labs     09/16/22  0027 09/16/22  0516 09/17/22  0512    140 138   K 4.1 4.0 3.8   CL 94* 96* 94*   CO2 30 29 34*   BUN 33* 32* 41*   CREATININE 1.1 0.9 1.4*   CALCIUM 9.5 9.0 9.0   PHOS 4.4 4.1 4.6     No results for input(s): AST, ALT, BILIDIR, BILITOT, ALKPHOS in the last 72 hours. No results for input(s): INR in the last 72 hours. No results for input(s): [place]jan[place]omin[place] in the last 72 hours. Urinalysis:      Lab Results   Component Value Date/Time    NITRU Negative 09/30/2020 01:47 AM    WBCUA 0-2 09/30/2020 01:47 AM    BACTERIA 4+ 11/24/2019 08:49 PM    RBCUA 5-10 09/30/2020 01:47 AM    BLOODU TRACE-INTACT 09/30/2020 01:47 AM    SPECGRAV 1.020 09/30/2020 01:47 AM    GLUCOSEU Negative 09/30/2020 01:47 AM    GLUCOSEU NEGATIVE 09/23/2010 10:50 AM       Radiology:  XR CHEST (2 VW)   Final Result      Mild pulmonary edema, demonstrating improvement since 9/11/2022. Stable cardiomegaly. XR CHEST PORTABLE   Final Result   Cardia megaly with diffuse pulmonary vascular congestion of early congestive heart failure              Assessment/Plan:    -Acute decompensated systolic heart failure/Takotsubo cardiomyopathy, EF 25 to 30%  -MARIA  -Morbid obesity with BMI of 52  -Chronic lymphedema  -DM type II  -Chronic A. [id number] Eliquis  -Essential hypertension      plan  -Continue current treatment  -Closely monitor renal function  -Discharge planning per cardiology    DVT Prophylaxis:   Diet: ADULT DIET;  Regular; Low Fat/Low Chol/High Fiber/2 gm Na  Code Status: Full Code  PT/OT Eval Status:           Kalen Negron MD

## 2022-09-17 NOTE — PLAN OF CARE
Problem: Discharge Planning  Goal: Discharge to home or other facility with appropriate resources  Outcome: Progressing  Flowsheets (Taken 9/16/2022 0933 by Jeremiah Rodgers RN)  Discharge to home or other facility with appropriate resources: Identify barriers to discharge with patient and caregiver     Problem: Respiratory - Adult  Goal: Achieves optimal ventilation and oxygenation  Outcome: Progressing  Flowsheets (Taken 9/16/2022 1953 by Jeremiah Rodgers RN)  Achieves optimal ventilation and oxygenation:   Assess for changes in respiratory status   Position to facilitate oxygenation and minimize respiratory effort     Problem: Cardiovascular - Adult  Goal: Maintains optimal cardiac output and hemodynamic stability  Outcome: Progressing  Flowsheets (Taken 9/16/2022 1953 by Jeremiah Rodgers RN)  Maintains optimal cardiac output and hemodynamic stability: Monitor blood pressure and heart rate  Goal: Absence of cardiac dysrhythmias or at baseline  Outcome: Progressing  Flowsheets (Taken 9/16/2022 0933 by Jeremiah Rodgers RN)  Absence of cardiac dysrhythmias or at baseline: Monitor cardiac rate and rhythm     Problem: Safety - Adult  Goal: Free from fall injury  Outcome: Progressing  Flowsheets (Taken 9/16/2022 0810 by Jeremiah Rodgers RN)  Free From Fall Injury: Instruct family/caregiver on patient safety     Problem: Pain  Goal: Verbalizes/displays adequate comfort level or baseline comfort level  Outcome: Progressing  Flowsheets (Taken 9/16/2022 1953 by Jeremiah Rodgers RN)  Verbalizes/displays adequate comfort level or baseline comfort level:   Administer analgesics based on type and severity of pain and evaluate response   Encourage patient to monitor pain and request assistance     Problem: Skin/Tissue Integrity  Goal: Absence of new skin breakdown  Description: 1. Monitor for areas of redness and/or skin breakdown  2. Assess vascular access sites hourly  3.   Every 4-6 hours minimum:  Change oxygen saturation probe site  4. Every 4-6 hours:  If on nasal continuous positive airway pressure, respiratory therapy assess nares and determine need for appliance change or resting period.   Outcome: Progressing     Problem: Metabolic/Fluid and Electrolytes - Adult  Goal: Electrolytes maintained within normal limits  Outcome: Progressing  Flowsheets (Taken 9/16/2022 0933 by Jeremiah Rodgers RN)  Electrolytes maintained within normal limits: Monitor labs and assess patient for signs and symptoms of electrolyte imbalances  Goal: Hemodynamic stability and optimal renal function maintained  Outcome: Progressing  Flowsheets (Taken 9/16/2022 0933 by Jeremiah Rodgers RN)  Hemodynamic stability and optimal renal function maintained: Monitor labs and assess for signs and symptoms of volume excess or deficit     Problem: ABCDS Injury Assessment  Goal: Absence of physical injury  Outcome: Progressing  Flowsheets (Taken 9/17/2022 1932)  Absence of Physical Injury: Implement safety measures based on patient assessment     Problem: Chronic Conditions and Co-morbidities  Goal: Patient's chronic conditions and co-morbidity symptoms are monitored and maintained or improved  Outcome: Progressing  Flowsheets (Taken 9/16/2022 0933 by Jeremiah Rodgers RN)  Care Plan - Patient's Chronic Conditions and Co-Morbidity Symptoms are Monitored and Maintained or Improved: Monitor and assess patient's chronic conditions and comorbid symptoms for stability, deterioration, or improvement

## 2022-09-18 PROBLEM — I42.9 CARDIOMYOPATHY (HCC): Status: ACTIVE | Noted: 2022-09-18

## 2022-09-18 LAB
A/G RATIO: 1.3 (ref 1.1–2.2)
ALBUMIN SERPL-MCNC: 3.8 G/DL (ref 3.4–5)
ALP BLD-CCNC: 64 U/L (ref 40–129)
ALT SERPL-CCNC: 24 U/L (ref 10–40)
ANION GAP SERPL CALCULATED.3IONS-SCNC: 12 MMOL/L (ref 3–16)
AST SERPL-CCNC: 21 U/L (ref 15–37)
BILIRUB SERPL-MCNC: 0.8 MG/DL (ref 0–1)
BUN BLDV-MCNC: 42 MG/DL (ref 7–20)
CALCIUM SERPL-MCNC: 9 MG/DL (ref 8.3–10.6)
CHLORIDE BLD-SCNC: 90 MMOL/L (ref 99–110)
CO2: 33 MMOL/L (ref 21–32)
CREAT SERPL-MCNC: 1.3 MG/DL (ref 0.6–1.2)
GFR AFRICAN AMERICAN: 47
GFR NON-AFRICAN AMERICAN: 39
GLUCOSE BLD-MCNC: 169 MG/DL (ref 70–99)
POTASSIUM SERPL-SCNC: 3.7 MMOL/L (ref 3.5–5.1)
SODIUM BLD-SCNC: 135 MMOL/L (ref 136–145)
TOTAL PROTEIN: 6.8 G/DL (ref 6.4–8.2)

## 2022-09-18 PROCEDURE — 80053 COMPREHEN METABOLIC PANEL: CPT

## 2022-09-18 PROCEDURE — 6360000002 HC RX W HCPCS: Performed by: NURSE PRACTITIONER

## 2022-09-18 PROCEDURE — 2580000003 HC RX 258: Performed by: INTERNAL MEDICINE

## 2022-09-18 PROCEDURE — 36415 COLL VENOUS BLD VENIPUNCTURE: CPT

## 2022-09-18 PROCEDURE — 99233 SBSQ HOSP IP/OBS HIGH 50: CPT | Performed by: NURSE PRACTITIONER

## 2022-09-18 PROCEDURE — 6370000000 HC RX 637 (ALT 250 FOR IP): Performed by: INTERNAL MEDICINE

## 2022-09-18 PROCEDURE — 2060000000 HC ICU INTERMEDIATE R&B

## 2022-09-18 PROCEDURE — 6370000000 HC RX 637 (ALT 250 FOR IP): Performed by: NURSE PRACTITIONER

## 2022-09-18 RX ORDER — FUROSEMIDE 40 MG/1
40 TABLET ORAL DAILY
Status: DISCONTINUED | OUTPATIENT
Start: 2022-09-18 | End: 2022-09-19 | Stop reason: HOSPADM

## 2022-09-18 RX ADMIN — FUROSEMIDE 40 MG: 40 TABLET ORAL at 12:14

## 2022-09-18 RX ADMIN — ALOGLIPTIN 6.25 MG: 6.25 TABLET, FILM COATED ORAL at 12:14

## 2022-09-18 RX ADMIN — SODIUM CHLORIDE, PRESERVATIVE FREE 10 ML: 5 INJECTION INTRAVENOUS at 09:00

## 2022-09-18 RX ADMIN — APIXABAN 5 MG: 5 TABLET, FILM COATED ORAL at 20:37

## 2022-09-18 RX ADMIN — FUROSEMIDE 40 MG: 10 INJECTION, SOLUTION INTRAMUSCULAR; INTRAVENOUS at 09:41

## 2022-09-18 RX ADMIN — SODIUM CHLORIDE, PRESERVATIVE FREE 10 ML: 5 INJECTION INTRAVENOUS at 20:37

## 2022-09-18 RX ADMIN — CHOLECALCIFEROL (VITAMIN D3) 10 MCG (400 UNIT) TABLET 400 UNITS: at 09:40

## 2022-09-18 RX ADMIN — SACUBITRIL AND VALSARTAN 1 TABLET: 24; 26 TABLET, FILM COATED ORAL at 09:41

## 2022-09-18 RX ADMIN — APIXABAN 5 MG: 5 TABLET, FILM COATED ORAL at 09:40

## 2022-09-18 ASSESSMENT — PAIN SCALES - GENERAL
PAINLEVEL_OUTOF10: 0
PAINLEVEL_OUTOF10: 0

## 2022-09-18 NOTE — PLAN OF CARE
Problem: Discharge Planning  Goal: Discharge to home or other facility with appropriate resources  9/18/2022 0435 by Wilton Magaña RN  Outcome: Progressing  Patient is able to get up and move around with her walker. Per patient she feels a lot better. Problem: Respiratory - Adult  Goal: Achieves optimal ventilation and oxygenation  9/18/2022 0435 by Wilton Magaña RN  Outcome: Progressing   Patient is now on room air and sating at 94-95%. Problem: Safety - Adult  Goal: Free from fall injury  9/18/2022 0435 by Wilton Magaña RN  Outcome: Progressing   Pt is a Fall Risk. See Dondra Reese Fall Risk Score. Pt bed in low position and side rails up. Call light and belongings in reach. Pt encouraged to call for assistance. Will continue with hourly rounds for PO intake, pain needs, toileting, and repositioning as needed.

## 2022-09-18 NOTE — PLAN OF CARE
Problem: Discharge Planning  Goal: Discharge to home or other facility with appropriate resources  9/18/2022 1402 by Juwan Barton RN  Outcome: Progressing  Flowsheets (Taken 9/16/2022 0933 by Dariana Fernandez RN)  Discharge to home or other facility with appropriate resources: Identify barriers to discharge with patient and caregiver  Note: Pt plans to discharge home with home health  9/18/2022 0435 by Tiffanie Hope RN  Outcome: Progressing     Problem: Respiratory - Adult  Goal: Achieves optimal ventilation and oxygenation  9/18/2022 1402 by Juwan Barton RN  Outcome: Progressing  Flowsheets (Taken 9/16/2022 1953 by Dariana Fernandez RN)  Achieves optimal ventilation and oxygenation:   Assess for changes in respiratory status   Position to facilitate oxygenation and minimize respiratory effort  Note: Pt is on room air  9/18/2022 0435 by Tiffanie Hope RN  Outcome: Progressing     Problem: Cardiovascular - Adult  Goal: Maintains optimal cardiac output and hemodynamic stability  Outcome: Progressing  Flowsheets (Taken 9/16/2022 1953 by Dariana Fernandez RN)  Maintains optimal cardiac output and hemodynamic stability: Monitor blood pressure and heart rate  Note: /66 this morning, coreg held and peramaters changed per Claudene Feinstein  Goal: Absence of cardiac dysrhythmias or at baseline  Outcome: Progressing  Flowsheets (Taken 9/16/2022 0933 by Dariana Fernandez RN)  Absence of cardiac dysrhythmias or at baseline: Monitor cardiac rate and rhythm  Note: Sinus rhythm this shift     Problem: Safety - Adult  Goal: Free from fall injury  9/18/2022 1402 by Juwan Barton RN  Outcome: Progressing  [address] (Taken 9/16/2022 0810 by Dariana Fernandez, RN)  Free From Fall Injury: Instruct family/caregiver on patient safety  9/18/2022 0435 by Tiffanie Hope RN  Outcome: Progressing     Problem: Pain  Goal: Verbalizes/displays adequate comfort level or baseline comfort level  Outcome: Progressing  Flowsheets (Taken 9/16/2022 1953 by [place] Krystina, RN)  Verbalizes/displays adequate comfort level or baseline comfort level:   Administer analgesics based on type and severity of pain and evaluate response   Encourage patient to monitor pain and request assistance     Problem: Skin/Tissue Integrity  Goal: Absence of new skin breakdown  Description: 1. Monitor for areas of redness and/or skin breakdown  2. Assess vascular access sites hourly  3. Every 4-6 hours minimum:  Change oxygen saturation probe site  4. Every 4-6 hours:  If on nasal continuous positive airway pressure, respiratory therapy assess nares and determine need for appliance change or resting period.   Outcome: Progressing     Problem: Metabolic/Fluid and Electrolytes - Adult  Goal: Electrolytes maintained within normal limits  Outcome: Progressing  Flowsheets (Taken 9/16/2022 0933 by Leodan Flaherty RN)  Electrolytes maintained within normal limits: Monitor labs and assess patient for signs and symptoms of electrolyte imbalances  Goal: Hemodynamic stability and optimal renal function maintained  Outcome: Progressing  Flowsheets (Taken 9/16/2022 0933 by Leodan Flaherty RN)  Hemodynamic stability and optimal renal function maintained: Monitor labs and assess for signs and symptoms of volume excess or deficit     Problem: ABCDS Injury Assessment  Goal: Absence of physical injury  Outcome: Progressing  Flowsheets (Taken 9/17/2022 1932)  Absence of Physical Injury: Implement safety measures based on patient assessment     Problem: Chronic Conditions and Co-morbidities  Goal: Patient's chronic conditions and co-morbidity symptoms are monitored and maintained or improved  Outcome: Progressing  Flowsheets (Taken 9/16/2022 0933 by Leodan Flaherty RN)  Care Plan - Patient's Chronic Conditions and Co-Morbidity Symptoms are Monitored and Maintained or Improved: Monitor and assess patient's chronic conditions and comorbid symptoms for stability, deterioration, or improvement

## 2022-09-18 NOTE — PROGRESS NOTES
[address]   Cardiology  Note   Dr Ahsan yPle MD, Sudeep El RN, FNP APRN CVNP  Date: 9/18/2022  Admit Date: 9/11/2022       CC:SOB (Pt arrives on CPAP due to severe SOB)    Cardiology consult: STEMI      Interval Hx /  Subjective: in NSR VSS feels much better  She is up in chair  on RA SV02 93%  B/p low side and symptomatic put parameters on coreg and decrease Entresto dose with parameters   Change lasix IVP to oral dose /wrap lower legs   sCr 1.4>> 1.3 improved   PT to walk her today   Pulmonary has signed off and will fu as out pt    plan home tomorrow if doing well/ planning home assistance per   Discuses life vest and she isn't interested  Cont GDMT  / TTE  in 3 months / consider AICD      Limited TTE 9/16/2022   Left ventricular cavity size is normal with mild LVH  Indeterminate diastolic function. [place] and distal septal hypokinetic. No evidence of left ventricular mass or thrombus  Ejection fraction is estimated to be 30-35 %. Overall left ventricular systolic function is mildly depressed . The aortic valve leaflets are not well visualized. The aortic valve is mildly thickened/calcified but opens well with normal gradients. Unable to estimate pulmonary artery pressure secondary to incomplete TR jet envelope      [facility] Takotsubo cardiomyopathy diagnosed 9/11/2022. Acute coronary syndrome with Takotsubo. We did not find coronary lesions or need intervention. \LV function is abnormal showing evidence for Takotsubo and low ejection fraction of 15%. Recommendations: Continue support with diuretics. Nitroglycerin as noted. She will be going to the floor on BiPAP. Beta-blocker. She normally takes Eliquis we will resume that. Also losartan 100 mg. Torsemide 20 mg. Metoprolol succinate 200 mg/day. 9/13/2022 TTE Left ventricular cavity size is normal. There is mild concentric left ventricular hypertrophy.  Overall left ventricular systolic function appears severely reduced with an ejection fraction of 25-30%. There is severe hypokinesis / akinesis of the apex and mid to distal  anteroseptal and anterior walls. Diastolic filling parameters suggest grade III diastolic dysfunction with increased filling pressures. No  evidence of left ventricular mass or thrombus noted. Tricuspid valve leaflets are structurally normal. Moderate tricuspid regurgitation. No evidence of tricuspid stenosis. Patient seen and examined. Clinical notes reviewed. Telemetry reviewed / Pertinent labs, diagnostic, device, and imaging results reviewed as a part of this visit  I spent a total of 35 minutes and greater than 50% of the time was spent counseling with patient  coordinating care regarding her diagnosis, treatments and plan of care    Scheduled Meds:   sacubitril-valsartan  0.5 tablet Oral BID    furosemide  40 mg IntraVENous Daily    apixaban  5 mg Oral BID    polyethylene glycol  17 g Oral Daily    vitamin D3  400 Units Oral Daily    alogliptin  6.25 mg Oral Daily    carvedilol  3.125 mg Oral BID WC    nitroGLYCERIN  1 patch TransDERmal Daily    atorvastatin  80 mg Oral Once    sodium chloride flush  5-40 mL IntraVENous 2 times per day     Vitals:    09/18/22 1007   BP: 102/68   Pulse: 90   Resp:    Temp:    SpO2:       In: 480 [P.O.:480]  Out: 400    Wt Readings from Last 3 Encounters:   09/18/22 (!) 322 lb (146.1 kg)   03/24/22 (!) 352 lb 12.8 oz (160 kg)   03/02/22 (!) 349 lb 6.4 oz (158.5 kg)       Intake/Output Summary (Last 24 hours) at 9/18/2022 1126  Last data filed at 9/18/2022 1007  Gross per 24 hour   Intake 480 ml   Output 400 ml   Net 80 ml       Telemetry: Personally Reviewed    Constitutional: Cooperative and in no apparent distress, and appears well nourished  Skin: Warm and pink; no pallor, cyanosis, clubbing, or bruising   HEENT: Symmetric and normocephalic  Cardiovascular: Regular rate and rhythm. S1/S2 present without murmurs, no rubs or gallops.  peripheral edema  Respiratory: Respirations symmetric and unlabored. Lungs clear to auscultation bilaterally, no wheezing, crackles, or rhonchi  Gastrointestinal: Abdomen soft and round. Bowel sounds normoactive without tenderness or masses. Musculoskeletal: Bilateral upper and lower extremity strength 5/5 with full ROM  Neurologic/Psych: Awake and orientated to person, place and time. Calm affect, appropriate mood      Patient Active Problem List    Diagnosis Date Noted    Abnormal result of other cardiovascular function study (CODE)     ST elevation myocardial infarction (STEMI) ([facility] [address] 09/13/2022    Acute respiratory failure with hypoxia and hypercapnia ([address] 09/12/2022    NSTEMI (non-ST elevated myocardial infarction) ([facility] [address] 09/11/2022    Acute on chronic systolic congestive heart failure (HCC)     Essential hypertension     Mixed hyperlipidemia     PAF (paroxysmal atrial fibrillation) ([facility])     CHF NYHA class III, acute, diastolic ([address] 03/62/0378    SOB (shortness of breath)     Abnormal ECG     Acute pulmonary edema (HCC)     Acute systolic (congestive) heart failure (HCC)     Atrial fibrillation with RVR ([facility] [address] 12/23/2019    Right hip pain 11/20/2017    Abnormal nuclear stress test 10/06/2017    Hyperlipidemia LDL goal <100 02/10/2016    Morbid obesity due to excess calories ([address] 12/29/2015    Pure hypercholesterolemia 12/29/2015    Bilateral knee pain 12/29/2015    Pre-diabetes 12/29/2015        Assessment     SOB (Pt arrives on CPAP due to severe SOB ) multifactorial   Decompensated HFrEF   On lasix IVP >> po   sCr wnl   Strict I &0 wt daily     Acute Bronchospasm   pulmonary edema vs asthma  Dr Leilani Don following     STEMI    [facility] Takotsubo cardiomyopathy diagnosed 9/11/2022. Acute coronary syndrome with Takotsubo. We did not find coronary lesions or need intervention. \LV function is abnormal showing evidence for Takotsubo and low ejection fraction of 15%. Recommendations: Continue support with diuretics. Nitroglycerin as noted. She will be going to the floor on BiPAP. Beta-blocker. She normally takes Eliquis we will resume that. Also losartan 100 mg. Torsemide 20 mg. Metoprolol succinate 200 mg/day. Limited TTE 9/16/2022   Left ventricular cavity size is normal with mild LVH  Indeterminate diastolic function. [place] and distal septal hypokinetic. No evidence of left ventricular mass or thrombus  Ejection fraction is estimated to be 30-35 %. Overall left ventricular systolic function is mildly depressed . The aortic valve leaflets are not well visualized. The aortic valve is mildly thickened/calcified but opens well with normal gradients. Unable to estimate pulmonary artery pressure secondary to incomplete TR jet envelope    9/13/2022 TTE Left ventricular cavity size is normal. There is mild concentric left ventricular hypertrophy. Overall left ventricular systolic function appears severely reduced with an ejection fraction of 25-30%. There is severe hypokinesis / akinesis of the apex and mid to distal  anteroseptal and anterior walls. Diastolic filling parameters suggest grade III diastolic dysfunction with increased filling pressures. No  evidence of left ventricular mass or thrombus noted. Tricuspid valve leaflets are structurally normal. Moderate tricuspid regurgitation. No evidence of tricuspid stenosis. Afib  Eliquis   [id number] Score for Atrial Fibrillation Stroke Risk 7  No bleeding noted no NSAIDS only tylenol as needed     HTN  Wnl    HLD   sub optimal     Morbid obesity   Body mass index is 51.97 kg/m².   Diet activity discussed     Hx PUD    Hgb stable     Lymphedema  wrap legs with ace     Plan   NSR feels much better  She is up in chair on RA SV02 93%  b/p low side and symptomatic  / added  parameters on coreg and decrease Entresto dose with parameters   Change lasix IVP to oral dose / wrap lower legs   sCr 1.4>> 1.3 improved   PT to walk her today   Pulmonary has signed off and will fu as out pt    plan home tomorrow if doing well & b/p stable / planning home assistance per   Discuses life vest and she isn't interested  Cont GDMT  / TTE  in 3 months / consider [id number] APRN TAEP CVNP

## 2022-09-18 NOTE — PROGRESS NOTES
Hospitalist Progress Note      PCP: Rachel Mujica MD    Date of Admission: 9/11/2022        Subjective:     Patient reports low BPs this morning, currently better. No shortness of breath      Medications:  Reviewed    Infusion Medications    sodium chloride       Scheduled Medications    furosemide  40 mg IntraVENous Daily    apixaban  5 mg Oral BID    polyethylene glycol  17 g Oral Daily    vitamin D3  400 Units Oral Daily    alogliptin  6.25 mg Oral Daily    carvedilol  3.125 mg Oral BID     sacubitril-valsartan  1 tablet Oral BID    nitroGLYCERIN  1 patch TransDERmal Daily    atorvastatin  80 mg Oral Once    sodium chloride flush  5-40 mL IntraVENous 2 times per day     PRN Meds: ipratropium-albuterol, magnesium hydroxide, sennosides-docusate sodium, benzonatate, albuterol, iohexol, sodium chloride flush, sodium chloride, acetaminophen      Intake/Output Summary (Last 24 hours) at 9/18/2022 0840  Last data filed at 9/17/2022 1953  Gross per 24 hour   Intake 240 ml   Output 400 ml   Net -160 ml         Physical Exam Performed:    /71   Pulse 86   Temp 97.9 °F (36.6 °C) (Oral)   Resp 20   Ht 5' 6\" (1.676 m)   Wt (!) 322 lb (146.1 kg)   SpO2 98%   BMI 51.97 kg/m²     General appearance: No apparent distress, appears stated age and cooperative. HEENT: Pupils equal, round, and reactive to light. Conjunctivae/corneas clear. Neck: Supple, with full range of motion. No jugular venous distention. Trachea midline. Respiratory:  Normal respiratory effort. Clear to auscultation, bilaterally without Rales/Wheezes/Rhonchi. Cardiovascular: Regular rate and rhythm with normal S1/S2 without murmurs, rubs or gallops. Abdomen: Soft, non-tender, non-distended with normal bowel sounds. Musculoskeletal: No clubbing, cyanosis or edema bilaterally. Full range of motion without deformity. Skin: Skin color, texture, turgor normal.  No rashes or lesions.   Neurologic:  Neurovascularly intact without any focal sensory/motor deficits. Cranial nerves: II-XII intact, grossly non-focal.  Psychiatric: Alert and oriented, thought content appropriate, normal insight  Capillary Refill: Brisk, 3 seconds, normal   Peripheral Pulses: +2 palpable, equal bilaterally       Labs:   No results for input(s): WBC, HGB, HCT, PLT in the last 72 hours. Recent Labs     09/16/22  0027 09/16/22  0516 09/17/22  0512    140 138   K 4.1 4.0 3.8   CL 94* 96* 94*   CO2 30 29 34*   BUN 33* 32* 41*   CREATININE 1.1 0.9 1.4*   CALCIUM 9.5 9.0 9.0   PHOS 4.4 4.1 4.6       No results for input(s): AST, ALT, BILIDIR, BILITOT, ALKPHOS in the last 72 hours. No results for input(s): INR in the last 72 hours. No results for input(s): Dorothy Horn in the last 72 hours. Urinalysis:      Lab Results   Component Value Date/Time    NITRU Negative 09/30/2020 01:47 AM    WBCUA 0-2 09/30/2020 01:47 AM    BACTERIA 4+ 11/24/2019 08:49 PM    RBCUA 5-10 09/30/2020 01:47 AM    BLOODU TRACE-INTACT 09/30/2020 01:47 AM    SPECGRAV 1.020 09/30/2020 01:47 AM    GLUCOSEU Negative 09/30/2020 01:47 AM    GLUCOSEU NEGATIVE 09/23/2010 10:50 AM       Radiology:  XR CHEST (2 VW)   Final Result      Mild pulmonary edema, demonstrating improvement since 9/11/2022. Stable cardiomegaly. XR CHEST PORTABLE   Final Result   Cardia megaly with diffuse pulmonary vascular congestion of early congestive heart failure              Assessment/Plan:    -Acute decompensated systolic heart failure/Takotsubo cardiomyopathy, EF 25 to 30%  -MARIA--CR is improving  -Morbid obesity with BMI of 52  -Chronic lymphedema  -DM type II, controlled-hemoglobin A1c 3/2/22 was 6.7  -Chronic A. [id number]-wq Eliquis  -Essential hypertension      plan  -Continue current treatment  -Closely monitor renal function  -Discharge planning per cardiology    DVT Prophylaxis: Eliquis  Diet: ADULT DIET;  Regular; Low Fat/Low Chol/High Fiber/2 gm Na  Code Status: Full Code  PT/OT Eval Status: Keisha Clements MD

## 2022-09-19 VITALS
WEIGHT: 293 LBS | HEART RATE: 85 BPM | OXYGEN SATURATION: 96 % | HEIGHT: 66 IN | RESPIRATION RATE: 18 BRPM | BODY MASS INDEX: 47.09 KG/M2 | SYSTOLIC BLOOD PRESSURE: 95 MMHG | DIASTOLIC BLOOD PRESSURE: 59 MMHG | TEMPERATURE: 98 F

## 2022-09-19 PROCEDURE — 6370000000 HC RX 637 (ALT 250 FOR IP): Performed by: NURSE PRACTITIONER

## 2022-09-19 PROCEDURE — 2580000003 HC RX 258: Performed by: INTERNAL MEDICINE

## 2022-09-19 PROCEDURE — 6370000000 HC RX 637 (ALT 250 FOR IP): Performed by: INTERNAL MEDICINE

## 2022-09-19 PROCEDURE — 99239 HOSP IP/OBS DSCHRG MGMT >30: CPT | Performed by: NURSE PRACTITIONER

## 2022-09-19 PROCEDURE — 99233 SBSQ HOSP IP/OBS HIGH 50: CPT | Performed by: NURSE PRACTITIONER

## 2022-09-19 PROCEDURE — 99233 SBSQ HOSP IP/OBS HIGH 50: CPT | Performed by: INTERNAL MEDICINE

## 2022-09-19 RX ORDER — FUROSEMIDE 40 MG/1
40 TABLET ORAL DAILY
Qty: 60 TABLET | Refills: 3 | Status: SHIPPED | OUTPATIENT
Start: 2022-09-19 | End: 2022-11-01 | Stop reason: SDUPTHER

## 2022-09-19 RX ORDER — ATORVASTATIN CALCIUM 80 MG/1
80 TABLET, FILM COATED ORAL DAILY
Qty: 90 TABLET | Refills: 3 | Status: SHIPPED | OUTPATIENT
Start: 2022-09-19 | End: 2022-11-01 | Stop reason: SDUPTHER

## 2022-09-19 RX ORDER — ATORVASTATIN CALCIUM 80 MG/1
80 TABLET, FILM COATED ORAL ONCE
Qty: 30 TABLET | Refills: 3 | Status: SHIPPED | OUTPATIENT
Start: 2022-09-19 | End: 2022-09-19 | Stop reason: SDUPTHER

## 2022-09-19 RX ORDER — CARVEDILOL 3.12 MG/1
3.12 TABLET ORAL 2 TIMES DAILY WITH MEALS
Qty: 60 TABLET | Refills: 3 | Status: SHIPPED | OUTPATIENT
Start: 2022-09-19 | End: 2022-11-01 | Stop reason: SDUPTHER

## 2022-09-19 RX ADMIN — APIXABAN 5 MG: 5 TABLET, FILM COATED ORAL at 08:39

## 2022-09-19 RX ADMIN — ALOGLIPTIN 6.25 MG: 6.25 TABLET, FILM COATED ORAL at 08:40

## 2022-09-19 RX ADMIN — FUROSEMIDE 40 MG: 40 TABLET ORAL at 10:04

## 2022-09-19 RX ADMIN — CARVEDILOL 3.12 MG: 3.12 TABLET, FILM COATED ORAL at 10:02

## 2022-09-19 RX ADMIN — CHOLECALCIFEROL (VITAMIN D3) 10 MCG (400 UNIT) TABLET 400 UNITS: at 08:40

## 2022-09-19 RX ADMIN — SODIUM CHLORIDE, PRESERVATIVE FREE 10 ML: 5 INJECTION INTRAVENOUS at 08:40

## 2022-09-19 RX ADMIN — SACUBITRIL AND VALSARTAN 0.5 TABLET: 24; 26 TABLET, FILM COATED ORAL at 10:04

## 2022-09-19 NOTE — PROGRESS NOTES
[facility]   Cardiology  Note   Dr Sarbjit Betancourt MD, Ricardo Rodriguez RN, FNP APRN CVNP  Date: 9/19/2022  Admit Date: 9/11/2022       CC:SOB (Pt arrives on CPAP due to severe SOB)    Cardiology consult: STEMI      Interval Hx /  Subjective: in NSR VSS feels much better   Now on RA SV02 96% did not use bipap overnight    He b/p has been on low side and we adjusted her meds  Her sCr was 0.9> 1.4 > 1.3 we decreased her lasix and sCr has improved    Pulmonary has signed off and will fu as out pt    plan home today with her family /  assistance per   Discuss life vest and she isn't interested   Cont GDMT  / TTE  in 3 months / consider AICD      Limited TTE 9/16/2022   Left ventricular cavity size is normal with mild LVH  Indeterminate diastolic function. [place] and distal septal hypokinetic. No evidence of left ventricular mass or thrombus  Ejection fraction is estimated to be 30-35 %. Overall left ventricular systolic function is mildly depressed . The aortic valve leaflets are not well visualized. The aortic valve is mildly thickened/calcified but opens well with normal gradients. Unable to estimate pulmonary artery pressure secondary to incomplete TR jet envelope      [facility] Takotsubo cardiomyopathy diagnosed 9/11/2022. Acute coronary syndrome with Takotsubo. We did not find coronary lesions or need intervention. \LV function is abnormal showing evidence for Takotsubo and low ejection fraction of 15%. Recommendations: Continue support with diuretics. Nitroglycerin as noted. She will be going to the floor on BiPAP. Beta-blocker. She normally takes Eliquis we will resume that. Also losartan 100 mg. Torsemide 20 mg. Metoprolol succinate 200 mg/day. 9/13/2022 TTE Left ventricular cavity size is normal. There is mild concentric left ventricular hypertrophy. Overall left ventricular systolic function appears severely reduced with an ejection fraction of 25-30%.  There is severe hypokinesis / akinesis of the apex and mid to distal  anteroseptal and anterior walls. Diastolic filling parameters suggest grade III diastolic dysfunction with increased filling pressures. No  evidence of left ventricular mass or thrombus noted. Tricuspid valve leaflets are structurally normal. Moderate tricuspid regurgitation. No evidence of tricuspid stenosis. Patient seen and examined. Clinical notes reviewed. Telemetry reviewed / Pertinent labs, diagnostic, device, and imaging results reviewed as a part of this visit  I spent a total of 35 minutes and greater than 50% of the time was spent counseling with patient  coordinating care regarding her diagnosis, treatments and plan of care    Scheduled Meds:   sacubitril-valsartan  0.5 tablet Oral BID    furosemide  40 mg Oral Daily    apixaban  5 mg Oral BID    polyethylene glycol  17 g Oral Daily    vitamin D3  400 Units Oral Daily    alogliptin  6.25 mg Oral Daily    carvedilol  3.125 mg Oral BID WC    nitroGLYCERIN  1 patch TransDERmal Daily    atorvastatin  80 mg Oral Once    sodium chloride flush  5-40 mL IntraVENous 2 times per day     Vitals:    09/19/22 0805   BP: 121/68   Pulse: 79   Resp: 18   Temp: 97.4 °F (36.3 °C)   SpO2: 96%      In: 495 [P.O.:480; I.V.:15]  Out: -    Wt Readings from Last 3 Encounters:   09/19/22 (!) 321 lb 4 oz (145.7 kg)   03/24/22 (!) 352 lb 12.8 oz (160 kg)   03/02/22 (!) 349 lb 6.4 oz (158.5 kg)       Intake/Output Summary (Last 24 hours) at 9/19/2022 0917  Last data filed at 9/19/2022 0840  Gross per 24 hour   Intake 485 ml   Output --   Net 485 ml       Telemetry: Personally Reviewed    Constitutional: Cooperative and in no apparent distress, and appears well nourished  Skin: Warm and pink; no pallor, cyanosis, clubbing, or bruising   HEENT: Symmetric and normocephalic  Cardiovascular: Regular rate and rhythm. S1/S2 present without murmurs, no rubs or gallops.  peripheral edema  Respiratory: Respirations symmetric and unlabored. Lungs clear to auscultation bilaterally, no wheezing, crackles, or rhonchi  Gastrointestinal: Abdomen soft and round. Bowel sounds normoactive without tenderness or masses. Musculoskeletal: Bilateral upper and lower extremity strength 5/5 with full ROM  Neurologic/Psych: Awake and orientated to person, place and time. Calm affect, appropriate mood      Patient Active Problem List    Diagnosis Date Noted    Abnormal result of other cardiovascular function study (CODE)     Cardiomyopathy ([facility] [address] 09/18/2022    ST elevation myocardial infarction (STEMI) ([facility] [address] 09/13/2022    Acute respiratory failure with hypoxia and hypercapnia ([address] 09/12/2022    NSTEMI (non-ST elevated myocardial infarction) ([facility] [address] 09/11/2022    Acute on chronic systolic congestive heart failure (HCC)     Essential hypertension     Mixed hyperlipidemia     PAF (paroxysmal atrial fibrillation) ([facility])     CHF NYHA class III, acute, diastolic ([address] 34/93/4508    SOB (shortness of breath)     Abnormal ECG     Acute pulmonary edema (HCC)     Acute systolic (congestive) heart failure (HCC)     Atrial fibrillation with RVR ([facility] [address] 12/23/2019    Right hip pain 11/20/2017    Abnormal nuclear stress test 10/06/2017    Hyperlipidemia LDL goal <100 02/10/2016    Morbid obesity due to excess calories ([address] 12/29/2015    Pure hypercholesterolemia 12/29/2015    Bilateral knee pain 12/29/2015    Pre-diabetes 12/29/2015        Assessment     SOB (Pt arrives on CPAP due to severe SOB ) multifactorial   Decompensated HFrEF   On lasix IVP   sCr wnl   Strict I &0 wt daily     Acute Bronchospasm   pulmonary edema vs asthma  Dr Marlene Ledesma following     STEMI    [facility] Takotsubo cardiomyopathy diagnosed 9/11/2022. Acute coronary syndrome with Takotsubo. We did not find coronary lesions or need intervention. \LV function is abnormal showing evidence for Takotsubo and low ejection fraction of 15%. Recommendations: Continue support with diuretics. Nitroglycerin as noted. She will be going to the floor on BiPAP. Beta-blocker. She normally takes Eliquis we will resume that. Also losartan 100 mg. Torsemide 20 mg. Metoprolol succinate 200 mg/day. Limited TTE 9/16/2022   Left ventricular cavity size is normal with mild LVH  Indeterminate diastolic function. [place] and distal septal hypokinetic. No evidence of left ventricular mass or thrombus  Ejection fraction is estimated to be 30-35 %. Overall left ventricular systolic function is mildly depressed . The aortic valve leaflets are not well visualized. The aortic valve is mildly thickened/calcified but opens well with normal gradients. Unable to estimate pulmonary artery pressure secondary to incomplete TR jet envelope      9/13/2022 TTE Left ventricular cavity size is normal. There is mild concentric left ventricular hypertrophy. Overall left ventricular systolic function appears severely reduced with an ejection fraction of 25-30%. There is severe hypokinesis / akinesis of the apex and mid to distal  anteroseptal and anterior walls. Diastolic filling parameters suggest grade III diastolic dysfunction with increased filling pressures. No  evidence of left ventricular mass or thrombus noted. Tricuspid valve leaflets are structurally normal. Moderate tricuspid regurgitation. No evidence of tricuspid stenosis. Afib  Eliquis   [id number] Score for Atrial Fibrillation Stroke Risk 7  No bleeding noted no NSAIDS only tylenol as needed     HTN  Wnl    HLD   sub optimal     Morbid obesity   Body mass index is 51.85 kg/m².   Diet activity discussed     Hx PUD    Hgb stable     Lymphedema  wrap legs with ace     Plan   in NSR VSS feels much better   Now on RA SV02 96% did not use bipap overnight    He b/p has been on low side and we adjusted her meds   Pulmonary has signed off and will fu as out pt    Discuss life vest and she isn't interested   Cont GDMT  / TTE  in 3 months / consider AICD    Continue cardiac meds Lipitor lasix eliquis   Entresto 25/26mg 0.5 tab  BID hold for b/p <419 systolic  / follow CMP   Coreg 3.125 mg BID with parameters   add SGLT2/ farxiga 5mg daily  Add hydralazine  aldactone in future as b/p and renals permit   plan home today with her family /  assistance per   Fu  in 1-2 weeks      [facility]

## 2022-09-19 NOTE — PROGRESS NOTES
Patient is taken via wheelchair to personal vehicle. All belongings are gathered and with patient. Patient has no further questions or concerns. Son has picked up new prescriptions from [facility] to [facility].

## 2022-09-19 NOTE — CARE COORDINATION
CTN contacted Kristal with [employer] [phone]. They have accepted this patient and will pull referral from [facility].  They will contact patient and make arrangements for [facility] by 9/21  Electronically signed by Shanika Peña LPN on 5/74/7771 at 50:92 AM

## 2022-09-19 NOTE — PLAN OF CARE
Problem: Safety - Adult  Goal: Free from fall injury  9/18/2022 2157 by Rahul Mock RN  Flowsheets (Taken 9/16/2022 0810 by Isabella So RN)  Free From Fall Injury: Instruct family/caregiver on patient safety

## 2022-09-19 NOTE — PLAN OF CARE
Problem: Discharge Planning  Goal: Discharge to home or other facility with appropriate resources  Outcome: Adequate for Discharge  Flowsheets  Taken 9/19/2022 0956  Discharge to home or other facility with appropriate resources:   Identify barriers to discharge with patient and caregiver   Arrange for needed discharge resources and transportation as appropriate   Identify discharge learning needs (meds, wound care, etc)  Taken 9/19/2022 0806  Discharge to home or other facility with appropriate resources: Identify barriers to discharge with patient and caregiver     Problem: Respiratory - Adult  Goal: Achieves optimal ventilation and oxygenation  Outcome: Adequate for Discharge  Flowsheets  Taken 9/19/2022 0956  Achieves optimal ventilation and oxygenation: Assess for changes in respiratory status  Taken 9/19/2022 0806  Achieves optimal ventilation and oxygenation: Assess for changes in respiratory status     Problem: Cardiovascular - Adult  Goal: Maintains optimal cardiac output and hemodynamic stability  Outcome: Adequate for Discharge  Flowsheets  Taken 9/19/2022 0956  Maintains optimal cardiac output and hemodynamic stability:   Monitor blood pressure and heart rate   Assess for signs of decreased cardiac output  Taken 9/19/2022 0806  Maintains optimal cardiac output and hemodynamic stability:   Monitor blood pressure and heart rate   Monitor urine output and notify Licensed Independent Practitioner for values outside of normal range   Assess for signs of decreased cardiac output  Goal: Absence of cardiac dysrhythmias or at baseline  Outcome: Adequate for Discharge  Flowsheets (Taken 9/19/2022 0806)  Absence of cardiac dysrhythmias or at baseline:   Monitor cardiac rate and rhythm   Assess for signs of decreased cardiac output     Problem: Safety - Adult  Goal: Free from fall injury  9/19/2022 0956 by Milana Abebe RN  Outcome: Adequate for Discharge  Flowsheets  Taken 9/19/2022 0956  Free From Fall Injury: Instruct family/caregiver on patient safety  Taken 9/19/2022 0948  Free From Fall Injury: Instruct family/caregiver on patient safety  Note: Discussed falls precautions and interventions used to promote patient safety  9/18/2022 2157 by Issa West RN  Flowsheets (Taken 9/16/2022 0810 by Elle Bowman RN)  Free From Fall Injury: Instruct family/caregiver on patient safety     Problem: Pain  Goal: Verbalizes/displays adequate comfort level or baseline comfort level  Outcome: Adequate for Discharge  Flowsheets  Taken 9/19/2022 0956  Verbalizes/displays adequate comfort level or baseline comfort level:   Assess pain using appropriate pain scale   Encourage patient to monitor pain and request assistance  Taken 9/19/2022 0805  Verbalizes/displays adequate comfort level or baseline comfort level: Encourage patient to monitor pain and request assistance  Note: Educated on pharmacological and non-pharmacological interventions to assist with pain      Problem: Skin/Tissue Integrity  Goal: Absence of new skin breakdown  Description: 1. Monitor for areas of redness and/or skin breakdown  2. Assess vascular access sites hourly  3. Every 4-6 hours minimum:  Change oxygen saturation probe site  4. Every 4-6 hours:  If on nasal continuous positive airway pressure, respiratory therapy assess nares and determine need for appliance change or resting period.   Outcome: Adequate for Discharge  Note: Educated on keeping skin clean and dry, especially within fold, to prevent skin breakdown      Problem: Metabolic/Fluid and Electrolytes - Adult  Goal: Electrolytes maintained within normal limits  Outcome: Adequate for Discharge  Flowsheets (Taken 9/19/2022 0806)  Electrolytes maintained within normal limits: Monitor labs and assess patient for signs and symptoms of electrolyte imbalances  Goal: Hemodynamic stability and optimal renal function maintained  Outcome: Adequate for Discharge  Flowsheets  Taken 9/19/2022 5049  Hemodynamic stability and optimal renal function maintained: Monitor labs and assess for signs and symptoms of volume excess or deficit  Taken 9/19/2022 0806  Hemodynamic stability and optimal renal function maintained: Monitor labs and assess for signs and symptoms of volume excess or deficit     Problem: ABCDS Injury Assessment  Goal: Absence of physical injury  Outcome: Adequate for Discharge  Flowsheets  Taken 9/19/2022 0956  Absence of Physical Injury: Implement safety measures based on patient assessment  Taken 9/19/2022 0948  Absence of Physical Injury: Implement safety measures based on patient assessment

## 2022-09-19 NOTE — DISCHARGE INSTRUCTIONS
atorvastatin  Pronunciation:  a TOR va anneliese tin  Brand:  Lipitor  What is the most important information I should know about atorvastatin? You should not take atorvastatin if you are pregnant or breastfeeding, or if you have liver disease. Tell your doctor about all your current medicines and any you start or stop using. Many drugs can interact, and some drugs should not be used together. Atorvastatin can cause the breakdown of muscle tissue, which can lead to kidney failure. Call your doctor right away if you have unexplained muscle pain, tenderness, or weakness especially if you also have fever, unusual tiredness, or dark urine. What is atorvastatin? Atorvastatin is used together with diet to lower blood levels of \"bad\" cholesterol (low-density lipoprotein, or LDL), to increase levels of \"good\" cholesterol (high-density lipoprotein, or HDL), and to lower triglycerides (a type of fat in the blood). Atorvastatin is used to treat high cholesterol, and to lower the risk of stroke, heart attack, or other heart complications in people with type 2 diabetes, coronary heart disease, or other risk factors. Atorvastatin is used in adults and children who are at least 8years old. Atorvastatin may also be used for purposes not listed in this medication guide. What should I discuss with my healthcare provider before taking atorvastatin? You should not use atorvastatin if you are allergic to it, or if you have liver disease. Do not use if you are pregnant. This medicine can harm an unborn baby. Use effective birth control to prevent pregnancy. Stop taking this medicine and tell your doctor at once if you become pregnant. Do not  breastfeed while you are taking atorvastatin. Tell your doctor if you have ever had:  liver problems;  muscle pain or weakness;  kidney disease;  diabetes;  a thyroid disorder; or  if you drink more than 2 alcoholic beverages daily.   Atorvastatin can cause the breakdown of muscle tissue, which can lead to kidney failure. This happens more often in women, in older adults, or people who have kidney disease or poorly controlled hypothyroidism (underactive thyroid). Atorvastatin is not approved for use by anyone younger than 8years old. How should I take atorvastatin? Follow all directions on your prescription label and read all medication guides or instruction sheets. Your doctor may occasionally change your dose. Use the medicine exactly as directed. Take the medicine at the same time each day, with or without food. Do not break an atorvastatin tablet before taking it, unless your doctor has told you to. You may need to stop using atorvastatin for a short time if you have:  uncontrolled seizures;  an electrolyte imbalance (such as high or low potassium levels in your blood);  severely low blood pressure;  a severe infection or illness; or  surgery or a medical emergency. It may take up to 2 weeks before your cholesterol levels improve, and you may need frequent blood tests. Even if you have no symptoms, tests can help your doctor determine if this medicine is effective. Atorvastatin is only part of a complete treatment program that may also include diet, exercise, and weight control. Follow your doctor's instructions very closely. Store at room temperature away from moisture, heat, and light. What happens if I miss a dose? Use the medicine as soon as you can, but skip the missed dose if you are more than 12 hours late for the dose. Do not use two doses at one time. What happens if I overdose? Seek emergency medical attention or call the Poison Help line at 1-584.607.3673. What should I avoid while taking atorvastatin? Avoid eating foods high in fat or cholesterol, or atorvastatin will not be as effective. Avoid drinking alcohol. It can raise triglyceride levels and may increase your risk of liver damage.   Grapefruit may interact with atorvastatin and lead to unwanted side effects. Avoid drinking more than 1 liter of grapefruit juice while taking atorvastatin. What are the possible side effects of atorvastatin? Get emergency medical help if you have signs of an allergic reaction: hives; difficulty breathing; swelling of your face, lips, tongue, or throat. In rare cases, atorvastatin can cause a condition that results in the breakdown of skeletal muscle tissue, leading to kidney failure. Call your doctor right away if you have unexplained muscle pain, tenderness, or weakness especially if you also have fever, unusual tiredness, and dark colored urine. Also call your doctor at once if you have:  muscle weakness in your hips, shoulders, neck, and back;  trouble lifting your arms, trouble climbing or standing;  liver problems --upper stomach pain, weakness, tired feeling, loss of appetite, dark urine, jaundice (yellowing of the skin or eyes); or  kidney problems --little or no urinating, swelling in your feet or ankles, feeling tired or short of breath. Common side effects may include:  joint pain;  stuffy nose, sore throat;  diarrhea; or  pain in your arms or legs. This is not a complete list of side effects and others may occur. Call your doctor for medical advice about side effects. You may report side effects to FDA at 9-982-FDA-4437. What other drugs will affect atorvastatin? Certain other drugs can increase your risk of serious muscle problems, and it is very important that your doctor knows if you are using any of them. Tell your doctor about all your current medicines and any you start or stop using, especially:  other cholesterol-lowering medication;  antibiotic or antifungal medicine;  birth control pills;  medicine to prevent organ transplant rejection;  heart medication; or  medicine to treat hepatitis C or HIV. This list is not complete and many other drugs may affect atorvastatin.  This includes prescription and over-the-counter medicines, vitamins, and herbal products. Not all possible drug interactions are listed here. Where can I get more information? Your pharmacist can provide more information about atorvastatin. Remember, keep this and all other medicines out of the reach of children, never share your medicines with others, and use this medication only for the indication prescribed. Every effort has been made to ensure that the information provided by [address] [place] [address]  is accurate, up-to-date, and complete, but no guarantee is made to that effect. Drug information contained herein may be time sensitive. [facility] information has been compiled for use by healthcare practitioners and consumers in the [place] and therefore [facility] does not warrant that uses outside of the [place] are appropriate, unless specifically indicated otherwise. [facility]'s drug information does not endorse drugs, diagnose patients or recommend therapy. [facility]'s drug information is an informational resource designed to assist licensed healthcare practitioners in caring for their patients and/or to serve consumers viewing this service as a supplement to, and not a substitute for, the expertise, skill, knowledge and judgment of healthcare practitioners. The absence of a warning for a given drug or drug combination in no way should be construed to indicate that the drug or drug combination is safe, effective or appropriate for any given patient. [facility] does not assume any responsibility for any aspect of healthcare administered with the aid of information [facility] provides. The information contained herein is not intended to cover all possible uses, directions, precautions, warnings, drug interactions, allergic reactions, or adverse effects. If you have questions about the drugs you are taking, check with your doctor, nurse or pharmacist.  Copyright 8511-8590 [address]: 22.02. Revision date: 2/9/2021. Care instructions adapted under license by [facility] ([facility]). If you have questions about a medical condition or this instruction, always ask your healthcare professional. [address] any warranty or liability for your use of this information. furosemide (oral/injection)  Pronunciation:  fer lima  Brand:  Lasix  What is the most important information I should know about furosemide? You should not use this medicine if you are unable to urinate. Do not take more than your recommended dose. High doses of furosemide may cause irreversible hearing loss. What is furosemide? Furosemide is a loop diuretic (water pill) that prevents your body from absorbing too much salt. This allows the salt to instead be passed in your urine. Furosemide is used to treat fluid retention (edema) in people with congestive heart failure, liver disease, or a kidney disorder such as nephrotic syndrome. Furosemide is also used to treat high blood pressure (hypertension). Furosemide may also be used for purposes not listed in this medication guide. What should I discuss with my healthcare provider before taking furosemide? You should not use furosemide if you are allergic to it, or if you are unable to urinate. Tell your doctor if you have ever had:  kidney disease;  enlarged prostate, bladder obstruction, urination problems;  cirrhosis or other liver disease;  an electrolyte imbalance (such as low levels of potassium or magnesium in your blood);  gout;  lupus;  diabetes; or  a sulfa drug allergy. Tell your doctor if you have an MRI (magnetic resonance imaging) or any type of scan using a radioactive dye that is injected into your veins. Both contrast dyes and furosemide can harm your kidneys. It is not known whether this medicine will harm an unborn baby. Tell your doctor if you are pregnant or plan to become pregnant. It may not be safe to breastfeed while using this medicine. Ask your doctor about any risk. Furosemide may slow breast milk production.   How should I take furosemide? Follow all directions on your prescription label and read all medication guides or instruction sheets. Your doctor may occasionally change your dose. Use the medicine exactly as directed. Furosemide oral is taken by mouth. Furosemide injection is injected into a muscle or given as an infusion into a vein. A healthcare provider will give you this injection if you are unable to take the medicine by mouth. You may receive your first dose in a hospital or clinic setting if you have severe liver disease. Do not take more than your recommended dose. High doses of furosemide may cause irreversible hearing loss. Measure liquid medicine carefully. Use the dosing syringe provided, or use a medicine dose-measuring device (not a kitchen spoon). Furosemide doses are based on weight in children. Your child's dose needs may change if the child gains or loses weight. Furosemide will make you urinate more often and you may get dehydrated easily. Follow your doctor's instructions about using potassium supplements or getting enough salt and potassium in your diet. Your blood pressure will need to be checked often and you may need other medical tests. If you have high blood pressure, keep using this medicine even if you feel well. High blood pressure often has no symptoms. You may need to use blood pressure medicine for the rest of your life. If you need surgery, tell the surgeon ahead of time that you are using furosemide. Store at room temperature away from moisture, heat, and light. Throw away any unused oral liquid  after 90 days. What happens if I miss a dose? Furosemide is sometimes used only once, so you may not be on a dosing schedule. If you are using the medication regularly, take the medicine as soon as you can, but skip the missed dose if it is almost time for your next dose. Do not take two doses at one time. What happens if I overdose?   Seek emergency medical attention or call the Poison Help line at 1-830.406.6724. Overdose symptoms may include feeling very thirsty or hot, heavy sweating, hot and dry skin, extreme weakness, or fainting. What should I avoid while taking furosemide? Avoid getting up too fast from a sitting or lying position, or you may feel dizzy. Avoid becoming dehydrated. Follow your doctor's instructions about the type and amount of liquids you should drink while you are taking furosemide. Drinking alcohol with this medicine can cause side effects. If you have high blood pressure, ask a doctor or pharmacist before taking any medicines that can raise your blood pressure, such as diet pills or cough-and-cold medicine. What are the possible side effects of furosemide? Get emergency medical help if you have signs of an allergic reaction (hives, difficult breathing, swelling in your face or throat) or a severe skin reaction (fever, sore throat, burning in your eyes, skin pain, red or purple skin rash that spreads and causes blistering and peeling). Call your doctor at once if you have:  a light-headed feeling, like you might pass out;  ringing in your ears, hearing loss;  muscle spasms or contractions;  pale skin, easy bruising, unusual bleeding;  high blood sugar --increased thirst, increased urination, dry mouth, fruity breath odor;  kidney problems --little or no urination, swelling in your feet or ankles, feeling tired or short of breath;  signs of liver or pancreas problems --loss of appetite, upper stomach pain (that may spread to your back), nausea or vomiting, dark urine, jaundice (yellowing of the skin or eyes); or  signs of an electrolyte imbalance --dry mouth, thirst, weakness, drowsiness, feeling jittery or unsteady, vomiting, irregular heartbeats, fluttering in your chest, numbness or tingling, muscle cramps, muscle weakness or limp feeling.   Common side effects may include:  diarrhea, constipation, loss of appetite;  numbness or tingling;  headache, dizziness; or  blurred vision. This is not a complete list of side effects and others may occur. Call your doctor for medical advice about side effects. You may report side effects to FDA at 1-262-DVW-9337. What other drugs will affect furosemide? Sometimes it is not safe to use certain medications at the same time. Some drugs can affect your blood levels of other drugs you take, which may increase side effects or make the medications less effective. If you also take sucralfate, take your furosemide dose 2 hours before or 2 hours after you take sucralfate. Tell your doctor about all your other medicines, especially:  another diuretic, especially ethacrynic acid;  chloral hydrate;  lithium;  phenytoin;  an injected antibiotic;  cancer medicine, such as cisplatin;  heart or blood pressure medicine; or  salicylates such as aspirin, Nuprin Backache Caplet, Kaopectate, KneeRelief, Pamprin Cramp Formula, Pepto-Bismol, Tricosal, Trilisate, and others. This list is not complete. Other drugs may affect furosemide, including prescription and over-the-counter medicines, vitamins, and herbal products. Not all possible drug interactions are listed here. Where can I get more information? Your pharmacist can provide more information about furosemide. Remember, keep this and all other medicines out of the reach of children, never share your medicines with others, and use this medication only for the indication prescribed. Every effort has been made to ensure that the information provided by [address] is accurate, up-to-date, and complete, but no guarantee is made to that effect. Drug information contained herein may be time sensitive. [facility] information has been compiled for use by healthcare practitioners and consumers in the [place] and therefore [facility] does not warrant that uses outside of the [place] are appropriate, unless specifically indicated otherwise.  [facility] drug information does not endorse drugs, diagnose patients or recommend therapy. [facility]'s drug information is an informational resource designed to assist licensed healthcare practitioners in caring for their patients and/or to serve consumers viewing this service as a supplement to, and not a substitute for, the expertise, skill, knowledge and judgment of healthcare practitioners. The absence of a warning for a given drug or drug combination in no way should be construed to indicate that the drug or drug combination is safe, effective or appropriate for any given patient. [facility] does not assume any responsibility for any aspect of healthcare administered with the aid of information [facility] provides. The information contained herein is not intended to cover all possible uses, directions, precautions, warnings, drug interactions, allergic reactions, or adverse effects. If you have questions about the drugs you are taking, check with your doctor, nurse or pharmacist.  Copyright 9105-1406 [address] Version: 16.01. Revision date: 5/22/2019. Care instructions adapted under license by [facility] ([facility]). If you have questions about a medical condition or this instruction, always ask your healthcare professional. [place] [address] any warranty or liability for your use of this information. dapagliflozin  Pronunciation:  DAP a gli FLOE zin  Brand:  [place]  What is the most important information I should know about dapagliflozin? Tell your doctor if you are sick with vomiting or diarrhea, or if you eat or drink less than usual.  Dapagliflozin can cause serious infections around the penis or vagina. Get medical help right away if you have burning, itching, odor, discharge, pain, tenderness, redness or swelling of the genital or rectal area, fever, or if you don't feel well. What is dapagliflozin?   Dapagliflozin is used with diet and exercise to improve blood sugar control in adults with type 2 diabetes mellitus (not for type 1 diabetes). Dapagliflozin is also used to lower the risk of needing to be in a hospital for heart failure in adults with type 2 diabetes who also have heart disease. Dapagliflozin is also used to lower the risk of dying or needing to be in a hospital for heart failure when your heart cannot pump blood properly. Dapagliflozin is also used to reduce the risk of end-stage kidney disease and hospitalization or death from heart problems in adults who also have kidney problems caused by type 2 diabetes. Dapagliflozin may also be used for purposes not listed in this medication guide. What should I discuss with my healthcare provider before taking dapagliflozin? You should not use dapagliflozin if you are allergic to it, or if you have:  severe kidney disease (or if you are on dialysis); or  diabetic ketoacidosis (call your doctor for treatment). Tell your doctor if you have ever had:  polycystic kidney disease;  liver disease;  bladder infections or other urination problems;  problems with your pancreas, including surgery;  alcoholism, or if you currently drink large amounts of alcohol; or  if you are on a low salt diet. Follow your doctor's instructions about using this medicine if you are pregnant or you become pregnant. Controlling diabetes is very important during pregnancy. You should not use dapagliflozin during the second or third trimester of pregnancy. Do not breastfeed. Not approved for use by anyone younger than 25years old. How should I take dapagliflozin? Follow all directions on your prescription label and read all medication guides or instruction sheets. Your doctor may occasionally change your dose. Use the medicine exactly as directed. You may take dapagliflozin with or without food. Your blood sugar will need to be checked often, and you may also need to test the level of ketones in your urine.  Dapagliflozin can cause life-threatening ketoacidosis (too much acid in the urination;  kidney problems --little or no urination, swelling in your feet or ankles, feeling tired or short of breath;  ketoacidosis (too much acid in the blood) --nausea, vomiting, stomach pain, confusion, unusual drowsiness, or trouble breathing; or  signs of a bladder infection --pain or burning when you urinate, increased urination, blood in your urine, fever, pain in your pelvis or back. Some side effects may be more likely to occur in older adults. Common side effects may include:  genital yeast infection;  urinating more than usual; or  sore throat and runny or stuffy nose. This is not a complete list of side effects and others may occur. Call your doctor for medical advice about side effects. You may report side effects to FDA at 2-100-FDA-5036. What other drugs will affect dapagliflozin? Other drugs may increase or decrease the effects of dapagliflozin on lowering your blood sugar. Tell your doctor about all your current medicines and any you start or stop using, especially:  insulin or other oral diabetes medicines; or  a diuretic or \"water pill. \"  This list is not complete. Other drugs may affect dapagliflozin, including prescription and over-the-counter medicines, vitamins, and herbal products. Not all possible drug interactions are listed here. Where can I get more information? Your pharmacist can provide more information about dapagliflozin. Remember, keep this and all other medicines out of the reach of children, never share your medicines with others, and use this medication only for the indication prescribed. Every effort has been made to ensure that the information provided by [address] is accurate, up-to-date, and complete, but no guarantee is made to that effect. Drug information contained herein may be time sensitive.  [facility] information has been compiled for use by healthcare practitioners and consumers in the [place] and therefore [facility] does not warrant that uses outside of the [place] are appropriate, unless specifically indicated otherwise. [facility] information does not endorse drugs, diagnose patients or recommend therapy. [facility] drug information is an informational resource designed to assist licensed healthcare practitioners in caring for their patients and/or to serve consumers viewing this service as a supplement to, and not a substitute for, the expertise, skill, knowledge and judgment of healthcare practitioners. The absence of a warning for a given drug or drug combination in no way should be construed to indicate that the drug or drug combination is safe, effective or appropriate for any given patient. [facility] does not assume any responsibility for any aspect of healthcare administered with the aid of information [facility] provides. The information contained herein is not intended to cover all possible uses, directions, precautions, warnings, drug interactions, allergic reactions, or adverse effects. If you have questions about the drugs you are taking, check with your doctor, nurse or pharmacist.  Copyright 1738-0761 [address] [place] [address] Dr [address]. Version: 8.01. Revision date: 10/6/2021. Care instructions adapted under license by [facility] ([facility]). If you have questions about a medical condition or this instruction, always ask your healthcare professional. [place] [address] any warranty or liability for your use of this information. carvedilol  Pronunciation:  BILLY ve dil ole  Brand:  Coreg, Coreg CR  What is the most important information I should know about carvedilol? You should not take carvedilol if you have asthma, bronchitis, emphysema, severe liver disease, or a serious heart condition such as heart block, \"sick sinus syndrome,\" or slow heart rate (unless you have a pacemaker). What is carvedilol? Carvedilol is a beta-blocker that is used to treat heart failure and hypertension (high blood pressure).   Carvedilol is also used after a heart attack that has caused your heart not to pump as well. Carvedilol may also be used for purposes not listed in this medication guide. What should I discuss with my healthcare provider before taking carvedilol? You should not take carvedilol if you are allergic to it, or if you have:  asthma, bronchitis, emphysema;  severe liver disease; or  a serious heart condition such as severe heart failure, heart block, \"sick sinus syndrome,\" or slow heart rate (unless you have a pacemaker). Tell your doctor if you have ever had:  coronary artery disease (clogged arteries);  slow heartbeats that have caused you to faint;  fluid retention;  asthma or other lung problems;  angina (chest pain);  diabetes (taking carvedilol can make it harder for you to tell when you have low blood sugar);  a thyroid disorder;  kidney disease;  circulation problems (such as Raynaud's syndrome); or  pheochromocytoma (tumor of the adrenal gland). Tell your doctor if you are pregnant or breastfeeding. Carvedilol is not approved for use by anyone younger than 25years old. How should I take carvedilol? Follow all directions on your prescription label and read all medication guides or instruction sheets. Your doctor may occasionally change your dose. Use the medicine exactly as directed. Carvedilol works best if you take it with food, at the same time every day. Swallow the extended-release capsule whole and do not crush, chew, break, or open it. If you cannot swallow a capsule whole, open it and sprinkle the medicine into a spoonful of cold applesauce. Swallow the mixture right away without chewing. Do not save it for later use. If you are switched from carvedilol tablets to carvedilol extended-release capsules (Coreg CR), your daily total dose of this medicine may be higher or lower than before. Older adults may be more likely to become dizzy or feel faint when switching from tablets to extended-release capsules.  Follow your doctor's instructions. Your blood pressure will need to be checked often. If you need surgery (including cataract surgery), tell your surgeon you currently use this medicine. You may need to stop for a short time. You should not stop using carvedilol suddenly. Stopping suddenly may cause chest pain or a heart attack. Follow your doctor's instructions about tapering your dose. If you are being treated for high blood pressure, keep using this medication even if you feel well. High blood pressure often has no symptoms. You may need to use blood pressure medication for the rest of your life. Carvedilol is only part of a complete treatment program that may also include diet, exercise, and weight control. Follow your doctor's instructions very closely. Store at room temperature away from moisture and heat. What happens if I miss a dose? Take the medicine as soon as you can, but skip the missed dose if it is almost time for your next dose. Do not take two doses at one time. What happens if I overdose? Seek emergency medical attention or call the Poison Help line at 1-623.722.4196. Overdose symptoms may include uneven heartbeats, shortness of breath, bluish-colored fingernails, dizziness, weakness, fainting, and seizure (convulsions). What should I avoid while taking carvedilol? Avoid driving or hazardous activity until you know how this medicine will affect you. Your reactions could be impaired. Avoid getting up too fast from a sitting or lying position, or you may feel dizzy. What are the possible side effects of carvedilol? Get emergency medical help if you have signs of an allergic reaction: hives; difficulty breathing; swelling of your face, lips, tongue, or throat.   Call your doctor at once if you have:  a light-headed feeling, like you might pass out;  slow or uneven heartbeats;  cold feeling or numbness in your fingers or toes;  chest pain, dry cough, wheezing, chest tightness;  heart problems --swelling, rapid weight gain, feeling short of breath; or  high blood sugar --increased thirst, increased urination, dry mouth, fruity breath odor. Common side effects may include:  dizziness;  slow heartbeats;  diarrhea;  weight gain;  dry eyes; or  problems wearing contact lenses. This is not a complete list of side effects and others may occur. Call your doctor for medical advice about side effects. You may report side effects to FDA at 1-781-FDA-8827. What other drugs will affect carvedilol? Sometimes it is not safe to use certain medications at the same time. Some drugs can affect your blood levels of other drugs you take, which may increase side effects or make the medications less effective. Other drugs may affect carvedilol, including prescription and over-the-counter medicines, vitamins, and herbal products. Tell your doctor about all your current medicines and any medicine you start or stop using. Where can I get more information? Your pharmacist can provide more information about carvedilol. Remember, keep this and all other medicines out of the reach of children, never share your medicines with others, and use this medication only for the indication prescribed. Every effort has been made to ensure that the information provided by [address] is accurate, up-to-date, and complete, but no guarantee is made to that effect. Drug information contained herein may be time sensitive. [facility] information has been compiled for use by healthcare practitioners and consumers in the [place] and therefore [facility] does not warrant that uses outside of the [place] are appropriate, unless specifically indicated otherwise. [facility] drug information does not endorse drugs, diagnose patients or recommend therapy.  [facility] drug information is an informational resource designed to assist licensed healthcare practitioners in caring for their patients and/or to serve consumers viewing this service as a supplement to, and not a substitute for, the expertise, skill, knowledge and judgment of healthcare practitioners. The absence of a warning for a given drug or drug combination in no way should be construed to indicate that the drug or drug combination is safe, effective or appropriate for any given patient. [facility] does not assume any responsibility for any aspect of healthcare administered with the aid of information [facility] provides. The information contained herein is not intended to cover all possible uses, directions, precautions, warnings, drug interactions, allergic reactions, or adverse effects. If you have questions about the drugs you are taking, check with your doctor, nurse or pharmacist.  Copyright 4110-8346 [address]. Version: 16.01. Revision date: 4/25/2019. Care instructions adapted under license by [facility] ([facility]). If you have questions about a medical condition or this instruction, always ask your healthcare professional. [place] [address] any warranty or liability for your use of this information. sacubitril and valsartan  Pronunciation:  han pineda and omer DEIDRE tan  Brand:  Entresto  What is the most important information I should know about sacubitril and valsartan? Do not use if you are pregnant, and tell your doctor right away if you become pregnant. If you have diabetes, do not use sacubitril and valsartan together with any medication that contains aliskiren (a blood pressure medicine). What is sacubitril and valsartan? Sacubitril and valsartan are blood pressure medicines. Valsartan is an angiotensin II receptor blocker (sometimes called an ARB). Sacubitril and valsartan is a combination medicine that is used in adults with chronic heart failure. This medicine helps lower the risk of needing to be hospitalized when symptoms get worse, and helps lower the risk of death from heart failure.   Sacubitril and valsartan is also used to treat heart failure in children who are at least 3year old. Sacubitril and valsartan is usually given together with other heart medications. Sacubitril and valsartan may also be used for purposes not listed in this medication guide. What should I discuss with my healthcare provider before taking sacubitril and valsartan? You should not use this medicine if you are allergic to sacubitril or valsartan (Diovan), or if you have ever had a severe allergic reaction to a blood pressure medication such as:  an ACE inhibitor--benazepril, captopril, enalapril, fosinopril, lisinopril, moexipril, perindopril, quinapril, ramipril, trandolapril (Lotensin, Vasotec, Prinivil, Accupril, Mavik, and others); or  an ARB--azilsartan, candesartan, eprosartan, irbesartan, losartan, olmesartan, telmisartan, valsartan (Atacand, Avapro, Benicar, Diovan, Edarbi, Micardis, Teveten, and others). You should not take sacubitril and valsartan within 36 hours before or after you have taken any ACE inhibitor medication. If you have diabetes, do not use sacubitril and valsartan together with any medication that contains aliskiren (a blood pressure medicine). You may also need to avoid taking sacubitril and valsartan with aliskiren if you have kidney disease. Tell your doctor if you have ever had:  liver disease;  hereditary angioedema; or  if you are on a low-salt-diet. Do not use if you are pregnant, and tell your doctor right away if you become pregnant. Sacubitril and valsartan can cause injury or death to the unborn baby if you take the medicine during your second or third trimester. You should not breastfeed while using this medicine. How should I take sacubitril and valsartan? Follow all directions on your prescription label and read all medication guides or instruction sheets. Your doctor may occasionally change your dose. Use the medicine exactly as directed. You may take this medicine with or without food.   Sacubitril and valsartan doses are based on weight in children. Your child's dose needs may change if the child gains or loses weight. If you cannot swallow a tablet whole, a pharmacist can make an oral suspension (liquid). Tell the doctor if the person taking this medicine has trouble swallowing the tablet. Shake the oral suspension (liquid) before you measure a dose. Use the dosing syringe provided, or use a medicine dose-measuring device (not a kitchen spoon). Your blood pressure will need to be checked often. Your kidney function may also need to be checked. Store at room temperature away from moisture and heat. Throw away any oral suspension not used within 15 days after it was mixed. Do not keep the oral suspension in a refrigerator. What happens if I miss a dose? Take the medicine as soon as you can, but skip the missed dose if it is almost time for your next dose. Do not take two doses at one time. What happens if I overdose? Seek emergency medical attention or call the Poison Help line at 1-813.261.3856. What should I avoid while taking sacubitril and valsartan? Do not use potassium supplements or salt substitutes, unless your doctor has told you to. Avoid getting up too fast from a sitting or lying position, or you may feel dizzy. What are the possible side effects of sacubitril and valsartan? Get emergency medical help if you have signs of an allergic reaction: hives; difficulty breathing; swelling of your face, lips, tongue, or throat. You may be more likely to have an allergic reaction if you are -American. Also call your doctor at once if you have:  a light-headed feeling, like you might pass out;  extreme tiredness;  high potassium --slow heart rate, weak pulse, muscle weakness, tingly feeling; or  kidney problems --little or no urination, rapid weight gain, painful or difficult urination, swelling in your hands, feet, or ankles.   Common side effects may include:  kidney problems;  high potassium;  dizziness, feeling light-headed; or  cough. This is not a complete list of side effects and others may occur. Call your doctor for medical advice about side effects. You may report side effects to FDA at 9-151-RPM-0880. What other drugs will affect sacubitril and valsartan? Tell your doctor about all your other medicines, especially:  aliskiren;  lithium;  any other heart or blood pressure medicines;  a diuretic or \"water pill\";  medicine or mineral supplements that contain potassium; or  NSAIDs (nonsteroidal anti-inflammatory drugs) --aspirin, ibuprofen (Advil, Motrin), naproxen (Aleve), celecoxib, diclofenac, indomethacin, meloxicam, and others. This list is not complete. Other drugs may affect sacubitril and valsartan, including prescription and over-the-counter medicines, vitamins, and herbal products. Not all possible drug interactions are listed here. Where can I get more information? Your pharmacist can provide more information about sacubitril and valsartan. Remember, keep this and all other medicines out of the reach of children, never share your medicines with others, and use this medication only for the indication prescribed. Every effort has been made to ensure that the information provided by [address] is accurate, up-to-date, and complete, but no guarantee is made to that effect. Drug information contained herein may be time sensitive. [facility] information has been compiled for use by healthcare practitioners and consumers in the [place] and therefore [employer] does not warrant that uses outside of the [place] are appropriate, unless specifically indicated otherwise. [facility] drug information does not endorse drugs, diagnose patients or recommend therapy.  [facility] drug information is an informational resource designed to assist licensed healthcare practitioners in caring for their patients and/or to serve consumers viewing this service as a supplement to, and not a substitute for, the expertise, skill, knowledge and judgment of healthcare practitioners. The absence of a warning for a given drug or drug combination in no way should be construed to indicate that the drug or drug combination is safe, effective or appropriate for any given patient. [facility] does not assume any responsibility for any aspect of healthcare administered with the aid of information [facility] provides. The information contained herein is not intended to cover all possible uses, directions, precautions, warnings, drug interactions, allergic reactions, or adverse effects. If you have questions about the drugs you are taking, check with your doctor, nurse or pharmacist.  Copyright 5955-6387 [address]. Version: 5.01. Revision date: 4/12/2021. Care instructions adapted under license by [employer]. If you have questions about a medical condition or this instruction, always ask your healthcare professional. [place] [address] any warranty or liability for your use of this information. Learning About Heart Failure  What is heart failure? Heart failure means that your heart muscle doesn't pump as much blood as your body needs. Failure doesn't mean that your heart has stopped. It means that your heart isn't pumping as well as it should. Because your heart cannot pump well, your body tries to make up for it. To do this:  Your body holds on to salt and water. This increases the amount of blood in your bloodstream.  Your heart beats faster. Your heart might get bigger. Your body has an amazing ability to make up for heart failure. It may do such a good job that you don't know you have a disease. But at some point, your heart and body will no longer be able to keep up. Then fluid starts to build up in your lungs and other parts of your body. This fluid buildup is called congestion. It's why some doctors call the disease congestive heart failure.   What can you expect when you have heart failure? Heart failure is a lifelong (chronic) disease. Treatment may be able to slow the disease and help you feel better. But heart failure tends to get worse over time. Despite this, there are many steps you can take to feel better and stay healthy longer. Early on, your symptoms may not be too bad. As heart failure gets worse, symptoms typically get worse, and you may need to limit your activities. Heart failure can also get worse suddenly. If this happens, you need emergency care. Then, after treatment, your symptoms may go back to being stable (which means they stay the same) for a long time. Heart failure can lead to other health problems, such as heart rhythm problems. Over time, your treatment options may change, especially as your symptoms get worse. You may want to think about what kind of care you want at the end of your life. What are the symptoms? Symptoms of heart failure start to happen when your heart can't pump enough blood to the rest of your body. In the early stages of heart failure, you may:  Feel tired easily. Be short of breath when you exert yourself. Feel like your heart is pounding or racing (palpitations). Feel weak or dizzy. As heart failure gets worse, fluid starts to build up in your lungs and other parts of your body. This may cause you to:  Feel short of breath even at rest.  Have swelling (edema), especially in your legs, ankles, and feet. Gain weight. This may happen over just a day or two, or more slowly. Cough or wheeze, especially when you lie down. Feel bloated or sick to your stomach. How is heart failure treated? Heart failure is treated with medicines and steps you take to make lifestyle changes and check your symptoms. Treatment can slow the disease and help you feel better and live longer. [employer] probably take several medicines. You'll take steps to care for yourself at home. [employer] watch for changes in your symptoms.  You may need to make lifestyle changes, such as limiting sodium, getting regular exercise, not smoking, and eating healthy foods. You might attend cardiac rehabilitation (rehab) to get education and support that help you make lifestyle changes and stay as healthy as possible. You may get a heart device. A pacemaker helps your heart pump blood. An ICD can stop abnormal heart rhythms. As heart failure gets worse, palliative care can help improve your quality of life. You can do advance care planning to decide what kind of care you want at the end of your life. How can you care for yourself? There are many steps you can take to feel better and live longer. They can help you stay active and enjoy life. Take your medicine the right way. Avoid medicines that can make your symptoms worse. Check your weight and symptoms every day. Know what to do if your symptoms get worse. Limit sodium. This helps keep fluid from building up. It may help you feel better. Be active. Exercise regularly, but don't exercise too hard. Be heart-healthy. Eat healthy foods, stay at a healthy weight, limit alcohol, and don't smoke. Stay as healthy as possible. Manage other health problems such as diabetes and high blood pressure. Avoid colds and flu, get help for depression and anxiety, and manage stress. If you think you may have a problem with alcohol or drug use, talk to your doctor. Ask your doctor if you need to limit the amount of fluids you drink. Follow-up care is a key part of your treatment and safety. Be sure to make and go to all appointments, and call your doctor if you are having problems. It's also a good idea to know your test results and keep a list of the medicines you take. Where can you learn more? Go to [email or web address] org and sign in to your [employer] account. Enter R849 in the [employer] box to learn more about \"Learning About Heart Failure. \"     If you do not have an account, please click on the \"Sign Up Now\" link. Current as of: January 10, 2022               Content Version: 13.4  © 1194-4885 [employer]. Care instructions adapted under license by [facility] ([facility]). If you have questions about a medical condition or this instruction, always ask your healthcare professional. [address] any warranty or liability for your use of this information.

## 2022-09-19 NOTE — DISCHARGE INSTR - COC
Continuity of Care Form    Patient Name: Erasmo Vogel   :  1940  MRN:  [id number]    Admit date:  2022  Discharge date:  ***    Code Status Order: Full Code   Advance Directives:     Admitting Physician:  Dolly Gonzalez MD  PCP: Rachel Mujica MD    Discharging Nurse:  [facility] Unit/Room#: 4320/4320-01  Discharging Unit Phone Number: ***    Emergency Contact:   Extended Emergency Contact Information  [facility] [address]  Phone: [phone]  Mobile Phone: [phone]  Relation: Child  Secondary Emergency Contact: [address] [facility] Phone: [phone]  Relation: Brother/Sister    Past Surgical History:  Past Surgical History:   Procedure Laterality Date    HERNIA REPAIR      umbilical     KNEE SURGERY Bilateral     TKR bilateral       Immunization History:   Immunization History   Administered Date(s) Administered    COVID-19, PFIZER PURPLE top, DILUTE for use, (age 15 y+), 30mcg/0.3mL 2021, 2021, 2021    Influenza, High Dose (Fluzone 65 yrs and older) 2019    Pneumococcal Conjugate 13-valent ([place]) 2014, 2015    Pneumococcal Polysaccharide (Vgsmhtlfr59) 2017    Td vaccine (adult) 2014       Active Problems:  Patient Active Problem List   Diagnosis Code    Morbid obesity due to excess calories ([facility] [address] E66.01    Pure hypercholesterolemia E78.00    Bilateral knee pain M25.561, M25.562    Pre-diabetes R73.03    Hyperlipidemia LDL goal <100 E78.5    Abnormal nuclear stress test R94.39    Abnormal result of other cardiovascular function study (CODE) R94.39    Right hip pain M25.551    Atrial fibrillation with RVR (HCC) I48.91    Acute pulmonary edema (HCC) E74.2    Acute systolic (congestive) heart failure (HCC) I50.21    SOB (shortness of breath) R06.02    Abnormal ECG R94.31    CHF NYHA class III, acute, diastolic (HCC) M17.96    Acute on chronic systolic congestive ***  Oxygen Therapy:  is not on home oxygen therapy. Ventilator:    - No ventilator support    Heart Failure Instructions for Daily Management  Patient was treated for acute systolic heart failure. she  will require the following:    Please weigh daily on the same scale and approximately the same time of day. Report weight gain of 3 pounds/day or 5 pounds/week to : Gregory Pickard [phone] and [facility] / Shahram Arellano [phone]. Please use hospital discharge weight as baseline reference. Please monitor for signs and symptoms of and report to MD:  Worsening Heart Failure: sudden weight gain, shortness of breath, lower extremity or general edema/swelling, abdominal bloating/swelling, inability to lie flat, intolerance to usual activity, or cough (especially at night). Report these finding even if no increase in weight. Dehydration:  having difficulty or a decrease in urination, dizziness, worsening fatigue, or new onset/worsening of generalized weakness. Please continue a LOW SODIUM diet and LIMIT fluid intake to 48 - 64 ounces ( 1.5 - 2 liters) per day. Call Gregory Pickard [phone] and [facility] / Shahram Arellano [phone] with any questions or concerns. Please continue heart failure education to patient and family/support system. See After Visit Summary for hospital follow up appointment details. Consider spiritual care referral for support and/or completion of advance directives . Consider: [place] [address] telehealth program if patient agreeable and able to participate and palliative care consult for ongoing goals of care, end of life, and/or chronic disease management discussions. Patient's primary cardiologist is Dr Hema Ignacio. Please draw BMP on 9/21 and fax results to [facility] so they can be reviewed with patient at follow up.        Rehab Therapies: ***  Weight Bearing Status/Restrictions: No weight bearing restrictions  Other Medical Equipment (for information only, NOT a DME order):  ***  Other Treatments: ***    Patient's personal belongings (please select all that are sent with patient):  Pants, shoes, undergarments, shirt, house coat    RN SIGNATURE:  Electronically signed by Jonathan Helm RN on 9/19/22 at 10:54 AM EDT    CASE MANAGEMENT/SOCIAL WORK SECTION    Inpatient Status Date: ***    Readmission Risk Assessment Score:  Readmission Risk              Risk of Unplanned Readmission:  14           Discharging to Facility/ Agency   Name:   Address:  Phone:  Fax:    Dialysis Facility (if applicable)   Name:  Address:  Dialysis Schedule:  Phone:  Fax:    / signature: {Esignature:[id number]}    PHYSICIAN SECTION    Prognosis: {Prognosis:6251299151}    Condition at Discharge: [address] [place] [address] Patient Condition:667104952}    Rehab Potential (if transferring to Rehab): {Prognosis:2701976149}    Recommended Labs or Other Treatments After Discharge: ***    Physician Certification: I certify the above information and transfer of Candace Herzog  is necessary for the continuing treatment of the diagnosis listed and that she requires {Admit to Appropriate Level of Care:94614} for {GREATER/LESS:475386398} 30 days.      Update Admission H&P: {CHP DME Changes in CBPOY:578154276}    PHYSICIAN SIGNATURE:  {Esignature:017740399}

## 2022-09-19 NOTE — CARE COORDINATION
Case Management Assessment            Discharge Note                    Date / Time of Note: 9/19/2022 11:38 AM                  Discharge Note Completed by: Marcello Vo RN    Patient Name: Catina Hernandez   YOB: 1940  Diagnosis: Acute pulmonary edema ([facility] [address] [J81.0]  NSTEMI (non-ST elevated myocardial infarction) ([facility] [address] [I21.4]  ST elevation myocardial infarction (STEMI), unspecified artery ([facility] [address] [I21.3]   Date / Time: 9/11/2022  8:45 AM    Current PCP: Rosie Christianson MD  Clinic patient: No    Hospitalization in the last 30 days: No    Advance Directives:  Code Status: Full Code  [place] DNR form completed and on chart: Not Indicated    Financial:  Payor: Cris Matias / Plan: Olesya Mane ESSENTIAL/PLUS / Product Type: *No Product type* /      Pharmacy:    [address]  Phone: [phone] Fax: [phone]      Assistance purchasing medications?: Potential Assistance Purchasing Medications: No  Assistance provided by Case Management: None at this time    Does patient want to participate in local refill/ meds to beds program?:      Meds To Beds General Rules:  1. Can ONLY be done Monday- Friday between 8:30am-5pm  2. Prescription(s) must be in pharmacy by 3pm to be filled same day  3. Copy of patient's insurance/ prescription drug card and patient face sheet must be sent along with the prescription(s)  4. Cost of Rx cannot be added to hospital bill. If financial assistance is needed, please contact unit  or ;  or  CANNOT provide pharmacy voucher for patients co-pays  5.  Patients can then  the prescription on their way out of the hospital at discharge, or pharmacy can deliver to the bedside if staff is available. (payment due at time of pick-up or delivery - cash, check, or card accepted)     Able to afford home medications/ co-pay costs: Yes    ADLS:  Current PT AM-PAC Score: 18 /24  Current OT AM-PAC Score: 20 /24      DISCHARGE Disposition: Home with Home Health Care: [facility]     LOC at discharge: Not Applicable  ROD Completed: Not Indicated    Notification completed in HENS/PAS?:  Not Applicable    IMM Completed:   Yes, Case management has presented and reviewed IMM letter #2 to the patient and/or family/ POA. Patient and/or family/POA verbalized understanding of their medicare rights and appeal process if needed. Patient and/or family/POA has signed, initialed and placed today's date (9/19/22) and time (1010) on IMM letter #2 on the the appropriate lines. Patient and/or family/POA, copy of letter offered and they are aware that this original copy of IMM letter #2 is available prior to discharge from the paper chart on the unit. Electronic documentation has been entered into epic for IMM letter #2 and original paper copy has been added to the paper chart at the nurses station.      Transportation:  Transportation PLAN for discharge: family   Mode of Transport: Private Car  Reason for medical transport: Not Applicable  Name of [address] [place] [address],[address]: Not Applicable  Time of Transport: tbd    Transport form completed: Not Indicated    Home Care:  [address] ordered at discharge: Yes  [address]: [facility]  Phone: [phone]  Fax: [phone]  Orders faxed: Yes    Durable Medical Equipment:  DME Provider: n/a  Equipment obtained during hospitalization: n/a    Home Oxygen and Respiratory Equipment:  Oxygen needed at discharge?: Not [address]: Not Applicable  Portable tank available for discharge?: Not Indicated    Dialysis:  Dialysis patient: No    Dialysis Center:  Not Applicable    Hospice Services:  Location: Not Applicable  Agency: Not Applicable    Consents signed: Not Indicated    Referrals made at [facility] for outpatient continued care:  Not Applicable    Additional CM Notes: Patient is discharging home with family. Patient is agreeable to home care with [facility]. Patient's family to transport home. The Plan for Transition of Care is related to the following treatment goals of Acute pulmonary edema (HCC) [J81.0]  NSTEMI (non-ST elevated myocardial infarction) ([facility] [address] [I21.4]  ST elevation myocardial infarction (STEMI), unspecified artery ([facility] [address] [I21.3]    The Patient and/or patient representative Judy Noel and her family were provided with a choice of provider and agrees with the discharge plan Yes    Freedom of choice list was provided with basic dialogue that supports the patient's individualized plan of care/goals and shares the quality data associated with the providers.  Yes    Care Transitions patient: No    Julianna Crain RN  [facility]  Case Management Department  Ph: [phone]  Fax: [phone]

## 2022-09-19 NOTE — PROGRESS NOTES
Discharge note: Patient has been seen by doctor. Discharge order obtained, and discharge instructions reviewed. Patient educated, using the teach back method, about follow up instructions and discharge instructions. A completed copy of the AVS instructions given to patient and all questions answered. IV catheters removed without complaints, catheter intact, site WNL. Son is present to transport patient home. Aware medications needed to be picked up from out patient pharmacy. Verified understanding. Son states he will go down when medications are available from [facility] to [facility]. Patient is assisted with getting dressed and packing belongings. When medications are available. Patient will be taken via wheelchair to personal vehicle.

## 2022-09-19 NOTE — DISCHARGE SUMMARY
Patient ID:  John Ruiz  [id number]  61 y.o.  1940    Admit date: 9/11/2022    Discharge date and time: 9/19/2022    Admitting Physician: Skip Pina MD       Admission Diagnoses: Acute pulmonary edema ([facility] [address] [J81.0]  NSTEMI (non-ST elevated myocardial infarction) ([facility] [address] [I21.4]  ST elevation myocardial infarction (STEMI), unspecified artery ([facility] [address] [I21.3]    Discharge Diagnoses: SAME    Admission Condition: fair    Discharged Condition: good    Hospital Course:     Assessment     SOB (Pt arrives on CPAP due to severe SOB ) multifactorial   Decompensated HFrEF   On lasix IVP   sCr wnl   Strict I &0 wt daily     Acute Bronchospasm   pulmonary edema vs asthma  Dr Michael Carey following     STEMI    [facility] Takotsubo cardiomyopathy diagnosed 9/11/2022. Acute coronary syndrome with Takotsubo. We did not find coronary lesions or need intervention. \LV function is abnormal showing evidence for Takotsubo and low ejection fraction of 15%. Recommendations: Continue support with diuretics. Nitroglycerin as noted. She will be going to the floor on BiPAP. Beta-blocker. She normally takes Eliquis we will resume that. Also losartan 100 mg. Torsemide 20 mg. Metoprolol succinate 200 mg/day. Limited TTE 9/16/2022   Left ventricular cavity size is normal with mild LVH  Indeterminate diastolic function. [place] and distal septal hypokinetic. No evidence of left ventricular mass or thrombus  Ejection fraction is estimated to be 30-35 %. Overall left ventricular systolic function is mildly depressed . The aortic valve leaflets are not well visualized. The aortic valve is mildly thickened/calcified but opens well with normal gradients. Unable to estimate pulmonary artery pressure secondary to incomplete TR jet envelope      9/13/2022 TTE Left ventricular cavity size is normal. There is mild concentric left ventricular hypertrophy.  Overall left ventricular systolic function appears severely reduced with an ejection fraction of 25-30%. There is severe hypokinesis / akinesis of the apex and mid to distal  anteroseptal and anterior walls. Diastolic filling parameters suggest grade III diastolic dysfunction with increased filling pressures. No  evidence of left ventricular mass or thrombus noted. Tricuspid valve leaflets are structurally normal. Moderate tricuspid regurgitation. No evidence of tricuspid stenosis. Afib  Eliquis   [id number] Score for Atrial Fibrillation Stroke Risk 7  No bleeding noted no NSAIDS only tylenol as needed     HTN  Wnl    HLD   sub optimal     Morbid obesity   Body mass index is 51.85 kg/m².   Diet activity discussed     Hx PUD    Hgb stable     Lymphedema  wrap legs with ace     Plan   in NSR VSS feels much better   Now on RA SV02 96% did not use bipap overnight    He b/p has been on low side and we adjusted her meds   Pulmonary has signed off and will fu as out pt    Discuss life vest and she isn't interested   Cont GDMT  / TTE  in 3 months / consider AICD    Continue cardiac meds Lipitor lasix eliquis   Entresto 25/26mg 0.5 tab  BID hold for b/p <816 systolic  / follow CMP   Coreg 3.125 mg BID with parameters   add SGLT2/farxiga 5mg daily  Add hydralazine  aldactone in future as b/p and renals permit   plan home today with her family /  assistance per   Fu  in 1-2 weeks     EDWNI BRAVO FNP CVNP     Consults:      Discharge Exam:  /68   Pulse 79   Temp 97.4 °F (36.3 °C) (Oral)   Resp 18   Ht 5' 6\" (1.676 m)   Wt (!) 321 lb 4 oz (145.7 kg)   SpO2 96%   BMI 51.85 kg/m²     General Appearance:    Alert, cooperative, no distress, appears stated age   Head:    Normocephalic, without obvious abnormality, atraumatic   Eyes:    PERRL, conjunctiva/corneas clear, EOM's intact        Ears:    deferred   Nose:   Nares normal, septum midline, mucosa normal, no drainage    or sinus tenderness   Throat:   Lips, mucosa, and tongue normal; teeth and gums normal   Neck:   Supple, symmetrical, trachea midline, no adenopathy;        thyroid:  No enlargement/tenderness/nodules; no carotid    bruit or JVD   Back:     Symmetric, no curvature, ROM normal, no CVA tenderness   Lungs:     Clear to auscultation bilaterally, respirations unlabored   Chest wall:    No tenderness or deformity   Heart:    Regular rate and rhythm, S1 and S2 normal, no murmur, rub   or gallop   Abdomen:     Soft, non-tender, bowel sounds active all four quadrants,     no masses, no organomegaly   Genitalia:    deferred   Rectal:    deferred    Extremities:   Extremities normal, atraumatic, no cyanosis or edema   Pulses:   2+ and symmetric all extremities   Skin:   Skin color, texture, turgor normal, no rashes or lesions   Lymph nodes:   Cervical, supraclavicular, and axillary nodes normal   Neurologic:   CNII-XII intact.  Normal strength, sensation and reflexes       throughout     Disposition: home    Patient Instructions:   Current Discharge Medication List        START taking these medications    Details   atorvastatin (LIPITOR) 80 MG tablet Take 1 tablet by mouth once for 1 dose  Qty: 30 tablet, Refills: 3      carvedilol (COREG) 3.125 MG tablet Take 1 tablet by mouth 2 times daily (with meals)  Qty: 60 tablet, Refills: 3      furosemide (LASIX) 40 MG tablet Take 1 tablet by mouth daily  Qty: 60 tablet, Refills: 3      sacubitril-valsartan (ENTRESTO) 24-26 MG per tablet Take 0.5 tablets by mouth 2 times daily  Qty: 60 tablet, Refills: 3      dapagliflozin (FARXIGA) 5 MG tablet Take 1 tablet by mouth every morning  Qty: 90 tablet, Refills: 3           CONTINUE these medications which have NOT CHANGED    Details   acetaminophen (TYLENOL) 500 MG tablet Take 500 mg by mouth every 6 hours as needed for Pain      vitamin D3 (CHOLECALCIFEROL) 10 MCG (400 UNIT) TABS tablet Take 1 tablet by mouth daily  Qty: 90 tablet, Refills: 3    Associated Diagnoses: Vitamin D deficiency      ELIQUIS 5 MG TABS tablet TAKE 1 TABLET BY MOUTH TWICE A DAY  Qty: 180 tablet, Refills: 3      blood glucose monitor strips Test 3 times a day & as needed for symptoms of irregular blood glucose. Qty: 100 strip, Refills: 1    Comments: Brand per patient preference. May round up to next available package size. STOP taking these medications       benzonatate (TESSALON) 100 MG capsule Comments:   Reason for Stopping:         acarbose (PRECOSE) 25 mg tablet Comments:   Reason for Stopping:         metoprolol succinate (TOPROL XL) 200 MG extended release tablet Comments:   Reason for Stopping:         losartan (COZAAR) 100 MG tablet Comments:   Reason for Stopping:         torsemide (DEMADEX) 20 MG tablet Comments:   Reason for Stopping:         dextromethorphan-guaiFENesin (ROBITUSSIN-DM)  MG/5ML syrup Comments:   Reason for Stopping:                Activity: as tolerated  Diet: cardiac diet    Follow-up in office 1-2 weeks    [address] [place] [address]      Time spent on discharge of patient: - minutes, including plan of care, patient education, and care coordination. Conventional cardiology note  Patient is awake and alert today and actually feels good. Her hemodynamics are stable and her rhythm is stable. Going home on therapy as listed above. She is to return to the office in about a week for follow-up. Her current rate is 76 sinus rhythm.   Chevy Emerson MD, [facility]

## 2022-09-21 ENCOUNTER — TELEPHONE (OUTPATIENT)
Dept: FAMILY MEDICINE CLINIC | Age: 82
End: 2022-09-21

## 2022-09-21 NOTE — PROGRESS NOTES
CC/HPI:    78 y.o. patient of Guero Clayton Soldzahida and Juan F Padilla here for HFrEF, HTN, HLD, and pAF. Recently diagnosed with Takotsubo CMP. EF 15%. No CAD seen on cath. She c/o hacky, productive cough with clear/yellow sputum. Notes mild sinus congestion and drainage. Weight is down. She has orthostatic LH/dizziness but denies syncope or falls. She has chronic LE edema and uses ACE wraps. Edema slightly worse the last 24-48 hours. She denies cp, sob, abdominal bloating or early satiety. No n/v/d or GI/ bleeding. Past Medical History:   Diagnosis Date    Acute systolic (congestive) heart failure (HCC)     Edema     Hearing loss     Hyperlipidemia     Hypertension     Morbid obesity due to excess calories ([facility] [address] 12/29/2015    Pre-diabetes     PUD (peptic ulcer disease)     \"years ago\",      Past Surgical History:   Procedure Laterality Date    HERNIA REPAIR      umbilical     KNEE SURGERY Bilateral     TKR bilateral     Family History   Problem Relation Age of Onset    Other Mother         hernia    High Blood Pressure Father      Social History     Tobacco Use    Smoking status: Never    Smokeless tobacco: Never   Substance Use Topics    Alcohol use: No    Drug use: No     Allergies:Aspirin and Dye [iodides]    Review of Systems  General: No changes in  fatigue, or night sweats. HEENT: No blurry or decreased vision. No changes in hearing, nasal discharge or sore throat. Cardiovascular:  See HPI. Respiratory:+ cough   Gastrointestinal:  No abdominal pain, hematochezia, melana, constipation, diarrhea, or history of GI ulcers. Genito-Urinary: No dysuria or hematuria. No urgency or polyuria. Musculoskeletal:  No complaints of joint pain, joint swelling or muscular weakness/soreness. Neurological:  No  headaches, numbness/tingling, speech problems or weakness. No history of a stroke or TIA. Psychological:  No anxiety or depression.   Hematological and Lymphatic: No abnormal bleeding or bruising, blood clots, components found for: CHLPL  Lab Results   Component Value Date    TRIG 56 2022    TRIG 71 2022    TRIG 57 10/01/2020     Lab Results   Component Value Date    HDL 68 (H) 2022    HDL 58 2022    HDL 60 10/01/2020     Lab Results   Component Value Date    LDLCALC 166 (H) 2022    LDLCALC 139 (H) 2022    LDLCALC 107 (H) 10/01/2020     Lab Results   Component Value Date    LABVLDL 11 2022    LABVLDL 14 2022    LABVLDL 11 10/01/2020     TSH:  Lab Results   Component Value Date    TSH 1.29 2022       IMAGIN2022 Coronary angiogram  Angiographic Findings:  Left Main: Normal  Left Anterior Descending: Normal  Circumflex: Mild plaque. No significant stenosis. Right Coronary: Dominant normal  Left Ventriculogram: Consistent with Takotsubo. Ejection fraction 15%. Distal anterior wall apex is akinetic was the base is contractile. Right ileofemoral: Normal    2022 Echo  Limited echo. Definity contrast administered   . Left ventricular cavity size is normal with mild LVH   . Indeterminate diastolic function. [place] and distal septal hypokinetic. No evidence of left ventricular mass or thrombus   . Ejection fraction is estimated to be 30-35 %. Overall left ventricular systolic function is mildly depressed . Eloisa Gip The aortic valve leaflets are not well visualized. The aortic valve is mildly thickened/calcified but opens well with normal   gradients. Unable to estimate pulmonary artery pressure secondary to incomplete TR jet   envelope. 2019 Coronary angiogram  1. Normal epicardial coronary arteries. 2.  LV ejection fraction 50% without regional wall motion abnormality. 3.  LVEDP 20 mmHg. 4.  No aortic valve gradient and no wall motion abnormality on left  Ventriculography.     Medications:   Current Outpatient Medications   Medication Sig Dispense Refill    carvedilol (COREG) 3.125 MG tablet Take 1 tablet by mouth 2 times daily (with meals) 60 tablet 3    furosemide (LASIX) 40 MG tablet Take 1 tablet by mouth daily 60 tablet 3    sacubitril-valsartan (ENTRESTO) 24-26 MG per tablet Take 0.5 tablets by mouth 2 times daily 60 tablet 3    dapagliflozin (FARXIGA) 5 MG tablet Take 1 tablet by mouth every morning 90 tablet 3    atorvastatin (LIPITOR) 80 MG tablet Take 1 tablet by mouth daily 90 tablet 3    acetaminophen (TYLENOL) 500 MG tablet Take 500 mg by mouth every 6 hours as needed for Pain      vitamin D3 (CHOLECALCIFEROL) 10 MCG (400 UNIT) TABS tablet Take 1 tablet by mouth daily 90 tablet 3    ELIQUIS 5 MG TABS tablet TAKE 1 TABLET BY MOUTH TWICE A  tablet 3    blood glucose monitor strips Test 3 times a day & as needed for symptoms of irregular blood glucose. 100 strip 1     No current facility-administered medications for this visit. Assessment:   1. Cough    2. Chronic diastolic CHF (congestive heart failure) ([address]    3. Essential hypertension    4. Mixed hyperlipidemia    5.  Persistent atrial fibrillation (HCC)          Plan:    HFrEF: acute    C/o cough so will get CXR    Lasix 40 mg po daily    Low salt diet Daily weights   Coreg 3.125 mg po bid    Entresto 24/26 0.5 tablet bid    Farxiga 5 mg po daily    Consider starting spironoacltone 25 mg po daily   BMP in 1 week   HTN   /62   Entresto   Coreg   HLD      Lipitor   A fib   HR 83   Coreg   Eliquis     Follow up with Dr Conrado Contreras in 2 weeks

## 2022-09-21 NOTE — TELEPHONE ENCOUNTER
Katrin 45 Transitions Initial Follow Up Call    Outreach made within 2 business days of discharge: Yes    Patient: Nona Kendall Patient : [id number]   MRN: [id number]  Reason for Admission: There are no discharge diagnoses documented for the most recent discharge. Discharge Date: 22       Spoke with: Patient    Discharge department/facility: [facility]    TCM Interactive Patient Contact:  Was patient able to fill all prescriptions: Yes  Was patient instructed to bring all medications to the follow-up visit: Yes  Is patient taking all medications as directed in the discharge summary?  Yes  Does patient understand their discharge instructions: Yes  Does patient have questions or concerns that need addressed prior to 7-14 day follow up office visit: no    Patient will call back to schedule    Follow Up  Future Appointments   Date Time Provider Carlos Enrique Diaz   2022  9:30 AM LAWRENCE Robison - CARLOTA Jeong [facility]       [place], [place]

## 2022-09-22 ENCOUNTER — OFFICE VISIT (OUTPATIENT)
Dept: CARDIOLOGY CLINIC | Age: 82
End: 2022-09-22
Payer: MEDICARE

## 2022-09-22 ENCOUNTER — HOSPITAL ENCOUNTER (OUTPATIENT)
Dept: GENERAL RADIOLOGY | Age: 82
Discharge: HOME | End: 2022-09-22
Payer: MEDICARE

## 2022-09-22 VITALS
OXYGEN SATURATION: 98 % | BODY MASS INDEX: 47.09 KG/M2 | HEART RATE: 83 BPM | SYSTOLIC BLOOD PRESSURE: 108 MMHG | WEIGHT: 293 LBS | HEIGHT: 66 IN | DIASTOLIC BLOOD PRESSURE: 62 MMHG

## 2022-09-22 DIAGNOSIS — I50.31: ICD-10-CM

## 2022-09-22 DIAGNOSIS — R05.9 COUGH: Primary | ICD-10-CM

## 2022-09-22 DIAGNOSIS — I50.32 CHRONIC DIASTOLIC CHF (CONGESTIVE HEART FAILURE) (HCC): ICD-10-CM

## 2022-09-22 DIAGNOSIS — I48.19 PERSISTENT ATRIAL FIBRILLATION (HCC): ICD-10-CM

## 2022-09-22 DIAGNOSIS — E78.2 MIXED HYPERLIPIDEMIA: ICD-10-CM

## 2022-09-22 DIAGNOSIS — I10 ESSENTIAL HYPERTENSION: ICD-10-CM

## 2022-09-22 DIAGNOSIS — R05.9 COUGH: ICD-10-CM

## 2022-09-22 LAB
ANION GAP SERPL CALCULATED.3IONS-SCNC: 14 MMOL/L (ref 3–16)
BUN BLDV-MCNC: 22 MG/DL (ref 7–20)
CALCIUM SERPL-MCNC: 9.1 MG/DL (ref 8.3–10.6)
CHLORIDE BLD-SCNC: 97 MMOL/L (ref 99–110)
CO2: 29 MMOL/L (ref 21–32)
CREAT SERPL-MCNC: 1.1 MG/DL (ref 0.6–1.2)
GFR AFRICAN AMERICAN: 57
GFR NON-AFRICAN AMERICAN: 47
GLUCOSE BLD-MCNC: 132 MG/DL (ref 70–99)
POTASSIUM SERPL-SCNC: 4 MMOL/L (ref 3.5–5.1)
SODIUM BLD-SCNC: 140 MMOL/L (ref 136–145)

## 2022-09-22 PROCEDURE — 1123F ACP DISCUSS/DSCN MKR DOCD: CPT | Performed by: NURSE PRACTITIONER

## 2022-09-22 PROCEDURE — 99214 OFFICE O/P EST MOD 30 MIN: CPT | Performed by: NURSE PRACTITIONER

## 2022-09-22 PROCEDURE — 71046 X-RAY EXAM CHEST 2 VIEWS: CPT

## 2022-09-26 ENCOUNTER — TELEPHONE (OUTPATIENT)
Dept: CARDIOLOGY CLINIC | Age: 82
End: 2022-09-26

## 2022-09-26 NOTE — TELEPHONE ENCOUNTER
Pt states Dr. Elodia Gomez office number showed up on her caller ID this morning. She called to see who might have called her. She had labs done on 9/22/22.

## 2022-09-26 NOTE — H&P
[facility]    Cardiology Consult/H&P  Consulting Cardiologist Giselle Matta MD , [facility]  Referring Provider:  Kalia Gonzalez MD    9/11/2022, 9:44 AM          Chief Complaint   Patient presents with    Shortness of Breath       Pt arrives on CPAP due to severe SOB       Primary Cardiologist: Anna Marie Messina  Asked by No admitting provider for patient encounter./Ekta Betancourt MD to evaluate his patient     Reason for consult STEMI     History of Present Illness:   Mavis Malone is a 78 y.o. female brought in by [facility] today with difficulty breathing. This started yesterday. 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 This caused a significant anxiety and panic on her part and the police were called and the 's office was called they brought out sniffing dogs and the patient apparently was going up and down the stairs trying to find this client. She develops pain at that time and started having shortness of breath. The pain went away and his shortness of breath persisted through the night and got worse to this morning where she could not continue. .  She [employer] was called and she was in significant respiratory distress. Was placed on BiPAP in the ED and EKG raise a possibility of an anterior septal ST segment elevation MI. She was brought to the Cath Lab emergently. To Cath Lab for possible acute intervention. She has a known history of paroxysmal atrial fibrillation. She did have a heart cath in 2019 with clear coronary arteries and ejection fraction in January 2021 was 55%. Paroxysmal atrial fibrillation and has in the past experienced electrical cardioversion. Recent EKGs have shown atrial fibrillation. Severe hypertension  Trivial coronary lesions based on cath in 2019  Hyperlipidemia     Echo 2017: normal     Echo 12/2019: nl LV, EF 35-40%, mid to apical HK, indeterminate diastolic, mod TR, mild RV dysfunction, RVSP 49 (8), mod BLAKE.       [facility] 12/30/19: normal coronaries, EF 50%, LVEDP 20     Past Medical History:   has a past medical history of Acute systolic (congestive) heart failure ([address] Edema, Hearing loss, Hyperlipidemia, Hypertension, Morbid obesity due to excess calories ([address] Pre-diabetes, and PUD (peptic ulcer disease). Surgical History:   has a past surgical history that includes hernia repair and knee surgery (Bilateral). Social History:   reports that she has never smoked. She has never used smokeless tobacco. She reports that she does not drink alcohol and does not use drugs. Family History:  family history includes High Blood Pressure in her father; Other in her mother. Home Medications:  Home Medications           Prior to Admission medications    Medication Sig Start Date End Date Taking? Authorizing Provider   acarbose (PRECOSE) 25 mg tablet TAKE 1 TABLET BY MOUTH 3 TIMES DAILY WITH MEALS. 6/6/22     Brandie Breaux MD   vitamin D3 (CHOLECALCIFEROL) 10 MCG (400 UNIT) TABS tablet Take 1 tablet by mouth daily 3/2/22     Brandie Breaux MD   metoprolol succinate (TOPROL XL) 200 MG extended release tablet TAKE 1 TABLET BY MOUTH EVERY DAY 3/2/22     Brandie Breaux MD   losartan (COZAAR) 100 MG tablet TAKE 1 TABLET BY MOUTH EVERY DAY 3/2/22     Brandie Breaux MD   torsemide (DEMADEX) 20 MG tablet TAKE 1 TABLET BY MOUTH THREE TIMES A DAY 3/2/22     Brandie Breaux MD   ELIQUIS 5 MG TABS tablet TAKE 1 TABLET BY MOUTH TWICE A DAY 9/27/21     LAWRENCE Urrutia - CNP   dextromethorphan-guaiFENesin (ROBITUSSIN-DM)  MG/5ML syrup Take 10 mLs by mouth every 4 hours as needed for Cough        Historical Provider, MD   blood glucose monitor strips Test 3 times a day & as needed for symptoms of irregular blood glucose.  12/31/19     Ryley Easley MD            Current Medications:  Current Facility-Administered Medications             Current Facility-Administered Medications   Medication Dose Route Frequency Provider Last Rate Last Admin    nitroGLYCERIN (NITROSTAT) 0.4 MG SL tablet                nitroGLYCERIN 50 mg in dextrose 5% 250 mL infusion  5-200 mcg/min IntraVENous Continuous Gladis Woodson MD        atorvastatin (LIPITOR) tablet 80 mg  80 mg Oral Once Gladis Woodson MD                 Current Outpatient Medications   Medication Sig Dispense Refill    acarbose (PRECOSE) 25 mg tablet TAKE 1 TABLET BY MOUTH 3 TIMES DAILY WITH MEALS. 90 tablet 1    vitamin D3 (CHOLECALCIFEROL) 10 MCG (400 UNIT) TABS tablet Take 1 tablet by mouth daily 90 tablet 3    metoprolol succinate (TOPROL XL) 200 MG extended release tablet TAKE 1 TABLET BY MOUTH EVERY DAY 90 tablet 3    losartan (COZAAR) 100 MG tablet TAKE 1 TABLET BY MOUTH EVERY DAY 90 tablet 3    torsemide (DEMADEX) 20 MG tablet TAKE 1 TABLET BY MOUTH THREE TIMES A DAY 90 tablet 1    ELIQUIS 5 MG TABS tablet TAKE 1 TABLET BY MOUTH TWICE A  tablet 3    dextromethorphan-guaiFENesin (ROBITUSSIN-DM)  MG/5ML syrup Take 10 mLs by mouth every 4 hours as needed for Cough         blood glucose monitor strips Test 3 times a day & as needed for symptoms of irregular blood glucose. 100 strip 1            Allergies:  Aspirin and Dye [iodides]      Review of Systems:   Constitutional: there has been no unanticipated weight loss. Eyes: No visual changes or diplopia. No scleral icterus. ENT: No Headaches, hearing loss or vertigo. No mouth sores or sore throat. Cardiovascular: Reviewed in HPI   Pulmonary:  no cough or sputum production. Gastrointestinal: No abdominal pain, appetite loss, blood in stools. No change in bowel or bladder habits. Genitourinary: No dysuria, trouble voiding, or hematuria. Musculoskeletal:  No gait disturbance, weakness or joint complaints. Integumentary: No rash or pruritis. Endocrine: No malaise, fatigue or temperature intolerance.  Hematologic/Lymphatic: No abnormal bruising or bleeding, blood clots or swollen lymph nodes. Allergic/Immunologic: No nasal congestion or hives. All other ROS are reviewed and are unremarkable. Physical Examination:    Vitals          Vitals:     09/11/22 0918 09/11/22 0919 09/11/22 0920 09/11/22 0921   BP: (!) 170/111   (!) 187/105     Pulse: (!) 120 (!) 115 (!) 118 (!) 113   Resp: (!) 47 24 (!) 36 23   SpO2: (!) 73%         Weight:                   EXAMl and General Appearance:  Healthy. And alert . HEENT: eyes and ears intact. No nasal masses  THYROID: not enlarged  LUNGS:  Clear to auscultation and percussion  HEART and VASCULAR:  The apical impulses not displaced  Heart tones are crisp and normal  Cervical veins are not engorged  The carotid upstroke is normal in amplitude and contour without delay or bruit  Peripheral pulses are symmetrical and full  There is no clubbing, cyanosis of the extremities. No peripheral edema  Femoral Arteries: 2+ and equal  Pedal Pulses:2+ and equal   ABDOMEN[de-identified]  No masses or tenderness  Liver/Spleen: No Abnormalities Noted  NEUROLOGICAL:  . Moves all extremities to command. Cranial nerves 2-12 are in tact.   PSYCHIATRIC: alert and lucid  and oriented and appropriate  SKIN: No lesions or rashes  LYMPH NODES: none enlarged                 CBC with Differential:          Lab Results   Component Value Date/Time     WBC 7.7 09/11/2022 09:04 AM     RBC 4.92 09/11/2022 09:04 AM     HGB 11.8 09/11/2022 09:04 AM     HCT 38.1 09/11/2022 09:04 AM      09/11/2022 09:04 AM     MCV 77.4 09/11/2022 09:04 AM     MCH 23.9 09/11/2022 09:04 AM     MCHC 30.9 09/11/2022 09:04 AM     RDW 18.2 09/11/2022 09:04 AM     LYMPHOPCT 36.9 03/02/2022 11:20 AM     MONOPCT 7.7 03/02/2022 11:20 AM     BASOPCT 0.8 03/02/2022 11:20 AM     MONOSABS 0.4 03/02/2022 11:20 AM     LYMPHSABS 2.1 03/02/2022 11:20 AM     EOSABS 0.1 03/02/2022 11:20 AM     BASOSABS 0.0 03/02/2022 11:20 AM      BMP:          Lab Results   Component Value Date/Time      09/11/2022 09:04 AM     K 5.0 09/11/2022 09:04 AM      09/11/2022 09:04 AM     CO2 21 09/11/2022 09:04 AM     BUN 15 09/11/2022 09:04 AM     LABALBU 4.1 09/11/2022 09:04 AM     CREATININE 0.9 09/11/2022 09:04 AM     CALCIUM 9.3 09/11/2022 09:04 AM     GFRAA >60 09/11/2022 09:04 AM     LABGLOM 60 09/11/2022 09:04 AM      Uric Acid:  No components found for: URIC  PT/INR:          Lab Results   Component Value Date/Time     PROTIME 15.3 09/11/2022 09:04 AM     INR 1.22 09/11/2022 09:04 AM      Last 3 Troponin:          Lab Results   Component Value Date/Time     TROPONINI 0.22 09/11/2022 09:04 AM     TROPONINI <0.01 09/30/2020 10:06 AM     TROPONINI 0.02 09/30/2020 01:20 AM      FLP:          Lab Results   Component Value Date/Time     TRIG 71 03/02/2022 11:20 AM     HDL 58 03/02/2022 11:20 AM     LDLCALC 139 03/02/2022 11:20 AM     LABVLDL 14 03/02/2022 11:20 AM         EKG: 3/24/2022. Sinus  Rhythm  -With rate variation   cv = 10. WITHIN NORMAL LIMITS  echocardiogramSummary 2/2/21   Normal left ventricle size, wall thickness, and systolic function with an   estimated ejection fraction of 55-60%. No regional wall motion abnormalities   are seen. Diastolic filling parameters suggests grade II diastolic   dysfunction. Increased LVEDP and LAP. Mild mitral and tricuspid regurgitation is present.      Assessment/ Plan           Patient Active Problem List     Diagnosis Date Noted    Abnormal result of other cardiovascular function study (CODE)      Acute on chronic systolic congestive heart failure (HCC)      Essential hypertension      Mixed hyperlipidemia      PAF (paroxysmal atrial fibrillation) (HCC)      CHF NYHA class III, acute, diastolic ([address] 44/03/9908    SOB (shortness of breath)      Abnormal ECG      Acute pulmonary edema (HCC)      Acute systolic (congestive) heart failure (HCC)      Atrial fibrillation with RVR ([facility] 12/23/2019    Right hip pain 11/20/2017    Abnormal nuclear stress test 10/06/2017    Hyperlipidemia LDL goal <100 02/10/2016    Morbid obesity due to excess calories ([facility] 12/29/2015    Pure hypercholesterolemia 12/29/2015    Bilateral knee pain 12/29/2015    Pre-diabetes 12/29/2015      Patient with acute severe shortness of breath and recent chest discomfort and EKG abnormalities possibly STEMI. To the Cath Lab for coronary evaluation and potential intervention. Thanks for allowing me the opportunity  to participate in the evaluation and care of your patients.  Please call if we can assist further [phone]     This chart was likely completed using voice recognition technology and may contain unintended grammatical , phraseology,and/or punctuation errors  Sarah Pereira MD , [facility]  9/11/20229:44 AM

## 2022-09-27 ENCOUNTER — TELEPHONE (OUTPATIENT)
Dept: FAMILY MEDICINE CLINIC | Age: 82
End: 2022-09-27

## 2022-09-27 NOTE — TELEPHONE ENCOUNTER
Per Dr. Uyen Mccloud:    Check with pt needs to be taking either metoprolol or carvedilol but not both.

## 2022-09-30 ENCOUNTER — TELEPHONE (OUTPATIENT)
Dept: FAMILY MEDICINE CLINIC | Age: 82
End: 2022-09-30

## 2022-09-30 DIAGNOSIS — M25.562 CHRONIC PAIN OF BOTH KNEES: ICD-10-CM

## 2022-09-30 DIAGNOSIS — G89.29 CHRONIC PAIN OF BOTH KNEES: ICD-10-CM

## 2022-09-30 DIAGNOSIS — I50.31: Primary | ICD-10-CM

## 2022-09-30 DIAGNOSIS — G89.29 CHRONIC BILATERAL LOW BACK PAIN WITHOUT SCIATICA: ICD-10-CM

## 2022-09-30 DIAGNOSIS — M25.561 CHRONIC PAIN OF BOTH KNEES: ICD-10-CM

## 2022-09-30 DIAGNOSIS — I48.0 PAF (PAROXYSMAL ATRIAL FIBRILLATION) (HCC): ICD-10-CM

## 2022-09-30 DIAGNOSIS — E66.01 MORBID OBESITY DUE TO EXCESS CALORIES (HCC): ICD-10-CM

## 2022-09-30 DIAGNOSIS — M54.50 CHRONIC BILATERAL LOW BACK PAIN WITHOUT SCIATICA: ICD-10-CM

## 2022-09-30 NOTE — TELEPHONE ENCOUNTER
Buddy Solis called on behalf of Patient requesting consent for a Bariatric walker with seat.     Seeking verbal consent    [address] [place] [address] Rd [phone]

## 2022-10-14 ENCOUNTER — TELEPHONE (OUTPATIENT)
Dept: FAMILY MEDICINE CLINIC | Age: 82
End: 2022-10-14

## 2022-10-14 NOTE — TELEPHONE ENCOUNTER
[facility] request a physician written order. Per Dr. Spivey Dheeraj: Pt needs a apt to fill form. Lm to call back to schedule a apt.

## 2022-10-18 ENCOUNTER — TELEPHONE (OUTPATIENT)
Dept: FAMILY MEDICINE CLINIC | Age: 82
End: 2022-10-18

## 2022-10-21 ENCOUNTER — TELEPHONE (OUTPATIENT)
Dept: FAMILY MEDICINE CLINIC | Age: 82
End: 2022-10-21

## 2022-10-21 NOTE — TELEPHONE ENCOUNTER
encourage compliance with low salt / sodium diet  And she needs to increase lasix 40mg from one tab po qam to bid for 3 days then back to po qd   If sob/lightheaded/cp needs to go to ED today     Fu apt next week db her on Monday at 2 pm

## 2022-10-21 NOTE — TELEPHONE ENCOUNTER
Patient PT called Bhavesh Perea) stating that patient has an overnight weight gain. (6 pounds)    Patient states her legs are swollen no leakage.     Patient seeking Medical Advice ASAP  [phone]

## 2022-10-25 ENCOUNTER — OFFICE VISIT (OUTPATIENT)
Dept: FAMILY MEDICINE CLINIC | Age: 82
End: 2022-10-25
Payer: MEDICARE

## 2022-10-25 VITALS
BODY MASS INDEX: 47.09 KG/M2 | TEMPERATURE: 97 F | DIASTOLIC BLOOD PRESSURE: 86 MMHG | OXYGEN SATURATION: 98 % | HEIGHT: 66 IN | WEIGHT: 293 LBS | SYSTOLIC BLOOD PRESSURE: 144 MMHG | RESPIRATION RATE: 16 BRPM | HEART RATE: 103 BPM

## 2022-10-25 DIAGNOSIS — I48.0 PAF (PAROXYSMAL ATRIAL FIBRILLATION) (HCC): ICD-10-CM

## 2022-10-25 DIAGNOSIS — I50.31: ICD-10-CM

## 2022-10-25 DIAGNOSIS — E66.01 MORBID OBESITY DUE TO EXCESS CALORIES (HCC): ICD-10-CM

## 2022-10-25 DIAGNOSIS — R60.0 EDEMA OF BOTH LEGS: ICD-10-CM

## 2022-10-25 DIAGNOSIS — I42.9 CARDIOMYOPATHY, UNSPECIFIED TYPE (HCC): Primary | ICD-10-CM

## 2022-10-25 PROCEDURE — 99214 OFFICE O/P EST MOD 30 MIN: CPT | Performed by: FAMILY MEDICINE

## 2022-10-25 PROCEDURE — 1123F ACP DISCUSS/DSCN MKR DOCD: CPT | Performed by: FAMILY MEDICINE

## 2022-10-25 PROCEDURE — 3074F SYST BP LT 130 MM HG: CPT | Performed by: FAMILY MEDICINE

## 2022-10-25 PROCEDURE — 3078F DIAST BP <80 MM HG: CPT | Performed by: FAMILY MEDICINE

## 2022-10-25 ASSESSMENT — ENCOUNTER SYMPTOMS
ABDOMINAL PAIN: 0
SHORTNESS OF BREATH: 1

## 2022-10-25 NOTE — PATIENT INSTRUCTIONS
Increase furosemide (Lasix)  from 40 mg from one a day to twice per day for 5 days, check your weight every day .     `

## 2022-10-25 NOTE — PROGRESS NOTES
Subjective:      Patient ID: Jam Patel is a 78 y.o. female. Congestive Heart Failure  Presents for follow-up visit. Associated symptoms include edema, fatigue and shortness of breath. Pertinent negatives include no abdominal pain, chest pain, chest pressure, claudication, muscle weakness, near-syncope, nocturia, orthopnea, palpitations, paroxysmal nocturnal dyspnea or unexpected weight change. The symptoms have been stable. Compliance with total regimen is %. Compliance problems include adherence to exercise. Compliance with diet is %. Compliance with exercise is 0-25%. Compliance with medications is 51-75%. Review of Systems   Constitutional:  Positive for fatigue. Negative for unexpected weight change. Respiratory:  Positive for shortness of breath. Cardiovascular:  Negative for chest pain, palpitations, claudication and near-syncope. Gastrointestinal:  Negative for abdominal pain. Genitourinary:  Negative for nocturia. Musculoskeletal:  Negative for muscle weakness. Objective:  Blood pressure (!) 144/86, pulse (!) 103, temperature 97 °F (36.1 °C), temperature source Tympanic, resp. rate 16, height 5' 6\" (1.676 m), weight (!) 340 lb 9.6 oz (154.5 kg), SpO2 98 %, not currently breastfeeding. Physical Exam  Vitals and nursing note reviewed. Constitutional:       General: She is not in acute distress. Appearance: She is well-developed. She is obese. She is not ill-appearing, toxic-appearing or diaphoretic. HENT:      Head: Normocephalic. Eyes:      General: No scleral icterus. Extraocular Movements: Extraocular movements intact. Conjunctiva/sclera: Conjunctivae normal.      Pupils: Pupils are equal, round, and reactive to light. Neck:      Thyroid: No thyromegaly. Vascular: No JVD. Comments: No carotid bruits  Cardiovascular:      Rate and Rhythm: Normal rate and regular rhythm.       Pulses:           Carotid pulses are 2+ on the right side and 2+ on the left side. Heart sounds: Normal heart sounds. No murmur heard. No friction rub. No gallop. Comments: No edema    Pulmonary:      Effort: Pulmonary effort is normal. No respiratory distress. Breath sounds: Normal breath sounds. No stridor. No wheezing, rhonchi or rales. Chest:      Chest wall: No tenderness. Musculoskeletal:      Cervical back: Normal range of motion and neck supple. Right lower leg: Edema present. Left lower leg: Edema present. Comments: 2+ pitting edema from mid tibia down to ankles     Lymphadenopathy:      Cervical: No cervical adenopathy. Skin:     General: Skin is warm. Capillary Refill: Capillary refill takes less than 2 seconds. Coloration: Skin is not pale. Neurological:      General: No focal deficit present. Mental Status: She is alert and oriented to person, place, and time. Mental status is at baseline. Cranial Nerves: No cranial nerve deficit. Sensory: No sensory deficit. Motor: No weakness or abnormal muscle tone. Gait: Gait abnormal (slow gain assited with cane). Deep Tendon Reflexes: Reflexes are normal and symmetric. Psychiatric:         Mood and Affect: Mood normal.         Blood work reviewed and interpreted by me for today's apt     Latest Reference Range & Units 9/22/22 11:08   Sodium 136 - 145 mmol/L 140   Potassium 3.5 - 5.1 mmol/L 4.0   Chloride 99 - 110 mmol/L 97 (L)   CO2 21 - 32 mmol/L 29   BUN,BUNPL 7 - 20 mg/dL 22 (H)   Creatinine 0.6 - 1.2 mg/dL 1.1   Anion Gap 3 - 16  14   GFR Non- >60  47 ! GFR  >60  57 ! Glucose, Random 70 - 99 mg/dL 132 (H)   CALCIUM, SERUM, 636222 8.3 - 10.6 mg/dL 9.1   (L): Data is abnormally low  (H): Data is abnormally high  !: Data is abnormal    Assessment:       Diagnosis Orders   1. Cardiomyopathy, unspecified type ([facility]        2. CHF NYHA class III, acute, diastolic ([facility] [address]        3.  PAF (paroxysmal atrial

## 2022-11-01 RX ORDER — APIXABAN 5 MG/1
TABLET, FILM COATED ORAL
Qty: 180 TABLET | Refills: 3 | Status: SHIPPED | OUTPATIENT
Start: 2022-11-01

## 2022-11-01 RX ORDER — FUROSEMIDE 40 MG/1
40 TABLET ORAL DAILY
Qty: 60 TABLET | Refills: 3 | Status: SHIPPED | OUTPATIENT
Start: 2022-11-01

## 2022-11-01 RX ORDER — CARVEDILOL 3.12 MG/1
3.12 TABLET ORAL 2 TIMES DAILY WITH MEALS
Qty: 60 TABLET | Refills: 3 | Status: SHIPPED | OUTPATIENT
Start: 2022-11-01 | End: 2022-11-28

## 2022-11-01 RX ORDER — ATORVASTATIN CALCIUM 80 MG/1
80 TABLET, FILM COATED ORAL DAILY
Qty: 90 TABLET | Refills: 3 | Status: SHIPPED | OUTPATIENT
Start: 2022-11-01

## 2022-11-01 NOTE — TELEPHONE ENCOUNTER
Requested Prescriptions     Pending Prescriptions Disp Refills    ELIQUIS 5 MG TABS tablet [Pharmacy Med Name: ELIQUIS 5 MG TABLET] 180 tablet 3     Sig: TAKE 1 TABLET BY MOUTH TWICE A DAY          Last Office Visit: 9/23/2021     Next Office Visit: 11.03.2022

## 2022-11-01 NOTE — TELEPHONE ENCOUNTER
Pt needs refills on Entresto 24-26 mg, Furosemide 40 mg, Carvedilol 3.125 mg, and Atorvastatin 80 mg    Meds pended    Last OV:  10-25-22

## 2022-11-03 ENCOUNTER — OFFICE VISIT (OUTPATIENT)
Dept: CARDIOLOGY CLINIC | Age: 82
End: 2022-11-03
Payer: MEDICARE

## 2022-11-03 VITALS
DIASTOLIC BLOOD PRESSURE: 76 MMHG | HEART RATE: 90 BPM | HEIGHT: 66 IN | SYSTOLIC BLOOD PRESSURE: 116 MMHG | WEIGHT: 293 LBS | BODY MASS INDEX: 47.09 KG/M2

## 2022-11-03 DIAGNOSIS — I42.9 CARDIOMYOPATHY, UNSPECIFIED TYPE (HCC): Primary | ICD-10-CM

## 2022-11-03 DIAGNOSIS — I21.02 ST ELEVATION MYOCARDIAL INFARCTION INVOLVING LEFT ANTERIOR DESCENDING (LAD) CORONARY ARTERY (HCC): ICD-10-CM

## 2022-11-03 PROCEDURE — 99214 OFFICE O/P EST MOD 30 MIN: CPT | Performed by: INTERNAL MEDICINE

## 2022-11-03 PROCEDURE — 1123F ACP DISCUSS/DSCN MKR DOCD: CPT | Performed by: INTERNAL MEDICINE

## 2022-11-03 PROCEDURE — 93000 ELECTROCARDIOGRAM COMPLETE: CPT | Performed by: INTERNAL MEDICINE

## 2022-11-03 PROCEDURE — 3078F DIAST BP <80 MM HG: CPT | Performed by: INTERNAL MEDICINE

## 2022-11-03 PROCEDURE — 3074F SYST BP LT 130 MM HG: CPT | Performed by: INTERNAL MEDICINE

## 2022-11-03 NOTE — PROGRESS NOTES
[address]   Dr Sinai Evangelista. Akin Martinez MD, [facility]    Outpatient Follow Up Note    11/3/2022,10:16 AM  Subjective:   CHIEF COMPLAINT / HPI:  Follow Up secondary to hypertension, hyperlipidemia, and severe peripheral edema. Avelina Riley is 78 y.o. female who presents today for a routine follow up. Patient did have admission and was found to have Takotsubo. Her LV ejection fraction did drop significantly but subsequently did go up some. Since I last saw her she is gained about 30 pounds of fluid. She has severe bilateral lower extremity edema which I think is lymphedema but in addition to that he has extra fluid on board. She is having some PND and orthopnea and she basically sits up at night to attempt sleep. We had a long discussion about Jobst stockings and increasing her diuretics. Takotsubo cardiomyopathy  Hypertension  Lymphedema  Hyperlipidemia  Cardiac cath on 9/11/2022 with minimal plaque. Ejection fraction 15% on angiogram.  And subsequent echo showed improvement to 30 to 35%. Past Medical History:    Past Medical History:   Diagnosis Date    Acute systolic (congestive) heart failure (HCC)     Edema     Hearing loss     Hyperlipidemia     Hypertension     Morbid obesity due to excess calories ([facility] [address] 12/29/2015    Pre-diabetes     PUD (peptic ulcer disease)     \"years ago\",      Past Surgical History  Past Surgical History:   Procedure Laterality Date    HERNIA REPAIR      umbilical     KNEE SURGERY Bilateral     TKR bilateral     Social History:       Social History     Tobacco Use   Smoking Status Never   Smokeless Tobacco Never     Current Medications:  Prior to Visit Medications    Medication Sig Taking?  Authorizing Provider   ELIQUIS 5 MG TABS tablet TAKE 1 TABLET BY MOUTH TWICE A DAY Yes Daxa Hurtado APRN - CNP   carvedilol (COREG) 3.125 MG tablet Take 1 tablet by mouth 2 times daily (with meals) Yes Calos Rodgers MD   furosemide (LASIX) 40 MG tablet Take 1 tablet by mouth daily Yes Yeny Tuttle MD   sacubitril-valsartan (ENTRESTO) 24-26 MG per tablet Take 0.5 tablets by mouth 2 times daily Yes Yeny Tuttle MD   atorvastatin (LIPITOR) 80 MG tablet Take 1 tablet by mouth daily Yes Yeny Tuttle MD   Misc. Devices (WALKER) MISC 1 each by Does not apply route daily Please provide rolling walker. Approved by patients insurance Yes Yeny Tuttle MD   dapagliflozin (FARXIGA) 5 MG tablet Take 1 tablet by mouth every morning Yes LAWRENCE Murphy - CNP   acetaminophen (TYLENOL) 500 MG tablet Take 500 mg by mouth every 6 hours as needed for Pain Yes Historical Provider, MD   vitamin D3 (CHOLECALCIFEROL) 10 MCG (400 UNIT) TABS tablet Take 1 tablet by mouth daily Yes Yeny Tuttle MD   blood glucose monitor strips Test 3 times a day & as needed for symptoms of irregular blood glucose. Yes Marty Childs MD   acarbose (PRECOSE) 25 mg tablet TAKE 1 TABLET BY MOUTH 3 TIMES DAILY WITH MEALS.   Yeny Tuttle MD   metoprolol succinate (TOPROL XL) 200 MG extended release tablet TAKE 1 TABLET BY MOUTH EVERY Victorina Caceres MD   losartan (COZAAR) 100 MG tablet TAKE 1 TABLET BY MOUTH EVERY Victorina Caceres MD   torsemide (DEMADEX) 20 MG tablet TAKE 1 TABLET BY MOUTH THREE TIMES A DAY  Yeny Tuttle MD     Family History  Family History   Problem Relation Age of Onset    Other Mother         hernia    High Blood Pressure Father        Current Medications  Current Outpatient Medications   Medication Sig Dispense Refill    ELIQUIS 5 MG TABS tablet TAKE 1 TABLET BY MOUTH TWICE A  tablet 3    carvedilol (COREG) 3.125 MG tablet Take 1 tablet by mouth 2 times daily (with meals) 60 tablet 3    furosemide (LASIX) 40 MG tablet Take 1 tablet by mouth daily 60 tablet 3    sacubitril-valsartan (ENTRESTO) 24-26 MG per tablet Take 0.5 tablets by mouth 2 times daily 60 tablet 3    atorvastatin (LIPITOR) 80 MG tablet Take 1 tablet by mouth daily 90 tablet 3 Misc. Devices (WALKER) MISC 1 each by Does not apply route daily Please provide rolling walker. Approved by patients insurance 1 each 0    dapagliflozin (FARXIGA) 5 MG tablet Take 1 tablet by mouth every morning 90 tablet 3    acetaminophen (TYLENOL) 500 MG tablet Take 500 mg by mouth every 6 hours as needed for Pain      vitamin D3 (CHOLECALCIFEROL) 10 MCG (400 UNIT) TABS tablet Take 1 tablet by mouth daily 90 tablet 3    blood glucose monitor strips Test 3 times a day & as needed for symptoms of irregular blood glucose. 100 strip 1     No current facility-administered medications for this visit. REVIEW OF SYSTEMS:    CONSTITUTIONAL: No major weight gain or loss, fatigue, weakness, night sweats or fever. HEENT: No new vision difficulties or ringing in the ears. RESPIRATORY: No new SOB, PND, orthopnea or cough. CARDIOVASCULAR: See HPI  GI: No nausea, vomiting, diarrhea, constipation, abdominal pain or changes in bowel habits. : No urinary frequency, urgency, incontinence hematuria or dysuria. SKIN: No cyanosis or skin lesions. MUSCULOSKELETAL: No new muscle or joint pain. NEUROLOGICAL: No syncope or TIA-like symptoms. PSYCHIATRIC: No anxiety, pain, insomnia or depression    Objective:   PHYSICAL EXAM:        VITALS:    Wt Readings from Last 3 Encounters:   11/03/22 (!) 347 lb (157.4 kg)   10/25/22 (!) 340 lb 9.6 oz (154.5 kg)   09/22/22 (!) 327 lb 6.4 oz (148.5 kg)     BP Readings from Last 3 Encounters:   11/03/22 116/76   10/25/22 (!) 144/86   09/22/22 108/62     Pulse Readings from Last 3 Encounters:   11/03/22 90   10/25/22 (!) 103   09/22/22 83       CONSTITUTIONAL: Cooperative, no apparent distress, and appears well nourished / developed  NEUROLOGIC:  Awake and orientated to person, place and time. PSYCH: Calm affect. SKIN: Warm and dry. HEENT: Sclera non-icteric, normocephalic, neck supple, no elevation of JVP, normal carotid pulses with no bruits and thyroid normal size.   LUNGS:  No increased work of breathing and clear to auscultation, no crackles or wheezing  CARDIOVASCULAR:  Regular rate and rhythm with no murmurs, gallops, rubs, or abnormal heart sounds, normal PMI. The apical impulses not displaced  Heart tones are crisp and normal  Cervical veins are not engorged  The carotid upstroke is normal in amplitude and contour without delay or bruit  JVP is not elevated  ABDOMEN:  Normal bowel sounds, non-distended and non-tender to palpation  EXT: No edema, no calf tenderness. Pulses are present bilaterally. DATA:    Lab Results   Component Value Date    ALT 24 09/18/2022    AST 21 09/18/2022    ALKPHOS 64 09/18/2022    BILITOT 0.8 09/18/2022     Lab Results   Component Value Date    CREATININE 1.1 09/22/2022    BUN 22 (H) 09/22/2022     09/22/2022    K 4.0 09/22/2022    CL 97 (L) 09/22/2022    CO2 29 09/22/2022     Lab Results   Component Value Date    TSH 1.29 03/02/2022     Lab Results   Component Value Date    WBC 7.7 09/11/2022    HGB 11.8 (L) 09/11/2022    HCT 38.1 09/11/2022    MCV 77.4 (L) 09/11/2022     09/11/2022     No components found for: CHLPL  Lab Results   Component Value Date    TRIG 56 09/12/2022    TRIG 71 03/02/2022    TRIG 57 10/01/2020     Lab Results   Component Value Date    HDL 68 (H) 09/12/2022    HDL 58 03/02/2022    HDL 60 10/01/2020     Lab Results   Component Value Date    LDLCALC 166 (H) 09/12/2022    LDLCALC 139 (H) 03/02/2022    LDLCALC 107 (H) 10/01/2020     Lab Results   Component Value Date    LABVLDL 11 09/12/2022    LABVLDL 14 03/02/2022    LABVLDL 11 10/01/2020     Radiology Review:  Pertinent images / reports were reviewed as a part of this visit and reveals the following:    Echo:  Summary 9/16/22   Limited echo. Definity contrast administered   . Left ventricular cavity size is normal with mild LVH   . Indeterminate diastolic function. Agency and distal septal hypokinetic. No evidence of left ventricular mass or thrombus   .  Ejection fraction is estimated to be 30-35 %. Overall left ventricular systolic function is mildly depressed . Lucyann Push The aortic valve leaflets are not well visualized. The aortic valve is mildly thickened/calcified but opens well with normal   gradients. Unable to estimate pulmonary artery pressure secondary to incomplete TR jet   envelope. Stress Test / Angiogram:   Assessment: 9/11/22  Acute coronary syndrome with Takotsubo. We did not find coronary lesions or need intervention. \  LV function is abnormal showing evidence for Takotsubo and low ejection fraction of 15%. Recommendations: Continue support with diuretics. Nitroglycerin as noted. She will be going to the floor on BiPAP. Beta-blocker. She normally takes Eliquis we will resume that. Also losartan 100 mg. Torsemide 20 mg. Metoprolol succinate 200 mg/day. ECG: EKG on 11/3/2022 atrial fibrillation at 100/min. Nonspecific ST and T wave changes. This patient was educated using the patient point room wall mount device. Absence from smokers and smoking and diet and exercising are important. Assessment:   Takotsubo cardiomyopathy with low ejection fraction hopefully has improved. Significant fluid retention. Bilateral lymphedema. Plan:   Reduce fluid intake. Reduce salt and sodium intake. Increase Lasix to 80 mg/day. See me back in 2 to 3 weeks. We will get renal panel. We did talk about Jobst stockings and she will be looking into that. Note that the patient is on Eliquis at 5 mg twice daily. Also on Entresto at 25 mg twice daily. Lipitor at 80 mg. Please call if we can assist further [phone]. Leo Weaver.  J Luis SANDERSON, [facility]      This note was likely completed using voice recognition technology and may contain unintended errors

## 2022-11-28 RX ORDER — CARVEDILOL 3.12 MG/1
TABLET ORAL
Qty: 60 TABLET | Refills: 3 | Status: SHIPPED | OUTPATIENT
Start: 2022-11-28

## 2022-11-28 NOTE — TELEPHONE ENCOUNTER
Medication:   Requested Prescriptions     Pending Prescriptions Disp Refills    carvedilol (COREG) 3.125 MG tablet [Pharmacy Med Name: CARVEDILOL 3.125 MG TABLET] 60 tablet 3     Sig: TAKE 1 TABLET BY MOUTH TWICE A DAY WITH MEALS        Last Filled:      Patient Phone Number: [phone] (home)     Last appt: 10/25/2022   Next appt: Visit date not found    Last OARRS: No flowsheet data found.

## 2022-11-29 ENCOUNTER — TELEPHONE (OUTPATIENT)
Dept: FAMILY MEDICINE CLINIC | Age: 82
End: 2022-11-29

## 2022-11-29 NOTE — TELEPHONE ENCOUNTER
Patient she has Cold two weeks    Sore from coughing /patient is coughing up mucus clear/cloudy    Body Soreness/ Cough/ Cold    No fever   Covid Test / Negative 11-    Patient seeking medication Last OV 10-

## 2022-11-30 ENCOUNTER — OFFICE VISIT (OUTPATIENT)
Dept: FAMILY MEDICINE CLINIC | Age: 82
End: 2022-11-30
Payer: MEDICARE

## 2022-11-30 VITALS
SYSTOLIC BLOOD PRESSURE: 126 MMHG | DIASTOLIC BLOOD PRESSURE: 88 MMHG | OXYGEN SATURATION: 98 % | TEMPERATURE: 97.8 F | HEIGHT: 66 IN | RESPIRATION RATE: 16 BRPM | BODY MASS INDEX: 47.09 KG/M2 | WEIGHT: 293 LBS | HEART RATE: 118 BPM

## 2022-11-30 DIAGNOSIS — J40 BRONCHITIS: Primary | ICD-10-CM

## 2022-11-30 DIAGNOSIS — I48.0 PAF (PAROXYSMAL ATRIAL FIBRILLATION) (HCC): ICD-10-CM

## 2022-11-30 DIAGNOSIS — R06.02 SOB (SHORTNESS OF BREATH): ICD-10-CM

## 2022-11-30 PROCEDURE — 1123F ACP DISCUSS/DSCN MKR DOCD: CPT | Performed by: FAMILY MEDICINE

## 2022-11-30 PROCEDURE — 3074F SYST BP LT 130 MM HG: CPT | Performed by: FAMILY MEDICINE

## 2022-11-30 PROCEDURE — 99214 OFFICE O/P EST MOD 30 MIN: CPT | Performed by: FAMILY MEDICINE

## 2022-11-30 PROCEDURE — 3078F DIAST BP <80 MM HG: CPT | Performed by: FAMILY MEDICINE

## 2022-11-30 RX ORDER — DOXYCYCLINE HYCLATE 100 MG
100 TABLET ORAL 2 TIMES DAILY
Qty: 10 TABLET | Refills: 0 | Status: SHIPPED | OUTPATIENT
Start: 2022-11-30 | End: 2022-12-05

## 2022-11-30 RX ORDER — BENZONATATE 100 MG/1
100 CAPSULE ORAL 3 TIMES DAILY PRN
Qty: 30 CAPSULE | Refills: 0 | Status: SHIPPED | OUTPATIENT
Start: 2022-11-30 | End: 2022-12-10

## 2022-11-30 ASSESSMENT — ENCOUNTER SYMPTOMS
HEARTBURN: 0
SHORTNESS OF BREATH: 1
WHEEZING: 0
HEMOPTYSIS: 0
RHINORRHEA: 0
COUGH: 1
SORE THROAT: 1

## 2022-11-30 NOTE — PROGRESS NOTES
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 female. Cough  This is a new problem. The current episode started in the past 7 days. The problem has been gradually worsening. The problem occurs constantly. The cough is Productive of sputum. Associated symptoms include chills, headaches, nasal congestion, a sore throat, shortness of breath and sweats. Pertinent negatives include no chest pain, ear congestion, ear pain, fever, heartburn, hemoptysis, myalgias, postnasal drip, rash, rhinorrhea, weight loss or wheezing. Nothing aggravates the symptoms. She has tried OTC cough suppressant for the symptoms. The treatment provided mild relief. There is no history of emphysema. chf   Shortness of Breath  This is a new problem. The current episode started in the past 7 days. The problem occurs constantly. The problem has been unchanged. Associated symptoms include headaches and a sore throat. Pertinent negatives include no chest pain, ear pain, fever, hemoptysis, rash, rhinorrhea or wheezing. The symptoms are aggravated by any activity. She has tried nothing for the symptoms. Chest Congestion  Associated symptoms include chills, congestion, coughing, headaches and a sore throat. Pertinent negatives include no chest pain, fever, myalgias or rash. Review of Systems   Constitutional:  Positive for chills. Negative for fever and weight loss. HENT:  Positive for congestion and sore throat. Negative for ear pain, postnasal drip and rhinorrhea. Respiratory:  Positive for cough and shortness of breath. Negative for hemoptysis and wheezing. Cardiovascular:  Negative for chest pain. Gastrointestinal:  Negative for heartburn. Musculoskeletal:  Negative for myalgias. Skin:  Negative for rash. Neurological:  Positive for headaches. Objective:  Blood pressure 126/88, pulse (!) 118, temperature 97.8 °F (36.6 °C), temperature source Tympanic, resp.  rate 16, height 5' 6\" (1.676 m), weight (!) 347 lb 4.8 oz (157.5 kg), SpO2 98 %, not currently breastfeeding. Physical Exam  Vitals and nursing note reviewed. Constitutional:       General: She is not in acute distress. Appearance: She is well-developed. She is obese. She is not ill-appearing, toxic-appearing or diaphoretic. HENT:      Head: Normocephalic. No right periorbital erythema or left periorbital erythema. Right Ear: Hearing, tympanic membrane, ear canal and external ear normal. No decreased hearing noted. No drainage, swelling or tenderness. No middle ear effusion. There is no impacted cerumen. Left Ear: Hearing, tympanic membrane, ear canal and external ear normal. No decreased hearing noted. No drainage, swelling or tenderness. No middle ear effusion. There is no impacted cerumen. Nose: Mucosal edema present. No nasal deformity, septal deviation or rhinorrhea. Right Sinus: No maxillary sinus tenderness or frontal sinus tenderness. Left Sinus: No maxillary sinus tenderness or frontal sinus tenderness. Mouth/Throat:      Mouth: Mucous membranes are moist.      Pharynx: No oropharyngeal exudate or posterior oropharyngeal erythema. Eyes:      Conjunctiva/sclera: Conjunctivae normal.      Pupils: Pupils are equal, round, and reactive to light. Neck:      Vascular: No JVD. Cardiovascular:      Rate and Rhythm: Regular rhythm. Tachycardia present. Heart sounds: Normal heart sounds. No murmur heard. No friction rub. No gallop. Comments: Irreg irreg    Pulmonary:      Effort: Pulmonary effort is normal. No respiratory distress. Breath sounds: No stridor. Rhonchi present. No wheezing or rales. Chest:      Chest wall: No tenderness. Musculoskeletal:         General: Normal range of motion. Cervical back: Normal range of motion and neck supple. Lymphadenopathy:      Cervical: No cervical adenopathy. Skin:     General: Skin is warm. Findings: No rash.    Neurological:      Mental Status: She is alert and oriented to person, place, and time. Cranial Nerves: No cranial nerve deficit. Motor: No abnormal muscle tone. Deep Tendon Reflexes: Reflexes are normal and symmetric. Psychiatric:         Thought Content: Thought content normal.     Home covid test : neg       Assessment:       Diagnosis Orders   1. Bronchitis  doxycycline hyclate (VIBRA-TABS) 100 MG tablet    benzonatate (TESSALON) 100 MG capsule      2. SOB (shortness of breath)        3. PAF (paroxysmal atrial fibrillation) ([facility])                Plan:      VSS except for a fib with tachycardia  Tx with antibiotic  Tessalon prn   Fu in 1 week     2. No hypoxia  Patient advised to go to Ed or call 911 if sx worsen,  agrees and verbalizes understanding    3.  Fu with cardiology   If sob worsen she agreed to go to ED           Yeny Tuttle MD

## 2022-12-07 ENCOUNTER — OFFICE VISIT (OUTPATIENT)
Dept: FAMILY MEDICINE CLINIC | Age: 82
End: 2022-12-07
Payer: MEDICARE

## 2022-12-07 VITALS
TEMPERATURE: 97.3 F | RESPIRATION RATE: 16 BRPM | WEIGHT: 293 LBS | HEIGHT: 66 IN | HEART RATE: 74 BPM | OXYGEN SATURATION: 97 % | BODY MASS INDEX: 47.09 KG/M2 | SYSTOLIC BLOOD PRESSURE: 130 MMHG | DIASTOLIC BLOOD PRESSURE: 86 MMHG

## 2022-12-07 DIAGNOSIS — R05.1 ACUTE COUGH: Primary | ICD-10-CM

## 2022-12-07 DIAGNOSIS — Z23 FLU VACCINE NEED: ICD-10-CM

## 2022-12-07 PROCEDURE — 3078F DIAST BP <80 MM HG: CPT | Performed by: FAMILY MEDICINE

## 2022-12-07 PROCEDURE — 1123F ACP DISCUSS/DSCN MKR DOCD: CPT | Performed by: FAMILY MEDICINE

## 2022-12-07 PROCEDURE — G0008 ADMIN INFLUENZA VIRUS VAC: HCPCS | Performed by: FAMILY MEDICINE

## 2022-12-07 PROCEDURE — 90694 VACC AIIV4 NO PRSRV 0.5ML IM: CPT | Performed by: FAMILY MEDICINE

## 2022-12-07 PROCEDURE — 3074F SYST BP LT 130 MM HG: CPT | Performed by: FAMILY MEDICINE

## 2022-12-07 PROCEDURE — 99213 OFFICE O/P EST LOW 20 MIN: CPT | Performed by: FAMILY MEDICINE

## 2022-12-07 RX ORDER — CETIRIZINE HYDROCHLORIDE 10 MG/1
10 TABLET ORAL DAILY
Qty: 30 TABLET | Refills: 0 | Status: SHIPPED | OUTPATIENT
Start: 2022-12-07 | End: 2023-01-06

## 2022-12-07 SDOH — ECONOMIC STABILITY: FOOD INSECURITY: WITHIN THE PAST 12 MONTHS, THE FOOD YOU BOUGHT JUST DIDN'T LAST AND YOU DIDN'T HAVE MONEY TO GET MORE.: NEVER TRUE

## 2022-12-07 SDOH — ECONOMIC STABILITY: FOOD INSECURITY: WITHIN THE PAST 12 MONTHS, YOU WORRIED THAT YOUR FOOD WOULD RUN OUT BEFORE YOU GOT MONEY TO BUY MORE.: NEVER TRUE

## 2022-12-07 ASSESSMENT — ENCOUNTER SYMPTOMS
SHORTNESS OF BREATH: 0
HEMOPTYSIS: 0
COUGH: 1
SORE THROAT: 0
HEARTBURN: 0
RHINORRHEA: 0
WHEEZING: 0

## 2022-12-07 ASSESSMENT — SOCIAL DETERMINANTS OF HEALTH (SDOH): HOW HARD IS IT FOR YOU TO PAY FOR THE VERY BASICS LIKE FOOD, HOUSING, MEDICAL CARE, AND HEATING?: NOT HARD AT ALL

## 2022-12-07 NOTE — PROGRESS NOTES
Subjective:      Patient ID: Robin Underwood is a 78 y.o. female. Cough  This is a new problem. The current episode started in the past 7 days. The problem has been gradually improving. The problem occurs every few minutes. The cough is Productive of sputum. Associated symptoms include nasal congestion and postnasal drip. Pertinent negatives include no chest pain, chills, ear congestion, ear pain, fever, headaches, heartburn, hemoptysis, myalgias, rhinorrhea, sore throat, shortness of breath, sweats, weight loss or wheezing. The symptoms are aggravated by lying down. Treatments tried: antibiotic. The treatment provided significant relief. Review of Systems   Constitutional:  Negative for chills, fever and weight loss. HENT:  Positive for postnasal drip. Negative for ear pain, rhinorrhea and sore throat. Eyes:         Watery eyes     Respiratory:  Positive for cough. Negative for hemoptysis, shortness of breath and wheezing. Cardiovascular:  Negative for chest pain. Gastrointestinal:  Negative for heartburn. Musculoskeletal:  Negative for myalgias. Neurological:  Negative for headaches. Objective:  Blood pressure 130/86, pulse 74, temperature 97.3 °F (36.3 °C), temperature source Tympanic, resp. rate 16, height 5' 6\" (1.676 m), weight (!) 340 lb 3.2 oz (154.3 kg), SpO2 97 %, not currently breastfeeding. Physical Exam  Vitals and nursing note reviewed. Constitutional:       General: She is not in acute distress. Appearance: She is well-developed. She is obese. She is not ill-appearing, toxic-appearing or diaphoretic. HENT:      Head: Normocephalic. Mouth/Throat:      Pharynx: No oropharyngeal exudate or posterior oropharyngeal erythema. Eyes:      Conjunctiva/sclera: Conjunctivae normal.      Pupils: Pupils are equal, round, and reactive to light. Cardiovascular:      Comments: Irreg irreg    Pulmonary:      Effort: Pulmonary effort is normal. No respiratory distress. Breath sounds: Normal breath sounds. No stridor. No wheezing, rhonchi or rales. Chest:      Chest wall: No tenderness. Musculoskeletal:      Cervical back: Normal range of motion and neck supple. Lymphadenopathy:      Cervical: No cervical adenopathy. Skin:     General: Skin is warm. Findings: No rash. Neurological:      Mental Status: She is alert and oriented to person, place, and time.        Assessment:      Cough    Watery eyes     Plan:      Sx of sob and wheezing had resolved,   No need to repeat antibiotic treatment  Her cough is prob sec to  post nasal drip  Trial with zyrtec hs     Flu vaccine today             Marcia Wise MD

## 2022-12-27 RX ORDER — FUROSEMIDE 40 MG/1
80 TABLET ORAL DAILY
Qty: 180 TABLET | Refills: 1 | Status: SHIPPED | OUTPATIENT
Start: 2022-12-27

## 2022-12-27 NOTE — TELEPHONE ENCOUNTER
Medication:   Requested Prescriptions     Pending Prescriptions Disp Refills    furosemide (LASIX) 40 MG tablet [Pharmacy Med Name: FUROSEMIDE 40 MG TABLET] 90 tablet 2     Sig: TAKE 1 TABLET BY MOUTH EVERY DAY        Last Filled:      Patient Phone Number: [phone] (home)     Last appt: 12/7/2022   Next appt: 3/10/2023    Last OARRS: No flowsheet data found.

## 2022-12-29 RX ORDER — CETIRIZINE HYDROCHLORIDE 10 MG/1
TABLET ORAL
Qty: 30 TABLET | Refills: 0 | Status: SHIPPED | OUTPATIENT
Start: 2022-12-29

## 2022-12-29 NOTE — TELEPHONE ENCOUNTER
Medication:   Requested Prescriptions     Pending Prescriptions Disp Refills    cetirizine (ZYRTEC) 10 MG tablet [Pharmacy Med Name: CETIRIZINE HCL 10 MG TABLET] 30 tablet 0     Sig: TAKE 1 TABLET BY MOUTH EVERY DAY        Last Filled:      Patient Phone Number: [phone] (home)     Last appt: 12/7/2022   Next appt: 3/10/2023    Last OARRS: No flowsheet data found.

## 2023-01-12 ENCOUNTER — OFFICE VISIT (OUTPATIENT)
Dept: CARDIOLOGY CLINIC | Age: 83
End: 2023-01-12
Payer: MEDICARE

## 2023-01-12 VITALS
SYSTOLIC BLOOD PRESSURE: 140 MMHG | DIASTOLIC BLOOD PRESSURE: 88 MMHG | BODY MASS INDEX: 47.09 KG/M2 | WEIGHT: 293 LBS | HEART RATE: 70 BPM | HEIGHT: 66 IN

## 2023-01-12 DIAGNOSIS — I42.9 CARDIOMYOPATHY, UNSPECIFIED TYPE (HCC): Primary | ICD-10-CM

## 2023-01-12 DIAGNOSIS — R07.9 CHEST PAIN AT REST: ICD-10-CM

## 2023-01-12 PROCEDURE — 3079F DIAST BP 80-89 MM HG: CPT | Performed by: INTERNAL MEDICINE

## 2023-01-12 PROCEDURE — 1123F ACP DISCUSS/DSCN MKR DOCD: CPT | Performed by: INTERNAL MEDICINE

## 2023-01-12 PROCEDURE — 3077F SYST BP >= 140 MM HG: CPT | Performed by: INTERNAL MEDICINE

## 2023-01-12 PROCEDURE — 93000 ELECTROCARDIOGRAM COMPLETE: CPT | Performed by: INTERNAL MEDICINE

## 2023-01-12 PROCEDURE — 99214 OFFICE O/P EST MOD 30 MIN: CPT | Performed by: INTERNAL MEDICINE

## 2023-01-12 RX ORDER — FUROSEMIDE 80 MG
80 TABLET ORAL 2 TIMES DAILY
Qty: 60 TABLET | Refills: 2 | Status: SHIPPED | OUTPATIENT
Start: 2023-01-12

## 2023-01-12 RX ORDER — FUROSEMIDE 80 MG
80 TABLET ORAL 2 TIMES DAILY
Qty: 60 TABLET | Refills: 2 | Status: CANCELLED | OUTPATIENT
Start: 2023-01-12

## 2023-01-12 NOTE — PROGRESS NOTES
[address]   Dr Yojana Walton. Blayne Reno MD, [address] [facility]    Outpatient Follow Up Note    1/12/2023,10:25 AM  Subjective:   CHIEF COMPLAINT / HPI:  Follow Up secondary to hypertension, hyperlipidemia, and severe peripheral edema. Helen Alejandro is 78 y.o. female who presents today f for urgent follow-up regarding her heart failure and cardiomyopathy and Takotsubo. I saw her a few weeks ago and it appears that she has not done any of the things that I have asked her to do. She has actually gained a few pounds and had some more edema that she particularly concerned about. At this point in time she actually otherwise looks stable significant edema. We will have her make some changes in her medications. Takotsubo cardiomyopathy  Hypertension  Lymphedema  Hyperlipidemia  Cardiac cath on 9/11/2022 with minimal plaque. Ejection fraction 15% on angiogram.  And subsequent echo showed improvement to 30 to 35%. Past Medical History:    Past Medical History:   Diagnosis Date    Acute systolic (congestive) heart failure (HCC)     Edema     Hearing loss     Hyperlipidemia     Hypertension     Morbid obesity due to excess calories ([facility] [address] 12/29/2015    Pre-diabetes     PUD (peptic ulcer disease)     \"years ago\",      Past Surgical History  Past Surgical History:   Procedure Laterality Date    HERNIA REPAIR      umbilical     KNEE SURGERY Bilateral     TKR bilateral     Social History:       Social History     Tobacco Use   Smoking Status Never   Smokeless Tobacco Never     Current Medications:  Prior to Visit Medications    Medication Sig Taking?  Authorizing Provider   cetirizine (ZYRTEC) 10 MG tablet TAKE 1 TABLET BY MOUTH EVERY DAY Yes Shira Cui MD   furosemide (LASIX) 40 MG tablet Take 2 tablets by mouth daily Yes Shira Cui MD   carvedilol (COREG) 3.125 MG tablet TAKE 1 TABLET BY MOUTH TWICE A DAY WITH MEALS Yes Shira Cui MD   ELIQUIS 5 MG TABS tablet TAKE 1 TABLET BY MOUTH TWICE A DAY Yes LAWRENCE Biggs CNP   sacubitril-valsartan (ENTRESTO) 24-26 MG per tablet Take 0.5 tablets by mouth 2 times daily Yes Jamie Spencer MD   atorvastatin (LIPITOR) 80 MG tablet Take 1 tablet by mouth daily Yes Jamie Spencer MD   Misc. Devices (WALKER) MISC 1 each by Does not apply route daily Please provide rolling walker. Approved by patients insurance Yes Jamie Spencer MD   dapagliflozin (FARXIGA) 5 MG tablet Take 1 tablet by mouth every morning Yes LAWRENCE Gregory CNP   acetaminophen (TYLENOL) 500 MG tablet Take 500 mg by mouth every 6 hours as needed for Pain Yes Historical Provider, MD   vitamin D3 (CHOLECALCIFEROL) 10 MCG (400 UNIT) TABS tablet Take 1 tablet by mouth daily Yes Jamie Spencer MD   blood glucose monitor strips Test 3 times a day & as needed for symptoms of irregular blood glucose. Yes Ok Queen MD   acarbose (PRECOSE) 25 mg tablet TAKE 1 TABLET BY MOUTH 3 TIMES DAILY WITH MEALS.   Jamie Spencer MD   metoprolol succinate (TOPROL XL) 200 MG extended release tablet TAKE 1 TABLET BY MOUTH EVERY Farrel MD Angelo   losartan (COZAAR) 100 MG tablet TAKE 1 TABLET BY MOUTH EVERY Paulinorel MD Angelo   torsemide (DEMADEX) 20 MG tablet TAKE 1 TABLET BY MOUTH THREE TIMES A DAY  Jamie Spencer MD     Family History  Family History   Problem Relation Age of Onset    Other Mother         hernia    High Blood Pressure Father        Current Medications  Current Outpatient Medications   Medication Sig Dispense Refill    cetirizine (ZYRTEC) 10 MG tablet TAKE 1 TABLET BY MOUTH EVERY DAY 30 tablet 0    furosemide (LASIX) 40 MG tablet Take 2 tablets by mouth daily 180 tablet 1    carvedilol (COREG) 3.125 MG tablet TAKE 1 TABLET BY MOUTH TWICE A DAY WITH MEALS 60 tablet 3    ELIQUIS 5 MG TABS tablet TAKE 1 TABLET BY MOUTH TWICE A  tablet 3    sacubitril-valsartan (ENTRESTO) 24-26 MG per tablet Take 0.5 tablets by mouth 2 times daily 60 tablet 3 atorvastatin (LIPITOR) 80 MG tablet Take 1 tablet by mouth daily 90 tablet 3    Misc. Devices (WALKER) MISC 1 each by Does not apply route daily Please provide rolling walker. Approved by patients insurance 1 each 0    dapagliflozin (FARXIGA) 5 MG tablet Take 1 tablet by mouth every morning 90 tablet 3    acetaminophen (TYLENOL) 500 MG tablet Take 500 mg by mouth every 6 hours as needed for Pain      vitamin D3 (CHOLECALCIFEROL) 10 MCG (400 UNIT) TABS tablet Take 1 tablet by mouth daily 90 tablet 3    blood glucose monitor strips Test 3 times a day & as needed for symptoms of irregular blood glucose. 100 strip 1     No current facility-administered medications for this visit. REVIEW OF SYSTEMS:    CONSTITUTIONAL: No major weight gain or loss, fatigue, weakness, night sweats or fever. HEENT: No new vision difficulties or ringing in the ears. RESPIRATORY: No new SOB, PND, orthopnea or cough. CARDIOVASCULAR: See HPI  GI: No nausea, vomiting, diarrhea, constipation, abdominal pain or changes in bowel habits. : No urinary frequency, urgency, incontinence hematuria or dysuria. SKIN: No cyanosis or skin lesions. MUSCULOSKELETAL: No new muscle or joint pain. NEUROLOGICAL: No syncope or TIA-like symptoms. PSYCHIATRIC: No anxiety, pain, insomnia or depression    Objective:   PHYSICAL EXAM:        VITALS:    Wt Readings from Last 3 Encounters:   01/12/23 (!) 351 lb (159.2 kg)   12/07/22 (!) 340 lb 3.2 oz (154.3 kg)   11/30/22 (!) 347 lb 4.8 oz (157.5 kg)     BP Readings from Last 3 Encounters:   01/12/23 (!) 140/88   12/07/22 130/86   11/30/22 126/88     Pulse Readings from Last 3 Encounters:   01/12/23 70   12/07/22 74   11/30/22 (!) 118       CONSTITUTIONAL: Cooperative, no apparent distress, and appears well nourished / developed  NEUROLOGIC:  Awake and orientated to person, place and time. PSYCH: Calm affect. SKIN: Warm and dry.   HEENT: Sclera non-icteric, normocephalic, neck supple, no elevation of JVP, normal carotid pulses with no bruits and thyroid normal size. LUNGS:  No increased work of breathing and clear to auscultation, no crackles or wheezing  CARDIOVASCULAR:  Regular rate and rhythm with no murmurs, gallops, rubs, or abnormal heart sounds, normal PMI. The apical impulses not displaced  Heart tones are crisp and normal  Cervical veins are not engorged  The carotid upstroke is normal in amplitude and contour without delay or bruit  JVP is not elevated  ABDOMEN:  Normal bowel sounds, non-distended and non-tender to palpation  EXT: No edema, no calf tenderness. Pulses are present bilaterally. DATA:    Lab Results   Component Value Date    ALT 24 09/18/2022    AST 21 09/18/2022    ALKPHOS 64 09/18/2022    BILITOT 0.8 09/18/2022     Lab Results   Component Value Date    CREATININE 1.1 09/22/2022    BUN 22 (H) 09/22/2022     09/22/2022    K 4.0 09/22/2022    CL 97 (L) 09/22/2022    CO2 29 09/22/2022     Lab Results   Component Value Date    TSH 1.29 03/02/2022     Lab Results   Component Value Date    WBC 7.7 09/11/2022    HGB 11.8 (L) 09/11/2022    HCT 38.1 09/11/2022    MCV 77.4 (L) 09/11/2022     09/11/2022     No components found for: CHLPL  Lab Results   Component Value Date    TRIG 56 09/12/2022    TRIG 71 03/02/2022    TRIG 57 10/01/2020     Lab Results   Component Value Date    HDL 68 (H) 09/12/2022    HDL 58 03/02/2022    HDL 60 10/01/2020     Lab Results   Component Value Date    LDLCALC 166 (H) 09/12/2022    LDLCALC 139 (H) 03/02/2022    LDLCALC 107 (H) 10/01/2020     Lab Results   Component Value Date    LABVLDL 11 09/12/2022    LABVLDL 14 03/02/2022    LABVLDL 11 10/01/2020     Radiology Review:  Pertinent images / reports were reviewed as a part of this visit and reveals the following:    Echo:  Summary 9/16/22   Limited echo. Definity contrast administered   . Left ventricular cavity size is normal with mild LVH   . Indeterminate diastolic function.    [place] and distal septal hypokinetic. No evidence of left ventricular mass or thrombus   . Ejection fraction is estimated to be 30-35 %. Overall left ventricular systolic function is mildly depressed . Denise Colon The aortic valve leaflets are not well visualized. The aortic valve is mildly thickened/calcified but opens well with normal   gradients. Unable to estimate pulmonary artery pressure secondary to incomplete TR jet   envelope. Stress Test / Angiogram:   Assessment: 9/11/22  Acute coronary syndrome with Takotsubo. We did not find coronary lesions or need intervention. \  LV function is abnormal showing evidence for Takotsubo and low ejection fraction of 15%. Recommendations: Continue support with diuretics. Nitroglycerin as noted. She will be going to the floor on BiPAP. Beta-blocker. She normally takes Eliquis we will resume that. Also losartan 100 mg. Torsemide 20 mg. Metoprolol succinate 200 mg/day. ECG: EKG on 11/3/2022 atrial fibrillation at 100/min. Nonspecific ST and T wave changes. EKG on 1/12/2023 atrial fibrillation at 75/min. Nonspecific ST and T wave changes. Assessment:   Takotsubo cardiomyopathy with low ejection fraction hopefully has improved. Significant fluid retention. Bilateral lymphedema. Plan:   #1 reduce fluid intake  #2 increase Lasix to 80 mg twice daily  #3 increase Entresto to 1 full tablet twice daily  #4 echocardiogram February 2023\#5 return to see me and 3 months. Please call if we can assist further [phone]. Ayaka Maldonado.  J Luis SANDERSON, [facility] - [place]      This note was likely completed using voice recognition technology and may contain unintended errors

## 2023-01-12 NOTE — PROGRESS NOTES
[address]   Dr Ruy Baugh. Hasmukh Acuña MD, [address]    Outpatient Follow Up Note    1/12/2023,10:27 AM  Subjective:   CHIEF COMPLAINT / HPI:  Follow Up secondary to hypertension, hyperlipidemia, and severe peripheral edema. Tanvi Khan is 78 y.o. female who presents today for a routine follow up. Patient did have admission and was found to have Takotsubo. Her LV ejection fraction did drop significantly but subsequently did go up some. Since I last saw her she is gained about 30 pounds of fluid. She has severe bilateral lower extremity edema which I think is lymphedema but in addition to that he has extra fluid on board. She is having some PND and orthopnea and she basically sits up at night to attempt sleep. We had a long discussion about Jobst stockings and increasing her diuretics. Takotsubo cardiomyopathy  Hypertension  Lymphedema  Hyperlipidemia  Cardiac cath on 9/11/2022 with minimal plaque. Ejection fraction 15% on angiogram.  And subsequent echo showed improvement to 30 to 35%. Past Medical History:    Past Medical History:   Diagnosis Date    Acute systolic (congestive) heart failure (HCC)     Edema     Hearing loss     Hyperlipidemia     Hypertension     Morbid obesity due to excess calories ([facility] [address] 12/29/2015    Pre-diabetes     PUD (peptic ulcer disease)     \"years ago\",      Past Surgical History  Past Surgical History:   Procedure Laterality Date    HERNIA REPAIR      umbilical     KNEE SURGERY Bilateral     TKR bilateral     Social History:       Social History     Tobacco Use   Smoking Status Never   Smokeless Tobacco Never     Current Medications:  Prior to Visit Medications    Medication Sig Taking?  Authorizing Provider   cetirizine (ZYRTEC) 10 MG tablet TAKE 1 TABLET BY Wilfred Gutéirrez MD   furosemide (LASIX) 40 MG tablet Take 2 tablets by mouth daily Yes Sydney Chaney MD   carvedilol (COREG) 3.125 MG tablet TAKE 1 TABLET BY MOUTH TWICE A DAY WITH MEALS Yes Jamie Spencer MD   ELIQUIS 5 MG TABS tablet TAKE 1 TABLET BY MOUTH TWICE A DAY Yes LAWRENCE Biggs CNP   sacubitril-valsartan (ENTRESTO) 24-26 MG per tablet Take 0.5 tablets by mouth 2 times daily Yes Jamie Spencer MD   atorvastatin (LIPITOR) 80 MG tablet Take 1 tablet by mouth daily Yes Jamie Spencer MD   Misc. Devices (WALKER) MISC 1 each by Does not apply route daily Please provide rolling walker. Approved by patients insurance Yes Jamie Spencer MD   dapagliflozin (FARXIGA) 5 MG tablet Take 1 tablet by mouth every morning Yes LAWRENCE Gregory CNP   acetaminophen (TYLENOL) 500 MG tablet Take 500 mg by mouth every 6 hours as needed for Pain Yes Historical Provider, MD   vitamin D3 (CHOLECALCIFEROL) 10 MCG (400 UNIT) TABS tablet Take 1 tablet by mouth daily Yes Jamie Spencer MD   blood glucose monitor strips Test 3 times a day & as needed for symptoms of irregular blood glucose. Yes Ok Queen MD   acarbose (PRECOSE) 25 mg tablet TAKE 1 TABLET BY MOUTH 3 TIMES DAILY WITH MEALS.   Jamie Spencer MD   metoprolol succinate (TOPROL XL) 200 MG extended release tablet TAKE 1 TABLET BY MOUTH EVERY Farrel MD Angelo   losartan (COZAAR) 100 MG tablet TAKE 1 TABLET BY MOUTH EVERY Paulinorel MD Angelo   torsemide (DEMADEX) 20 MG tablet TAKE 1 TABLET BY MOUTH THREE TIMES A DAY  Jamie Spencer MD     Family History  Family History   Problem Relation Age of Onset    Other Mother         hernia    High Blood Pressure Father        Current Medications  Current Outpatient Medications   Medication Sig Dispense Refill    cetirizine (ZYRTEC) 10 MG tablet TAKE 1 TABLET BY MOUTH EVERY DAY 30 tablet 0    furosemide (LASIX) 40 MG tablet Take 2 tablets by mouth daily 180 tablet 1    carvedilol (COREG) 3.125 MG tablet TAKE 1 TABLET BY MOUTH TWICE A DAY WITH MEALS 60 tablet 3    ELIQUIS 5 MG TABS tablet TAKE 1 TABLET BY MOUTH TWICE A  tablet 3    sacubitril-valsartan (ENTRESTO) 24-26 MG per tablet Take 0.5 tablets by mouth 2 times daily 60 tablet 3    atorvastatin (LIPITOR) 80 MG tablet Take 1 tablet by mouth daily 90 tablet 3    Misc. Devices (WALKER) MISC 1 each by Does not apply route daily Please provide rolling walker. Approved by patients insurance 1 each 0    dapagliflozin (FARXIGA) 5 MG tablet Take 1 tablet by mouth every morning 90 tablet 3    acetaminophen (TYLENOL) 500 MG tablet Take 500 mg by mouth every 6 hours as needed for Pain      vitamin D3 (CHOLECALCIFEROL) 10 MCG (400 UNIT) TABS tablet Take 1 tablet by mouth daily 90 tablet 3    blood glucose monitor strips Test 3 times a day & as needed for symptoms of irregular blood glucose. 100 strip 1     No current facility-administered medications for this visit. REVIEW OF SYSTEMS:    CONSTITUTIONAL: No major weight gain or loss, fatigue, weakness, night sweats or fever. HEENT: No new vision difficulties or ringing in the ears. RESPIRATORY: No new SOB, PND, orthopnea or cough. CARDIOVASCULAR: See HPI  GI: No nausea, vomiting, diarrhea, constipation, abdominal pain or changes in bowel habits. : No urinary frequency, urgency, incontinence hematuria or dysuria. SKIN: No cyanosis or skin lesions. MUSCULOSKELETAL: No new muscle or joint pain. NEUROLOGICAL: No syncope or TIA-like symptoms. PSYCHIATRIC: No anxiety, pain, insomnia or depression    Objective:   PHYSICAL EXAM:        VITALS:    Wt Readings from Last 3 Encounters:   01/12/23 (!) 351 lb (159.2 kg)   12/07/22 (!) 340 lb 3.2 oz (154.3 kg)   11/30/22 (!) 347 lb 4.8 oz (157.5 kg)     BP Readings from Last 3 Encounters:   01/12/23 (!) 140/88   12/07/22 130/86   11/30/22 126/88     Pulse Readings from Last 3 Encounters:   01/12/23 70   12/07/22 74   11/30/22 (!) 118       CONSTITUTIONAL: Cooperative, no apparent distress, and appears well nourished / developed  NEUROLOGIC:  Awake and orientated to person, place and time. PSYCH: Calm affect.   SKIN: Warm and dry. HEENT: Sclera non-icteric, normocephalic, neck supple, no elevation of JVP, normal carotid pulses with no bruits and thyroid normal size. LUNGS:  No increased work of breathing and clear to auscultation, no crackles or wheezing  CARDIOVASCULAR:  Regular rate and rhythm with no murmurs, gallops, rubs, or abnormal heart sounds, normal PMI. The apical impulses not displaced  Heart tones are crisp and normal  Cervical veins are not engorged  The carotid upstroke is normal in amplitude and contour without delay or bruit  JVP is not elevated  ABDOMEN:  Normal bowel sounds, non-distended and non-tender to palpation  EXT: No edema, no calf tenderness. Pulses are present bilaterally. DATA:    Lab Results   Component Value Date    ALT 24 09/18/2022    AST 21 09/18/2022    ALKPHOS 64 09/18/2022    BILITOT 0.8 09/18/2022     Lab Results   Component Value Date    CREATININE 1.1 09/22/2022    BUN 22 (H) 09/22/2022     09/22/2022    K 4.0 09/22/2022    CL 97 (L) 09/22/2022    CO2 29 09/22/2022     Lab Results   Component Value Date    TSH 1.29 03/02/2022     Lab Results   Component Value Date    WBC 7.7 09/11/2022    HGB 11.8 (L) 09/11/2022    HCT 38.1 09/11/2022    MCV 77.4 (L) 09/11/2022     09/11/2022     No components found for: CHLPL  Lab Results   Component Value Date    TRIG 56 09/12/2022    TRIG 71 03/02/2022    TRIG 57 10/01/2020     Lab Results   Component Value Date    HDL 68 (H) 09/12/2022    HDL 58 03/02/2022    HDL 60 10/01/2020     Lab Results   Component Value Date    LDLCALC 166 (H) 09/12/2022    LDLCALC 139 (H) 03/02/2022    LDLCALC 107 (H) 10/01/2020     Lab Results   Component Value Date    LABVLDL 11 09/12/2022    LABVLDL 14 03/02/2022    LABVLDL 11 10/01/2020     Radiology Review:  Pertinent images / reports were reviewed as a part of this visit and reveals the following:    Echo:  Summary 9/16/22   Limited echo. Definity contrast administered   .  Left ventricular cavity size is normal with mild LVH   . Indeterminate diastolic function. [place] and distal septal hypokinetic. No evidence of left ventricular mass or thrombus   . Ejection fraction is estimated to be 30-35 %. Overall left ventricular systolic function is mildly depressed . Bhavik Pimentel The aortic valve leaflets are not well visualized. The aortic valve is mildly thickened/calcified but opens well with normal   gradients. Unable to estimate pulmonary artery pressure secondary to incomplete TR jet   envelope. Stress Test / Angiogram:   Assessment: 9/11/22  Acute coronary syndrome with Takotsubo. We did not find coronary lesions or need intervention. \  LV function is abnormal showing evidence for Takotsubo and low ejection fraction of 15%. Recommendations: Continue support with diuretics. Nitroglycerin as noted. She will be going to the floor on BiPAP. Beta-blocker. She normally takes Eliquis we will resume that. Also losartan 100 mg. Torsemide 20 mg. Metoprolol succinate 200 mg/day. ECG: EKG on 11/3/2022 atrial fibrillation at 100/min. Nonspecific ST and T wave changes. This patient was educated using the patient point room wall mount device. Absence from smokers and smoking and diet and exercising are important. Assessment:   Takotsubo cardiomyopathy with low ejection fraction hopefully has improved. Significant fluid retention. Bilateral lymphedema. Plan:   Reduce fluid intake. Reduce salt and sodium intake. Increase Lasix to 80 mg/day. See me back in 2 to 3 weeks. We will get renal panel. We did talk about Jobst stockings and she will be looking into that. Note that the patient is on Eliquis at 5 mg twice daily. Also on Entresto at 25 mg twice daily. Lipitor at 80 mg. Please call if we can assist further [phone]. Bienvenido Whittaker.  J Luis SANDERSON, [facility] - [place]      This note was likely completed using voice recognition technology and may contain unintended errors

## 2023-01-24 RX ORDER — CETIRIZINE HYDROCHLORIDE 10 MG/1
TABLET ORAL
Qty: 30 TABLET | Refills: 0 | Status: SHIPPED | OUTPATIENT
Start: 2023-01-24

## 2023-02-06 RX ORDER — FUROSEMIDE 80 MG
TABLET ORAL
Qty: 180 TABLET | Refills: 2 | Status: SHIPPED | OUTPATIENT
Start: 2023-02-06

## 2023-02-15 ENCOUNTER — TELEPHONE (OUTPATIENT)
Dept: CARDIOLOGY CLINIC | Age: 83
End: 2023-02-15

## 2023-02-15 NOTE — TELEPHONE ENCOUNTER
Patient called and stated she has concerns about her legs. She thinks that she may needs potassium.  She stated when she gets up her legs tense up. Patient wants a call back [phone] cell or [phone] home

## 2023-02-23 ENCOUNTER — HOSPITAL ENCOUNTER (OUTPATIENT)
Dept: NON INVASIVE DIAGNOSTICS | Age: 83
Discharge: HOME | End: 2023-02-23
Payer: MEDICARE

## 2023-02-23 DIAGNOSIS — I42.9 CARDIOMYOPATHY, UNSPECIFIED TYPE (HCC): ICD-10-CM

## 2023-02-23 DIAGNOSIS — R07.9 CHEST PAIN AT REST: ICD-10-CM

## 2023-02-23 LAB
LV EF: 58 %
LVEF MODALITY: NORMAL

## 2023-02-23 PROCEDURE — 93306 TTE W/DOPPLER COMPLETE: CPT

## 2023-02-23 RX ORDER — CETIRIZINE HYDROCHLORIDE 10 MG/1
TABLET ORAL
Qty: 30 TABLET | Refills: 0 | Status: SHIPPED | OUTPATIENT
Start: 2023-02-23

## 2023-02-23 NOTE — TELEPHONE ENCOUNTER
Medication:   Requested Prescriptions     Pending Prescriptions Disp Refills    cetirizine (ZYRTEC) 10 MG tablet [Pharmacy Med Name: CETIRIZINE HCL 10 MG TABLET] 30 tablet 0     Sig: TAKE 1 TABLET BY MOUTH EVERY DAY        Last Filled:      Patient Phone Number: [phone] (home)     Last appt: 12/7/2022   Next appt: 3/1/2023    Last OARRS: No flowsheet data found.

## 2023-02-27 RX ORDER — CARVEDILOL 3.12 MG/1
TABLET ORAL
Qty: 180 TABLET | Refills: 1 | Status: SHIPPED | OUTPATIENT
Start: 2023-02-27

## 2023-02-27 NOTE — TELEPHONE ENCOUNTER
Medication:   Requested Prescriptions     Pending Prescriptions Disp Refills    carvedilol (COREG) 3.125 MG tablet [Pharmacy Med Name: CARVEDILOL 3.125 MG TABLET] 180 tablet 1     Sig: TAKE 1 TABLET BY MOUTH TWICE A DAY WITH MEALS        Last Filled:      Patient Phone Number: [phone] (home)     Last appt: 12/7/2022   Next appt: 3/1/2023    Last OARRS: No flowsheet data found.

## 2023-03-09 DIAGNOSIS — E55.9 VITAMIN D DEFICIENCY: ICD-10-CM

## 2023-03-09 RX ORDER — OMEGA-3S/DHA/EPA/FISH OIL/D3 300MG-1000
CAPSULE ORAL
Qty: 90 TABLET | Refills: 3 | Status: SHIPPED | OUTPATIENT
Start: 2023-03-09

## 2023-03-09 NOTE — TELEPHONE ENCOUNTER
Medication:   Requested Prescriptions     Pending Prescriptions Disp Refills    vitamin D3 (CHOLECALCIFEROL) 10 MCG (400 UNIT) TABS tablet [Pharmacy Med Name: VITAMIN D3 400 UNIT TABLET] 90 tablet 3     Sig: TAKE 1 TABLET BY MOUTH EVERY DAY        Last Filled:      Patient Phone Number: [phone] (home)     Last appt: 12/7/2022   Next appt: Visit date not found    Last OARRS: No flowsheet data found.

## 2023-03-22 ENCOUNTER — OFFICE VISIT (OUTPATIENT)
Dept: CARDIOLOGY CLINIC | Age: 83
End: 2023-03-22
Payer: MEDICARE

## 2023-03-22 VITALS
WEIGHT: 293 LBS | BODY MASS INDEX: 47.09 KG/M2 | HEART RATE: 96 BPM | HEIGHT: 66 IN | SYSTOLIC BLOOD PRESSURE: 120 MMHG | DIASTOLIC BLOOD PRESSURE: 88 MMHG

## 2023-03-22 DIAGNOSIS — I48.91 ATRIAL FIBRILLATION WITH RVR (HCC): ICD-10-CM

## 2023-03-22 DIAGNOSIS — Z00.00 ROUTINE CHECK-UP: Primary | ICD-10-CM

## 2023-03-22 DIAGNOSIS — Z01.818 PRE-OP TESTING: ICD-10-CM

## 2023-03-22 DIAGNOSIS — I48.19 PERSISTENT ATRIAL FIBRILLATION (HCC): ICD-10-CM

## 2023-03-22 PROCEDURE — 93000 ELECTROCARDIOGRAM COMPLETE: CPT | Performed by: INTERNAL MEDICINE

## 2023-03-22 PROCEDURE — 1123F ACP DISCUSS/DSCN MKR DOCD: CPT | Performed by: INTERNAL MEDICINE

## 2023-03-22 PROCEDURE — 99214 OFFICE O/P EST MOD 30 MIN: CPT | Performed by: INTERNAL MEDICINE

## 2023-03-22 PROCEDURE — 3074F SYST BP LT 130 MM HG: CPT | Performed by: INTERNAL MEDICINE

## 2023-03-22 PROCEDURE — 3079F DIAST BP 80-89 MM HG: CPT | Performed by: INTERNAL MEDICINE

## 2023-03-22 NOTE — PROGRESS NOTES
(COREG) 3.125 MG tablet TAKE 1 TABLET BY MOUTH TWICE A DAY WITH MEALS Yes Beny Drake MD   cetirizine (ZYRTEC) 10 MG tablet TAKE 1 TABLET BY MOUTH EVERY DAY Yes Beny Drake MD   furosemide (LASIX) 80 MG tablet TAKE 1 TABLET BY MOUTH TWICE A DAY Yes LAWRENCE Vines CNP   sacubitril-valsartan (ENTRESTO) 24-26 MG per tablet Take 1 tablet by mouth 2 times daily Yes Daisha Jacobsen MD   ELIQUIS 5 MG TABS tablet TAKE 1 TABLET BY MOUTH TWICE A DAY Yes LAWRENCE Sutherland CNP   atorvastatin (LIPITOR) 80 MG tablet Take 1 tablet by mouth daily Yes Beny Drake MD   Misc. Devices (WALKER) MISC 1 each by Does not apply route daily Please provide rolling walker. Approved by patients insurance Yes Beny Drake MD   dapagliflozin (FARXIGA) 5 MG tablet Take 1 tablet by mouth every morning Yes LAWRENCE Vines CNP   acetaminophen (TYLENOL) 500 MG tablet Take 500 mg by mouth every 6 hours as needed for Pain Yes Historical Provider, MD   blood glucose monitor strips Test 3 times a day & as needed for symptoms of irregular blood glucose. Yes Johnson Montes MD   acarbose (PRECOSE) 25 mg tablet TAKE 1 TABLET BY MOUTH 3 TIMES DAILY WITH MEALS.   Beny Drake MD   metoprolol succinate (TOPROL XL) 200 MG extended release tablet TAKE 1 TABLET BY MOUTH EVERY DAY  Beny Drake MD   losartan (COZAAR) 100 MG tablet TAKE 1 TABLET BY MOUTH EVERY DAY  Beny Drake MD   torsemide (DEMADEX) 20 MG tablet TAKE 1 TABLET BY MOUTH THREE TIMES A DAY  Beny Drake MD     Family History  Family History   Problem Relation Age of Onset    Other Mother         hernia    High Blood Pressure Father        Current Medications  Current Outpatient Medications   Medication Sig Dispense Refill    vitamin D3 (CHOLECALCIFEROL) 10 MCG (400 UNIT) TABS tablet TAKE 1 TABLET BY MOUTH EVERY DAY 90 tablet 3    carvedilol (COREG) 3.125 MG tablet TAKE 1 TABLET BY MOUTH TWICE A DAY WITH MEALS 180 tablet 1    cetirizine (ZYRTEC) 10 MG tablet

## 2023-03-23 RX ORDER — CETIRIZINE HYDROCHLORIDE 10 MG/1
TABLET ORAL
Qty: 30 TABLET | Refills: 3 | Status: SHIPPED | OUTPATIENT
Start: 2023-03-23 | End: 2023-05-24 | Stop reason: SDUPTHER

## 2023-04-05 NOTE — PROGRESS NOTES
Called patient about procedure. Told to be here at 0930 for procedure at 1100. Must be NPO after midnight but can take morning medication with sips of water. Patient stated they take Eliquis, no missed doses in last 30 days. Told to have a responsible adult with them to take them home and stay with them afterwards, if they do not get admitted to hospital. Also, to bring a current list of medications. No other questions or concerns.

## 2023-04-06 ENCOUNTER — HOSPITAL ENCOUNTER (OUTPATIENT)
Dept: CARDIAC CATH/INVASIVE PROCEDURES | Age: 83
Discharge: HOME | End: 2023-04-06

## 2023-04-18 ENCOUNTER — TELEPHONE (OUTPATIENT)
Dept: CARDIOLOGY CLINIC | Age: 83
End: 2023-04-18

## 2023-04-25 ENCOUNTER — HOSPITAL ENCOUNTER (OUTPATIENT)
Dept: CARDIAC CATH/INVASIVE PROCEDURES | Age: 83
Discharge: HOME | End: 2023-04-27
Payer: MEDICARE

## 2023-04-25 LAB
ANION GAP SERPL CALCULATED.3IONS-SCNC: 9 MMOL/L (ref 3–16)
BUN SERPL-MCNC: 22 MG/DL (ref 7–20)
CALCIUM SERPL-MCNC: 9.4 MG/DL (ref 8.3–10.6)
CHLORIDE SERPL-SCNC: 98 MMOL/L (ref 99–110)
CO2 SERPL-SCNC: 33 MMOL/L (ref 21–32)
CREAT SERPL-MCNC: 1 MG/DL (ref 0.6–1.2)
EKG ATRIAL RATE: 241 BPM
EKG DIAGNOSIS: NORMAL
EKG Q-T INTERVAL: 378 MS
EKG QRS DURATION: 76 MS
EKG QTC CALCULATION (BAZETT): 433 MS
EKG R AXIS: 6 DEGREES
EKG T AXIS: 3 DEGREES
EKG VENTRICULAR RATE: 79 BPM
GFR SERPLBLD CREATININE-BSD FMLA CKD-EPI: 55 ML/MIN/{1.73_M2}
GLUCOSE SERPL-MCNC: 113 MG/DL (ref 70–99)
INR PPP: 1.36 (ref 0.84–1.16)
POTASSIUM SERPL-SCNC: 4 MMOL/L (ref 3.5–5.1)
PROTHROMBIN TIME: 16.8 SEC (ref 11.5–14.8)
SODIUM SERPL-SCNC: 140 MMOL/L (ref 136–145)

## 2023-04-25 PROCEDURE — 80048 BASIC METABOLIC PNL TOTAL CA: CPT

## 2023-04-25 PROCEDURE — 85610 PROTHROMBIN TIME: CPT

## 2023-04-25 PROCEDURE — 93005 ELECTROCARDIOGRAM TRACING: CPT | Performed by: INTERNAL MEDICINE

## 2023-04-25 NOTE — PROCEDURES
Interventional cardiology note  This patient brought in today for elective cardioversion. She has atrial fibrillation and during the course of her stay we could not get IV access site. Several attempts were made and eventually could not get IV access therefore cannot proceed with given her sedation and doing the DCCV. Patient is in atrial fibrillation at controlled rate of about 60 bpm.  She tells me that she has been taking her medication anticoagulation Eliquis at 5 mg twice daily without fail. .  She has cardiomyopathy and is on carvedilol and [place]. .  At this time she is stable. She is going home without any intervention at this time. We will follow her in the office as necessary.   Nils Coleman MD, [facility] - [place]

## 2023-05-18 ENCOUNTER — TELEPHONE (OUTPATIENT)
Dept: FAMILY MEDICINE CLINIC | Age: 83
End: 2023-05-18

## 2023-05-18 NOTE — TELEPHONE ENCOUNTER
[id number] Stephanie Rodriguez will review report
For you situational awareness     Please advise
Nurse Practitioner Mainor Ambriz called from [facility] stating that she did a [facility] peripheral artery disease test. NP states that it is severe in both of her limbs. NP states also that Dr. Vishnu Vaca will receive a report of these results Via  fax.        Mainor Ambriz call back number: [phone]      Last Office Visit: 12/07/2022
n/a

## 2023-05-22 ENCOUNTER — TELEPHONE (OUTPATIENT)
Dept: FAMILY MEDICINE CLINIC | Age: 83
End: 2023-05-22

## 2023-05-22 NOTE — TELEPHONE ENCOUNTER
Patient son stopped in office wanting to know if a medication can be sent in for patient. Patient is having congestion, chills, body aches. Son states did at home COVID test and it was negative.              Last Office Visit: 12/07/2022

## 2023-05-23 NOTE — TELEPHONE ENCOUNTER
It may be a call  Recommend tylenol, for body aches and chills  And mucinex prn for congestion and cough    Or she can do a vv or tel visit with NP today

## 2023-05-24 ENCOUNTER — OFFICE VISIT (OUTPATIENT)
Dept: FAMILY MEDICINE CLINIC | Age: 83
End: 2023-05-24
Payer: MEDICARE

## 2023-05-24 VITALS
OXYGEN SATURATION: 95 % | WEIGHT: 293 LBS | HEART RATE: 80 BPM | HEIGHT: 66 IN | DIASTOLIC BLOOD PRESSURE: 78 MMHG | TEMPERATURE: 97.2 F | BODY MASS INDEX: 47.09 KG/M2 | SYSTOLIC BLOOD PRESSURE: 141 MMHG

## 2023-05-24 DIAGNOSIS — E78.5 HYPERLIPIDEMIA LDL GOAL <100: ICD-10-CM

## 2023-05-24 DIAGNOSIS — J30.9 ALLERGIC RHINITIS, UNSPECIFIED SEASONALITY, UNSPECIFIED TRIGGER: Primary | ICD-10-CM

## 2023-05-24 DIAGNOSIS — B96.89 ACUTE BACTERIAL SINUSITIS: ICD-10-CM

## 2023-05-24 DIAGNOSIS — I10 ESSENTIAL HYPERTENSION: ICD-10-CM

## 2023-05-24 DIAGNOSIS — J01.90 ACUTE BACTERIAL SINUSITIS: ICD-10-CM

## 2023-05-24 PROCEDURE — 99214 OFFICE O/P EST MOD 30 MIN: CPT | Performed by: NURSE PRACTITIONER

## 2023-05-24 PROCEDURE — 1123F ACP DISCUSS/DSCN MKR DOCD: CPT | Performed by: NURSE PRACTITIONER

## 2023-05-24 PROCEDURE — 3074F SYST BP LT 130 MM HG: CPT | Performed by: NURSE PRACTITIONER

## 2023-05-24 PROCEDURE — 3078F DIAST BP <80 MM HG: CPT | Performed by: NURSE PRACTITIONER

## 2023-05-24 RX ORDER — CETIRIZINE HYDROCHLORIDE 10 MG/1
10 TABLET ORAL DAILY
Qty: 30 TABLET | Refills: 3 | Status: SHIPPED | OUTPATIENT
Start: 2023-05-24 | End: 2023-08-22

## 2023-05-24 RX ORDER — AMOXICILLIN 500 MG/1
500 CAPSULE ORAL 2 TIMES DAILY
Qty: 20 CAPSULE | Refills: 0 | Status: SHIPPED | OUTPATIENT
Start: 2023-05-24 | End: 2023-06-03

## 2023-05-24 RX ORDER — ROSUVASTATIN CALCIUM 40 MG/1
40 TABLET, COATED ORAL DAILY
Qty: 30 TABLET | Refills: 0 | Status: SHIPPED | OUTPATIENT
Start: 2023-05-24

## 2023-05-24 RX ORDER — BENZONATATE 100 MG/1
100 CAPSULE ORAL 3 TIMES DAILY PRN
Qty: 21 CAPSULE | Refills: 0 | Status: SHIPPED | OUTPATIENT
Start: 2023-05-24 | End: 2023-05-31

## 2023-05-24 SDOH — ECONOMIC STABILITY: INCOME INSECURITY: HOW HARD IS IT FOR YOU TO PAY FOR THE VERY BASICS LIKE FOOD, HOUSING, MEDICAL CARE, AND HEATING?: NOT HARD AT ALL

## 2023-05-24 SDOH — ECONOMIC STABILITY: FOOD INSECURITY: WITHIN THE PAST 12 MONTHS, YOU WORRIED THAT YOUR FOOD WOULD RUN OUT BEFORE YOU GOT MONEY TO BUY MORE.: NEVER TRUE

## 2023-05-24 SDOH — ECONOMIC STABILITY: HOUSING INSECURITY
IN THE LAST 12 MONTHS, WAS THERE A TIME WHEN YOU DID NOT HAVE A STEADY PLACE TO SLEEP OR SLEPT IN A SHELTER (INCLUDING NOW)?: NO

## 2023-05-24 SDOH — ECONOMIC STABILITY: FOOD INSECURITY: WITHIN THE PAST 12 MONTHS, THE FOOD YOU BOUGHT JUST DIDN'T LAST AND YOU DIDN'T HAVE MONEY TO GET MORE.: NEVER TRUE

## 2023-05-24 ASSESSMENT — PATIENT HEALTH QUESTIONNAIRE - PHQ9
SUM OF ALL RESPONSES TO PHQ QUESTIONS 1-9: 0
SUM OF ALL RESPONSES TO PHQ QUESTIONS 1-9: 0
2. FEELING DOWN, DEPRESSED OR HOPELESS: 0
1. LITTLE INTEREST OR PLEASURE IN DOING THINGS: 0
SUM OF ALL RESPONSES TO PHQ QUESTIONS 1-9: 0
SUM OF ALL RESPONSES TO PHQ QUESTIONS 1-9: 0
SUM OF ALL RESPONSES TO PHQ9 QUESTIONS 1 & 2: 0

## 2023-05-30 ASSESSMENT — ENCOUNTER SYMPTOMS
NAUSEA: 0
SHORTNESS OF BREATH: 0
SINUS COMPLAINT: 1
SWOLLEN GLANDS: 1
ABDOMINAL PAIN: 0
SORE THROAT: 1
COUGH: 0
ANAL BLEEDING: 0
RECTAL PAIN: 0
BACK PAIN: 0
CONSTIPATION: 0
SINUS PRESSURE: 1
BLOOD IN STOOL: 0
HOARSE VOICE: 0
DIARRHEA: 0

## 2023-05-30 NOTE — ASSESSMENT & PLAN NOTE
Borderline controlled, medication adherence emphasized, lifestyle modifications recommended and check cbc cmp, followup with dr Phuc Abarca in 1 month

## 2023-05-30 NOTE — ASSESSMENT & PLAN NOTE
Unclear control, changes made today: check fasting lipids, will switch to rosuvastatin to see if better tolerated, medication adherence emphasized and lifestyle modifications recommended

## 2023-06-08 ENCOUNTER — TELEPHONE (OUTPATIENT)
Dept: FAMILY MEDICINE CLINIC | Age: 83
End: 2023-06-08

## 2023-06-08 DIAGNOSIS — I73.9 PVD (PERIPHERAL VASCULAR DISEASE) (HCC): Primary | ICD-10-CM

## 2023-06-08 NOTE — TELEPHONE ENCOUNTER
Thank you. Post-Care Instructions: I reviewed with the patient in detail post-care instructions. Patient is not to engage in any heavy lifting, exercise, or swimming for the next 14 days. Should the patient develop any fevers, chills, bleeding, severe pain patient will contact the office immediately.

## 2023-06-08 NOTE — TELEPHONE ENCOUNTER
Abn circulation. Needs arterial doppler legs. Dx pvd    Pt has been informed and provided the number to central scheduling. Can you please verify that the correct order was placed.

## 2023-06-23 DIAGNOSIS — E78.5 HYPERLIPIDEMIA LDL GOAL <100: ICD-10-CM

## 2023-06-23 RX ORDER — ROSUVASTATIN CALCIUM 40 MG/1
TABLET, COATED ORAL
Qty: 30 TABLET | Refills: 5 | Status: SHIPPED | OUTPATIENT
Start: 2023-06-23

## 2023-06-23 NOTE — TELEPHONE ENCOUNTER
Medication:   Requested Prescriptions     Pending Prescriptions Disp Refills    rosuvastatin (CRESTOR) 40 MG tablet [Pharmacy Med Name: ROSUVASTATIN CALCIUM 40 MG TAB] 30 tablet 0     Sig: TAKE 1 TABLET BY MOUTH EVERY DAY        Last Filled:      Patient Phone Number: [phone] (home)     Last appt: 6/13/2023   Next appt: 6/29/2023    Last OARRS: No flowsheet data found.

## 2023-07-31 ENCOUNTER — OFFICE VISIT (OUTPATIENT)
Dept: FAMILY MEDICINE CLINIC | Age: 83
End: 2023-07-31
Payer: MEDICARE

## 2023-07-31 VITALS
RESPIRATION RATE: 15 BRPM | BODY MASS INDEX: 48.82 KG/M2 | HEIGHT: 65 IN | TEMPERATURE: 97 F | SYSTOLIC BLOOD PRESSURE: 134 MMHG | DIASTOLIC BLOOD PRESSURE: 80 MMHG | OXYGEN SATURATION: 94 % | WEIGHT: 293 LBS | HEART RATE: 76 BPM

## 2023-07-31 DIAGNOSIS — I10 ESSENTIAL HYPERTENSION: ICD-10-CM

## 2023-07-31 DIAGNOSIS — M25.551 HIP PAIN, RIGHT: ICD-10-CM

## 2023-07-31 DIAGNOSIS — R25.2 LEG CRAMPS: ICD-10-CM

## 2023-07-31 DIAGNOSIS — M54.2 NECK PAIN: ICD-10-CM

## 2023-07-31 DIAGNOSIS — Z00.00 MEDICARE ANNUAL WELLNESS VISIT, SUBSEQUENT: Primary | ICD-10-CM

## 2023-07-31 PROCEDURE — 3074F SYST BP LT 130 MM HG: CPT | Performed by: FAMILY MEDICINE

## 2023-07-31 PROCEDURE — 1123F ACP DISCUSS/DSCN MKR DOCD: CPT | Performed by: FAMILY MEDICINE

## 2023-07-31 PROCEDURE — G0439 PPPS, SUBSEQ VISIT: HCPCS | Performed by: FAMILY MEDICINE

## 2023-07-31 PROCEDURE — 99213 OFFICE O/P EST LOW 20 MIN: CPT | Performed by: FAMILY MEDICINE

## 2023-07-31 PROCEDURE — 3078F DIAST BP <80 MM HG: CPT | Performed by: FAMILY MEDICINE

## 2023-07-31 RX ORDER — TIZANIDINE 2 MG/1
2 TABLET ORAL 3 TIMES DAILY PRN
Qty: 30 TABLET | Refills: 0 | Status: SHIPPED | OUTPATIENT
Start: 2023-07-31

## 2023-07-31 SDOH — ECONOMIC STABILITY: FOOD INSECURITY: WITHIN THE PAST 12 MONTHS, THE FOOD YOU BOUGHT JUST DIDN'T LAST AND YOU DIDN'T HAVE MONEY TO GET MORE.: NEVER TRUE

## 2023-07-31 SDOH — ECONOMIC STABILITY: FOOD INSECURITY: WITHIN THE PAST 12 MONTHS, YOU WORRIED THAT YOUR FOOD WOULD RUN OUT BEFORE YOU GOT MONEY TO BUY MORE.: NEVER TRUE

## 2023-07-31 SDOH — ECONOMIC STABILITY: INCOME INSECURITY: HOW HARD IS IT FOR YOU TO PAY FOR THE VERY BASICS LIKE FOOD, HOUSING, MEDICAL CARE, AND HEATING?: NOT HARD AT ALL

## 2023-07-31 ASSESSMENT — PATIENT HEALTH QUESTIONNAIRE - PHQ9
SUM OF ALL RESPONSES TO PHQ QUESTIONS 1-9: 0
SUM OF ALL RESPONSES TO PHQ9 QUESTIONS 1 & 2: 0
SUM OF ALL RESPONSES TO PHQ QUESTIONS 1-9: 0
1. LITTLE INTEREST OR PLEASURE IN DOING THINGS: 0
2. FEELING DOWN, DEPRESSED OR HOPELESS: 0
SUM OF ALL RESPONSES TO PHQ QUESTIONS 1-9: 0
SUM OF ALL RESPONSES TO PHQ QUESTIONS 1-9: 0

## 2023-07-31 NOTE — PROGRESS NOTES
Empaneled Provider     Reviewed and updated this visit:  Tobacco  Allergies  Meds  Med Hx  Surg Hx  Soc Hx  Fam Hx

## 2023-07-31 NOTE — PATIENT INSTRUCTIONS
lifestyle, illnesses that may run in your family, and various assessments and screenings as appropriate. After reviewing your medical record and screening and assessments performed today your provider may have ordered immunizations, labs, imaging, and/or referrals for you. A list of these orders (if applicable) as well as your Preventive Care list are included within your After Visit Summary for your review. Other Preventive Recommendations:    A preventive eye exam performed by an eye specialist is recommended every 1-2 years to screen for glaucoma; cataracts, macular degeneration, and other eye disorders. A preventive dental visit is recommended every 6 months. Try to get at least 150 minutes of exercise per week or 10,000 steps per day on a pedometer . Order or download the FREE \"Exercise & Physical Activity: Your Everyday Guide\" from [employer]. Call 4-559.305.8241 or search [employer] online. You need 5724-1559 mg of calcium and 3644-6709 IU of vitamin D per day. It is possible to meet your calcium requirement with diet alone, but a vitamin D supplement is usually necessary to meet this goal.  When exposed to the sun, use a sunscreen that protects against both UVA and UVB radiation with an SPF of 30 or greater. Reapply every 2 to 3 hours or after sweating, drying off with a towel, or swimming. Always wear a seat belt when traveling in a car. Always wear a helmet when riding a bicycle or motorcycle.

## 2023-08-04 LAB
AMBIGUOUS ABBREVIATION: NORMAL
BUN / CREAT RATIO: 19 (ref 12–28)
BUN BLDV-MCNC: 19 MG/DL (ref 8–27)
CALCIUM SERPL-MCNC: 8.8 MG/DL (ref 8.7–10.3)
CHLORIDE BLD-SCNC: 103 MMOL/L (ref 96–106)
CO2: 20 MMOL/L (ref 20–29)
CREAT SERPL-MCNC: 1.02 MG/DL (ref 0.57–1)
ESTIMATED GLOMERULAR FILTRATION RATE CREATININE EQUATION: 54 ML/MIN/1.73
GLUCOSE BLD-MCNC: 105 MG/DL (ref 70–99)
POTASSIUM SERPL-SCNC: 4.2 MMOL/L (ref 3.5–5.2)
SODIUM BLD-SCNC: 143 MMOL/L (ref 134–144)
TOTAL CK: 124 U/L (ref 26–161)

## 2023-08-07 DIAGNOSIS — M54.2 NECK PAIN: ICD-10-CM

## 2023-08-07 RX ORDER — TIZANIDINE 2 MG/1
2 TABLET ORAL 3 TIMES DAILY PRN
Qty: 90 TABLET | Refills: 0 | Status: SHIPPED | OUTPATIENT
Start: 2023-08-07

## 2023-08-07 NOTE — TELEPHONE ENCOUNTER
Medication:   Requested Prescriptions     Pending Prescriptions Disp Refills    tiZANidine (ZANAFLEX) 2 MG tablet 90 tablet 0     Sig: Take 1 tablet by mouth 3 times daily as needed (R neck pain, spasm)        Last Filled:      Patient Phone Number: [phone] (home)     Last appt: 7/31/2023   Next appt: Visit date not found    Last OARRS: No flowsheet data found.

## 2023-08-31 ENCOUNTER — HOSPITAL ENCOUNTER (EMERGENCY)
Age: 83
Discharge: HOME | End: 2023-08-31
Attending: EMERGENCY MEDICINE
Payer: MEDICARE

## 2023-08-31 ENCOUNTER — APPOINTMENT (OUTPATIENT)
Dept: GENERAL RADIOLOGY | Age: 83
End: 2023-08-31
Payer: MEDICARE

## 2023-08-31 VITALS
BODY MASS INDEX: 48.82 KG/M2 | DIASTOLIC BLOOD PRESSURE: 87 MMHG | SYSTOLIC BLOOD PRESSURE: 141 MMHG | HEIGHT: 65 IN | HEART RATE: 72 BPM | TEMPERATURE: 98.1 F | WEIGHT: 293 LBS | OXYGEN SATURATION: 100 % | RESPIRATION RATE: 18 BRPM

## 2023-08-31 DIAGNOSIS — I10 UNCONTROLLED HYPERTENSION: ICD-10-CM

## 2023-08-31 DIAGNOSIS — I48.91 ATRIAL FIBRILLATION WITH RVR (HCC): Primary | ICD-10-CM

## 2023-08-31 LAB
ANION GAP SERPL CALCULATED.3IONS-SCNC: 12 MMOL/L (ref 3–16)
BASOPHILS # BLD: 0.1 K/UL (ref 0–0.2)
BASOPHILS NFR BLD: 1.3 %
BUN SERPL-MCNC: 24 MG/DL (ref 7–20)
CALCIUM SERPL-MCNC: 9.7 MG/DL (ref 8.3–10.6)
CHLORIDE SERPL-SCNC: 100 MMOL/L (ref 99–110)
CO2 SERPL-SCNC: 29 MMOL/L (ref 21–32)
CREAT SERPL-MCNC: 1 MG/DL (ref 0.6–1.2)
DEPRECATED RDW RBC AUTO: 17.3 % (ref 12.4–15.4)
EOSINOPHIL # BLD: 0.1 K/UL (ref 0–0.6)
EOSINOPHIL NFR BLD: 1.2 %
GFR SERPLBLD CREATININE-BSD FMLA CKD-EPI: 55 ML/MIN/{1.73_M2}
GLUCOSE SERPL-MCNC: 108 MG/DL (ref 70–99)
HCT VFR BLD AUTO: 41.7 % (ref 36–48)
HGB BLD-MCNC: 13.6 G/DL (ref 12–16)
LYMPHOCYTES # BLD: 1.8 K/UL (ref 1–5.1)
LYMPHOCYTES NFR BLD: 41 %
MAGNESIUM SERPL-MCNC: 2.1 MG/DL (ref 1.8–2.4)
MCH RBC QN AUTO: 25.6 PG (ref 26–34)
MCHC RBC AUTO-ENTMCNC: 32.7 G/DL (ref 31–36)
MCV RBC AUTO: 78.2 FL (ref 80–100)
MONOCYTES # BLD: 0.3 K/UL (ref 0–1.3)
MONOCYTES NFR BLD: 7.4 %
NEUTROPHILS # BLD: 2.2 K/UL (ref 1.7–7.7)
NEUTROPHILS NFR BLD: 49.1 %
NT-PROBNP SERPL-MCNC: 824 PG/ML (ref 0–449)
PLATELET # BLD AUTO: 268 K/UL (ref 135–450)
PMV BLD AUTO: 8.5 FL (ref 5–10.5)
POTASSIUM SERPL-SCNC: 4 MMOL/L (ref 3.5–5.1)
RBC # BLD AUTO: 5.33 M/UL (ref 4–5.2)
SODIUM SERPL-SCNC: 141 MMOL/L (ref 136–145)
TROPONIN, HIGH SENSITIVITY: 12 NG/L (ref 0–14)
TROPONIN, HIGH SENSITIVITY: 9 NG/L (ref 0–14)
WBC # BLD AUTO: 4.5 K/UL (ref 4–11)

## 2023-08-31 PROCEDURE — 96374 THER/PROPH/DIAG INJ IV PUSH: CPT

## 2023-08-31 PROCEDURE — 71046 X-RAY EXAM CHEST 2 VIEWS: CPT

## 2023-08-31 PROCEDURE — 84484 ASSAY OF TROPONIN QUANT: CPT

## 2023-08-31 PROCEDURE — 85025 COMPLETE CBC W/AUTO DIFF WBC: CPT

## 2023-08-31 PROCEDURE — 83880 ASSAY OF NATRIURETIC PEPTIDE: CPT

## 2023-08-31 PROCEDURE — 83735 ASSAY OF MAGNESIUM: CPT

## 2023-08-31 PROCEDURE — 99285 EMERGENCY DEPT VISIT HI MDM: CPT

## 2023-08-31 PROCEDURE — 2500000003 HC RX 250 WO HCPCS: Performed by: EMERGENCY MEDICINE

## 2023-08-31 PROCEDURE — 80048 BASIC METABOLIC PNL TOTAL CA: CPT

## 2023-08-31 PROCEDURE — 36415 COLL VENOUS BLD VENIPUNCTURE: CPT

## 2023-08-31 RX ORDER — METOPROLOL TARTRATE 5 MG/5ML
5 INJECTION INTRAVENOUS ONCE
Status: COMPLETED | OUTPATIENT
Start: 2023-08-31 | End: 2023-08-31

## 2023-08-31 RX ADMIN — METOPROLOL TARTRATE 5 MG: 1 INJECTION, SOLUTION INTRAVENOUS at 12:46

## 2023-08-31 ASSESSMENT — PAIN - FUNCTIONAL ASSESSMENT: PAIN_FUNCTIONAL_ASSESSMENT: 0-10

## 2023-08-31 ASSESSMENT — PAIN DESCRIPTION - FREQUENCY: FREQUENCY: CONTINUOUS

## 2023-08-31 ASSESSMENT — PAIN DESCRIPTION - ORIENTATION: ORIENTATION: LEFT

## 2023-08-31 ASSESSMENT — PAIN DESCRIPTION - LOCATION: LOCATION: HEAD

## 2023-08-31 ASSESSMENT — PAIN DESCRIPTION - DESCRIPTORS: DESCRIPTORS: ACHING

## 2023-08-31 ASSESSMENT — PAIN DESCRIPTION - PAIN TYPE: TYPE: ACUTE PAIN

## 2023-08-31 ASSESSMENT — PAIN SCALES - GENERAL: PAINLEVEL_OUTOF10: 7

## 2023-08-31 NOTE — ED NOTES
Pt educated on discharge instructions and given discharge papers. Pt verbalized understanding discharge with no questions nor concerns. Pt ambulated to exit with stable gait.      Mike Minaya RN  08/31/23 [id number]

## 2023-08-31 NOTE — ED PROVIDER NOTES
[facility] ENCOUNTER          ATTENDING PHYSICIAN NOTE       Date of evaluation: 8/31/2023    ADDENDUM:      Care of this patient was assumed from Dr Valerie Blanco. The patient was seen for Hypertension (States she takes her BP 3x a day and yesterday noticed it was elevated. 150s/110s at home. Started with headache this morning on left side, 7/10. Denies chest pain, sob, n/v/d.)  . The patient's initial evaluation and plan have been discussed with the prior provider who initially evaluated the patient. Nursing Notes, Past Medical Hx, Past Surgical Hx, Social Hx, Allergies, and Family Hx were all reviewed. ASSESSMENT / PLAN  (MEDICAL DECISION MAKING)     Allison Thompson is a 78 y.o. female who presents with chief complaint of hypertension. On initial exam was hypertensive and tachycardic in A-fib RVR. Was given 5 mg of IV metoprolol several hours ago. I was asked to follow-up on repeat troponin. Repeat troponin unremarkable. Blood pressure continues to be normal in the 140s with heart rate normal in the 80s, rate controlled A-fib. Neil Sanchez Unclear trigger for having elevated blood pressure, states she is compliant with her medications. Does have a cardiologist to follow-up with. Feels better on reassessment, wants to go home. Discharged home in good condition. Is this patient to be included in the SEP-1 core measure? No Exclusion criteria - the patient is NOT to be included for SEP-1 Core Measure due to: Infection is not suspected    Medical Decision Making  Problems Addressed:  Atrial fibrillation with RVR ([address]): acute illness or injury  Uncontrolled hypertension: acute illness or injury    Amount and/or Complexity of Data Reviewed  Labs: ordered. Decision-making details documented in ED Course. ECG/medicine tests:      Details: see prior provider note for details    Risk  Prescription drug management. Clinical Impression     1. Atrial fibrillation with RVR ([address])    2.

## 2023-09-05 ENCOUNTER — TELEPHONE (OUTPATIENT)
Dept: CARE COORDINATION | Age: 83
End: 2023-09-05

## 2023-09-05 ENCOUNTER — CARE COORDINATION (OUTPATIENT)
Dept: CARE COORDINATION | Age: 83
End: 2023-09-05

## 2023-09-05 LAB
EKG ATRIAL RATE: 133 BPM
EKG DIAGNOSIS: NORMAL
EKG Q-T INTERVAL: 330 MS
EKG QRS DURATION: 76 MS
EKG QTC CALCULATION (BAZETT): 470 MS
EKG R AXIS: 15 DEGREES
EKG T AXIS: 7 DEGREES
EKG VENTRICULAR RATE: 122 BPM

## 2023-09-05 NOTE — CARE COORDINATION
medication adherence: None  Are you able to afford your medications?: Yes  How often do you have trouble taking your medications the way you have been told to take them?: I always take them as prescribed. Ability to seek help/take action for Emergent Urgent situations i.e. fire, crime, inclement weather or health crisis. : Independent  Ability to ambulate to restroom: Independent  Ability handle personal hygeine needs (bathing/dressing/grooming): Independent  Ability to manage Medications: Independent  Ability to prepare Food Preparation: Independent  Ability to maintain home (clean home, laundry): Needs Assistance  Ability to drive and/or has transportation: Needs Assistance  Ability to do shopping: Independent  Ability to manage finances: Independent  Is patient able to live independently?: Yes     Current Housing: Private Residence           Frequent urination at night?: No  Do you use rails/bars?: Yes  Do you have a non-slip tub mat?: Yes     Are you experiencing loss of meaning?: No  Are you experiencing loss of hope and peace?: No     Suggested Interventions and Community Resources                  No future appointments.   ,   General Assessment    Do you have any symptoms that are causing concern?: No     , and No linked episodes

## 2023-09-06 ENCOUNTER — TELEPHONE (OUTPATIENT)
Dept: CARDIOLOGY CLINIC | Age: 83
End: 2023-09-06

## 2023-09-06 ENCOUNTER — OFFICE VISIT (OUTPATIENT)
Dept: FAMILY MEDICINE CLINIC | Age: 83
End: 2023-09-06
Payer: MEDICARE

## 2023-09-06 VITALS
HEIGHT: 66 IN | TEMPERATURE: 97.9 F | DIASTOLIC BLOOD PRESSURE: 84 MMHG | SYSTOLIC BLOOD PRESSURE: 120 MMHG | WEIGHT: 293 LBS | HEART RATE: 89 BPM | BODY MASS INDEX: 47.09 KG/M2 | OXYGEN SATURATION: 98 % | RESPIRATION RATE: 16 BRPM

## 2023-09-06 DIAGNOSIS — E11.9 TYPE 2 DIABETES MELLITUS WITHOUT COMPLICATION, WITHOUT LONG-TERM CURRENT USE OF INSULIN (HCC): Primary | ICD-10-CM

## 2023-09-06 DIAGNOSIS — I10 ESSENTIAL HYPERTENSION: ICD-10-CM

## 2023-09-06 DIAGNOSIS — I48.91 ATRIAL FIBRILLATION WITH RVR (HCC): ICD-10-CM

## 2023-09-06 LAB — HBA1C MFR BLD: 6.4 %

## 2023-09-06 PROCEDURE — 3044F HG A1C LEVEL LT 7.0%: CPT | Performed by: FAMILY MEDICINE

## 2023-09-06 PROCEDURE — 99213 OFFICE O/P EST LOW 20 MIN: CPT | Performed by: FAMILY MEDICINE

## 2023-09-06 PROCEDURE — 1123F ACP DISCUSS/DSCN MKR DOCD: CPT | Performed by: FAMILY MEDICINE

## 2023-09-06 PROCEDURE — 83036 HEMOGLOBIN GLYCOSYLATED A1C: CPT | Performed by: FAMILY MEDICINE

## 2023-09-06 PROCEDURE — 3078F DIAST BP <80 MM HG: CPT | Performed by: FAMILY MEDICINE

## 2023-09-06 PROCEDURE — 3074F SYST BP LT 130 MM HG: CPT | Performed by: FAMILY MEDICINE

## 2023-09-06 RX ORDER — CETIRIZINE HYDROCHLORIDE 10 MG/1
10 TABLET ORAL DAILY
Qty: 30 TABLET | Refills: 3 | Status: SHIPPED | OUTPATIENT
Start: 2023-09-06 | End: 2023-12-05

## 2023-09-06 NOTE — TELEPHONE ENCOUNTER
Pt called left message [facility] voicemail pt needs 2 wk hospital follow up visit with Dr Deshaun Galvan.     Pls advise

## 2023-09-07 ASSESSMENT — ENCOUNTER SYMPTOMS
SHORTNESS OF BREATH: 0
EYE PAIN: 0
EYES NEGATIVE: 1
SINUS PRESSURE: 0
SORE THROAT: 0
EYE ITCHING: 0
WHEEZING: 0
ABDOMINAL PAIN: 0
RHINORRHEA: 0
COUGH: 0

## 2023-09-07 NOTE — PROGRESS NOTES
Manisha Terry (:  1940) is a 78 y.o. female,Established patient, here for evaluation of the following chief complaint(s):  Follow-Up from Hospital (Headaches, elevated bp ) and Blood Sugar Problem (Discuss/)          Subjective   SUBJECTIVE/OBJECTIVE:  HPI  23-year-old female patient, Dr. Janiya Anna here for ED follow-up. Records reviewed, on arrival to the ED she had high blood pressure and was in A-fib with tachycardia  Eventually over the ED during ED stay her blood pressure normalized and heart rate was controlled. She was sent home on medication and asked to follow-up with cardiologist.  She has been doing well since she has an upcoming cardiology appointment in 2 weeks. Her blood pressure is 120/84 today and heart rate is 89. She has no new complaints. She has diabetes and due for his for A1c check    Review of Systems   Constitutional: Negative. Negative for fatigue. HENT:  Negative for congestion, ear pain, rhinorrhea, sinus pressure and sore throat. Eyes: Negative. Negative for pain and itching. Respiratory:  Negative for cough, shortness of breath and wheezing. Cardiovascular:  Negative for chest pain and palpitations. Gastrointestinal:  Negative for abdominal pain. Genitourinary:  Negative for frequency and urgency. Musculoskeletal:  Negative for gait problem. Skin:  Negative for rash. Neurological:  Negative for dizziness and headaches. Psychiatric/Behavioral:  The patient is not nervous/anxious. Objective   Physical Exam  Constitutional:       Appearance: Normal appearance. HENT:      Head: Normocephalic and atraumatic. Cardiovascular:      Rate and Rhythm: Normal rate and regular rhythm. Pulmonary:      Effort: Pulmonary effort is normal.      Breath sounds: Normal breath sounds. No wheezing. Neurological:      Mental Status: She is alert.    Psychiatric:         Mood and Affect: Mood normal.       Hemoglobin A1C   Date Value Ref Range Status

## 2023-09-12 ENCOUNTER — CARE COORDINATION (OUTPATIENT)
Dept: CARE COORDINATION | Age: 83
End: 2023-09-12

## 2023-09-12 NOTE — CARE COORDINATION
Ambulatory Care Coordination Note  2023    Patient Current Location:  Home: [address] [place] [id number]     ACM contacted the patient by telephone. Verified name and  with patient as identifiers. Provided introduction to self, and explanation of the ACM role. Challenges to be reviewed by the provider   Additional needs identified to be addressed with provider: Yes  medications-pt concerned about her \"water pill\" and her kidney function               Method of communication with provider: chart routing. ACM: Lenny Ty RN    ACM f/u with patient today after her recent ED f/u appt with Dr. Gwen Fragoso. She has also scheduled her cardiology f/u appt for 23 with Dr. Jose J John. Pt stated she feels good, continues to take her BP every morning (today 107/72, hR85) and stated the swelling to her bilateral legs has decreased significantly. Pt said she is concerned about taking the \"water pill and her kidney function. \" She confirmed she is taking Lasix, 80 mg tablet, BID, once in the morning and once later in the day. ACM informed her I will let Dr. Vladimir Juan know about her concerns. She said otherwise, she is doing great. She has no other symptoms to report at this time. She agreed to call ACM if any questions, concerns or needs arise. PLAN:    Notify Dr. Vladimir Juan about pt concerns with her Lasix and kidney function    Offered patient enrollment in the Remote Patient Monitoring (RPM) program for in-home monitoring: NA. Lab Results       None                 Goals Addressed                   This Visit's Progress     Self Monitoring   On track     Blood Pressure - I will take my blood pressure as directed - Daily  I will notify my provider of any trends of increasing or decreasing blood pressures over a month period of time.   I will notify my provider of any changes in blood pressure associated with symptoms of dizziness, falls, passing out, headache, confusion/change in mental

## 2023-09-12 NOTE — CARE COORDINATION
ACM called pt to provide her with Dr. Augusto Sousa most recent recommendations. Pt was able to provide the instructions back to AC over the phone. ACM encouraged her to call with any questions or concerns and she agreed with this plan.

## 2023-09-12 NOTE — CARE COORDINATION
Her GFR is overall stable for the last year. Her bp does look little low  My recommendation is : take furosemide one tab qd (in am)  And monitor weights , if she gains 3 Lb in one day take the second dose.      Re check bmp in 1 mo

## 2023-09-21 ENCOUNTER — TELEPHONE (OUTPATIENT)
Dept: FAMILY MEDICINE CLINIC | Age: 83
End: 2023-09-21

## 2023-09-21 NOTE — TELEPHONE ENCOUNTER
----- Message from Juan Shearer sent at 9/21/2023  1:23 PM EDT -----  Subject: Appointment Request    Reason for Call: Established Patient Appointment needed: Routine Existing   Condition Follow Up    QUESTIONS    Reason for appointment request? Available appointments did not meet   patient need     Additional Information for Provider? Pt called in and stated would like to   be seen for a follow , DM and also see pt feet before Oct.1 pt is a   diabetic and do not mind seeing NP as well. If someone can give the pt a   call back.   ---------------------------------------------------------------------------  --------------  Alysa VIZCARRA  [phone]; OK to leave message on voicemail  ---------------------------------------------------------------------------  --------------  SCRIPT ANSWERS

## 2023-09-25 ENCOUNTER — OFFICE VISIT (OUTPATIENT)
Dept: CARDIOLOGY CLINIC | Age: 83
End: 2023-09-25
Payer: MEDICARE

## 2023-09-25 VITALS
HEART RATE: 60 BPM | SYSTOLIC BLOOD PRESSURE: 152 MMHG | DIASTOLIC BLOOD PRESSURE: 70 MMHG | BODY MASS INDEX: 55.2 KG/M2 | WEIGHT: 293 LBS

## 2023-09-25 DIAGNOSIS — I42.9 CARDIOMYOPATHY, UNSPECIFIED TYPE (HCC): Primary | ICD-10-CM

## 2023-09-25 DIAGNOSIS — I48.91 ATRIAL FIBRILLATION WITH RVR (HCC): ICD-10-CM

## 2023-09-25 PROCEDURE — 1123F ACP DISCUSS/DSCN MKR DOCD: CPT | Performed by: INTERNAL MEDICINE

## 2023-09-25 PROCEDURE — 3077F SYST BP >= 140 MM HG: CPT | Performed by: INTERNAL MEDICINE

## 2023-09-25 PROCEDURE — 3078F DIAST BP <80 MM HG: CPT | Performed by: INTERNAL MEDICINE

## 2023-09-25 PROCEDURE — 99214 OFFICE O/P EST MOD 30 MIN: CPT | Performed by: INTERNAL MEDICINE

## 2023-09-25 NOTE — PROGRESS NOTES
every morning 30 tablet 3    cetirizine (ZYRTEC) 10 MG tablet Take 1 tablet by mouth daily 30 tablet 3    tiZANidine (ZANAFLEX) 2 MG tablet Take 1 tablet by mouth 3 times daily as needed (R neck pain, spasm) 90 tablet 0    rosuvastatin (CRESTOR) 40 MG tablet TAKE 1 TABLET BY MOUTH EVERY DAY 30 tablet 5    apixaban (ELIQUIS) 5 MG TABS tablet Take 1 tablet by mouth 2 times daily 180 tablet 3    vitamin D3 (CHOLECALCIFEROL) 10 MCG (400 UNIT) TABS tablet TAKE 1 TABLET BY MOUTH EVERY DAY 90 tablet 3    carvedilol (COREG) 3.125 MG tablet TAKE 1 TABLET BY MOUTH TWICE A DAY WITH MEALS 180 tablet 1    furosemide (LASIX) 80 MG tablet TAKE 1 TABLET BY MOUTH TWICE A DAY (Patient taking differently: Take 1 tablet by mouth daily) 180 tablet 2    sacubitril-valsartan (ENTRESTO) 24-26 MG per tablet Take 1 tablet by mouth 2 times daily 60 tablet 5    Misc. Devices (WALKER) MISC 1 each by Does not apply route daily Please provide rolling walker. Approved by patients insurance 1 each 0    blood glucose monitor strips Test 3 times a day & as needed for symptoms of irregular blood glucose. 100 strip 1    acetaminophen (TYLENOL) 500 MG tablet Take 1 tablet by mouth every 6 hours as needed for Pain (Patient not taking: Reported on 9/25/2023)       No current facility-administered medications for this visit. REVIEW OF SYSTEMS:    CONSTITUTIONAL: No major weight gain or loss, fatigue, weakness, night sweats or fever. HEENT: No new vision difficulties or ringing in the ears. RESPIRATORY: No new SOB, PND, orthopnea or cough. CARDIOVASCULAR: See HPI  GI: No nausea, vomiting, diarrhea, constipation, abdominal pain or changes in bowel habits. : No urinary frequency, urgency, incontinence hematuria or dysuria. SKIN: No cyanosis or skin lesions. MUSCULOSKELETAL: No new muscle or joint pain. NEUROLOGICAL: No syncope or TIA-like symptoms.   PSYCHIATRIC: No anxiety, pain, insomnia or depression    Objective:   PHYSICAL EXAM:

## 2023-09-26 ENCOUNTER — OFFICE VISIT (OUTPATIENT)
Dept: FAMILY MEDICINE CLINIC | Age: 83
End: 2023-09-26
Payer: MEDICARE

## 2023-09-26 VITALS
DIASTOLIC BLOOD PRESSURE: 80 MMHG | HEART RATE: 73 BPM | BODY MASS INDEX: 47.09 KG/M2 | WEIGHT: 293 LBS | HEIGHT: 66 IN | TEMPERATURE: 97.3 F | SYSTOLIC BLOOD PRESSURE: 123 MMHG | OXYGEN SATURATION: 98 %

## 2023-09-26 DIAGNOSIS — E11.59 TYPE 2 DIABETES MELLITUS WITH OTHER CIRCULATORY COMPLICATION, WITHOUT LONG-TERM CURRENT USE OF INSULIN (HCC): ICD-10-CM

## 2023-09-26 DIAGNOSIS — Z23 NEED FOR INFLUENZA VACCINATION: ICD-10-CM

## 2023-09-26 DIAGNOSIS — I89.0 LYMPHEDEMA OF BOTH LOWER EXTREMITIES: ICD-10-CM

## 2023-09-26 DIAGNOSIS — E78.2 MIXED HYPERLIPIDEMIA: Primary | ICD-10-CM

## 2023-09-26 PROCEDURE — 1123F ACP DISCUSS/DSCN MKR DOCD: CPT | Performed by: NURSE PRACTITIONER

## 2023-09-26 PROCEDURE — 3074F SYST BP LT 130 MM HG: CPT | Performed by: NURSE PRACTITIONER

## 2023-09-26 PROCEDURE — 3078F DIAST BP <80 MM HG: CPT | Performed by: NURSE PRACTITIONER

## 2023-09-26 PROCEDURE — 99214 OFFICE O/P EST MOD 30 MIN: CPT | Performed by: NURSE PRACTITIONER

## 2023-09-26 PROCEDURE — G0008 ADMIN INFLUENZA VIRUS VAC: HCPCS | Performed by: NURSE PRACTITIONER

## 2023-09-26 PROCEDURE — 90694 VACC AIIV4 NO PRSRV 0.5ML IM: CPT | Performed by: NURSE PRACTITIONER

## 2023-09-26 PROCEDURE — 3044F HG A1C LEVEL LT 7.0%: CPT | Performed by: NURSE PRACTITIONER

## 2023-09-26 ASSESSMENT — ENCOUNTER SYMPTOMS
SORE THROAT: 0
SHORTNESS OF BREATH: 0
EYE PAIN: 0
EYES NEGATIVE: 1
EYE ITCHING: 0
RHINORRHEA: 0
COUGH: 0
SINUS PRESSURE: 0
WHEEZING: 0
ABDOMINAL PAIN: 0

## 2023-09-26 NOTE — PROGRESS NOTES
trend. An ACE inhibitor/angiotensin II receptor blocker is being taken. She sees a podiatrist.      Current Outpatient Medications   Medication Sig Dispense Refill    dapagliflozin (FARXIGA) 5 MG tablet Take 1 tablet by mouth every morning 30 tablet 3    cetirizine (ZYRTEC) 10 MG tablet Take 1 tablet by mouth daily 30 tablet 3    tiZANidine (ZANAFLEX) 2 MG tablet Take 1 tablet by mouth 3 times daily as needed (R neck pain, spasm) 90 tablet 0    rosuvastatin (CRESTOR) 40 MG tablet TAKE 1 TABLET BY MOUTH EVERY DAY 30 tablet 5    apixaban (ELIQUIS) 5 MG TABS tablet Take 1 tablet by mouth 2 times daily 180 tablet 3    vitamin D3 (CHOLECALCIFEROL) 10 MCG (400 UNIT) TABS tablet TAKE 1 TABLET BY MOUTH EVERY DAY 90 tablet 3    furosemide (LASIX) 80 MG tablet TAKE 1 TABLET BY MOUTH TWICE A DAY (Patient taking differently: Take 1 tablet by mouth daily) 180 tablet 2    Misc. Devices (WALKER) MISC 1 each by Does not apply route daily Please provide rolling walker. Approved by patients insurance 1 each 0    acetaminophen (TYLENOL) 500 MG tablet Take 1 tablet by mouth every 6 hours as needed for Pain      blood glucose monitor strips Test 3 times a day & as needed for symptoms of irregular blood glucose. 100 strip 1    sacubitril-valsartan (ENTRESTO) 24-26 MG per tablet Take 1 tablet by mouth 2 times daily (Patient not taking: Reported on 9/26/2023) 60 tablet 5     No current facility-administered medications for this visit. Past Medical History:  No date: Acute systolic (congestive) heart failure (HCC)  No date: Edema  No date: Hearing loss  No date: Hyperlipidemia  No date: Hypertension  12/29/2015:  Morbid obesity due to excess calories (HCC)  No date: Pre-diabetes  No date: PUD (peptic ulcer disease)      Comment:  \"years ago\",   Past Surgical History:   Procedure Laterality Date    HERNIA REPAIR      umbilical     KNEE SURGERY Bilateral     TKR bilateral     Social History     Socioeconomic History    Marital status:

## 2023-09-27 NOTE — TELEPHONE ENCOUNTER
Medication:   Requested Prescriptions     Pending Prescriptions Disp Refills    atorvastatin (LIPITOR) 80 MG tablet [Pharmacy Med Name: ATORVASTATIN 80 MG TABLET] 90 tablet 3     Sig: TAKE 1 TABLET BY MOUTH EVERY DAY        Last Filled:      Patient Phone Number: [phone] (home)     Last appt: 9/26/2023   Next appt: Visit date not found    Last OARRS:        No data to display

## 2023-09-28 RX ORDER — ATORVASTATIN CALCIUM 80 MG/1
80 TABLET, FILM COATED ORAL DAILY
Qty: 90 TABLET | Refills: 3 | Status: SHIPPED | OUTPATIENT
Start: 2023-09-28

## 2023-10-03 ENCOUNTER — CARE COORDINATION (OUTPATIENT)
Dept: CARE COORDINATION | Age: 83
End: 2023-10-03

## 2023-10-03 NOTE — CARE COORDINATION
ACM attempted to f/u with patient today, however she was UTR. Unable to leave a VM due to her VM system is full at this time. Will make another outreach attempt at a later date/time.

## 2023-11-10 ENCOUNTER — CARE COORDINATION (OUTPATIENT)
Dept: CARE COORDINATION | Age: 83
End: 2023-11-10

## 2023-11-13 NOTE — CARE COORDINATION
Pt informed of Cx results. She is feeling better.   1/31/20   MG  
found. Barriers: none  Plan for overcoming my barriers: Pt will continue to take her BP daily. Pt reports her BP was 131/88 this morning prior to taking her BP medication. Confidence: 9/10  Anticipated Goal Completion Date: 12/5/2023 9/12/23-Pt takes BP daily, in the morning. Today was 107/72, HR 85. BILLY                No future appointments. ,   General Assessment         , No linked episodes, and This patient was permanently screened out of Care Coordination on 11/13/2023 for the following reason:

## 2023-11-14 RX ORDER — SACUBITRIL AND VALSARTAN 24; 26 MG/1; MG/1
1 TABLET, FILM COATED ORAL 2 TIMES DAILY
Qty: 60 TABLET | Refills: 5 | Status: SHIPPED | OUTPATIENT
Start: 2023-11-14

## 2024-01-08 RX ORDER — DAPAGLIFLOZIN 5 MG/1
5 TABLET, FILM COATED ORAL EVERY MORNING
Qty: 30 TABLET | Refills: 3 | Status: SHIPPED | OUTPATIENT
Start: 2024-01-08

## 2024-01-08 NOTE — TELEPHONE ENCOUNTER
Medication:   Requested Prescriptions     Pending Prescriptions Disp Refills    FARXIGA 5 MG tablet [Pharmacy Med Name: FARXIGA 5 MG TABLET] 30 tablet 3     Sig: TAKE 1 TABLET BY MOUTH EVERY DAY IN THE MORNING     Last Filled:  # 30 with 3 refills on 9/6/23    Last appt: 9/26/2023   Next appt: Visit date not found    Last OARRS:        No data to display

## 2024-01-11 ENCOUNTER — CARE COORDINATION (OUTPATIENT)
Dept: CARE COORDINATION | Age: 84
End: 2024-01-11

## 2024-01-11 NOTE — CARE COORDINATION
Ambulatory Care Coordination Note  2024    Patient Current Location:  Home: [address]  [facility] [address]     ACM contacted the patient by telephone. Verified name and  with patient as identifiers. Provided introduction to self, and explanation of the ACM role.     Challenges to be reviewed by the provider   Additional needs identified to be addressed with provider: No  none               Method of communication with provider: none.    ACM: Filomena Dunham RN    ACM f/u with patient today. She reports she is doing well. She will have all of her medications very soon. Her car is currently at the dealership getting repaired and then she will have her daughter  her medications today. She said she is currently looking to consolidate her providers and she would like to switch to the podiatrist office that shares the building with Dr. Allen.     ACM called and verified [facility], in the same building as Dr. Allen, is accepting new patients. The  stated they are accepting new patients, without a referral, and the patient would need to call them to verify they accept her insurance. Acm thanked her for her time and assistance and updated pt with this information.     Pt stated she would call and verify her insurance and schedule with them as she needs her toenails cut soon.     Pt denied any other needs at this time. ACM will f/u with her again at a later date/time. Pt agreed with this plan.     PLAN:    F/u able to schedule with Podiatrist  F/u has all meds  Car fixed?    Offered patient enrollment in the Remote Patient Monitoring (RPM) program for in-home monitoring: Patient declined.    Lab Results       None            Care Coordination Interventions    Referral from Primary Care Provider: No  Suggested Interventions and Community Resources          Goals Addressed    None         Future Appointments   Date Time Provider Department Center   3/26/2024 11:45 AM

## 2024-01-24 DIAGNOSIS — J30.9 ALLERGIC RHINITIS, UNSPECIFIED SEASONALITY, UNSPECIFIED TRIGGER: ICD-10-CM

## 2024-01-24 RX ORDER — CETIRIZINE HYDROCHLORIDE 10 MG/1
10 TABLET ORAL DAILY
Qty: 90 TABLET | Refills: 1 | OUTPATIENT
Start: 2024-01-24

## 2024-01-30 ENCOUNTER — OFFICE VISIT (OUTPATIENT)
Dept: FAMILY MEDICINE CLINIC | Age: 84
End: 2024-01-30
Payer: MEDICARE

## 2024-01-30 VITALS
OXYGEN SATURATION: 97 % | SYSTOLIC BLOOD PRESSURE: 138 MMHG | HEIGHT: 66 IN | WEIGHT: 293 LBS | DIASTOLIC BLOOD PRESSURE: 88 MMHG | HEART RATE: 115 BPM | BODY MASS INDEX: 47.09 KG/M2 | TEMPERATURE: 96.6 F

## 2024-01-30 DIAGNOSIS — I42.9 CARDIOMYOPATHY, UNSPECIFIED TYPE (HCC): Primary | ICD-10-CM

## 2024-01-30 DIAGNOSIS — N30.00 ACUTE CYSTITIS WITHOUT HEMATURIA: ICD-10-CM

## 2024-01-30 PROCEDURE — 3075F SYST BP GE 130 - 139MM HG: CPT | Performed by: NURSE PRACTITIONER

## 2024-01-30 PROCEDURE — 99213 OFFICE O/P EST LOW 20 MIN: CPT | Performed by: NURSE PRACTITIONER

## 2024-01-30 PROCEDURE — 1123F ACP DISCUSS/DSCN MKR DOCD: CPT | Performed by: NURSE PRACTITIONER

## 2024-01-30 PROCEDURE — 3079F DIAST BP 80-89 MM HG: CPT | Performed by: NURSE PRACTITIONER

## 2024-01-30 RX ORDER — NITROFURANTOIN 25; 75 MG/1; MG/1
100 CAPSULE ORAL 2 TIMES DAILY
Qty: 14 CAPSULE | Refills: 0 | Status: SHIPPED | OUTPATIENT
Start: 2024-01-30 | End: 2024-02-06

## 2024-01-30 NOTE — PATIENT INSTRUCTIONS
Decrease Water intake to 4-5 solo cups per day  Start antibiotic  Get labs drawn  Follow up with Dr. Allen in 2 weeks

## 2024-01-30 NOTE — PROGRESS NOTES
Myra Nina (:  1940) is a 83 y.o. female,Established patient, here for evaluation of the following chief complaint(s):  Urinary Pain      ASSESSMENT/PLAN:  1. Cardiomyopathy, unspecified type (HCC)  -     Comprehensive Metabolic Panel; Future  -     Brain Natriuretic Peptide; Future  2. Acute cystitis without hematuria  -     nitrofurantoin, macrocrystal-monohydrate, (MACROBID) 100 MG capsule; Take 1 capsule by mouth 2 times daily for 7 days, Disp-14 capsule, R-0Normal      Patient Instructions   Decrease Water intake to 4-5 solo cups per day  Start antibiotic  Get labs drawn  Follow up with Dr. Allen in 2 weeks     No follow-ups on file.    SUBJECTIVE/OBJECTIVE:  HPI  Pt seen for concern of burning with urination. Pt also c/o increased leg swelling. Weight notably higher today from previous value.     Urinary Tract Infection: Patient complains of dysuria She has had symptoms for 3 days. Patient also complains of  burning with urination . Patient denies back pain and fever. Patient does have a history of recurrent UTI.  Patient does not have a history of pyelonephritis.     Current Outpatient Medications   Medication Sig Dispense Refill    FARXIGA 5 MG tablet TAKE 1 TABLET BY MOUTH EVERY DAY IN THE MORNING 30 tablet 3    ENTRESTO 24-26 MG per tablet TAKE 1 TABLET BY MOUTH TWICE A DAY 60 tablet 5    atorvastatin (LIPITOR) 80 MG tablet TAKE 1 TABLET BY MOUTH EVERY DAY 90 tablet 3    tiZANidine (ZANAFLEX) 2 MG tablet Take 1 tablet by mouth 3 times daily as needed (R neck pain, spasm) 90 tablet 0    rosuvastatin (CRESTOR) 40 MG tablet TAKE 1 TABLET BY MOUTH EVERY DAY 30 tablet 5    apixaban (ELIQUIS) 5 MG TABS tablet Take 1 tablet by mouth 2 times daily 180 tablet 3    vitamin D3 (CHOLECALCIFEROL) 10 MCG (400 UNIT) TABS tablet TAKE 1 TABLET BY MOUTH EVERY DAY 90 tablet 3    furosemide (LASIX) 80 MG tablet TAKE 1 TABLET BY MOUTH TWICE A DAY (Patient taking differently: Take 1 tablet by mouth daily) 180

## 2024-02-05 ENCOUNTER — CARE COORDINATION (OUTPATIENT)
Dept: CASE MANAGEMENT | Age: 84
End: 2024-02-05

## 2024-02-05 NOTE — CARE COORDINATION
Ambulatory Care Coordination Note  2024    Patient Current Location:  Home: [address]  [facility] [address]     LPN CC contacted the patient by telephone. Verified name and  with patient as identifiers. Provided introduction to self, and explanation of the LPN CC role.     Challenges to be reviewed by the provider   Additional needs identified to be addressed with provider: No  none               Method of communication with provider: none.    ACM: Montserrat Fernandez LPN    Patient reports she has a cold. She has been coughing, has runny nose and ha. Appetite and fluid intake is good. , Wt 354 lbs. She has robittussin for high BP that she is taking. She called her PCP. Now she is waiting to receive a return call. No needs at this time.    Plan:  F/u in a week  Assess needs       Offered patient enrollment in the Remote Patient Monitoring (RPM) program for in-home monitoring:  previous decline .    Lab Results       None            Care Coordination Interventions    Referral from Primary Care Provider: No  Suggested Interventions and Community Resources          Goals Addressed    None         Future Appointments   Date Time Provider Department Center   2024  9:15 AM Kwaku Logan APRN - CNP [place]  [place] [facility]   3/26/2024 11:45 [facility]   ,   Diabetes Assessment               , and   Congestive Heart Failure Assessment           Symptoms:              Montserrat Fernandez BASSEM  Care Coordinator  [phone]

## 2024-02-07 ENCOUNTER — TELEPHONE (OUTPATIENT)
Dept: FAMILY MEDICINE CLINIC | Age: 84
End: 2024-02-07

## 2024-02-07 NOTE — TELEPHONE ENCOUNTER
Patient states she would like medical advice / treatment Patient has tested and Covid negative     SX aching, fever 101.0 ,coughing, mucus thick white, tiredness low energy 48 hrs Covid test negative 02-    OTC products has not allowed relief of SX patient seeking treat right away

## 2024-02-12 ENCOUNTER — CARE COORDINATION (OUTPATIENT)
Dept: CARE COORDINATION | Age: 84
End: 2024-02-12

## 2024-02-12 NOTE — CARE COORDINATION
Ambulatory Care Coordination Note  2024    Patient Current Location:  Home: [address]  [facility] [address]     ACM contacted the patient by telephone. Verified name and  with patient as identifiers. Provided introduction to self, and explanation of the ACM role.     Challenges to be reviewed by the provider   Additional needs identified to be addressed with provider: No  none               Method of communication with provider: none.    ACM: Filomena Dunham RN    ACM called to f/u with patient today as she had recently reported to CC team she wasn't feeling well. She fu with a VV with her PCP on 24 and was diagnosed with URI.     Today, pt reports she is feeling much better. She said she is no longer feeling achy, and she continues to cough and expel clear phlegm. She said she is doing well and thanked [facility] for calling. She agreed to call if she has any questions, concerns or needs.     PLAN:    Possibly graduate  F/u any other needs    Offered patient enrollment in the Remote Patient Monitoring (RPM) program for in-home monitoring:  previously declined .    Lab Results       None            Care Coordination Interventions    Referral from Primary Care Provider: No  Suggested Interventions and Community Resources          Goals Addressed    None         Future Appointments   Date Time Provider Department Center   2024  9:15 AM Kwaku Logan APRN - CNP [place]  [place] - [facility]   3/26/2024 11:45 AM Conrad Dietz MD [facility]   ,   General Assessment    Do you have any symptoms that are causing concern?: No     , and No linked episodes

## 2024-02-13 ENCOUNTER — OFFICE VISIT (OUTPATIENT)
Dept: FAMILY MEDICINE CLINIC | Age: 84
End: 2024-02-13
Payer: MEDICARE

## 2024-02-13 VITALS
HEART RATE: 111 BPM | SYSTOLIC BLOOD PRESSURE: 126 MMHG | OXYGEN SATURATION: 98 % | BODY MASS INDEX: 47.09 KG/M2 | HEIGHT: 66 IN | WEIGHT: 293 LBS | TEMPERATURE: 97.5 F | DIASTOLIC BLOOD PRESSURE: 82 MMHG

## 2024-02-13 DIAGNOSIS — M16.11 OSTEOARTHRITIS OF RIGHT HIP, UNSPECIFIED OSTEOARTHRITIS TYPE: ICD-10-CM

## 2024-02-13 DIAGNOSIS — G89.29 CHRONIC BILATERAL LOW BACK PAIN WITHOUT SCIATICA: ICD-10-CM

## 2024-02-13 DIAGNOSIS — I50.23 ACUTE ON CHRONIC SYSTOLIC CONGESTIVE HEART FAILURE (HCC): ICD-10-CM

## 2024-02-13 DIAGNOSIS — J06.9 VIRAL URI: Primary | ICD-10-CM

## 2024-02-13 DIAGNOSIS — M54.50 CHRONIC BILATERAL LOW BACK PAIN WITHOUT SCIATICA: ICD-10-CM

## 2024-02-13 PROCEDURE — 1123F ACP DISCUSS/DSCN MKR DOCD: CPT | Performed by: NURSE PRACTITIONER

## 2024-02-13 PROCEDURE — 3074F SYST BP LT 130 MM HG: CPT | Performed by: NURSE PRACTITIONER

## 2024-02-13 PROCEDURE — 3079F DIAST BP 80-89 MM HG: CPT | Performed by: NURSE PRACTITIONER

## 2024-02-13 PROCEDURE — 99214 OFFICE O/P EST MOD 30 MIN: CPT | Performed by: NURSE PRACTITIONER

## 2024-02-13 RX ORDER — TRIAMCINOLONE ACETONIDE 55 UG/1
2 SPRAY, METERED NASAL DAILY
Qty: 16 G | Refills: 0 | Status: SHIPPED | OUTPATIENT
Start: 2024-02-13

## 2024-02-13 RX ORDER — GUAIFENESIN 600 MG/1
600 TABLET, EXTENDED RELEASE ORAL 2 TIMES DAILY
Qty: 10 TABLET | Refills: 0 | COMMUNITY
Start: 2024-02-13 | End: 2024-02-18

## 2024-02-13 NOTE — PATIENT INSTRUCTIONS
Recommend mucinex twice daily for the next 5 days  Can also use Nasacort nasal spray to help with drainage    Referral for home care placed to [facility]  Phone: [phone]

## 2024-02-13 NOTE — PROGRESS NOTES
Stress: Not on file   Social Connections: Not on file   Intimate Partner Violence: Not on file   Housing Stability: Unknown (7/31/2023)    Housing Stability Vital Sign     Unable to Pay for Housing in the Last Year: Not on file     Number of Places Lived in the Last Year: Not on file     Unstable Housing in the Last Year: No         Review of Systems   Constitutional:  Negative for activity change, appetite change, chills, diaphoresis, fatigue and fever.   HENT:  Positive for congestion, rhinorrhea and sinus pain. Negative for ear pain and sneezing.    Respiratory:  Positive for cough. Negative for wheezing.    Cardiovascular:  Negative for chest pain.   Gastrointestinal:  Negative for nausea and vomiting.   Musculoskeletal:  Positive for arthralgias, joint pain and stiffness.   Neurological:  Negative for tingling, numbness and headaches.     Vitals:    02/13/24 1118 02/13/24 1212   BP: (!) 143/89 126/82   Pulse: (!) 111    Temp: 97.5 °F (36.4 °C)    TempSrc: Tympanic    SpO2: 98%    Weight: (!) 159.8 kg (352 lb 3.2 oz)    Height: 1.676 m (5' 6\")       No results found for this or any previous visit (from the past 2016 hour(s)).     Wt Readings from Last 3 Encounters:   02/13/24 (!) 159.8 kg (352 lb 3.2 oz)   01/30/24 (!) 162 kg (357 lb 1.6 oz)   09/26/23 (!) 154.7 kg (341 lb 1.6 oz)        Physical Exam  Constitutional:       General: She is not in acute distress.     Appearance: Normal appearance.   HENT:      Head: Normocephalic and atraumatic.   Eyes:      Extraocular Movements: Extraocular movements intact.      Conjunctiva/sclera: Conjunctivae normal.   Cardiovascular:      Rate and Rhythm: Normal rate and regular rhythm.      Heart sounds: Normal heart sounds.   Pulmonary:      Effort: Pulmonary effort is normal. No tachypnea, bradypnea, accessory muscle usage, respiratory distress or retractions.      Breath sounds: Normal breath sounds and air entry. No stridor. No decreased breath sounds, wheezing or

## 2024-03-04 ENCOUNTER — TELEPHONE (OUTPATIENT)
Dept: CARE COORDINATION | Age: 84
End: 2024-03-04

## 2024-03-04 ENCOUNTER — CARE COORDINATION (OUTPATIENT)
Dept: CARE COORDINATION | Age: 84
End: 2024-03-04

## 2024-03-04 DIAGNOSIS — M25.552 BILATERAL HIP PAIN: Primary | ICD-10-CM

## 2024-03-04 DIAGNOSIS — M25.551 BILATERAL HIP PAIN: Primary | ICD-10-CM

## 2024-03-04 NOTE — CARE COORDINATION
VIOLETA called and spoke with the representative at [facility]; [phone], who stated they received the fax for pt C, PT/OT on 2/27/24. He reports from there, the information was given to their local representative, Александр Segura [phone], who should be reaching out to patient in the next few days.     VIOLETA thanked him for his time and assistance.     VIOLETA called and spoke with Александр Segura. He stated yes, he is aware of pt and he said he would definitely give her a call and also look into if she has been called before and maybe left messages for her. He was driving during our conversation, however he was able to verify pt phone number to ensure he calls the correct number.     VIOLETA thanked him for his time and assistance as well.

## 2024-03-04 NOTE — TELEPHONE ENCOUNTER
Pt reports burning and pain in both legs, pain in R hip when it rains or is cloudy outside. Requesting external referral for Ortho, please.     Waiting to hear from [facility] regarding home PT/OT. VIOLETA f/u with Александр at [facility] today and he will call pt to get appts set up with her.

## 2024-03-04 NOTE — CARE COORDINATION
[facility] f/u with patient today. She reports she feels better, but has a little cough lingering still.     Pt stated she never heard from [facility]. [facility] to call and find out if they received the referral sent during pt last OV. [facility]: [phone]    Pt reports both legs burn and her R hip and both legs hurt anytime it rains or is cloudy outside. She said she would like a referral to [facility] in [place] if possible to see if there are any other treatments that may be offered for her pain/burning sensation. [facility] informed her I will send a message to Dr. Allen to find out if she can enter a referral for her. I will also call [facility] to find out if they have an update regarding the referral to them as well. Pt agreed with this plan. She stated she will wait to hear back from [facility].     Otherwise, she is doing well, she said. She agreed to call if any other questions, concerns or needs arise.

## 2024-03-07 NOTE — TELEPHONE ENCOUNTER
I have attempted to contact this patient by phone with the following results: no answer and v/m was full.

## 2024-03-15 ENCOUNTER — CARE COORDINATION (OUTPATIENT)
Dept: CARE COORDINATION | Age: 84
End: 2024-03-15

## 2024-03-25 DIAGNOSIS — J30.9 ALLERGIC RHINITIS, UNSPECIFIED SEASONALITY, UNSPECIFIED TRIGGER: ICD-10-CM

## 2024-03-25 DIAGNOSIS — E55.9 VITAMIN D DEFICIENCY: ICD-10-CM

## 2024-03-26 ENCOUNTER — OFFICE VISIT (OUTPATIENT)
Dept: CARDIOLOGY CLINIC | Age: 84
End: 2024-03-26
Payer: MEDICARE

## 2024-03-26 VITALS
BODY MASS INDEX: 58.27 KG/M2 | SYSTOLIC BLOOD PRESSURE: 150 MMHG | DIASTOLIC BLOOD PRESSURE: 76 MMHG | WEIGHT: 293 LBS | HEART RATE: 72 BPM

## 2024-03-26 DIAGNOSIS — I48.0 PAF (PAROXYSMAL ATRIAL FIBRILLATION) (HCC): ICD-10-CM

## 2024-03-26 PROCEDURE — 3078F DIAST BP <80 MM HG: CPT | Performed by: INTERNAL MEDICINE

## 2024-03-26 PROCEDURE — 99214 OFFICE O/P EST MOD 30 MIN: CPT | Performed by: INTERNAL MEDICINE

## 2024-03-26 PROCEDURE — 3077F SYST BP >= 140 MM HG: CPT | Performed by: INTERNAL MEDICINE

## 2024-03-26 PROCEDURE — 1123F ACP DISCUSS/DSCN MKR DOCD: CPT | Performed by: INTERNAL MEDICINE

## 2024-03-26 RX ORDER — FUROSEMIDE 80 MG
80 TABLET ORAL DAILY
Qty: 90 TABLET | Refills: 3 | Status: SHIPPED | OUTPATIENT
Start: 2024-03-26

## 2024-03-26 RX ORDER — SACUBITRIL AND VALSARTAN 24; 26 MG/1; MG/1
1 TABLET, FILM COATED ORAL 2 TIMES DAILY
Qty: 180 TABLET | Refills: 3 | Status: SHIPPED | OUTPATIENT
Start: 2024-03-26

## 2024-03-26 NOTE — PROGRESS NOTES
[facility] [place]   Dr Conrad Dietz MD, [facility]    Outpatient Follow Up Note    3/26/2024,12:21 PM  Subjective:   CHIEF COMPLAINT / HPI:  Follow Up secondary to hypertension, hyperlipidemia, and severe peripheral edema.     Myra Nina is 83 y.o. female who presents today on here on 3/26/2024 and generally doing well.  The Lasix is at 40 mg/day and she seems to be doing fairly well on that dose.  On examination all else looks great well.  Lungs are clear extremities show no edema.         Takotsubo cardiomyopathy September 2022 EF 30%   Hypertension  Lymphedema  Hyperlipidemia  Cardiac cath on 9/11/2022 with minimal plaque.  Ejection fraction 15% on angiogram.  And subsequent echo showed improvement to 30 to 35%.                       Atrial fibrillation   Past Medical History:    Past Medical History:   Diagnosis Date    Acute systolic (congestive) heart failure (HCC)     Edema     Hearing loss     Hyperlipidemia     Hypertension     Morbid obesity due to excess calories (HCC) 12/29/2015    Pre-diabetes     PUD (peptic ulcer disease)     \"years ago\",      Past Surgical History  Past Surgical History:   Procedure Laterality Date    HERNIA REPAIR      umbilical     KNEE SURGERY Bilateral     TKR bilateral     Social History:       Social History     Tobacco Use   Smoking Status Never   Smokeless Tobacco Never     Current Medications:  Prior to Visit Medications    Medication Sig Taking? Authorizing Provider   triamcinolone (NASACORT ALLERGY 24HR) 55 MCG/ACT nasal inhaler 2 sprays by Each Nostril route daily Yes Kwaku Logan APRN - CNP   FARXIGA 5 MG tablet TAKE 1 TABLET BY MOUTH EVERY DAY IN THE MORNING Yes Ekta Allen MD   ENTRESTO 24-26 MG per tablet TAKE 1 TABLET BY MOUTH TWICE A DAY Yes Arlene Chung APRN - CNP   atorvastatin (LIPITOR) 80 MG tablet TAKE 1 TABLET BY MOUTH EVERY DAY Yes Ekta Allen MD   tiZANidine (ZANAFLEX) 2 MG tablet Take 1 tablet by mouth 3

## 2024-03-26 NOTE — TELEPHONE ENCOUNTER
Medication:   Requested Prescriptions     Pending Prescriptions Disp Refills    VITAMIN D3 10 MCG (400 UNIT) TABS tablet [Pharmacy Med Name: VITAMIN D3 400 UNIT TABLET] 90 tablet 3     Sig: TAKE 1 TABLET BY MOUTH EVERY DAY        Last Filled:     03/09/2023        Patient Phone Number: [phone] (home)     Last appt: 2/13/2024   Next appt: Visit date not found    Last OARRS:        No data to display

## 2024-03-26 NOTE — TELEPHONE ENCOUNTER
Medication:   Requested Prescriptions     Pending Prescriptions Disp Refills    cetirizine (ZYRTEC) 10 MG tablet [Pharmacy Med Name: CETIRIZINE HCL 10 MG TABLET] 90 tablet 1     Sig: TAKE 1 TABLET BY MOUTH EVERY DAY        Last Filled: not known     Patient Phone Number: [phone] (home)     Last appt: 2/13/2024   Next appt: 3/25/2024    Last OARRS:        No data to display

## 2024-03-27 RX ORDER — CETIRIZINE HYDROCHLORIDE 10 MG/1
10 TABLET ORAL DAILY
Qty: 90 TABLET | Refills: 1 | Status: SHIPPED | OUTPATIENT
Start: 2024-03-27

## 2024-03-27 RX ORDER — OMEGA-3S/DHA/EPA/FISH OIL/D3 300MG-1000
CAPSULE ORAL
Qty: 90 TABLET | Refills: 3 | Status: SHIPPED | OUTPATIENT
Start: 2024-03-27

## 2024-03-29 ENCOUNTER — CARE COORDINATION (OUTPATIENT)
Dept: CARE COORDINATION | Age: 84
End: 2024-03-29

## 2024-03-29 NOTE — CARE COORDINATION
Attempted to f/u with patient today, however she was UTR. Recording on pt phone stated, \"sorry, mailbox is full and there's not enough space to record a message.\" ACM to make another outreach attempt at a later date/time.

## 2024-04-11 ENCOUNTER — OFFICE VISIT (OUTPATIENT)
Dept: FAMILY MEDICINE CLINIC | Age: 84
End: 2024-04-11
Payer: MEDICARE

## 2024-04-11 VITALS
SYSTOLIC BLOOD PRESSURE: 126 MMHG | WEIGHT: 293 LBS | BODY MASS INDEX: 47.09 KG/M2 | HEIGHT: 66 IN | RESPIRATION RATE: 16 BRPM | DIASTOLIC BLOOD PRESSURE: 82 MMHG | TEMPERATURE: 97.9 F

## 2024-04-11 DIAGNOSIS — L98.9 SKIN LESION OF FOOT: Primary | ICD-10-CM

## 2024-04-11 DIAGNOSIS — E78.2 MIXED HYPERLIPIDEMIA: ICD-10-CM

## 2024-04-11 DIAGNOSIS — E11.59 TYPE 2 DIABETES MELLITUS WITH OTHER CIRCULATORY COMPLICATION, WITHOUT LONG-TERM CURRENT USE OF INSULIN (HCC): ICD-10-CM

## 2024-04-11 PROCEDURE — 3074F SYST BP LT 130 MM HG: CPT | Performed by: NURSE PRACTITIONER

## 2024-04-11 PROCEDURE — 1123F ACP DISCUSS/DSCN MKR DOCD: CPT | Performed by: NURSE PRACTITIONER

## 2024-04-11 PROCEDURE — 3079F DIAST BP 80-89 MM HG: CPT | Performed by: NURSE PRACTITIONER

## 2024-04-11 PROCEDURE — 99213 OFFICE O/P EST LOW 20 MIN: CPT | Performed by: NURSE PRACTITIONER

## 2024-04-11 RX ORDER — CEPHALEXIN 250 MG/1
250 CAPSULE ORAL 3 TIMES DAILY
Qty: 30 CAPSULE | Refills: 0 | Status: SHIPPED | OUTPATIENT
Start: 2024-04-11 | End: 2024-04-21

## 2024-04-11 NOTE — PATIENT INSTRUCTIONS
Paint toe with betadine nightly  Start antibiotic  Get labs drawn  Follow up 1 week  Schedule podiatry appt

## 2024-04-12 PROBLEM — L98.9 SKIN LESION OF FOOT: Status: ACTIVE | Noted: 2024-04-12

## 2024-04-12 ASSESSMENT — ENCOUNTER SYMPTOMS: COLOR CHANGE: 1

## 2024-04-12 NOTE — PROGRESS NOTES
Myra Nina (:  1940) is a 83 y.o. female,Established patient, here for evaluation of the following chief complaint(s):  Other (Pt states that she has a blister on her left foot. She says she would like to get orders for lab complete lab work)      ASSESSMENT/PLAN:  1. Skin lesion of foot  Assessment & Plan:   Paint toe with betadine nightly  Start antibiotic  Get labs drawn  Follow up 1 week  Schedule podiatry appt  Orders:  -     cephALEXin (KEFLEX) 250 MG capsule; Take 1 capsule by mouth 3 times daily for 10 days, Disp-30 capsule, R-0Normal  -     [facility] - Luis Eduardo Powell DPM, Podiatry, [place]  -     Comprehensive Metabolic Panel; Future  -     CBC with Auto Differential; Future  2. Mixed hyperlipidemia  Assessment & Plan:   Labs ordered for next visit  3. Type 2 diabetes mellitus with other circulatory complication, without long-term current use of insulin ([facility])  Assessment & Plan:   Due for updated labs, orders provided  Orders:  -     Hemoglobin A1C; Future  -     Lipid Panel; Future  4. Body mass index (BMI) 50.0-59.9, adult ([facility])  Assessment & Plan:    Stable, continue dietary management      Patient Instructions   Paint toe with betadine nightly  Start antibiotic  Get labs drawn  Follow up 1 week  Schedule podiatry appt       No follow-ups on file.    SUBJECTIVE/OBJECTIVE:  HPI  Pt seen for acute concerns of skin lesion to left foot  Skin Problem  This is a new problem. The current episode started in the past 7 days. The problem is unchanged. The affected locations include the left foot. The rash is characterized by blistering. She was exposed to nothing. Pertinent negatives include no fever. Treatments tried: neosporin. The treatment provided no relief.       Current Outpatient Medications   Medication Sig Dispense Refill    cephALEXin (KEFLEX) 250 MG capsule Take 1 capsule by mouth 3 times daily for 10 days 30 capsule 0    cetirizine (ZYRTEC) 10 MG tablet TAKE 1 TABLET BY MOUTH

## 2024-04-23 ENCOUNTER — OFFICE VISIT (OUTPATIENT)
Dept: FAMILY MEDICINE CLINIC | Age: 84
End: 2024-04-23
Payer: MEDICARE

## 2024-04-23 VITALS
TEMPERATURE: 97.5 F | RESPIRATION RATE: 16 BRPM | HEART RATE: 86 BPM | OXYGEN SATURATION: 99 % | SYSTOLIC BLOOD PRESSURE: 124 MMHG | BODY MASS INDEX: 47.09 KG/M2 | WEIGHT: 293 LBS | DIASTOLIC BLOOD PRESSURE: 82 MMHG | HEIGHT: 66 IN

## 2024-04-23 DIAGNOSIS — M65.332 TRIGGER MIDDLE FINGER OF LEFT HAND: ICD-10-CM

## 2024-04-23 DIAGNOSIS — R30.0 DYSURIA: ICD-10-CM

## 2024-04-23 DIAGNOSIS — N30.01 ACUTE CYSTITIS WITH HEMATURIA: Primary | ICD-10-CM

## 2024-04-23 DIAGNOSIS — N76.0 ACUTE VAGINITIS: ICD-10-CM

## 2024-04-23 LAB
BILIRUBIN, POC: NORMAL
BLOOD URINE, POC: NORMAL
CLARITY, POC: NORMAL
COLOR, POC: YELLOW
GLUCOSE URINE, POC: NORMAL
KETONES, POC: NORMAL
LEUKOCYTE EST, POC: NORMAL
NITRITE, POC: NORMAL
PH, POC: 6
PROTEIN, POC: NORMAL
SPECIFIC GRAVITY, POC: 1.03
UROBILINOGEN, POC: 0.2

## 2024-04-23 PROCEDURE — 3074F SYST BP LT 130 MM HG: CPT | Performed by: NURSE PRACTITIONER

## 2024-04-23 PROCEDURE — 1123F ACP DISCUSS/DSCN MKR DOCD: CPT | Performed by: NURSE PRACTITIONER

## 2024-04-23 PROCEDURE — 3079F DIAST BP 80-89 MM HG: CPT | Performed by: NURSE PRACTITIONER

## 2024-04-23 PROCEDURE — 81002 URINALYSIS NONAUTO W/O SCOPE: CPT | Performed by: NURSE PRACTITIONER

## 2024-04-23 PROCEDURE — 99214 OFFICE O/P EST MOD 30 MIN: CPT | Performed by: NURSE PRACTITIONER

## 2024-04-23 RX ORDER — NYSTATIN 100000 [USP'U]/G
POWDER TOPICAL
Qty: 60 G | Refills: 1 | Status: SHIPPED | OUTPATIENT
Start: 2024-04-23

## 2024-04-23 RX ORDER — FLUCONAZOLE 150 MG/1
150 TABLET ORAL ONCE
Qty: 1 TABLET | Refills: 0 | Status: SHIPPED | OUTPATIENT
Start: 2024-04-23 | End: 2024-04-23

## 2024-04-23 ASSESSMENT — ENCOUNTER SYMPTOMS
WHEEZING: 0
CHEST TIGHTNESS: 0
SHORTNESS OF BREATH: 0

## 2024-04-23 NOTE — PROGRESS NOTES
change and fatigue.   Respiratory:  Negative for chest tightness, shortness of breath and wheezing.    Cardiovascular:  Negative for chest pain and palpitations.   Genitourinary:  Positive for dysuria and vaginal discharge. Negative for decreased urine volume, flank pain, frequency, hematuria, pelvic pain, urgency and vaginal pain.   Skin:  Positive for rash.   Neurological:  Positive for tingling. Negative for dizziness, syncope, numbness and headaches.     Vitals:    04/23/24 1333   BP: 124/82   Pulse: 86   Resp: 16   Temp: 97.5 °F (36.4 °C)   TempSrc: Temporal   SpO2: 99%   Weight: (!) 159.7 kg (352 lb)   Height: 1.676 m (5' 6\")      Recent Results (from the past 2016 hour(s))   POCT Urinalysis no Micro    Collection Time: 04/23/24  3:29 PM   Result Value Ref Range    Color, UA yellow     Clarity, UA semi-clear     Glucose, UA POC 2+     Bilirubin, UA neg     Ketones, UA neg     Spec Grav, UA 1.030     Blood, UA POC trace     pH, UA 6.0     Protein, UA POC neg     Urobilinogen, UA 0.2     Leukocytes, UA neg     Nitrite, UA neg         Wt Readings from Last 3 Encounters:   04/23/24 (!) 159.7 kg (352 lb)   04/11/24 (!) 158.8 kg (350 lb)   03/26/24 (!) 163.7 kg (361 lb)        Physical Exam  Constitutional:       General: She is not in acute distress.     Appearance: Normal appearance.   HENT:      Head: Normocephalic and atraumatic.   Eyes:      Extraocular Movements: Extraocular movements intact.      Conjunctiva/sclera: Conjunctivae normal.   Cardiovascular:      Rate and Rhythm: Normal rate and regular rhythm.      Heart sounds: Normal heart sounds.   Pulmonary:      Effort: Pulmonary effort is normal.      Breath sounds: Normal breath sounds. No wheezing or rhonchi.   Musculoskeletal:      Cervical back: Normal range of motion.   Skin:     Findings: Erythema present.          Neurological:      General: No focal deficit present.      Mental Status: She is alert and oriented to person, place, and time.

## 2024-04-24 LAB — BACTERIA UR CULT: NORMAL

## 2024-05-16 ENCOUNTER — CARE COORDINATION (OUTPATIENT)
Dept: CARE COORDINATION | Age: 84
End: 2024-05-16

## 2024-05-16 NOTE — CARE COORDINATION
ACM f/u with patient today. She reports she is doing well. She said her body is sore, mostly her knees, due to her PT which she receives at home 4 times per week. She said overall she feels better though. She said she has no questions, concerns or needs from ACM at this time. ACM to end current CC episode and will remove name from care team. Pt encouraged to call if any future needs, questions, or concerns arise and she agreed with this plan.

## 2024-06-24 RX ORDER — DAPAGLIFLOZIN 5 MG/1
5 TABLET, FILM COATED ORAL EVERY MORNING
Qty: 30 TABLET | Refills: 3 | Status: SHIPPED | OUTPATIENT
Start: 2024-06-24

## 2024-06-24 NOTE — TELEPHONE ENCOUNTER
Medication:   Requested Prescriptions     Pending Prescriptions Disp Refills    FARXIGA 5 MG tablet [Pharmacy Med Name: FARXIGA 5 MG TABLET] 30 tablet 3     Sig: TAKE 1 TABLET BY MOUTH EVERY DAY IN THE MORNING        Last Filled:         01/08/2024 01/08/2024       Patient Phone Number: [phone] (home)     Last appt: 4/23/2024   Next appt: Visit date not found    Last OARRS:        No data to display

## 2024-07-01 ENCOUNTER — TELEPHONE (OUTPATIENT)
Dept: FAMILY MEDICINE CLINIC | Age: 84
End: 2024-07-01

## 2024-08-01 ENCOUNTER — OFFICE VISIT (OUTPATIENT)
Dept: FAMILY MEDICINE CLINIC | Age: 84
End: 2024-08-01
Payer: MEDICARE

## 2024-08-01 VITALS
DIASTOLIC BLOOD PRESSURE: 74 MMHG | HEIGHT: 66 IN | OXYGEN SATURATION: 97 % | HEART RATE: 66 BPM | BODY MASS INDEX: 47.09 KG/M2 | WEIGHT: 293 LBS | SYSTOLIC BLOOD PRESSURE: 136 MMHG | TEMPERATURE: 97.3 F

## 2024-08-01 DIAGNOSIS — N18.31 STAGE 3A CHRONIC KIDNEY DISEASE (HCC): ICD-10-CM

## 2024-08-01 DIAGNOSIS — Z00.00 MEDICARE ANNUAL WELLNESS VISIT, SUBSEQUENT: Primary | ICD-10-CM

## 2024-08-01 DIAGNOSIS — I42.9 CARDIOMYOPATHY, UNSPECIFIED TYPE (HCC): ICD-10-CM

## 2024-08-01 DIAGNOSIS — D68.69 SECONDARY HYPERCOAGULABLE STATE (HCC): ICD-10-CM

## 2024-08-01 DIAGNOSIS — I48.91 ATRIAL FIBRILLATION WITH RVR (HCC): ICD-10-CM

## 2024-08-01 DIAGNOSIS — E11.59 TYPE 2 DIABETES MELLITUS WITH OTHER CIRCULATORY COMPLICATION, WITHOUT LONG-TERM CURRENT USE OF INSULIN (HCC): ICD-10-CM

## 2024-08-01 DIAGNOSIS — E78.5 HYPERLIPIDEMIA LDL GOAL <100: ICD-10-CM

## 2024-08-01 DIAGNOSIS — I10 ESSENTIAL HYPERTENSION: ICD-10-CM

## 2024-08-01 DIAGNOSIS — I50.22 CHRONIC SYSTOLIC (CONGESTIVE) HEART FAILURE (HCC): ICD-10-CM

## 2024-08-01 PROBLEM — J96.01 ACUTE RESPIRATORY FAILURE WITH HYPOXIA AND HYPERCAPNIA (HCC): Status: RESOLVED | Noted: 2022-09-12 | Resolved: 2024-08-01

## 2024-08-01 PROBLEM — I21.4 NSTEMI (NON-ST ELEVATED MYOCARDIAL INFARCTION) (HCC): Status: RESOLVED | Noted: 2022-09-11 | Resolved: 2024-08-01

## 2024-08-01 PROBLEM — I21.3 ST ELEVATION MYOCARDIAL INFARCTION (STEMI) (HCC): Status: RESOLVED | Noted: 2022-09-13 | Resolved: 2024-08-01

## 2024-08-01 PROBLEM — R94.39 ABNORMAL NUCLEAR STRESS TEST: Status: RESOLVED | Noted: 2017-10-06 | Resolved: 2024-08-01

## 2024-08-01 PROBLEM — J96.02 ACUTE RESPIRATORY FAILURE WITH HYPOXIA AND HYPERCAPNIA (HCC): Status: RESOLVED | Noted: 2022-09-12 | Resolved: 2024-08-01

## 2024-08-01 PROCEDURE — 1123F ACP DISCUSS/DSCN MKR DOCD: CPT | Performed by: FAMILY MEDICINE

## 2024-08-01 PROCEDURE — 99214 OFFICE O/P EST MOD 30 MIN: CPT | Performed by: FAMILY MEDICINE

## 2024-08-01 PROCEDURE — G0439 PPPS, SUBSEQ VISIT: HCPCS | Performed by: FAMILY MEDICINE

## 2024-08-01 SDOH — ECONOMIC STABILITY: FOOD INSECURITY: WITHIN THE PAST 12 MONTHS, THE FOOD YOU BOUGHT JUST DIDN'T LAST AND YOU DIDN'T HAVE MONEY TO GET MORE.: NEVER TRUE

## 2024-08-01 SDOH — ECONOMIC STABILITY: FOOD INSECURITY: WITHIN THE PAST 12 MONTHS, YOU WORRIED THAT YOUR FOOD WOULD RUN OUT BEFORE YOU GOT MONEY TO BUY MORE.: NEVER TRUE

## 2024-08-01 SDOH — ECONOMIC STABILITY: INCOME INSECURITY: HOW HARD IS IT FOR YOU TO PAY FOR THE VERY BASICS LIKE FOOD, HOUSING, MEDICAL CARE, AND HEATING?: NOT HARD AT ALL

## 2024-08-01 ASSESSMENT — PATIENT HEALTH QUESTIONNAIRE - PHQ9
2. FEELING DOWN, DEPRESSED OR HOPELESS: NOT AT ALL
SUM OF ALL RESPONSES TO PHQ QUESTIONS 1-9: 0
1. LITTLE INTEREST OR PLEASURE IN DOING THINGS: NOT AT ALL
SUM OF ALL RESPONSES TO PHQ9 QUESTIONS 1 & 2: 0

## 2024-08-01 ASSESSMENT — LIFESTYLE VARIABLES
HOW OFTEN DO YOU HAVE A DRINK CONTAINING ALCOHOL: NEVER
HOW MANY STANDARD DRINKS CONTAINING ALCOHOL DO YOU HAVE ON A TYPICAL DAY: PATIENT DOES NOT DRINK

## 2024-08-01 NOTE — PROGRESS NOTES
Vitals:    08/01/24 0909   BP: 136/74   Site: Left Upper Arm   Position: Sitting   Cuff Size: Large Adult   Pulse: 66   Temp: 97.3 °F (36.3 °C)   TempSrc: Temporal   SpO2: 97%   Weight: (!) 164.7 kg (363 lb)   Height: 1.676 m (5' 6\")      Body mass index is 58.59 kg/m².                    Allergies   Allergen Reactions    Aspirin Other (See Comments)     Caused ulcers, burns stomach    Dye [Iodides] Itching, Swelling and Rash     Prior to Visit Medications    Medication Sig Taking? Authorizing Provider   FARXIGA 5 MG tablet TAKE 1 TABLET BY MOUTH EVERY DAY IN THE MORNING Yes Ekta Allen MD   nystatin (MYCOSTATIN) 125148 UNIT/GM powder Apply 3 times daily to vaginal area and groin folds Yes Kwaku Logan APRN - CNP   cetirizine (ZYRTEC) 10 MG tablet TAKE 1 TABLET BY MOUTH EVERY DAY Yes Ekta Allen MD   VITAMIN D3 10 MCG (400 UNIT) TABS tablet TAKE 1 TABLET BY MOUTH EVERY DAY Yes Ekta Allen MD   sacubitril-valsartan (ENTRESTO) 24-26 MG per tablet Take 1 tablet by mouth 2 times daily Yes Conrad Dietz MD   apixaban (ELIQUIS) 5 MG TABS tablet Take 1 tablet by mouth 2 times daily Yes Conrad Dietz MD   furosemide (LASIX) 80 MG tablet Take 1 tablet by mouth daily Yes Conrad Dietz MD   triamcinolone (NASACORT ALLERGY 24HR) 55 MCG/ACT nasal inhaler 2 sprays by Each Nostril route daily Yes Kwaku Logan APRN - CNP   atorvastatin (LIPITOR) 80 MG tablet TAKE 1 TABLET BY MOUTH EVERY DAY Yes Ekta Allen MD   tiZANidine (ZANAFLEX) 2 MG tablet Take 1 tablet by mouth 3 times daily as needed (R neck pain, spasm) Yes Ekta Allen MD   rosuvastatin (CRESTOR) 40 MG tablet TAKE 1 TABLET BY MOUTH EVERY DAY Yes Ekta Allen MD   acetaminophen (TYLENOL) 500 MG tablet Take 1 tablet by mouth every 6 hours as needed for Pain Yes Provider, MD Elias   blood glucose monitor strips Test 3 times a day & as needed for symptoms of irregular blood glucose. Yes Mac Isabel MD   Continuous Blood

## 2024-08-08 ENCOUNTER — TELEPHONE (OUTPATIENT)
Dept: FAMILY MEDICINE CLINIC | Age: 84
End: 2024-08-08

## 2024-08-08 NOTE — TELEPHONE ENCOUNTER
MA place call to [phone] (home)  spoke with patient and informed her of her tests results done via [facility]. Patient stated understanding. Please see attached labs that have been scanned in to patient chart.

## 2024-08-21 ENCOUNTER — TELEPHONE (OUTPATIENT)
Dept: FAMILY MEDICINE CLINIC | Age: 84
End: 2024-08-21

## 2024-08-21 DIAGNOSIS — U07.1 COVID-19: Primary | ICD-10-CM

## 2024-08-21 NOTE — TELEPHONE ENCOUNTER
Pt called to state she is covid positive and is there anything to be called in to pharmacy.    Please call pt

## 2024-08-21 NOTE — TELEPHONE ENCOUNTER
Rx for paxlovid sent     While takine paxlovid reduce dose of Eliquis:   Take half tab po bid (instead of one tab po bid)    If sob, cp, lightheadedness go to ED       Quarantine for 5 days

## 2024-09-16 ENCOUNTER — TELEPHONE (OUTPATIENT)
Dept: CARDIOLOGY CLINIC | Age: 84
End: 2024-09-16

## 2024-10-09 DIAGNOSIS — J30.9 ALLERGIC RHINITIS, UNSPECIFIED SEASONALITY, UNSPECIFIED TRIGGER: ICD-10-CM

## 2024-10-09 RX ORDER — ATORVASTATIN CALCIUM 80 MG/1
80 TABLET, FILM COATED ORAL DAILY
Qty: 90 TABLET | Refills: 0 | Status: SHIPPED | OUTPATIENT
Start: 2024-10-09

## 2024-10-09 RX ORDER — CETIRIZINE HYDROCHLORIDE 10 MG/1
10 TABLET ORAL DAILY
Qty: 90 TABLET | Refills: 0 | Status: SHIPPED | OUTPATIENT
Start: 2024-10-09

## 2024-10-09 NOTE — TELEPHONE ENCOUNTER
Medication:   Requested Prescriptions     Pending Prescriptions Disp Refills    atorvastatin (LIPITOR) 80 MG tablet [Pharmacy Med Name: ATORVASTATIN 80 MG TABLET] 90 tablet 3     Sig: TAKE 1 TABLET BY MOUTH EVERY DAY    cetirizine (ZYRTEC) 10 MG tablet [Pharmacy Med Name: CETIRIZINE HCL 10 MG TABLET] 90 tablet 1     Sig: TAKE 1 TABLET BY MOUTH EVERY DAY        Last Filled:  09/28/2023  & 03/27/2024     Patient Phone Number: [phone] (home)     Last appt: 8/1/2024   Next appt: 11/1/2024    Last OARRS:        No data to display

## 2024-10-31 ENCOUNTER — OFFICE VISIT (OUTPATIENT)
Dept: CARDIOLOGY CLINIC | Age: 84
End: 2024-10-31

## 2024-10-31 VITALS
DIASTOLIC BLOOD PRESSURE: 90 MMHG | SYSTOLIC BLOOD PRESSURE: 150 MMHG | WEIGHT: 293 LBS | BODY MASS INDEX: 57.3 KG/M2 | HEART RATE: 88 BPM

## 2024-10-31 DIAGNOSIS — I48.0 PAF (PAROXYSMAL ATRIAL FIBRILLATION) (HCC): ICD-10-CM

## 2024-10-31 DIAGNOSIS — I48.91 ATRIAL FIBRILLATION WITH RVR (HCC): Primary | ICD-10-CM

## 2024-10-31 NOTE — PROGRESS NOTES
Indeterminate diastolic function.   The right ventricle is normal in size with decreased systolic function.   Tapse: 1.20 cm.   RV s: 8.59 cm/s   The left atrium is dilated.   Mitral annular calcification is present.   Thickened mitral valve leaflets.   Mild mitral regurgitation.   Mildly calcified aortic valve.   Mild aortic regurgitation.   Mild tricuspid regurgitation.   The pulmonic valve is not well visualized.   Mild pulmonic regurgitation present.   Estimated pulmonary artery systolic pressure is at 16 mmHg assuming a right   atrial pressure of 3 mmHg.      Stress Test / Angiogram:   Assessment: 9/11/22  Acute coronary syndrome with Takotsubo.  We did not find coronary lesions or need intervention.\  LV function is abnormal showing evidence for Takotsubo and low ejection fraction of 15%.  Recommendations: Continue support with diuretics.  Nitroglycerin as noted.  She will be going to the floor on BiPAP.  Beta-blocker.  She normally takes Eliquis we will resume that.  Also losartan 100 mg.  Torsemide 20 mg.  Metoprolol succinate 200 mg/day.  ECG: EKG on 11/3/2022 atrial fibrillation at 100/min.  Nonspecific ST and T wave changes.  EKG on 3/22/2023 atrial fibrillation at 75/min.  Nonspecific T wave changes.    EKG on 10/31/2024 nonspecific ST and T wave changes.  Atrial fibrillation 88/min.  Right axis deviation.     Assessment  Takotsubo cardiomyopathy which has improved.  There is significant fluid retention from both water retention and the lymphedema.  Significant fluid retention.  Bilateral lymphedema.    Plan:   She wants Jobst stockings.  Will try to get that for her knee-high's.  Continue current therapy as listed.  Return to see us in about 4 months.  Please call if we can assist further [phone].  Conrad Dietz MD, [facility]      This note was likely completed using voice recognition technology and may contain unintended errors

## 2024-12-04 ENCOUNTER — OFFICE VISIT (OUTPATIENT)
Dept: FAMILY MEDICINE CLINIC | Age: 84
End: 2024-12-04

## 2024-12-04 VITALS
TEMPERATURE: 97.4 F | SYSTOLIC BLOOD PRESSURE: 148 MMHG | OXYGEN SATURATION: 99 % | HEART RATE: 75 BPM | DIASTOLIC BLOOD PRESSURE: 84 MMHG | BODY MASS INDEX: 47.09 KG/M2 | WEIGHT: 293 LBS | HEIGHT: 66 IN

## 2024-12-04 DIAGNOSIS — E78.5 HYPERLIPIDEMIA LDL GOAL <100: ICD-10-CM

## 2024-12-04 DIAGNOSIS — R60.0 EDEMA OF BOTH LEGS: ICD-10-CM

## 2024-12-04 DIAGNOSIS — I48.91 ATRIAL FIBRILLATION WITH RVR (HCC): ICD-10-CM

## 2024-12-04 DIAGNOSIS — E11.59 TYPE 2 DIABETES MELLITUS WITH OTHER CIRCULATORY COMPLICATION, WITHOUT LONG-TERM CURRENT USE OF INSULIN (HCC): Primary | ICD-10-CM

## 2024-12-04 DIAGNOSIS — Z23 NEEDS FLU SHOT: ICD-10-CM

## 2024-12-04 DIAGNOSIS — I10 ESSENTIAL HYPERTENSION: ICD-10-CM

## 2024-12-04 NOTE — ASSESSMENT & PLAN NOTE
unchanged  Encourage compliance with medication, life style changes  Continue same medications no changes needed at this time   Check fu labs     Orders:    Basic Metabolic Panel; Future    Hemoglobin A1C; Future

## 2024-12-04 NOTE — PROGRESS NOTES
Myra Nina (:  1940) is a 84 y.o. female,Established patient, here for evaluation of the following chief complaint(s):  Follow-up         Assessment & Plan  Type 2 diabetes mellitus with other circulatory complication, without long-term current use of insulin (HCC)    unchanged  Encourage compliance with medication, life style changes  Continue same medications no changes needed at this time   Check fu labs     Orders:    Basic Metabolic Panel; Future    Hemoglobin A1C; Future    Essential hypertension   Stable,   encourage low sodium diet   Continue same medications no changes needed at this time   Fu in 3 mo   Furosemide / valsartan 26 mg (entresto)          Edema of both legs   Chronic   Rx for compression stockings   Lasix     Orders:    Compression Stockings MISC; by Does not apply route Thigh stockings 30 mmHg    Needs flu shot       Orders:    Influenza, FLUAD Trivalent, (age 65 y+), IM, Preservative Free, 0.5mL    Atrial fibrillation with RVR ([facility])   Continue Eliquis            Hyperlipidemia LDL goal <100   Continue statin                Recommended Shingrix / RSV and Tdap at her pharmacy   Fu in 3 mo     Subjective   HPI  Treatment Adherence:   Medication compliance:  compliant most of the time  Diet compliance:  compliant most of the time  Weight trend: stable  Current exercise: no regular exercise  Barriers: none    Diabetes Mellitus Type 2: Current symptoms/problems include none.    Home blood sugar records: fasting range: 112-118   Any episodes of hypoglycemia? no  Eye exam current (within one year): no  Tobacco history: She  reports that she has never smoked. She has never used smokeless tobacco.   Daily Aspirin? Yes    Hypertension:  Home blood pressure monitoring: Yes - 148/88 .  She is adherent to a low sodium diet. Patient denies chest pain, shortness of breath, and lightheadedness.  Antihypertensive medication side effects: no medication side effects noted.  Use of agents  DOS 10/8/22. Pre procedural telephone interview complete with [facility]. Patient verbalizes correct arrival time 0500 and place SDS, NPO status 8hrs and no medications on DOS. Patient instructed to shower with antibacterial soap per protocol. No orders present at time of interview. After hospital discharge patient will be going home with wife. Person staying overnight is wife. Patient acknowledged understanding to the plan of care and had no further questions/concerns.     Patient aware if they are ill (cough, fever, etc.) to call their MD. Patient aware of coronavirus precautions for hospital screening upon entry to hospital, mask requirement for all on hospital campus, current visitor policy and  parking no longer available.

## 2024-12-05 NOTE — ASSESSMENT & PLAN NOTE
Stable,   encourage low sodium diet   Continue same medications no changes needed at this time   Fu in 3 mo   Furosemide / valsartan 26 mg (entresto)           Post-Care Instructions: I reviewed with the patient in detail post-care instructions. Patient is to keep the biopsy site dry overnight, and then apply bacitracin twice daily until healed. Patient may apply hydrogen peroxide soaks to remove any crusting.

## 2025-01-08 DIAGNOSIS — J30.9 ALLERGIC RHINITIS, UNSPECIFIED SEASONALITY, UNSPECIFIED TRIGGER: ICD-10-CM

## 2025-01-08 RX ORDER — ATORVASTATIN CALCIUM 80 MG/1
80 TABLET, FILM COATED ORAL DAILY
Qty: 90 TABLET | Refills: 3 | Status: SHIPPED | OUTPATIENT
Start: 2025-01-08

## 2025-01-08 RX ORDER — CETIRIZINE HYDROCHLORIDE 10 MG/1
10 TABLET ORAL DAILY
Qty: 90 TABLET | Refills: 3 | Status: SHIPPED | OUTPATIENT
Start: 2025-01-08

## 2025-01-08 NOTE — TELEPHONE ENCOUNTER
Medication:   Requested Prescriptions     Pending Prescriptions Disp Refills    cetirizine (ZYRTEC) 10 MG tablet [Pharmacy Med Name: CETIRIZINE HCL 10 MG TABLET] 90 tablet 0     Sig: TAKE 1 TABLET BY MOUTH EVERY DAY    atorvastatin (LIPITOR) 80 MG tablet [Pharmacy Med Name: ATORVASTATIN 80 MG TABLET] 90 tablet 0     Sig: TAKE 1 TABLET BY MOUTH EVERY DAY     Last Filled:  #90 of each on 10/09/2024    Last appt: 12/4/2024   Next appt: Visit date not found    Last OARRS:        No data to display

## 2025-02-04 ENCOUNTER — APPOINTMENT (OUTPATIENT)
Dept: GENERAL RADIOLOGY | Age: 85
End: 2025-02-04
Payer: MEDICARE

## 2025-02-04 ENCOUNTER — HOSPITAL ENCOUNTER (EMERGENCY)
Age: 85
Discharge: HOME | End: 2025-02-04
Attending: EMERGENCY MEDICINE
Payer: MEDICARE

## 2025-02-04 VITALS
HEART RATE: 116 BPM | BODY MASS INDEX: 47.09 KG/M2 | HEIGHT: 66 IN | DIASTOLIC BLOOD PRESSURE: 101 MMHG | WEIGHT: 293 LBS | RESPIRATION RATE: 20 BRPM | SYSTOLIC BLOOD PRESSURE: 151 MMHG | OXYGEN SATURATION: 97 % | TEMPERATURE: 96.9 F

## 2025-02-04 DIAGNOSIS — R60.0 PERIPHERAL EDEMA: Primary | ICD-10-CM

## 2025-02-04 LAB
ANION GAP SERPL CALCULATED.3IONS-SCNC: 10 MMOL/L (ref 3–16)
BASOPHILS # BLD: 0.1 K/UL (ref 0–0.2)
BASOPHILS NFR BLD: 1 %
BUN SERPL-MCNC: 29 MG/DL (ref 7–20)
CALCIUM SERPL-MCNC: 9.7 MG/DL (ref 8.3–10.6)
CHLORIDE SERPL-SCNC: 101 MMOL/L (ref 99–110)
CO2 SERPL-SCNC: 29 MMOL/L (ref 21–32)
CREAT SERPL-MCNC: 1.3 MG/DL (ref 0.6–1.2)
DEPRECATED RDW RBC AUTO: 16.3 % (ref 12.4–15.4)
EKG ATRIAL RATE: 91 BPM
EKG DIAGNOSIS: NORMAL
EKG Q-T INTERVAL: 286 MS
EKG QRS DURATION: 74 MS
EKG QTC CALCULATION (BAZETT): 404 MS
EKG R AXIS: 6 DEGREES
EKG T AXIS: -5 DEGREES
EKG VENTRICULAR RATE: 120 BPM
EOSINOPHIL # BLD: 0.1 K/UL (ref 0–0.6)
EOSINOPHIL NFR BLD: 1.4 %
GFR SERPLBLD CREATININE-BSD FMLA CKD-EPI: 40 ML/MIN/{1.73_M2}
GLUCOSE SERPL-MCNC: 137 MG/DL (ref 70–99)
HCT VFR BLD AUTO: 35.9 % (ref 36–48)
HGB BLD-MCNC: 11.4 G/DL (ref 12–16)
LYMPHOCYTES # BLD: 1.8 K/UL (ref 1–5.1)
LYMPHOCYTES NFR BLD: 29.7 %
MCH RBC QN AUTO: 25.2 PG (ref 26–34)
MCHC RBC AUTO-ENTMCNC: 31.7 G/DL (ref 31–36)
MCV RBC AUTO: 79.4 FL (ref 80–100)
MONOCYTES # BLD: 0.4 K/UL (ref 0–1.3)
MONOCYTES NFR BLD: 6.7 %
NEUTROPHILS # BLD: 3.8 K/UL (ref 1.7–7.7)
NEUTROPHILS NFR BLD: 61.2 %
NT-PROBNP SERPL-MCNC: 807 PG/ML (ref 0–449)
PLATELET # BLD AUTO: 277 K/UL (ref 135–450)
PMV BLD AUTO: 8.5 FL (ref 5–10.5)
POTASSIUM SERPL-SCNC: 4.2 MMOL/L (ref 3.5–5.1)
RBC # BLD AUTO: 4.52 M/UL (ref 4–5.2)
SODIUM SERPL-SCNC: 140 MMOL/L (ref 136–145)
TROPONIN, HIGH SENSITIVITY: 12 NG/L (ref 0–14)
TROPONIN, HIGH SENSITIVITY: 15 NG/L (ref 0–14)
WBC # BLD AUTO: 6.2 K/UL (ref 4–11)

## 2025-02-04 PROCEDURE — 93005 ELECTROCARDIOGRAM TRACING: CPT | Performed by: EMERGENCY MEDICINE

## 2025-02-04 PROCEDURE — 71046 X-RAY EXAM CHEST 2 VIEWS: CPT

## 2025-02-04 PROCEDURE — 85025 COMPLETE CBC W/AUTO DIFF WBC: CPT

## 2025-02-04 PROCEDURE — 83880 ASSAY OF NATRIURETIC PEPTIDE: CPT

## 2025-02-04 PROCEDURE — 6360000002 HC RX W HCPCS

## 2025-02-04 PROCEDURE — 84484 ASSAY OF TROPONIN QUANT: CPT

## 2025-02-04 PROCEDURE — 80048 BASIC METABOLIC PNL TOTAL CA: CPT

## 2025-02-04 PROCEDURE — 99285 EMERGENCY DEPT VISIT HI MDM: CPT

## 2025-02-04 PROCEDURE — 96374 THER/PROPH/DIAG INJ IV PUSH: CPT

## 2025-02-04 PROCEDURE — 6370000000 HC RX 637 (ALT 250 FOR IP)

## 2025-02-04 RX ORDER — METOPROLOL SUCCINATE 25 MG/1
50 TABLET, EXTENDED RELEASE ORAL ONCE
Status: COMPLETED | OUTPATIENT
Start: 2025-02-04 | End: 2025-02-04

## 2025-02-04 RX ORDER — FUROSEMIDE 10 MG/ML
40 INJECTION INTRAMUSCULAR; INTRAVENOUS ONCE
Status: COMPLETED | OUTPATIENT
Start: 2025-02-04 | End: 2025-02-04

## 2025-02-04 RX ADMIN — METOPROLOL SUCCINATE 50 MG: 25 TABLET, EXTENDED RELEASE ORAL at 21:33

## 2025-02-04 RX ADMIN — FUROSEMIDE 40 MG: 10 INJECTION, SOLUTION INTRAMUSCULAR; INTRAVENOUS at 18:35

## 2025-02-04 ASSESSMENT — PAIN DESCRIPTION - PAIN TYPE: TYPE: ACUTE PAIN

## 2025-02-04 ASSESSMENT — ENCOUNTER SYMPTOMS
ALLERGIC/IMMUNOLOGIC NEGATIVE: 1
GASTROINTESTINAL NEGATIVE: 1
EYES NEGATIVE: 1

## 2025-02-04 ASSESSMENT — PAIN DESCRIPTION - DESCRIPTORS: DESCRIPTORS: STABBING

## 2025-02-04 ASSESSMENT — PAIN DESCRIPTION - LOCATION: LOCATION: GROIN

## 2025-02-04 ASSESSMENT — PAIN - FUNCTIONAL ASSESSMENT
PAIN_FUNCTIONAL_ASSESSMENT: 0-10
PAIN_FUNCTIONAL_ASSESSMENT: PREVENTS OR INTERFERES WITH MANY ACTIVE NOT PASSIVE ACTIVITIES

## 2025-02-04 ASSESSMENT — PAIN DESCRIPTION - FREQUENCY: FREQUENCY: INTERMITTENT

## 2025-02-04 ASSESSMENT — PAIN DESCRIPTION - ORIENTATION: ORIENTATION: LEFT

## 2025-02-04 ASSESSMENT — PAIN DESCRIPTION - ONSET: ONSET: ON-GOING

## 2025-02-04 ASSESSMENT — PAIN SCALES - GENERAL: PAINLEVEL_OUTOF10: 10

## 2025-02-04 NOTE — ED TRIAGE NOTES
[facility] Emergency Department  MEDICAL SCREENING EXAM    Date of Service: 2/4/2025    Reason for Visit: Leg Swelling (Worsening swelling in the legs over the last couple weeks. Hx of CHF, wendie Dietz)        Patient History, Brief Exam, and Initial Assessment     Abbreviated HPI: Myra Nina is a 84 y.o. female presenting with bilateral lower extremity swelling that has been worse recently.  No redness or warmth.  No fever.  No chest pain or shortness of breath.    INITIAL VITALS:  ,  ,  ,  ,      Plan     Patient was evaluated in the REU for a medical screening exam.  To further evaluate the presenting complaints, the following orders have been placed:  Orders Placed This Encounter   Procedures    XR CHEST (2 VW)    CBC with Auto Differential    BMP w/ Reflex to MG    Troponin    BNP    EKG 12 Lead (SOB)        See primary provider's note for full details and final disposition.     Current Facility-Administered Medications:   No orders of the defined types were placed in this encounter.        REU Dispo     Stable for lobby while awaiting ED bed        Relevant Medical History     Past Medical History:   Diagnosis Date    Acute pulmonary edema     Acute respiratory failure with hypoxia and hypercapnia 09/12/2022    Acute systolic (congestive) heart failure (HCC)     Edema     Hearing loss     Hyperlipidemia     Hypertension     Morbid obesity due to excess calories 12/29/2015    NSTEMI (non-ST elevated myocardial infarction) (HCC) 09/11/2022    Pre-diabetes     PUD (peptic ulcer disease)     \"years ago\",      Past Surgical History:   Procedure Laterality Date    HERNIA REPAIR      umbilical     KNEE SURGERY Bilateral     TKR bilateral     Allergies   Allergen Reactions    Aspirin Other (See Comments)     Caused ulcers, burns stomach    Dye [Iodides] Itching, Swelling and Rash

## 2025-02-05 ENCOUNTER — OFFICE VISIT (OUTPATIENT)
Dept: CARDIOLOGY CLINIC | Age: 85
End: 2025-02-05

## 2025-02-05 ENCOUNTER — OFFICE VISIT (OUTPATIENT)
Dept: CARDIOLOGY CLINIC | Age: 85
End: 2025-02-05
Payer: MEDICARE

## 2025-02-05 VITALS
BODY MASS INDEX: 56.98 KG/M2 | DIASTOLIC BLOOD PRESSURE: 60 MMHG | SYSTOLIC BLOOD PRESSURE: 138 MMHG | HEART RATE: 60 BPM | WEIGHT: 293 LBS

## 2025-02-05 DIAGNOSIS — I50.31: ICD-10-CM

## 2025-02-05 DIAGNOSIS — I50.22 CHRONIC SYSTOLIC (CONGESTIVE) HEART FAILURE (HCC): ICD-10-CM

## 2025-02-05 DIAGNOSIS — I42.9 CARDIOMYOPATHY, UNSPECIFIED TYPE (HCC): Primary | ICD-10-CM

## 2025-02-05 DIAGNOSIS — I48.91 ATRIAL FIBRILLATION WITH RVR (HCC): ICD-10-CM

## 2025-02-05 DIAGNOSIS — E66.01 MORBID OBESITY DUE TO EXCESS CALORIES: ICD-10-CM

## 2025-02-05 DIAGNOSIS — Z53.8 APPOINTMENT CANCELED BY HOSPITAL: Primary | ICD-10-CM

## 2025-02-05 PROCEDURE — 1160F RVW MEDS BY RX/DR IN RCRD: CPT | Performed by: NURSE PRACTITIONER

## 2025-02-05 PROCEDURE — 1123F ACP DISCUSS/DSCN MKR DOCD: CPT | Performed by: NURSE PRACTITIONER

## 2025-02-05 PROCEDURE — 99215 OFFICE O/P EST HI 40 MIN: CPT | Performed by: NURSE PRACTITIONER

## 2025-02-05 PROCEDURE — 1159F MED LIST DOCD IN RCRD: CPT | Performed by: NURSE PRACTITIONER

## 2025-02-05 RX ORDER — METOPROLOL SUCCINATE 50 MG/1
50 TABLET, EXTENDED RELEASE ORAL DAILY
Qty: 90 TABLET | Refills: 3 | Status: SHIPPED | OUTPATIENT
Start: 2025-02-05

## 2025-02-05 NOTE — DISCHARGE INSTRUCTIONS
Please follow-up Dr. Dietz's office tomorrow.  I spoke with the office tonight they were able to to get you in tomorrow if you were to call.  You need to discuss about your metoprolol that you were taking but no longer prescribed.  This is important for your rate control however follow Dr. Dietz's recommendations.

## 2025-02-05 NOTE — PROGRESS NOTES
[facility]     Outpatient Follow Up Note  Dr Conrad Dietz MD,  FACC >> Dr Isak Chung RN, APRN,CNP CVNP      CHIEF COMPLAINT / HPI:  Follow Up after hosp visit   2/4/25 \"Leg Swelling (Worsening swelling in the legs over the last couple weeks. Hx of CHF, sees J Luis)\"  AHF went to ED and given lasix with great results  /  EKG was afib rate I 120 in ED / sCr 1.3   Interval history:  VSS wt down 22#, since lasix in ED and took lasix today, feels better, she has chronic lymphedema    Not on BB >> will start Toprol 50 mg po daily fu next week will probably need more BB   Cardiac  meds Entresto lasix Lipitor Eliquis denies  bleeding or falls / no NSAIDS only tylenol prn   Fu with Dr Parisi in 2-3 months  TTE recommended   Blood  work PTV       Myra Nina is 84 y.o. female who presents today with HFrEF, HTN, HLD, and pAF. diagnosed with takotsubo CMP. EF 15%. Now EF wnl no CAD seen on cath.   HTN remains on high side   Lymphedema  HLD   [facility]  on 9/11/2022 with minimal plaque.  Ejection fraction 15% on angiogram.  And subsequent echo showed improvement to 30 to 35% >> 55%                      TTE 2/23/23 Normal left ventricular size. Mild-moderate concentric left ventricular hypertrophy. The left ventricular systolic function appears preserved with an estimated ejection fraction of 55%-60%. Abnormal septal motion. Cannot exclude regional wall motion abnormalities secondary to poor endocardial visualization. Indeterminate diastolic function. The right ventricle is normal in size with decreased systolic function. Tapse: 1.20 cm. RV s: 8.59 cm/s The left atrium is dilated. Mitral annular calcification is present. Thickened mitral valve leaflets. Mild mitral regurgitation. Mildly calcified aortic valve. Mild aortic regurgitation. Mild tricuspid regurgitation. The pulmonic valve is not well visualized. Mild pulmonic regurgitation present.      Past Medical History:   Diagnosis Date    Acute

## 2025-02-06 ENCOUNTER — TELEPHONE (OUTPATIENT)
Dept: CARE COORDINATION | Age: 85
End: 2025-02-06

## 2025-02-06 ENCOUNTER — CARE COORDINATION (OUTPATIENT)
Dept: CARE COORDINATION | Age: 85
End: 2025-02-06

## 2025-02-06 DIAGNOSIS — E11.59 TYPE 2 DIABETES MELLITUS WITH OTHER CIRCULATORY COMPLICATION, WITHOUT LONG-TERM CURRENT USE OF INSULIN (HCC): Primary | ICD-10-CM

## 2025-02-06 NOTE — TELEPHONE ENCOUNTER
Medication:   Requested Prescriptions     Pending Prescriptions Disp Refills    Lancets MISC 300 each 5     Si each by Does not apply route 3 times daily        Last Filled:      Patient Phone Number: [phone] (home)     Last appt: Visit date not found   Next appt: Visit date not found    Last OARRS:        No data to display

## 2025-02-06 NOTE — CARE COORDINATION
Ambulatory Care Coordination Note     2025 11:54 AM     Patient Current Location:  Home: [address]  [place] [address]     This patient was received as a referral from [facility] .    Patient contacted the ACM by telephone. Verified name and  with patient as identifiers. Provided introduction to self, and explanation of the ACM role.   Patient accepted care management services at this time.          ACM: Filomena Dunham RN     Challenges to be reviewed by the provider   Additional needs identified to be addressed with provider Yes  Refill of lancets                Method of communication with provider: chart routing.    Utilization: Initial Call - Discharge Date: 25   Discharge Facility: [facility]  Reason for ED Visit: peripheral edema  Visit Diagnosis: peripheral edema to lower extremities    Number of ED visits in the last 6 months: 1      Do you have any ongoing symptoms?  Still some swelling to legs, but closer to baseline than they were before she went to the ED.   Did you call your PCP prior to going to the ED? No, did not call the PCP office.     Review of Discharge Instructions:   [] AVS discharge instructions  [] Right Care, Right Place, Right Time document  [] Medication changes  [] Follow up appointments  [] Referral follow up   [] N/a       Care Summary Note: ACM called to f/u with patient today. She reports she is doing much better since she's been to the ED and so much fluid was removed. She said she has had a decrease in the swelling of her leg, she is weighing herself daily, taking daily BP and she said she has no SOB, no CP and is \"getting around much better\" since home from the ED.     She did f/u with cardiology on 25 and was prescribed Troprol XL, 50 mg, daily. She said she is concerned taking this due to the side effects she spoke with the pharmacist about. She said she is already experiencing shoulder pain with her ring finger to her

## 2025-02-07 ENCOUNTER — TELEPHONE (OUTPATIENT)
Dept: CARDIOLOGY CLINIC | Age: 85
End: 2025-02-07

## 2025-02-07 DIAGNOSIS — R07.9 CHEST PAIN, UNSPECIFIED TYPE: Primary | ICD-10-CM

## 2025-02-07 DIAGNOSIS — R06.02 SHORTNESS OF BREATH: ICD-10-CM

## 2025-02-07 RX ORDER — LANCETS 30 GAUGE
EACH MISCELLANEOUS
Qty: 300 EACH | Refills: 5 | Status: SHIPPED | OUTPATIENT
Start: 2025-02-07

## 2025-02-07 NOTE — TELEPHONE ENCOUNTER
[facility] scheduling called stating the patient's echo order is not meeting medical necessity. Please call the patient once the order is updated so she can get scheduled. [phone]

## 2025-02-07 NOTE — TELEPHONE ENCOUNTER
Put a new order in for an echo and called patient to let her know that she can call and get the echo scheduled.

## 2025-02-18 ENCOUNTER — TELEPHONE (OUTPATIENT)
Dept: FAMILY MEDICINE CLINIC | Age: 85
End: 2025-02-18

## 2025-02-18 LAB
ALBUMIN: 3.9 G/DL (ref 3.7–4.7)
ALP BLD-CCNC: 102 IU/L (ref 44–121)
ALT SERPL-CCNC: 18 IU/L (ref 0–32)
AST SERPL-CCNC: 18 IU/L (ref 0–40)
B-TYPE NATRIURETIC PEPTIDE: 132.5 PG/ML (ref 0–100)
BASOPHILS ABSOLUTE: 0 X10E3/UL (ref 0–0.2)
BASOPHILS RELATIVE PERCENT: 1 %
BILIRUB SERPL-MCNC: 0.7 MG/DL (ref 0–1.2)
BILIRUBIN DIRECT: 0.3 MG/DL (ref 0–0.4)
BUN / CREAT RATIO: 18 (ref 12–28)
BUN BLDV-MCNC: 21 MG/DL (ref 8–27)
CALCIUM SERPL-MCNC: 9.2 MG/DL (ref 8.7–10.3)
CHLORIDE BLD-SCNC: 102 MMOL/L (ref 96–106)
CHOLESTEROL, TOTAL: 126 MG/DL (ref 100–199)
CO2: 26 MMOL/L (ref 20–29)
CREAT SERPL-MCNC: 1.15 MG/DL (ref 0.57–1)
EOSINOPHILS ABSOLUTE: 0.1 X10E3/UL (ref 0–0.4)
EOSINOPHILS RELATIVE PERCENT: 1 %
ESTIMATED GLOMERULAR FILTRATION RATE CREATININE EQUATION: 46 ML/MIN/1.73
GLOBULIN: 3.2 G/DL (ref 1.5–4.5)
GLUCOSE BLD-MCNC: 117 MG/DL (ref 70–99)
HCT VFR BLD CALC: 35.8 % (ref 34–46.6)
HDLC SERPL-MCNC: 60 MG/DL
HEMOGLOBIN: 11.3 G/DL (ref 11.1–15.9)
IMMATURE GRANS (ABS): 0 X10E3/UL (ref 0–0.1)
IMMATURE GRANULOCYTES %: 0 %
LDL CHOLESTEROL: 53 MG/DL (ref 0–99)
LYMPHOCYTES ABSOLUTE: 2.3 X10E3/UL (ref 0.7–3.1)
LYMPHOCYTES RELATIVE PERCENT: 39 %
MAGNESIUM: 2 MG/DL (ref 1.6–2.3)
MCH RBC QN AUTO: 24.7 PG (ref 26.6–33)
MCHC RBC AUTO-ENTMCNC: 31.6 G/DL (ref 31.5–35.7)
MCV RBC AUTO: 78 FL (ref 79–97)
MONOCYTES ABSOLUTE: 0.4 X10E3/UL (ref 0.1–0.9)
MONOCYTES RELATIVE PERCENT: 8 %
NEUTROPHILS ABSOLUTE: 3 X10E3/UL (ref 1.4–7)
NEUTROPHILS RELATIVE PERCENT: 51 %
PDW BLD-RTO: 14.7 % (ref 11.7–15.4)
PLATELET # BLD: 264 X10E3/UL (ref 150–450)
POTASSIUM SERPL-SCNC: 4.3 MMOL/L (ref 3.5–5.2)
RBC # BLD: 4.58 X10E6/UL (ref 3.77–5.28)
SODIUM BLD-SCNC: 142 MMOL/L (ref 134–144)
T3 FREE: 2.9 PG/ML (ref 2–4.4)
TOTAL PROTEIN: 7.1 G/DL (ref 6–8.5)
TRIGL SERPL-MCNC: 62 MG/DL (ref 0–149)
TSH SERPL DL<=0.05 MIU/L-ACNC: 2.34 UIU/ML (ref 0.45–4.5)
VITAMIN D 25-HYDROXY: 29.1 NG/ML (ref 30–100)
VLDLC SERPL CALC-MCNC: 13 MG/DL (ref 5–40)
WBC # BLD: 5.8 X10E3/UL (ref 3.4–10.8)

## 2025-02-18 NOTE — TELEPHONE ENCOUNTER
MA place call to [phone] (home)  spoke with patient and informed her of Dr. Allen's message stated below. She stated understanding.

## 2025-02-18 NOTE — TELEPHONE ENCOUNTER
----- Message from Dr. Ekta Allen MD sent at 2/18/2025  1:03 PM EST -----  + iron deficiency but no anemia  Take iron 325 mg (rx sent )     Sugar 117, at goal   Kidney stable     Vit D stable, continue vit D    BNP: Hear test stable     Chol normal     Thyroid normal     Magnesium normal

## 2025-02-21 ENCOUNTER — CARE COORDINATION (OUTPATIENT)
Dept: CARE COORDINATION | Age: 85
End: 2025-02-21

## 2025-02-21 DIAGNOSIS — I42.9 CARDIOMYOPATHY, UNSPECIFIED TYPE (HCC): Primary | ICD-10-CM

## 2025-02-21 NOTE — CARE COORDINATION
Ambulatory Care Coordination Note     2025 3:02 PM     Patient Current Location:  Home: [address]  [place] [address]     ACM contacted the patient by telephone. Verified name and  with patient as identifiers.         ACM: Filomena Dunham RN     Challenges to be reviewed by the provider   Additional needs identified to be addressed with provider Yes  medications-pt thought prescription for iron was sent to her pharmacy and [facility] said they do not have an Rx               Method of communication with provider: chart routing.    Utilization: Patient has not had any utilization since our last call.     Care Summary Note: ACM f/u with patient today. She reports she is doing well, the swelling in her legs is \"much better\". She said she has been keeping her legs elevated and she has no symptoms to report to [facility] at this time. She continues to weigh herself and check her BP daily. Pt stated she is supposed to have an ECHO scheduled, but she was informed she would have to pay $321 OOP and she said she does not have the extra money for that. Pt also stated she received a text that she owes $191 to [employer] and she is not sure what that is for. [facility] provided pt with the contact number to talk with [employer] Billing at: [phone] to talk with a  about her bill and will also send a referral to [facility] regarding any possible resources to help pt pay for her ECHO. Pt agreed with this plan.     Pt stated she was informed earlier this week that a prescription for iron was sent to her pharmacy and she said she called [facility] and was told they have not receive a prescription for iron. [facility] will send a message to pt PCP to let her know.     Pt did not have any additional questions or concerns today.     Offered patient enrollment in the Remote Patient Monitoring (RPM) program for in-home monitoring: Deferred at this time because pt has her own equipment and checks her weight and BP daily at home; will discuss at

## 2025-02-24 RX ORDER — FERROUS SULFATE 325(65) MG
TABLET ORAL
Qty: 90 TABLET | Refills: 0 | Status: SHIPPED | OUTPATIENT
Start: 2025-02-24

## 2025-02-27 NOTE — PROGRESS NOTES
prescribed in this documentation as scribed by Ms. Adele Gutierrez RN in my presence and it is both accurate and complete.       Marcelle Figueroa MD  Cardiac Electrophysiology  [facility]

## 2025-02-28 ENCOUNTER — CARE COORDINATION (OUTPATIENT)
Dept: CARE COORDINATION | Age: 85
End: 2025-02-28

## 2025-02-28 NOTE — CARE COORDINATION
Ambulatory Care Coordination Note     2/28/2025 12:21 PM     Patient outreach attempt by this ACM today to perform care management follow up . ACM was unable to reach the patient by telephone today;   voicemail full and unable to leave a message.      ACM: Filomena Dunham, RN     Care Summary Note: F/u if pt received her iron medication and if she spoke with [facility].     PCP/Specialist follow up:   Future Appointments         Provider Specialty Dept Phone    3/20/2025 12:00 PM Ira Figueroa MD Cardiology [phone]            Follow Up:   Plan for next ACM outreach in approximately 3 weeks to complete:  - SDOH assessments  - medication review.

## 2025-03-07 ENCOUNTER — COMMUNITY OUTREACH (OUTPATIENT)
Dept: OTHER | Age: 85
End: 2025-03-07

## 2025-03-07 NOTE — PROGRESS NOTES
PP-SW spoke with patient on this date as referred by RNCC. SW and patient discussed needed Echo and patient being told that she needed to have $321 upfront to have completed. SW and patient discussed current income and household circumstances. Due to unique household situation, SW to check into income to be reported for financial assistance and options for patient. SW to follow up once looked into further. Patient voiced understanding.

## 2025-03-17 ENCOUNTER — TELEPHONE (OUTPATIENT)
Dept: CARE COORDINATION | Age: 85
End: 2025-03-17

## 2025-03-17 ENCOUNTER — CARE COORDINATION (OUTPATIENT)
Dept: CARE COORDINATION | Age: 85
End: 2025-03-17

## 2025-03-17 NOTE — TELEPHONE ENCOUNTER
Called pt for CC f/u. She reports she is having headaches and would like to f/u with Dr. Allen. Attempted to scheduled but was blocked from scheduling. Routing to office to assist.

## 2025-03-17 NOTE — CARE COORDINATION
changes with medications and reports taking all medications as prescribed.    Advance Care Planning:   Not reviewed during this call     Care Planning:   Not completed during this call    PCP/Specialist follow up:   Future Appointments         Provider Specialty Dept Phone    3/20/2025 12:00 PM Ira Figueroa MD Cardiology [phone]            Follow Up:   Plan for next ACM outreach in approximately 3 weeks to complete:  - follow up appointment with Cardiology, Dr. Allen and [facility] .   Patient  is agreeable to this plan.

## 2025-03-18 NOTE — TELEPHONE ENCOUNTER
I tried calling pt to get there on the schedule  for tomorrow at 230 if pt call back please schedule per dr head v/qiana was full

## 2025-03-19 ENCOUNTER — OFFICE VISIT (OUTPATIENT)
Dept: FAMILY MEDICINE CLINIC | Age: 85
End: 2025-03-19

## 2025-03-19 VITALS
TEMPERATURE: 97.1 F | DIASTOLIC BLOOD PRESSURE: 93 MMHG | WEIGHT: 293 LBS | SYSTOLIC BLOOD PRESSURE: 139 MMHG | HEART RATE: 94 BPM | BODY MASS INDEX: 47.09 KG/M2 | OXYGEN SATURATION: 99 % | HEIGHT: 66 IN

## 2025-03-19 DIAGNOSIS — N18.31 STAGE 3A CHRONIC KIDNEY DISEASE (HCC): ICD-10-CM

## 2025-03-19 DIAGNOSIS — E11.59 TYPE 2 DIABETES MELLITUS WITH OTHER CIRCULATORY COMPLICATION, WITHOUT LONG-TERM CURRENT USE OF INSULIN: ICD-10-CM

## 2025-03-19 DIAGNOSIS — I10 ESSENTIAL HYPERTENSION: Primary | ICD-10-CM

## 2025-03-19 RX ORDER — METOPROLOL SUCCINATE 50 MG/1
50 TABLET, EXTENDED RELEASE ORAL DAILY
COMMUNITY
Start: 2025-03-19

## 2025-03-19 ASSESSMENT — PATIENT HEALTH QUESTIONNAIRE - PHQ9
SUM OF ALL RESPONSES TO PHQ QUESTIONS 1-9: 0
2. FEELING DOWN, DEPRESSED OR HOPELESS: NOT AT ALL
SUM OF ALL RESPONSES TO PHQ QUESTIONS 1-9: 0
1. LITTLE INTEREST OR PLEASURE IN DOING THINGS: NOT AT ALL

## 2025-03-19 NOTE — PROGRESS NOTES
Myra Nina (:  1940) is a 84 y.o. female,Established patient, here for evaluation of the following chief complaint(s):  Follow-up         Assessment & Plan  Essential hypertension   Not well controlled  Med rec was reviewed today   She should be on BB , explained risks /benefits and she agreed to re start med.   Fu in 3 mo          Type 2 diabetes mellitus with other circulatory complication, without long-term current use of insulin (HCC)    Intolerant to farxiga (recurrent UTI), this med was dc   Fu 1 mo   Needs fu labs           Stage 3a chronic kidney disease (HCC)   Bmp stable   Continue same medications no changes needed at this time              No follow-ups on file.       Subjective   Hypertension  This is a chronic problem. The current episode started more than 1 year ago. The problem is unchanged. The problem is uncontrolled. Associated symptoms include peripheral edema (intemittent) and shortness of breath. Past treatments include diuretics. The current treatment provides mild improvement. Compliance problems include diet and exercise.    Congestive Heart Failure  Presents for follow-up visit. Associated symptoms include edema, fatigue and shortness of breath. Pertinent negatives include no abdominal pain. The symptoms have been stable. Compliance with total regimen is 0-25%. Compliance problems include adherence to diet and adherence to exercise.  Compliance with diet is 0-25%. Compliance with exercise is 0-25%. Compliance with medications is 26-50%.   Other  This is a chronic problem. The current episode started more than 1 year ago. The problem occurs constantly. The problem has been unchanged. Associated symptoms include fatigue. Pertinent negatives include no abdominal pain. Nothing aggravates the symptoms. Treatments tried: see med list, all reviewed today wiht her.   HFrEF, HTN, HLD, and pAF. diagnosed with takotsubo CMP. EF 15%. Now EF wnl no CAD seen on cath.       [facility]  on

## 2025-03-19 NOTE — ASSESSMENT & PLAN NOTE
Not well controlled  Med rec was reviewed today   She should be on BB , explained risks /benefits and she agreed to re start med.   Fu in 3 mo

## 2025-03-20 ENCOUNTER — NURSE ONLY (OUTPATIENT)
Dept: CARDIOLOGY CLINIC | Age: 85
End: 2025-03-20

## 2025-03-20 ENCOUNTER — OFFICE VISIT (OUTPATIENT)
Dept: CARDIOLOGY CLINIC | Age: 85
End: 2025-03-20
Payer: MEDICARE

## 2025-03-20 VITALS
WEIGHT: 293 LBS | HEART RATE: 89 BPM | BODY MASS INDEX: 59.28 KG/M2 | SYSTOLIC BLOOD PRESSURE: 148 MMHG | DIASTOLIC BLOOD PRESSURE: 88 MMHG

## 2025-03-20 DIAGNOSIS — I48.19 PERSISTENT ATRIAL FIBRILLATION (HCC): ICD-10-CM

## 2025-03-20 DIAGNOSIS — E66.01 MORBID OBESITY DUE TO EXCESS CALORIES: ICD-10-CM

## 2025-03-20 DIAGNOSIS — I42.9 CARDIOMYOPATHY, UNSPECIFIED TYPE (HCC): ICD-10-CM

## 2025-03-20 DIAGNOSIS — I50.22 CHRONIC SYSTOLIC (CONGESTIVE) HEART FAILURE: ICD-10-CM

## 2025-03-20 DIAGNOSIS — I48.11 LONGSTANDING PERSISTENT ATRIAL FIBRILLATION (HCC): Primary | ICD-10-CM

## 2025-03-20 DIAGNOSIS — I10 ESSENTIAL HYPERTENSION: ICD-10-CM

## 2025-03-20 PROCEDURE — 1123F ACP DISCUSS/DSCN MKR DOCD: CPT | Performed by: INTERNAL MEDICINE

## 2025-03-20 PROCEDURE — 99214 OFFICE O/P EST MOD 30 MIN: CPT | Performed by: INTERNAL MEDICINE

## 2025-03-20 PROCEDURE — 1159F MED LIST DOCD IN RCRD: CPT | Performed by: INTERNAL MEDICINE

## 2025-03-20 PROCEDURE — 93000 ELECTROCARDIOGRAM COMPLETE: CPT | Performed by: INTERNAL MEDICINE

## 2025-03-20 PROCEDURE — G2211 COMPLEX E/M VISIT ADD ON: HCPCS | Performed by: INTERNAL MEDICINE

## 2025-03-20 ASSESSMENT — ENCOUNTER SYMPTOMS
SHORTNESS OF BREATH: 1
ABDOMINAL PAIN: 0

## 2025-03-20 NOTE — PROGRESS NOTES
Monitor placed by MYRLA  Monitor company [employer]  Length of monitor 14  Monitor ordered by   Serial number/Patch ID [id number]  Kit ID (for VC)   Activation successful prior to pt leaving office? Yes

## 2025-04-02 ENCOUNTER — CARE COORDINATION (OUTPATIENT)
Dept: CARE COORDINATION | Age: 85
End: 2025-04-02

## 2025-04-02 ENCOUNTER — TELEPHONE (OUTPATIENT)
Dept: CARE COORDINATION | Age: 85
End: 2025-04-02

## 2025-04-02 NOTE — CARE COORDINATION
Ambulatory Care Coordination Note     2025 10:02 AM     Patient Current Location:  Home: [address]  [place] [address]     ACM contacted the patient by telephone. Verified name and  with patient as identifiers.         ACM: Filomena Dunham RN     Challenges to be reviewed by the provider   Additional needs identified to be addressed with provider Yes  Pt needs paperwork signed by Dr. Allen so she can get $200 off a motorized scooter at [employer]. Pt to drop off paperwork. ACM to send telephone encounter to PCP office to let them know.                Method of communication with provider: chart routing.    Utilization: Patient has not had any utilization since our last call.     Care Summary Note: ACM f/u with patient today. She f/u with Dr. Allen regarding her H/A and with Cardiology. She was prescribed BB from Dr. Allen and pt reports her H/A have subsided. She said she is wearing a heart monitor for 14 days and will be done monitoring tomorrow. She said she would like to get a scooter from [employer] and will need to drop off paperwork to have Dr. Allen sign so she is able to get a $200 discount on the scooter. ACM to notify the office. Pt stated she is doing well otherwise, has no questions or concerns at this time and agreed to call if that changes.     Offered patient enrollment in the Remote Patient Monitoring (RPM) program for in-home monitoring: Yes, but did not enroll at this time: already monitoring with home equipment.     Assessments Completed:   General Assessment    Do you have any symptoms that are causing concern?: No          Medications Reviewed:   Patient denies any changes with medications and reports taking all medications as prescribed.    Advance Care Planning:   Not reviewed during this call     Care Planning:   Education Documentation  [employer], taught by Filomena Dunham, RN at 2025 10:01 AM.  Learner: Patient  Readiness: Acceptance  Method:

## 2025-04-02 NOTE — TELEPHONE ENCOUNTER
CC f/u with patient today. During conversation, pt stated she will be purchasing a motorized scooter. She said she will be dropping off a form from [employer] and asked if Dr. Allen will sign it so that she can get a $200 discount on the scooter.     Will forward to the office to let them know to expect paperwork from pt soon.

## 2025-04-08 ENCOUNTER — RESULTS FOLLOW-UP (OUTPATIENT)
Dept: CARDIOLOGY CLINIC | Age: 85
End: 2025-04-08

## 2025-04-08 RX ORDER — SACUBITRIL AND VALSARTAN 24; 26 MG/1; MG/1
1 TABLET, FILM COATED ORAL 2 TIMES DAILY
Qty: 60 TABLET | Refills: 1 | Status: SHIPPED | OUTPATIENT
Start: 2025-04-08

## 2025-04-08 NOTE — TELEPHONE ENCOUNTER
Requested Prescriptions     Pending Prescriptions Disp Refills    ENTRESTO 24-26 MG per tablet [Pharmacy Med Name: ENTRESTO 24 MG-26 MG TABLET] 180 tablet 3     Sig: TAKE 1 TABLET BY MOUTH TWICE A DAY            Checked Correct Pharmacy: Yes    Any changes since last refill? No     Number: 180  Refills: 3    Last OV: 2/5/2025 Provider: SANJAY    Next OV: Visit date not found Provider: ARCENIO    Last Labs:   CMP:   Lab Results   Component Value Date     02/17/2025    K 4.3 02/17/2025     02/17/2025    CO2 26 02/17/2025    BUN 21 02/17/2025    CREATININE 1.15 (H) 02/17/2025    GLUCOSE 117 (H) 02/17/2025    CALCIUM 9.2 02/17/2025    BILITOT 0.7 02/17/2025    ALKPHOS 102 02/17/2025    AST 18 02/17/2025    ALT 18 02/17/2025    LABGLOM 40 (A) 02/04/2025    GFRAA 57 (A) 09/22/2022    AGRATIO 1.3 09/18/2022    GLOB 3.2 02/17/2025

## 2025-04-14 DIAGNOSIS — E55.9 VITAMIN D DEFICIENCY: ICD-10-CM

## 2025-04-14 RX ORDER — FUROSEMIDE 80 MG/1
80 TABLET ORAL DAILY
Qty: 90 TABLET | Refills: 3 | Status: SHIPPED | OUTPATIENT
Start: 2025-04-14

## 2025-04-14 RX ORDER — OMEGA-3S/DHA/EPA/FISH OIL/D3 300MG-1000
400 CAPSULE ORAL DAILY
Qty: 90 TABLET | Refills: 3 | Status: SHIPPED | OUTPATIENT
Start: 2025-04-14

## 2025-04-14 NOTE — TELEPHONE ENCOUNTER
Medication:   Requested Prescriptions     Pending Prescriptions Disp Refills    VITAMIN D3 10 MCG (400 UNIT) TABS tablet [Pharmacy Med Name: VITAMIN D3 400 UNIT TABLET] 90 tablet 3     Sig: TAKE 1 TABLET BY MOUTH EVERY DAY        Last Filled:  3/27/24    Patient Phone Number: [phone] (home)     Last appt: 3/19/2025   Next appt: Visit date not found    Last OARRS:        No data to display

## 2025-04-14 NOTE — TELEPHONE ENCOUNTER
Requested Prescriptions     Pending Prescriptions Disp Refills    furosemide (LASIX) 80 MG tablet [Pharmacy Med Name: FUROSEMIDE 80 MG TABLET] 90 tablet 3     Sig: TAKE 1 TABLET BY MOUTH EVERY DAY            Checked Correct Pharmacy: Yes    Any changes since last refill? No     Number: 90  Refills: 3    Last OV:03.20.2025Provider: [facility]    Next OV: 09.20.2025Provider: NPET    Last Labs:   CBC:   Lab Results   Component Value Date    WBC 5.8 02/17/2025    HGB 11.3 02/17/2025    HCT 35.8 02/17/2025    MCV 78 (L) 02/17/2025     02/17/2025     CMP:   Lab Results   Component Value Date     02/17/2025    K 4.3 02/17/2025     02/17/2025    CO2 26 02/17/2025    BUN 21 02/17/2025    CREATININE 1.15 (H) 02/17/2025    GLUCOSE 117 (H) 02/17/2025    CALCIUM 9.2 02/17/2025    BILITOT 0.7 02/17/2025    ALKPHOS 102 02/17/2025    AST 18 02/17/2025    ALT 18 02/17/2025    LABGLOM 40 (A) 02/04/2025    GFRAA 57 (A) 09/22/2022    AGRATIO 1.3 09/18/2022    GLOB 3.2 02/17/2025          Liver:   Lab Results   Component Value Date    ALT 18 02/17/2025    AST 18 02/17/2025    ALKPHOS 102 02/17/2025    BILITOT 0.7 02/17/2025      BNP:   Lab Results   Component Value Date    .5 (H) 02/17/2025

## 2025-04-24 ENCOUNTER — TELEPHONE (OUTPATIENT)
Dept: CARE COORDINATION | Age: 85
End: 2025-04-24

## 2025-04-24 ENCOUNTER — CARE COORDINATION (OUTPATIENT)
Dept: CARE COORDINATION | Age: 85
End: 2025-04-24

## 2025-04-24 NOTE — TELEPHONE ENCOUNTER
Pt c/o swelling to bilateral legs continues. Scheduled her for f/u appt with Dr. Allen on 5/5/25 at 1200. Will veirfy appt is ok with office.     Sent request to [employer] to mail CHF Summer Information to pt home and will review all info during next outreach. Pt agreed with this plan.

## 2025-05-06 ENCOUNTER — APPOINTMENT (OUTPATIENT)
Dept: GENERAL RADIOLOGY | Age: 85
DRG: 291 | End: 2025-05-06
Payer: MEDICARE

## 2025-05-06 ENCOUNTER — HOSPITAL ENCOUNTER (INPATIENT)
Age: 85
LOS: 2 days | Discharge: HOME | DRG: 291 | End: 2025-05-08
Attending: EMERGENCY MEDICINE | Admitting: INTERNAL MEDICINE
Payer: MEDICARE

## 2025-05-06 DIAGNOSIS — I48.91 ATRIAL FIBRILLATION, UNSPECIFIED TYPE (HCC): ICD-10-CM

## 2025-05-06 DIAGNOSIS — I50.9 ACUTE ON CHRONIC CONGESTIVE HEART FAILURE, UNSPECIFIED HEART FAILURE TYPE (HCC): Primary | ICD-10-CM

## 2025-05-06 LAB
ANION GAP SERPL CALCULATED.3IONS-SCNC: 12 MMOL/L (ref 3–16)
BASOPHILS # BLD: 0.1 K/UL (ref 0–0.2)
BASOPHILS NFR BLD: 2 %
BUN SERPL-MCNC: 20 MG/DL (ref 7–20)
CALCIUM SERPL-MCNC: 9.3 MG/DL (ref 8.3–10.6)
CHLORIDE SERPL-SCNC: 103 MMOL/L (ref 99–110)
CO2 SERPL-SCNC: 26 MMOL/L (ref 21–32)
CREAT SERPL-MCNC: 1.1 MG/DL (ref 0.6–1.2)
DEPRECATED RDW RBC AUTO: 17.4 % (ref 12.4–15.4)
EKG ATRIAL RATE: 153 BPM
EKG DIAGNOSIS: NORMAL
EKG Q-T INTERVAL: 326 MS
EKG QRS DURATION: 78 MS
EKG QTC CALCULATION (BAZETT): 443 MS
EKG R AXIS: 81 DEGREES
EKG T AXIS: 24 DEGREES
EKG VENTRICULAR RATE: 111 BPM
EOSINOPHIL # BLD: 0.1 K/UL (ref 0–0.6)
EOSINOPHIL NFR BLD: 1.3 %
FLUAV RNA RESP QL NAA+PROBE: NOT DETECTED
FLUBV RNA RESP QL NAA+PROBE: NOT DETECTED
GFR SERPLBLD CREATININE-BSD FMLA CKD-EPI: 49 ML/MIN/{1.73_M2}
GLUCOSE SERPL-MCNC: 104 MG/DL (ref 70–99)
HCT VFR BLD AUTO: 35.8 % (ref 36–48)
HGB BLD-MCNC: 11.6 G/DL (ref 12–16)
LYMPHOCYTES # BLD: 1.6 K/UL (ref 1–5.1)
LYMPHOCYTES NFR BLD: 28.1 %
MCH RBC QN AUTO: 24.9 PG (ref 26–34)
MCHC RBC AUTO-ENTMCNC: 32.5 G/DL (ref 31–36)
MCV RBC AUTO: 76.8 FL (ref 80–100)
MONOCYTES # BLD: 0.4 K/UL (ref 0–1.3)
MONOCYTES NFR BLD: 6.1 %
NEUTROPHILS # BLD: 3.7 K/UL (ref 1.7–7.7)
NEUTROPHILS NFR BLD: 62.5 %
NT-PROBNP SERPL-MCNC: 1572 PG/ML (ref 0–449)
PLATELET # BLD AUTO: 270 K/UL (ref 135–450)
PMV BLD AUTO: 8.1 FL (ref 5–10.5)
POTASSIUM SERPL-SCNC: 3.8 MMOL/L (ref 3.5–5.1)
POTASSIUM SERPL-SCNC: NORMAL MMOL/L (ref 3.5–5.1)
RBC # BLD AUTO: 4.66 M/UL (ref 4–5.2)
SARS-COV-2 RNA RESP QL NAA+PROBE: NOT DETECTED
SODIUM SERPL-SCNC: 141 MMOL/L (ref 136–145)
TROPONIN, HIGH SENSITIVITY: 15 NG/L (ref 0–14)
WBC # BLD AUTO: 5.9 K/UL (ref 4–11)

## 2025-05-06 PROCEDURE — 84443 ASSAY THYROID STIM HORMONE: CPT

## 2025-05-06 PROCEDURE — 93005 ELECTROCARDIOGRAM TRACING: CPT | Performed by: EMERGENCY MEDICINE

## 2025-05-06 PROCEDURE — 83550 IRON BINDING TEST: CPT

## 2025-05-06 PROCEDURE — 87636 SARSCOV2 & INF A&B AMP PRB: CPT

## 2025-05-06 PROCEDURE — 99285 EMERGENCY DEPT VISIT HI MDM: CPT

## 2025-05-06 PROCEDURE — 6370000000 HC RX 637 (ALT 250 FOR IP): Performed by: NURSE PRACTITIONER

## 2025-05-06 PROCEDURE — 80048 BASIC METABOLIC PNL TOTAL CA: CPT

## 2025-05-06 PROCEDURE — 83540 ASSAY OF IRON: CPT

## 2025-05-06 PROCEDURE — 82728 ASSAY OF FERRITIN: CPT

## 2025-05-06 PROCEDURE — 71045 X-RAY EXAM CHEST 1 VIEW: CPT

## 2025-05-06 PROCEDURE — 84484 ASSAY OF TROPONIN QUANT: CPT

## 2025-05-06 PROCEDURE — 2060000000 HC ICU INTERMEDIATE R&B

## 2025-05-06 PROCEDURE — 96374 THER/PROPH/DIAG INJ IV PUSH: CPT

## 2025-05-06 PROCEDURE — 83735 ASSAY OF MAGNESIUM: CPT

## 2025-05-06 PROCEDURE — 6360000002 HC RX W HCPCS: Performed by: NURSE PRACTITIONER

## 2025-05-06 PROCEDURE — 84132 ASSAY OF SERUM POTASSIUM: CPT

## 2025-05-06 PROCEDURE — 85025 COMPLETE CBC W/AUTO DIFF WBC: CPT

## 2025-05-06 PROCEDURE — 83880 ASSAY OF NATRIURETIC PEPTIDE: CPT

## 2025-05-06 RX ORDER — DEXTROSE MONOHYDRATE 100 MG/ML
INJECTION, SOLUTION INTRAVENOUS CONTINUOUS PRN
Status: DISCONTINUED | OUTPATIENT
Start: 2025-05-06 | End: 2025-05-08 | Stop reason: HOSPADM

## 2025-05-06 RX ORDER — POLYETHYLENE GLYCOL 3350 17 G/17G
17 POWDER, FOR SOLUTION ORAL DAILY PRN
Status: DISCONTINUED | OUTPATIENT
Start: 2025-05-06 | End: 2025-05-08 | Stop reason: HOSPADM

## 2025-05-06 RX ORDER — ACETAMINOPHEN 325 MG/1
650 TABLET ORAL EVERY 6 HOURS PRN
Status: DISCONTINUED | OUTPATIENT
Start: 2025-05-06 | End: 2025-05-08 | Stop reason: HOSPADM

## 2025-05-06 RX ORDER — METOPROLOL TARTRATE 25 MG/1
12.5 TABLET, FILM COATED ORAL 2 TIMES DAILY
Status: DISCONTINUED | OUTPATIENT
Start: 2025-05-07 | End: 2025-05-07

## 2025-05-06 RX ORDER — ATORVASTATIN CALCIUM 40 MG/1
80 TABLET, FILM COATED ORAL NIGHTLY
Status: DISCONTINUED | OUTPATIENT
Start: 2025-05-06 | End: 2025-05-08 | Stop reason: HOSPADM

## 2025-05-06 RX ORDER — INSULIN LISPRO 100 [IU]/ML
0-4 INJECTION, SOLUTION INTRAVENOUS; SUBCUTANEOUS
Status: DISCONTINUED | OUTPATIENT
Start: 2025-05-06 | End: 2025-05-08 | Stop reason: HOSPADM

## 2025-05-06 RX ORDER — GLUCAGON 1 MG/ML
1 KIT INJECTION PRN
Status: DISCONTINUED | OUTPATIENT
Start: 2025-05-06 | End: 2025-05-08 | Stop reason: HOSPADM

## 2025-05-06 RX ORDER — ONDANSETRON 4 MG/1
4 TABLET, ORALLY DISINTEGRATING ORAL EVERY 8 HOURS PRN
Status: DISCONTINUED | OUTPATIENT
Start: 2025-05-06 | End: 2025-05-08 | Stop reason: HOSPADM

## 2025-05-06 RX ORDER — ACETAMINOPHEN 650 MG/1
650 SUPPOSITORY RECTAL EVERY 6 HOURS PRN
Status: DISCONTINUED | OUTPATIENT
Start: 2025-05-06 | End: 2025-05-08 | Stop reason: HOSPADM

## 2025-05-06 RX ORDER — SODIUM CHLORIDE 0.9 % (FLUSH) 0.9 %
5-40 SYRINGE (ML) INJECTION PRN
Status: DISCONTINUED | OUTPATIENT
Start: 2025-05-06 | End: 2025-05-08 | Stop reason: HOSPADM

## 2025-05-06 RX ORDER — FUROSEMIDE 10 MG/ML
40 INJECTION INTRAMUSCULAR; INTRAVENOUS 2 TIMES DAILY
Status: DISCONTINUED | OUTPATIENT
Start: 2025-05-07 | End: 2025-05-08 | Stop reason: HOSPADM

## 2025-05-06 RX ORDER — SODIUM CHLORIDE 9 MG/ML
INJECTION, SOLUTION INTRAVENOUS PRN
Status: DISCONTINUED | OUTPATIENT
Start: 2025-05-06 | End: 2025-05-08 | Stop reason: HOSPADM

## 2025-05-06 RX ORDER — METOPROLOL SUCCINATE 25 MG/1
50 TABLET, EXTENDED RELEASE ORAL DAILY
Status: DISCONTINUED | OUTPATIENT
Start: 2025-05-06 | End: 2025-05-06

## 2025-05-06 RX ORDER — ONDANSETRON 2 MG/ML
4 INJECTION INTRAMUSCULAR; INTRAVENOUS EVERY 6 HOURS PRN
Status: DISCONTINUED | OUTPATIENT
Start: 2025-05-06 | End: 2025-05-08 | Stop reason: HOSPADM

## 2025-05-06 RX ORDER — SODIUM CHLORIDE 0.9 % (FLUSH) 0.9 %
5-40 SYRINGE (ML) INJECTION EVERY 12 HOURS SCHEDULED
Status: DISCONTINUED | OUTPATIENT
Start: 2025-05-06 | End: 2025-05-08 | Stop reason: HOSPADM

## 2025-05-06 RX ORDER — FUROSEMIDE 10 MG/ML
80 INJECTION INTRAMUSCULAR; INTRAVENOUS ONCE
Status: COMPLETED | OUTPATIENT
Start: 2025-05-06 | End: 2025-05-06

## 2025-05-06 RX ADMIN — METOPROLOL SUCCINATE 50 MG: 25 TABLET, EXTENDED RELEASE ORAL at 17:09

## 2025-05-06 RX ADMIN — FUROSEMIDE 80 MG: 10 INJECTION, SOLUTION INTRAMUSCULAR; INTRAVENOUS at 17:10

## 2025-05-06 ASSESSMENT — PAIN - FUNCTIONAL ASSESSMENT: PAIN_FUNCTIONAL_ASSESSMENT: 0-10

## 2025-05-06 ASSESSMENT — PAIN SCALES - GENERAL: PAINLEVEL_OUTOF10: 0

## 2025-05-06 ASSESSMENT — LIFESTYLE VARIABLES
HOW MANY STANDARD DRINKS CONTAINING ALCOHOL DO YOU HAVE ON A TYPICAL DAY: PATIENT DOES NOT DRINK
HOW OFTEN DO YOU HAVE A DRINK CONTAINING ALCOHOL: NEVER

## 2025-05-06 NOTE — ED PROVIDER NOTES
ED Attending Attestation Note     Date of evaluation: 5/6/2025    This patient was seen by the advance practice provider.  I have seen and examined the patient, agree with the workup, evaluation, management and diagnosis. The care plan has been discussed.      I have reviewed the ECG and concur with the CHARISMA's interpretation.    The patient's clinical history is as follows:    Chief Complaint     Shortness of Breath (Pt c/o sob that started last night. )      History of Present Illness     Myra Nina is a 84 y.o. female who presents to the Emergency Department with concerns for shortness of breath and tightness across her chest.  The patient has a history of heart failure, lymphedema, prior Takotsubo, A-fib on anticoagulation.  Last echocardiogram in February 23 with an EF of 55 to 60% and indeterminate diastolic dysfunction with LVH.  Patient reports that she had stopped taking her metoprolol several days ago because she was provided with a flyer from her drugstore that noted that if she continued to take that medication she was at risk for becoming very stiff and was concerned about the adverse reaction that was explained to her on this flyer.  She reports that over the course of the past 2448 hrs. she has developed increasing shortness of breath which is made it difficult for her to ambulate and walk around.  She reports that she has had a tightness across her chest as well.  Denies any maría chest pain.  No radiation of the pain.  It is largely constant and occurs at rest.  She feels as if she does have some more fluid on her legs than baseline.    Past Medical, Surgical, Family, and Social History     She has a past medical history of Acute pulmonary edema (HCC), Acute respiratory failure with hypoxia and hypercapnia (HCC), Acute systolic (congestive) heart failure (HCC), Edema, Hearing loss, Hyperlipidemia, Hypertension, Morbid obesity due to excess calories (HCC), NSTEMI (non-ST elevated myocardial

## 2025-05-06 NOTE — H&P
IV.  -Results of an extended cardiac Holter monitor from March shows a predominant rhythm of A-fib that was rate controlled.  -Last Echo done 2/23/23 showed EF 55-60%.  Patient's lowest EF was 15% which was noted during a heart cath on 9/11/22.  -CXR in ED showed cardiomegaly and mild vascular congestion.  Patient has BLE edema and elevated pro-BNP to 1,572 (was 807 in Feb).  -s/p lasix 80mg IV in the ED and patient made 2L of urine this evening from that.  Lasix 40 mg IV BID to start tomorrow.  Restart metoprolol tartrate at 12.5mg BID.  Patient reports not taking her home Toprol XL 50mg in a couple of months.  Will use lopressor 5mg IV if she goes back into RVR.  F/u TSH, and iron and ferritin levels.  Continue home Eliquis 5mg BID, Entresto 4-26mg BID.  Telemetry monitoring.  EP consulted.  Appreciate recs.    HTN  Patient's BP elevated to a high of 163/100.  The value of 177/96 in the chart was done while patient was moving around.  Continue home Entresto and add back metoprolol that patient self-discontinued.  Will re-assess BP then and adjust meds as needed.    Type 2 Diabetes Mellitus   Patient not on any home meds currently.    Last HbA1C 6.4 on 9/6/2023.   LDSSI.  POC gluc checks 4x daily AC & HS.  Hypoglycemia protocol.      CKD 3a  Baseline Cr 1.0.  Daily BMP and Mg  Strict I/Os  Daily standing weights  Maintain MAP > 65 mmHg  Avoid nephrotoxic agents      Code Status: Full code  FEN: Regular diet, low salt  PPX:  Eliquis 5mg BID  DISPO: PCU    Will discuss with attending physician Evert Benitez.   __________________  Aleta Ross MD, PGY-3  5/6/2025  6:54 PM

## 2025-05-06 NOTE — ED NOTES
Patient Name: Myra Nina  : 1940 84 y.o.  MRN: [id number]  ED Room #: B25/B25-25     Chief complaint:   Chief Complaint   Patient presents with    Shortness of Breath     Pt c/o sob that started last night.      Hospital Problem/Diagnosis:   Hospital Problems           Last Modified POA    * (Principal) Acute exacerbation of chronic heart failure (HCC) 2025 Yes         O2 Flow Rate:    (if applicable)  Cardiac Rhythm:   (if applicable)  Active LDA's:   Peripheral IV 25 Distal;Left Cephalic (Active)            How does patient ambulate? Stand by assist    2. How does patient take pills? Whole with Water    3. Is patient alert? Alert    4. Is patient oriented? To Person, To Place, To Time, To Situation, and Follows Commands    5.   Patient arrived from:  home  Facility Name: ___________________________________________    6. If patient is disoriented or from a Skill Nursing Facility has family been notified of admission? No    7. Patient belongings? Belongings: Cell Phone and Clothing    Disposition of belongings? Kept with Patient     8. Any specific patient or family belongings/needs/dynamics?   a.     9. Miscellaneous comments/pending orders?  a.       If there are any additional questions please reach out to the Emergency Department.      Norma Villavicencio RN  [phone]

## 2025-05-06 NOTE — ED PROVIDER NOTES
THE [facility]  EMERGENCY DEPARTMENT ENCOUNTER          NURSE PRACTITIONER NOTE     Date of evaluation: 5/6/2025    Chief Complaint     Shortness of Breath (Pt c/o sob that started last night. )      History of Present Illness     Myra Nina is a 84 y.o. female with a past medical history of PE, AF on Eliquis CHF, with recovered EF  of 55%-60%, hypertension, hyperlipidemia, morbid obesity, STEMI 2022 with cath showing Takotsubo, no lesions requiring intervention, who presents to the emergency department with complaints of shortness of breath that started last night.  On arrival here, states she feels short of breath and that her legs are more swollen than normal.  Denies cough, fevers, chills.  States that she has been taking all of her medications including her Eliquis, with the exception of metoprolol.  She states that she got a handout from her pharmacy that mentioned that paralysis could be a side effect of metoprolol and so she was scared to take it.  On chart review, looks as if this has been a medication of hers at least since March 2025.      With the exception of the above, there are no aggravating or alleviating factors.    Review of Systems     Pertinent positive and negative findings as documented above in HPI, otherwise all other systems were reviewed and negative     Past Medical, Surgical, Family, and Social History     Medical History:   Past Medical History:   Diagnosis Date    Acute pulmonary edema (HCC)     Acute respiratory failure with hypoxia and hypercapnia (HCC) 09/12/2022    Acute systolic (congestive) heart failure (HCC)     Edema     Hearing loss     Hyperlipidemia     Hypertension     Morbid obesity due to excess calories (HCC) 12/29/2015    NSTEMI (non-ST elevated myocardial infarction) (HCC) 09/11/2022    Pre-diabetes     PUD (peptic ulcer disease)     \"years ago\",        Surgical History:   Past Surgical History:   Procedure Laterality Date    HERNIA REPAIR      umbilical

## 2025-05-07 ENCOUNTER — APPOINTMENT (OUTPATIENT)
Age: 85
DRG: 291 | End: 2025-05-07
Payer: MEDICARE

## 2025-05-07 ENCOUNTER — CARE COORDINATION (OUTPATIENT)
Dept: CARE COORDINATION | Age: 85
End: 2025-05-07

## 2025-05-07 LAB
ALBUMIN SERPL-MCNC: 3.7 G/DL (ref 3.4–5)
ALP SERPL-CCNC: 99 U/L (ref 40–129)
ALT SERPL-CCNC: 18 U/L (ref 10–40)
ANION GAP SERPL CALCULATED.3IONS-SCNC: 11 MMOL/L (ref 3–16)
ANION GAP SERPL CALCULATED.3IONS-SCNC: 14 MMOL/L (ref 3–16)
AST SERPL-CCNC: 25 U/L (ref 15–37)
BASOPHILS # BLD: 0 K/UL (ref 0–0.2)
BASOPHILS NFR BLD: 0.4 %
BILIRUB DIRECT SERPL-MCNC: 0.4 MG/DL (ref 0–0.3)
BILIRUB INDIRECT SERPL-MCNC: 0.7 MG/DL (ref 0–1)
BILIRUB SERPL-MCNC: 1.1 MG/DL (ref 0–1)
BUN SERPL-MCNC: 18 MG/DL (ref 7–20)
BUN SERPL-MCNC: 22 MG/DL (ref 7–20)
CALCIUM SERPL-MCNC: 9.1 MG/DL (ref 8.3–10.6)
CALCIUM SERPL-MCNC: 9.2 MG/DL (ref 8.3–10.6)
CHLORIDE SERPL-SCNC: 102 MMOL/L (ref 99–110)
CHLORIDE SERPL-SCNC: 104 MMOL/L (ref 99–110)
CO2 SERPL-SCNC: 27 MMOL/L (ref 21–32)
CO2 SERPL-SCNC: 30 MMOL/L (ref 21–32)
CREAT SERPL-MCNC: 0.9 MG/DL (ref 0.6–1.2)
CREAT SERPL-MCNC: 1.2 MG/DL (ref 0.6–1.2)
DEPRECATED RDW RBC AUTO: 17.3 % (ref 12.4–15.4)
ECHO AO ROOT DIAM: 3.2 CM
ECHO AO ROOT INDEX: 1.25 CM/M2
ECHO AR MAX VEL PISA: 4.4 M/S
ECHO AV AREA PEAK VELOCITY: 1.8 CM2
ECHO AV AREA/BSA PEAK VELOCITY: 0.7 CM2/M2
ECHO AV PEAK GRADIENT: 9 MMHG
ECHO AV PEAK VELOCITY: 1.5 M/S
ECHO AV REGURGITANT PHT: 444 MS
ECHO AV VELOCITY RATIO: 0.6
ECHO BSA: 2.73 M2
ECHO EST RA PRESSURE: 8 MMHG
ECHO IVC EXP: 2.2 CM
ECHO IVC INSP: 1.1 CM
ECHO LA AREA 2C: 29.8 CM2
ECHO LA AREA 4C: 28.9 CM2
ECHO LA MAJOR AXIS: 7.7 CM
ECHO LA MINOR AXIS: 7.4 CM
ECHO LA VOL BP: 94 ML (ref 22–52)
ECHO LA VOL MOD A2C: 96 ML (ref 22–52)
ECHO LA VOL MOD A4C: 88 ML (ref 22–52)
ECHO LA VOL/BSA BIPLANE: 37 ML/M2 (ref 16–34)
ECHO LA VOLUME INDEX MOD A2C: 38 ML/M2 (ref 16–34)
ECHO LA VOLUME INDEX MOD A4C: 35 ML/M2 (ref 16–34)
ECHO LV E' LATERAL VELOCITY: 9.12 CM/S
ECHO LV E' SEPTAL VELOCITY: 8.44 CM/S
ECHO LV EDV 3D: 203 ML
ECHO LV EDV INDEX 3D: 80 ML/M2
ECHO LV EF PHYSICIAN: 57 %
ECHO LV EJECTION FRACTION 3D: 56 %
ECHO LV ESV 3D: 89 ML
ECHO LV ESV INDEX 3D: 35 ML/M2
ECHO LV FRACTIONAL SHORTENING: 33 % (ref 28–44)
ECHO LV INTERNAL DIMENSION DIASTOLE INDEX: 1.92 CM/M2
ECHO LV INTERNAL DIMENSION DIASTOLIC: 4.9 CM (ref 3.9–5.3)
ECHO LV INTERNAL DIMENSION SYSTOLIC INDEX: 1.29 CM/M2
ECHO LV INTERNAL DIMENSION SYSTOLIC: 3.3 CM
ECHO LV IVSD: 1 CM (ref 0.6–0.9)
ECHO LV MASS 2D: 188.1 G (ref 67–162)
ECHO LV MASS 3D INDEX: 80 G/M2
ECHO LV MASS 3D: 204 G
ECHO LV MASS INDEX 2D: 73.8 G/M2 (ref 43–95)
ECHO LV POSTERIOR WALL DIASTOLIC: 1.1 CM (ref 0.6–0.9)
ECHO LV RELATIVE WALL THICKNESS RATIO: 0.45
ECHO LVOT AREA: 3.1 CM2
ECHO LVOT DIAM: 2 CM
ECHO LVOT MEAN GRADIENT: 1 MMHG
ECHO LVOT PEAK GRADIENT: 3 MMHG
ECHO LVOT PEAK VELOCITY: 0.9 M/S
ECHO LVOT STROKE VOLUME INDEX: 20.7 ML/M2
ECHO LVOT SV: 52.8 ML
ECHO LVOT VTI: 16.8 CM
ECHO MV A VELOCITY: 0.46 M/S
ECHO MV E DECELERATION TIME (DT): 177 MS
ECHO MV E VELOCITY: 1.39 M/S
ECHO MV E/A RATIO: 3.02
ECHO MV E/E' LATERAL: 15.24
ECHO MV E/E' RATIO (AVERAGED): 15.86
ECHO MV E/E' SEPTAL: 16.47
ECHO PV MAX VELOCITY: 0.5 M/S
ECHO PV PEAK GRADIENT: 1 MMHG
ECHO RA AREA 4C: 37.6 CM2
ECHO RA END SYSTOLIC VOLUME APICAL 4 CHAMBER INDEX BSA: 62 ML/M2
ECHO RA VOLUME: 159 ML
ECHO RIGHT VENTRICULAR SYSTOLIC PRESSURE (RVSP): 45 MMHG
ECHO RV BASAL DIMENSION: 4.4 CM
ECHO RV FREE WALL PEAK S': 11.1 CM/S
ECHO RV MID DIMENSION: 3.1 CM
ECHO RV TAPSE: 1.7 CM (ref 1.7–?)
ECHO TV REGURGITANT MAX VELOCITY: 3.05 M/S
ECHO TV REGURGITANT PEAK GRADIENT: 37 MMHG
EOSINOPHIL # BLD: 0.1 K/UL (ref 0–0.6)
EOSINOPHIL NFR BLD: 1 %
EST. AVERAGE GLUCOSE BLD GHB EST-MCNC: 151.3 MG/DL
FERRITIN SERPL IA-MCNC: 233 NG/ML (ref 15–150)
GFR SERPLBLD CREATININE-BSD FMLA CKD-EPI: 44 ML/MIN/{1.73_M2}
GFR SERPLBLD CREATININE-BSD FMLA CKD-EPI: 62 ML/MIN/{1.73_M2}
GLUCOSE BLD-MCNC: 100 MG/DL (ref 70–99)
GLUCOSE BLD-MCNC: 112 MG/DL (ref 70–99)
GLUCOSE BLD-MCNC: 116 MG/DL (ref 70–99)
GLUCOSE BLD-MCNC: 130 MG/DL (ref 70–99)
GLUCOSE BLD-MCNC: 142 MG/DL (ref 70–99)
GLUCOSE SERPL-MCNC: 117 MG/DL (ref 70–99)
GLUCOSE SERPL-MCNC: 120 MG/DL (ref 70–99)
HBA1C MFR BLD: 6.9 %
HCT VFR BLD AUTO: 34.5 % (ref 36–48)
HGB BLD-MCNC: 11.4 G/DL (ref 12–16)
LYMPHOCYTES # BLD: 2.1 K/UL (ref 1–5.1)
LYMPHOCYTES NFR BLD: 30.8 %
MAGNESIUM SERPL-MCNC: 1.83 MG/DL (ref 1.8–2.4)
MAGNESIUM SERPL-MCNC: 1.84 MG/DL (ref 1.8–2.4)
MAGNESIUM SERPL-MCNC: 1.85 MG/DL (ref 1.8–2.4)
MCH RBC QN AUTO: 25.3 PG (ref 26–34)
MCHC RBC AUTO-ENTMCNC: 33.1 G/DL (ref 31–36)
MCV RBC AUTO: 76.6 FL (ref 80–100)
MONOCYTES # BLD: 0.5 K/UL (ref 0–1.3)
MONOCYTES NFR BLD: 7.8 %
NEUTROPHILS # BLD: 4.1 K/UL (ref 1.7–7.7)
NEUTROPHILS NFR BLD: 60 %
PERFORMED ON: ABNORMAL
PLATELET # BLD AUTO: 294 K/UL (ref 135–450)
PMV BLD AUTO: 8.4 FL (ref 5–10.5)
POTASSIUM SERPL-SCNC: 3.5 MMOL/L (ref 3.5–5.1)
POTASSIUM SERPL-SCNC: 3.5 MMOL/L (ref 3.5–5.1)
POTASSIUM SERPL-SCNC: 3.8 MMOL/L (ref 3.5–5.1)
POTASSIUM SERPL-SCNC: 3.9 MMOL/L (ref 3.5–5.1)
PROT SERPL-MCNC: 7.2 G/DL (ref 6.4–8.2)
RBC # BLD AUTO: 4.5 M/UL (ref 4–5.2)
SODIUM SERPL-SCNC: 143 MMOL/L (ref 136–145)
SODIUM SERPL-SCNC: 145 MMOL/L (ref 136–145)
TSH SERPL DL<=0.005 MIU/L-ACNC: 2.37 UIU/ML (ref 0.27–4.2)
WBC # BLD AUTO: 6.8 K/UL (ref 4–11)

## 2025-05-07 PROCEDURE — 80048 BASIC METABOLIC PNL TOTAL CA: CPT

## 2025-05-07 PROCEDURE — 83735 ASSAY OF MAGNESIUM: CPT

## 2025-05-07 PROCEDURE — 6370000000 HC RX 637 (ALT 250 FOR IP)

## 2025-05-07 PROCEDURE — 6370000000 HC RX 637 (ALT 250 FOR IP): Performed by: INTERNAL MEDICINE

## 2025-05-07 PROCEDURE — 2060000000 HC ICU INTERMEDIATE R&B

## 2025-05-07 PROCEDURE — 84132 ASSAY OF SERUM POTASSIUM: CPT

## 2025-05-07 PROCEDURE — 93306 TTE W/DOPPLER COMPLETE: CPT

## 2025-05-07 PROCEDURE — 85025 COMPLETE CBC W/AUTO DIFF WBC: CPT

## 2025-05-07 PROCEDURE — 83036 HEMOGLOBIN GLYCOSYLATED A1C: CPT

## 2025-05-07 PROCEDURE — 2500000003 HC RX 250 WO HCPCS

## 2025-05-07 PROCEDURE — 6360000002 HC RX W HCPCS

## 2025-05-07 PROCEDURE — 36415 COLL VENOUS BLD VENIPUNCTURE: CPT

## 2025-05-07 PROCEDURE — 80076 HEPATIC FUNCTION PANEL: CPT

## 2025-05-07 PROCEDURE — 80061 LIPID PANEL: CPT

## 2025-05-07 PROCEDURE — 99222 1ST HOSP IP/OBS MODERATE 55: CPT | Performed by: INTERNAL MEDICINE

## 2025-05-07 RX ORDER — CARVEDILOL 6.25 MG/1
6.25 TABLET ORAL 2 TIMES DAILY WITH MEALS
Status: DISCONTINUED | OUTPATIENT
Start: 2025-05-07 | End: 2025-05-08 | Stop reason: HOSPADM

## 2025-05-07 RX ORDER — METOPROLOL TARTRATE 25 MG/1
25 TABLET, FILM COATED ORAL 2 TIMES DAILY
Status: DISCONTINUED | OUTPATIENT
Start: 2025-05-07 | End: 2025-05-07

## 2025-05-07 RX ADMIN — CARVEDILOL 6.25 MG: 6.25 TABLET, FILM COATED ORAL at 12:24

## 2025-05-07 RX ADMIN — POTASSIUM BICARBONATE 40 MEQ: 782 TABLET, EFFERVESCENT ORAL at 14:25

## 2025-05-07 RX ADMIN — FUROSEMIDE 40 MG: 10 INJECTION, SOLUTION INTRAMUSCULAR; INTRAVENOUS at 20:26

## 2025-05-07 RX ADMIN — CARVEDILOL 6.25 MG: 6.25 TABLET, FILM COATED ORAL at 16:50

## 2025-05-07 RX ADMIN — SACUBITRIL AND VALSARTAN 1 TABLET: 24; 26 TABLET, FILM COATED ORAL at 20:26

## 2025-05-07 RX ADMIN — ATORVASTATIN CALCIUM 80 MG: 40 TABLET, FILM COATED ORAL at 20:26

## 2025-05-07 RX ADMIN — APIXABAN 5 MG: 5 TABLET, FILM COATED ORAL at 00:15

## 2025-05-07 RX ADMIN — SACUBITRIL AND VALSARTAN 1 TABLET: 24; 26 TABLET, FILM COATED ORAL at 00:15

## 2025-05-07 RX ADMIN — APIXABAN 5 MG: 5 TABLET, FILM COATED ORAL at 20:26

## 2025-05-07 RX ADMIN — SODIUM CHLORIDE, PRESERVATIVE FREE 10 ML: 5 INJECTION INTRAVENOUS at 20:46

## 2025-05-07 RX ADMIN — SACUBITRIL AND VALSARTAN 1 TABLET: 24; 26 TABLET, FILM COATED ORAL at 10:00

## 2025-05-07 RX ADMIN — APIXABAN 5 MG: 5 TABLET, FILM COATED ORAL at 10:00

## 2025-05-07 RX ADMIN — SODIUM CHLORIDE, PRESERVATIVE FREE 10 ML: 5 INJECTION INTRAVENOUS at 10:00

## 2025-05-07 RX ADMIN — FUROSEMIDE 40 MG: 10 INJECTION, SOLUTION INTRAMUSCULAR; INTRAVENOUS at 10:00

## 2025-05-07 RX ADMIN — ATORVASTATIN CALCIUM 80 MG: 40 TABLET, FILM COATED ORAL at 00:15

## 2025-05-07 RX ADMIN — SODIUM CHLORIDE, PRESERVATIVE FREE 10 ML: 5 INJECTION INTRAVENOUS at 00:16

## 2025-05-07 ASSESSMENT — PAIN SCALES - GENERAL
PAINLEVEL_OUTOF10: 0

## 2025-05-07 NOTE — CARE COORDINATION
No Care Coordination outreach at this time due to pt current admission status. Will monitor for pt d/c.

## 2025-05-07 NOTE — ED NOTES
Pt changed by two RN's and placed in a new brief. New wilman pads in place. Ppurewick hooked up to suction.      Norma Villavicencio, RN  05/06/25 2011

## 2025-05-08 VITALS
WEIGHT: 293 LBS | RESPIRATION RATE: 18 BRPM | HEIGHT: 66 IN | OXYGEN SATURATION: 96 % | HEART RATE: 67 BPM | BODY MASS INDEX: 47.09 KG/M2 | SYSTOLIC BLOOD PRESSURE: 115 MMHG | TEMPERATURE: 97.4 F | DIASTOLIC BLOOD PRESSURE: 73 MMHG

## 2025-05-08 PROBLEM — I42.8 NICM (NONISCHEMIC CARDIOMYOPATHY) (HCC): Status: ACTIVE | Noted: 2022-09-18

## 2025-05-08 LAB
ANION GAP SERPL CALCULATED.3IONS-SCNC: 14 MMOL/L (ref 3–16)
BASOPHILS # BLD: 0 K/UL (ref 0–0.2)
BASOPHILS NFR BLD: 0 %
BUN SERPL-MCNC: 22 MG/DL (ref 7–20)
BURR CELLS BLD QL SMEAR: ABNORMAL
CALCIUM SERPL-MCNC: 8.7 MG/DL (ref 8.3–10.6)
CHLORIDE SERPL-SCNC: 105 MMOL/L (ref 99–110)
CHOLEST SERPL-MCNC: 121 MG/DL (ref 0–199)
CO2 SERPL-SCNC: 24 MMOL/L (ref 21–32)
CREAT SERPL-MCNC: 1.1 MG/DL (ref 0.6–1.2)
DEPRECATED RDW RBC AUTO: 17.3 % (ref 12.4–15.4)
EOSINOPHIL # BLD: 0.1 K/UL (ref 0–0.6)
EOSINOPHIL NFR BLD: 1 %
GFR SERPLBLD CREATININE-BSD FMLA CKD-EPI: 49 ML/MIN/{1.73_M2}
GLUCOSE BLD-MCNC: 109 MG/DL (ref 70–99)
GLUCOSE BLD-MCNC: 114 MG/DL (ref 70–99)
GLUCOSE SERPL-MCNC: 127 MG/DL (ref 70–99)
HCT VFR BLD AUTO: 34.9 % (ref 36–48)
HDLC SERPL-MCNC: 53 MG/DL (ref 40–60)
HGB BLD-MCNC: 11.5 G/DL (ref 12–16)
IRON SATN MFR SERPL: 12 % (ref 15–50)
IRON SERPL-MCNC: 33 UG/DL (ref 37–145)
LDLC SERPL CALC-MCNC: 57 MG/DL
LYMPHOCYTES # BLD: 1.8 K/UL (ref 1–5.1)
LYMPHOCYTES NFR BLD: 32 %
MAGNESIUM SERPL-MCNC: 1.81 MG/DL (ref 1.8–2.4)
MCH RBC QN AUTO: 25.2 PG (ref 26–34)
MCHC RBC AUTO-ENTMCNC: 33 G/DL (ref 31–36)
MCV RBC AUTO: 76.4 FL (ref 80–100)
MONOCYTES # BLD: 0.4 K/UL (ref 0–1.3)
MONOCYTES NFR BLD: 7 %
NEUTROPHILS # BLD: 3.3 K/UL (ref 1.7–7.7)
NEUTROPHILS NFR BLD: 60 %
NT-PROBNP SERPL-MCNC: 1557 PG/ML (ref 0–449)
PATH INTERP BLD-IMP: NO
PERFORMED ON: ABNORMAL
PERFORMED ON: ABNORMAL
PLATELET # BLD AUTO: 191 K/UL (ref 135–450)
PLATELET BLD QL SMEAR: ADEQUATE
PMV BLD AUTO: 8.4 FL (ref 5–10.5)
POTASSIUM SERPL-SCNC: 4.3 MMOL/L (ref 3.5–5.1)
RBC # BLD AUTO: 4.57 M/UL (ref 4–5.2)
SCHISTOCYTES BLD QL SMEAR: ABNORMAL
SLIDE REVIEW: ABNORMAL
SODIUM SERPL-SCNC: 143 MMOL/L (ref 136–145)
TIBC SERPL-MCNC: 278 UG/DL (ref 260–445)
TRIGL SERPL-MCNC: 53 MG/DL (ref 0–150)
VLDLC SERPL CALC-MCNC: 11 MG/DL
WBC # BLD AUTO: 5.5 K/UL (ref 4–11)

## 2025-05-08 PROCEDURE — 2500000003 HC RX 250 WO HCPCS

## 2025-05-08 PROCEDURE — 36415 COLL VENOUS BLD VENIPUNCTURE: CPT

## 2025-05-08 PROCEDURE — 6370000000 HC RX 637 (ALT 250 FOR IP): Performed by: INTERNAL MEDICINE

## 2025-05-08 PROCEDURE — 85025 COMPLETE CBC W/AUTO DIFF WBC: CPT

## 2025-05-08 PROCEDURE — 6360000002 HC RX W HCPCS

## 2025-05-08 PROCEDURE — 6370000000 HC RX 637 (ALT 250 FOR IP)

## 2025-05-08 PROCEDURE — 83735 ASSAY OF MAGNESIUM: CPT

## 2025-05-08 PROCEDURE — 6360000002 HC RX W HCPCS: Performed by: INTERNAL MEDICINE

## 2025-05-08 PROCEDURE — 80048 BASIC METABOLIC PNL TOTAL CA: CPT

## 2025-05-08 PROCEDURE — 83880 ASSAY OF NATRIURETIC PEPTIDE: CPT

## 2025-05-08 RX ORDER — CARVEDILOL 6.25 MG/1
6.25 TABLET ORAL 2 TIMES DAILY WITH MEALS
Qty: 60 TABLET | Refills: 3 | Status: SHIPPED | OUTPATIENT
Start: 2025-05-08 | End: 2025-05-09 | Stop reason: SDUPTHER

## 2025-05-08 RX ADMIN — CARVEDILOL 6.25 MG: 6.25 TABLET, FILM COATED ORAL at 08:18

## 2025-05-08 RX ADMIN — SODIUM CHLORIDE, PRESERVATIVE FREE 10 ML: 5 INJECTION INTRAVENOUS at 08:20

## 2025-05-08 RX ADMIN — FUROSEMIDE 40 MG: 10 INJECTION, SOLUTION INTRAMUSCULAR; INTRAVENOUS at 08:18

## 2025-05-08 RX ADMIN — SACUBITRIL AND VALSARTAN 1 TABLET: 24; 26 TABLET, FILM COATED ORAL at 08:18

## 2025-05-08 RX ADMIN — APIXABAN 5 MG: 5 TABLET, FILM COATED ORAL at 08:18

## 2025-05-08 RX ADMIN — IRON SUCROSE 200 MG: 20 INJECTION, SOLUTION INTRAVENOUS at 14:55

## 2025-05-08 ASSESSMENT — PAIN SCALES - GENERAL: PAINLEVEL_OUTOF10: 0

## 2025-05-08 NOTE — DISCHARGE SUMMARY
INTERNAL MEDICINE DEPARTMENT AT THE [facility]  DISCHARGE SUMMARY    Patient ID:  Myra Nina                                             Discharge Date:  5/8/25  Patient's PCP:  Ekta Allen MD                                          Discharge Physician:  Arley Bright DO  Admit Date:  5/6/2025   Admitting Physician:  Evert Benitez MD      DISCHARGE DIAGNOSES:  Present on Admission:   Acute exacerbation of chronic heart failure (HCC)   Atrial fibrillation with rapid ventricular response (HCC)   NICM (nonischemic cardiomyopathy) (HCC)   Morbid obesity due to excess calories (HCC)   Type 2 diabetes mellitus with circulatory disorder, without long-term current use of insulin (HCC)   Hyperlipidemia LDL goal <100   Bilateral knee pain    Hospital Course:  Ms. Myra Nina is a 84 female with a medical history significant for HTN, T2DM, pAF on Eliquis, HFrEF, Takotsubo cardiomyopathy, CKD 3a.    She presented 5/6 complaining of several weeks of worsening bilateral lower extremity swelling and a couple days of progressively worsening shortness of breath.  She is compliant with Lasix but stopped taking her Toprol XL 1 to 2 months prior to presenting because she saw a flyer and a pharmacy that listed paralysis as one of the side effects.  She got scared and stopped taking the medication because she was having difficulty moving one of her fingers on her right hand.  In the emergency department, she was in atrial fibrillation with rapid ventricular response heart rate in the 140s and short of breath requiring supplemental oxygen which was quickly weaned off.  She was admitted to the floor for further workup with cardiology consulted.  5/7 TTE demonstrated left ventricular ejection fraction of 55-60% with grade 2 diastolic dysfunction and dilated right ventricle and right atria.  The patient was diuresed with Lasix and transition to Coreg from Toprol XL by cardiology with instructions to

## 2025-05-08 NOTE — PLAN OF CARE
Problem: Chronic Conditions and Co-morbidities  Goal: Patient's chronic conditions and co-morbidity symptoms are monitored and maintained or improved  Outcome: Completed  Flowsheets (Taken 5/8/2025 1539)  Care Plan - Patient's Chronic Conditions and Co-Morbidity Symptoms are Monitored and Maintained or Improved:   Monitor and assess patient's chronic conditions and comorbid symptoms for stability, deterioration, or improvement   Collaborate with multidisciplinary team to address chronic and comorbid conditions and prevent exacerbation or deterioration   Update acute care plan with appropriate goals if chronic or comorbid symptoms are exacerbated and prevent overall improvement and discharge     Problem: Discharge Planning  Goal: Discharge to home or other facility with appropriate resources  5/8/2025 1539 by Kylee Sullivan, RN  Outcome: Completed  Flowsheets (Taken 5/8/2025 0307 by Jon Pablo, RN)  Discharge to home or other facility with appropriate resources:   Identify barriers to discharge with patient and caregiver   Identify discharge learning needs (meds, wound care, etc)   Refer to discharge planning if patient needs post-hospital services based on physician order or complex needs related to functional status, cognitive ability or social support system   Arrange for interpreters to assist at discharge as needed     Problem: Pain  Goal: Verbalizes/displays adequate comfort level or baseline comfort level  5/8/2025 1539 by Kylee Sullivan RN  Outcome: Completed  Flowsheets (Taken 5/8/2025 0307 by Jon Pablo, RN)  Verbalizes/displays adequate comfort level or baseline comfort level:   Encourage patient to monitor pain and request assistance   Assess pain using appropriate pain scale   Administer analgesics based on type and severity of pain and evaluate response   Implement non-pharmacological measures as appropriate and evaluate response     Problem: Safety - Adult  Goal:

## 2025-05-08 NOTE — PLAN OF CARE
Problem: Discharge Planning  Goal: Discharge to home or other facility with appropriate resources  5/8/2025 0307 by Jon Pablo RN  Outcome: Progressing  Flowsheets (Taken 5/8/2025 0307)  Discharge to home or other facility with appropriate resources:   Identify barriers to discharge with patient and caregiver   Identify discharge learning needs (meds, wound care, etc)   Refer to discharge planning if patient needs post-hospital services based on physician order or complex needs related to functional status, cognitive ability or social support system   Arrange for interpreters to assist at discharge as needed     Problem: Pain  Goal: Verbalizes/displays adequate comfort level or baseline comfort level  5/8/2025 0307 by Jon Pablo RN  Outcome: Progressing  Flowsheets (Taken 5/8/2025 0307)  Verbalizes/displays adequate comfort level or baseline comfort level:   Encourage patient to monitor pain and request assistance   Assess pain using appropriate pain scale   Administer analgesics based on type and severity of pain and evaluate response   Implement non-pharmacological measures as appropriate and evaluate response     Problem: Safety - Adult  Goal: Free from fall injury  5/8/2025 0307 by Jon Pablo RN  Outcome: Progressing  Flowsheets (Taken 5/8/2025 0307)  Free From Fall Injury: Instruct family/caregiver on patient safety  Note: Fall protocol in place      Problem: Skin/Tissue Integrity - Adult  Goal: Skin integrity remains intact  5/8/2025 0307 by Jon Pbalo RN  Outcome: Progressing  Flowsheets (Taken 5/8/2025 0307)  Skin Integrity Remains Intact:   Monitor for areas of redness and/or skin breakdown   Every 4-6 hours:  If on nasal continuous positive airway pressure, assess nares and determine need for appliance change or resting period   Turn and reposition as indicated   Check visual cues for pain   Positioning devices   Monitor skin under

## 2025-05-08 NOTE — PROGRESS NOTES
Case Management Assessment            Discharge Note                    Date / Time of Note: 5/8/2025 1:50 PM                  Discharge Note Completed by: Nishi Rich    Patient Name: Myra Nina   YOB: 1940  Diagnosis: Atrial fibrillation, unspecified type (HCC) [I48.91]  Acute on chronic congestive heart failure, unspecified heart failure type (HCC) [I50.9]  Acute exacerbation of chronic heart failure (HCC) [I50.9]   Date / Time: 5/6/2025  3:14 PM    Current PCP: Ekta Allen MD  Clinic patient: No    Hospitalization in the last 30 days: No       Advance Directives:  Code Status: Full Code  [place] DNR form completed and on chart: Not Indicated    Financial:  Payor: [place] MEDICARE / Plan: ANDRES [employer]PER [place] MEDICARE / Product Type: *No Product type* /      Pharmacy:    [facility]/pharmacy #[id number] - [place], [place] - [address] [address] - P [phone] - F [phone] [address]  [place] [address]  Phone: [phone] Fax: [phone]      Assistance purchasing medications?:    Assistance provided by Case Management: None at this time    Does patient want to participate in local refill/ meds to beds program?: Yes    Meds To Beds General Rules:  1. Can ONLY be done Monday- Friday between 8:30am-5pm  2. Prescription(s) must be in pharmacy by 3pm to be filled same day  3.Copy of patient's insurance/ prescription drug card and patient face sheet must be sent along with the prescription(s)  4. Cost of Rx cannot be added to hospital bill. If financial assistance is needed, please contact unit  or ;  or  CANNOT provide pharmacy voucher for patients co-pays  5. Patients can then  the prescription on their way out of the hospital at discharge, or pharmacy can deliver to the bedside if staff is available. (payment due at time of pick-up or delivery - cash, check, or card accepted)     Able to afford home medications/ co-pay costs: 
      Internal Medicine Progress Note    Patient: Myra Nina   : 1940   Admit Date: 2025     Date: 2025     History     Interval History    Afebrile, hemodynamically stable on room air. Had echocardiogram this morning. She is sitting upright on edge of bed, resting comfortably. Still having some shortness of breath with exertion but improved from yesterday. Denies chest pain, N/V, or changes in vision. Replaced K.    Brief Daily Plan:   - Replaced K  - Lasix 40mg BID, switch to oral tomorrow  - F/u echo results from this morning and cardio recs    HPI    Myra Nina is a 84 y.o. female who presented to the ED on 25 due to shortness of breath and leg swelling. She has a PMHx notable for HTN, T2DM, pAF on Eliquis, HFrEF, Takotsubo cardiomyopathy, CKD 3a. Other medical history listed below.       Patient reports several weeks of increasing lower extremity swelling and a few days of shortness of breath.  Other symptoms in the past several days include feeling tired, a sensation of \"something turning over\" in her stomach, and general decreased appetite.  She also reports that yesterday she did not urinate very much despite taking her usual dose of daily lasix.  Urinating very little is unusual for her.  Patient denies fever, chills, change in vision, nausea, vomiting, chest pain, diarrhea, constipation, dysuria, or urgency.  Patient has chronic swelling in her bilateral lower extremities and reports that when she has more swelling than usual in them, her right leg usually gets bigger than her left, as seen today.  Patient said this started happening after a right knee replacement didn't go well.  She denies any extra pain in the right leg compared to her left leg.  She says she ambulates at home with a walker.     Patient reported to me that she stopped taking her Toprol XL a couple months ago.  Her last fill for a 90 day supply was in February.  She reports taking all her other home meds but 
      Internal Medicine Progress Note    Patient: Myra Nina   : 1940   Admit Date: 2025     Date: 2025     History     Interval History    Doing well. Denies chest pain and SOB.  Wants to be home by  for family gathering.  Afebrile, intermittent tachycardia.   TTE w/ LVEF 55-60%  UO 1600, net -700cc on 40mg IV lasix daily      Brief Daily Plan:   - Cont diuresis    HPI    Myra Nina is a 84 y.o. female who presented to the ED on 25 due to shortness of breath and leg swelling. She has a PMHx notable for HTN, T2DM, pAF on Eliquis, HFrEF, Takotsubo cardiomyopathy, CKD 3a. Other medical history listed below.       Patient reports several weeks of increasing lower extremity swelling and a few days of shortness of breath.  Other symptoms in the past several days include feeling tired, a sensation of \"something turning over\" in her stomach, and general decreased appetite.  She also reports that yesterday she did not urinate very much despite taking her usual dose of daily lasix.  Urinating very little is unusual for her.  Patient denies fever, chills, change in vision, nausea, vomiting, chest pain, diarrhea, constipation, dysuria, or urgency.  Patient has chronic swelling in her bilateral lower extremities and reports that when she has more swelling than usual in them, her right leg usually gets bigger than her left, as seen today.  Patient said this started happening after a right knee replacement didn't go well.  She denies any extra pain in the right leg compared to her left leg.  She says she ambulates at home with a walker.     Patient reported to me that she stopped taking her Toprol XL a couple months ago.  Her last fill for a 90 day supply was in February.  She reports taking all her other home meds but says she has not taken the metoprolol in probably a couple months.  Her reason for stopping the metoprolol was because a flyer at the pharmacy said paralysis was a potential side 
4 Eyes Skin Assessment     NAME:  Myra Nina  YOB: 1940  MEDICAL RECORD NUMBER:  [id number]    The patient is being assessed for  Admission    I agree that at least one RN has performed a thorough Head to Toe Skin Assessment on the patient. ALL assessment sites listed below have been assessed.      Areas assessed by both nurses:    Head, Face, Ears, Shoulders, Back, Chest, Arms, Elbows, Hands, Sacrum. Buttock, Coccyx, Ischium, Legs. Feet and Heels, and Under Medical Devices         Does the Patient have a Wound? No noted wound(s)     Excoriation in abd folds, blanchable redness to coccyx  Damien Prevention initiated by RN: Yes  Wound Care Orders initiated by RN: No    Pressure Injury (Stage 3,4, Unstageable, DTI, NWPT, and Complex wounds) if present, place Wound referral order by RN under : No    New Ostomies, if present place, Ostomy referral order under : No     Nurse 1 eSignature: Electronically signed by Kumar Solomon RN on 5/7/25 at 3:47 AM EDT    **SHARE this note so that the co-signing nurse can place an eSignature**    Nurse 2 eSignature: Electronically signed by Betsy Gilbert RN on 5/7/25 at 4:11 AM EDT   
Case Management Assessment  Initial Evaluation    Date/Time of Evaluation: 5/7/2025 9:13 AM  Assessment Completed by: Nisih Rich    If patient is discharged prior to next notation, then this note serves as note for discharge by case management.    Patient Name: Myra Nina                   YOB: 1940  Diagnosis: Atrial fibrillation, unspecified type (HCC) [I48.91]  Acute on chronic congestive heart failure, unspecified heart failure type (HCC) [I50.9]  Acute exacerbation of chronic heart failure (HCC) [I50.9]                   Date / Time: 5/6/2025  3:14 PM    Patient Admission Status: Inpatient   Readmission Risk (Low < 19, Mod (19-27), High > 27): Readmission Risk Score: 11.4    Current PCP: Ekta Allen MD  PCP verified by CM? Yes    Chart Reviewed: Yes      History Provided by: Patient, Medical Record  Patient Orientation: Alert and Oriented    Patient Cognition: Alert    Hospitalization in the last 30 days (Readmission):  No    If yes, Readmission Assessment in  Navigator will be completed.    Advance Directives:      Code Status: Full Code   Patient's Primary Decision Maker is: Legal Next of Kin    Primary Decision Maker: Lynnette Buck - Child - [phone]    Secondary Decision Maker: Jane Salcedo - Brother/Sister - [phone]    Supplemental (Other) Decision Maker: Amol Kim - Child - [phone]    Supplemental (Other) Decision Maker: maría kim - Child - [phone]    Supplemental (Other) Decision Maker: sergio Nina - Child - [phone]    Discharge Planning:    Patient lives with: Alone Type of Home: House  Primary Care Giver: Self  Patient Support Systems include: Family Members, Friends/Neighbors   Current Financial resources: Medicare  Current community resources:    Current services prior to admission: None            Current DME:              Type of Home Care services:  None    ADLS  Prior functional level: Independent in ADLs/IADLs  Current 
Pt's current BP 99/61. She received coreg @ 1650, Currently has IV lasix due. Messaged provider to check if lasix should be given, due to SBP < 100. Per Dr. Bartholomew, Dr. Bright will review and let us know.  
34.5*    294       BMP:   Recent Labs     05/06/25  1619 05/06/25  1716 05/07/25  0352 05/07/25  0403     --   --  145   K 3.9  3.8 see below 3.5 3.5     --   --  104   CO2 26  --   --  27   BUN 20  --   --  18   CREATININE 1.1  --   --  0.9   GLUCOSE 104*  --   --  120*     Magnesium:   Recent Labs     05/06/25  1619 05/07/25  0403   MG 1.83 1.84     LFT's:   Recent Labs     05/07/25  0352   AST 25   ALT 18   BILITOT 1.1*   ALKPHOS 99         U/A: No results for input(s): \"NITRITE\", \"COLORU\", \"PHUR\", \"LABCAST\", \"WBCUA\", \"RBCUA\", \"MUCUS\", \"TRICHOMONAS\", \"YEAST\", \"BACTERIA\", \"CLARITYU\", \"SPECGRAV\", \"LEUKOCYTESUR\", \"UROBILINOGEN\", \"BILIRUBINUR\", \"BLOODU\", \"GLUCOSEU\", \"KETONES\", \"AMORPHOUS\" in the last 72 hours.    Radiology:  XR CHEST PORTABLE   Final Result      Cardiomegaly with mild vascular congestion.      Electronically signed by MD Cuco Tello            Assessment & Plan     Myra Nina is a 84 y.o. female, who presented with SOB and leg swelling and is being treated for a heart failure exacerbation induced by A fib RVR.     Acute on chronic heart failure exacerbation  A fib RVR  Patient with SOB and a few weeks of increased leg swelling.  She was in A fib on admission which likely induced the HF exacerbation.  Patient likely went into RVR due to not taking her home Toprol XL after she saw a flyer at the pharmacy stating that paralysis was a potential side effect of metoprolol.  In the ED, patient's heart rate went as high as 140 bpm.  Patient was given Toprol XL 50 mg once in the ED and lasix 80mg IV.  -Results of an extended cardiac Holter monitor from March shows a predominant rhythm of A-fib that was rate controlled.  -Last Echo done 2/23/23 showed EF 55-60%.  Patient's lowest EF was 15% which was noted during a heart cath on 9/11/22. Pending results from echo today.   -CXR in ED showed cardiomegaly and mild vascular congestion.  Patient has BLE edema and elevated pro-BNP to 1,572

## 2025-05-08 NOTE — CONSULTS
[facility]  HEART FAILURE PROGRAM      NAME:  Myra Nina  MEDICAL RECORD NUMBER:  [id number]  AGE: 84 y.o.   GENDER: female  : 1940  ADMIT DATE: 2025  TODAY'S DATE:  2025    Subjective:     VISIT TYPE: {Misc; HF RN Consult:23361::\"follow-up\"}     ADMITTING PHYSICIAN:  Evert Benitez MD    Code Status: Full Code    PAST MEDICAL HISTORY        Diagnosis Date    Acute pulmonary edema (HCC)     Acute respiratory failure with hypoxia and hypercapnia (HCC) 2022    Acute systolic (congestive) heart failure (HCC)     Edema     Hearing loss     Hyperlipidemia     Hypertension     Morbid obesity due to excess calories (HCC) 2015    NSTEMI (non-ST elevated myocardial infarction) (HCC) 2022    Pre-diabetes     PUD (peptic ulcer disease)     \"years ago\",        SOCIAL HISTORY    Social History     Tobacco Use    Smoking status: Never    Smokeless tobacco: Never   Substance Use Topics    Alcohol use: No    Drug use: No         MEDICATIONS  Scheduled Meds:   carvedilol  6.25 mg Oral BID WC    apixaban  5 mg Oral BID    atorvastatin  80 mg Oral Nightly    sacubitril-valsartan  1 tablet Oral BID    sodium chloride flush  5-40 mL IntraVENous 2 times per day    furosemide  40 mg IntraVENous BID    insulin lispro  0-4 Units SubCUTAneous 4x Daily AC & HS         Objective:     ADMISSION DIAGNOSIS:   Atrial fibrillation, unspecified type (HCC) [I48.91]  Acute on chronic congestive heart failure, unspecified heart failure type (HCC) [I50.9]  Acute exacerbation of chronic heart failure (HCC) [I50.9]     /73   Pulse 67   Temp 97.4 °F (36.3 °C) (Oral)   Resp 18   Ht 1.676 m (5' 6\")   Wt (!) 159.9 kg (352 lb 8.3 oz)   SpO2 96%   BMI 56.90 kg/m²     ADMIT:  Weight - Scale: (!) 165.2 kg (364 lb 1.6 oz)    TODAY: Weight - Scale: (!) 159.9 kg (352 lb 8.3 oz)    Wt Readings from Last 5 Encounters:   25 (!) 159.9 kg (352 lb 8.3 oz)   25 (!) 166.6 kg 
preserved with an estimated   ejection fraction of 55%-60%.   Abnormal septal motion.   Cannot exclude regional wall motion abnormalities secondary to poor   endocardial visualization.   Indeterminate diastolic function.   The right ventricle is normal in size with decreased systolic function.   Tapse: 1.20 cm.   RV s: 8.59 cm/s   The left atrium is dilated.   Mitral annular calcification is present.   Thickened mitral valve leaflets.   Mild mitral regurgitation.   Mildly calcified aortic valve.   Mild aortic regurgitation.   Mild tricuspid regurgitation.   The pulmonic valve is not well visualized.   Mild pulmonic regurgitation present.   Estimated pulmonary artery systolic pressure is at 16 mmHg assuming a right   atrial pressure of 3 mmHg.    Echo 09/16/2022: Summary   Limited echo. Definity contrast administered   . Left ventricular cavity size is normal with mild LVH   . Indeterminate diastolic function.   [place] and distal septal hypokinetic.   No evidence of left ventricular mass or thrombus   . Ejection fraction is estimated to be 30-35 %.   Overall left ventricular systolic function is mildly depressed .   . The aortic valve leaflets are not well visualized.   The aortic valve is mildly thickened/calcified but opens well with normal   gradients.   Unable to estimate pulmonary artery pressure secondary to incomplete TR jet   envelope.    Stress Test:    Cardiac catheterization 12/30/2019: CONCLUSION:  1.  Normal epicardial coronary arteries.  2.  LV ejection fraction 50% without regional wall motion abnormality.  3.  LVEDP 20 mmHg.  4.  No aortic valve gradient and no wall motion abnormality on left  ventriculography.    Additional studies:   Event monitor 03/20/2025: Summary   - Predominant rhythm: AF  - Atrial Fibrillation (AF) 100.0%, controlled ventricular rate  - PVC <0.1 %    Impression and Plan:      AFIB w/RVR  NICMP, now HFimpEF  H/o Takotsubo CMP  HTN  HLP  DM  Lymphedema  Morbid Obesity    --Echo

## 2025-05-08 NOTE — PLAN OF CARE
Problem: Chronic Conditions and Co-morbidities  Goal: Patient's chronic conditions and co-morbidity symptoms are monitored and maintained or improved  Outcome: Progressing     Problem: Discharge Planning  Goal: Discharge to home or other facility with appropriate resources  Outcome: Progressing     Problem: Pain  Goal: Verbalizes/displays adequate comfort level or baseline comfort level  Outcome: Progressing     Problem: Safety - Adult  Goal: Free from fall injury  Outcome: Progressing     Problem: ABCDS Injury Assessment  Goal: Absence of physical injury  Outcome: Progressing     Problem: Respiratory - Adult  Goal: Achieves optimal ventilation and oxygenation  Outcome: Progressing     Problem: Cardiovascular - Adult  Goal: Maintains optimal cardiac output and hemodynamic stability  Outcome: Progressing  Goal: Absence of cardiac dysrhythmias or at baseline  Outcome: Progressing     Problem: Metabolic/Fluid and Electrolytes - Adult  Goal: Electrolytes maintained within normal limits  Outcome: Progressing  Goal: Hemodynamic stability and optimal renal function maintained  Outcome: Progressing     Problem: Skin/Tissue Integrity - Adult  Goal: Skin integrity remains intact  Outcome: Progressing     Problem: Musculoskeletal - Adult  Goal: Return mobility to safest level of function  Outcome: Progressing  Goal: Return ADL status to a safe level of function  Outcome: Progressing

## 2025-05-08 NOTE — DISCHARGE INSTRUCTIONS
medication dosage adjusted. Please go to your nearest emergency department with any sudden onset chest pain or shortness of breath.

## 2025-05-09 ENCOUNTER — TELEPHONE (OUTPATIENT)
Dept: CARE COORDINATION | Age: 85
End: 2025-05-09

## 2025-05-09 ENCOUNTER — CARE COORDINATION (OUTPATIENT)
Dept: CARE COORDINATION | Age: 85
End: 2025-05-09

## 2025-05-09 DIAGNOSIS — I50.22 CHRONIC SYSTOLIC (CONGESTIVE) HEART FAILURE (HCC): Primary | ICD-10-CM

## 2025-05-09 PROCEDURE — 1111F DSCHRG MED/CURRENT MED MERGE: CPT | Performed by: FAMILY MEDICINE

## 2025-05-09 RX ORDER — CARVEDILOL 6.25 MG/1
6.25 TABLET ORAL 2 TIMES DAILY WITH MEALS
Qty: 60 TABLET | Refills: 3 | Status: SHIPPED | OUTPATIENT
Start: 2025-05-09

## 2025-05-09 NOTE — TELEPHONE ENCOUNTER
Patient needs a script sent into [facility] pharmacy. Patient did not receive paper Rx prior to discharge.     - patient is scheduled to see ALIZA on 5/13/25 for hosp f/u.    Medication  carvedilol (COREG) 6.25 MG tablet [[id number]]  carvedilol (COREG) 6.25 MG tablet [[id number]]    Order Details  Dose: 6.25 mg Route: Oral Frequency: 2 TIMES DAILY WITH MEALS   Dispense Quantity: 60 tablet Refills: 3          Sig: Take 1 tablet by mouth 2 times daily (with meals)     Pharmacy  [facility]/pharmacy #[id number] - [place], [place] - [address] - P [phone] - F [phone] [address] [facility] [address]  Phone: [phone]  Fax: [phone]

## 2025-05-09 NOTE — TELEPHONE ENCOUNTER
BETTY Completed. Pt scheduled for hospital f/u with Dr. Allen on 5/14/25 at noon. Will verify with Dr. Allen, this date/time is approved by her.

## 2025-05-09 NOTE — CARE COORDINATION
Called [facility] and was able to speak with Lucita, who was sending a message to cardiac provider to request Rx sign off and sent to [facility]. Thanked Lucita for her assistance today and will update pt when see Rx signed off and sent to [facility].     ACM notes the Rx for Coreg was sent to [facility] pharmacy today by cardiology around 1048AM. Attempted to call pt to let her know and she was UTR. Left VM message on her mobile number. Will f/u with her again at a later date/time.

## 2025-05-09 NOTE — CARE COORDINATION
Ambulatory Care Coordination Note     2025 9:24 AM     Patient Current Location:  Home: [facility] [place]  [place] [address]     ACM contacted the patient by telephone. Verified name and  with patient as identifiers.         ACM: Filomena Dunham RN     Challenges to be reviewed by the provider   Additional needs identified to be addressed with provider Yes  medications-pt was prescribed Coreg and the Rx was not called into [facility]. Pt was not given paper Rx, will reach out to cardiology to see if they will write Rx for patient.   Pt scheduled for hospital f/u with Dr. Allen on 25 at noon. Will change date/time if needed.                Method of communication with provider: chart routing.    Utilization: Has the patient been discharged from the hospital since your last call? yes -   Call within 2 business days of discharge: Yes    Patient: Myra Nina    Patient : 1940   MRN: [id number]    Reason for Admission: CHF exacerbation  Discharge Date: 25  RURS: Readmission Risk Score: 11.9      Last Discharge Facility       Date Complaint Diagnosis Description Type Department Provider    25 Shortness of Breath Acute on chronic congestive heart failure, unspecified heart failure type (HCC) ... ED to Hosp-Admission (Discharged) (ADMITTED) [facility] PCU Raúl Rose MD; Tigist Morillo...            Was this an external facility discharge? No    Ambulatory Care Manager reviewed discharge instructions and red flags with patient. The patient was given an opportunity to ask questions; all questions answered at this time.. The patient demonstrated understanding using teach back method.   Were discharge instructions available to patient? Yes.   Reviewed appropriate site of care based on symptoms and resources available to patient including: PCP  Specialist. The patient agrees to contact the primary care provider and/or specialist office for questions related to their healthcare.     Patients top risk

## 2025-05-19 RX ORDER — FERROUS SULFATE 325(65) MG
1 TABLET ORAL DAILY
Qty: 90 TABLET | Refills: 0 | Status: SHIPPED | OUTPATIENT
Start: 2025-05-19

## 2025-05-19 NOTE — TELEPHONE ENCOUNTER
Medication:   Requested Prescriptions     Pending Prescriptions Disp Refills    ferrous sulfate (IRON 325) 325 (65 Fe) MG tablet [Pharmacy Med Name: FERROUS SULFATE 325 MG TABLET] 90 tablet 0     Sig: TAKE 1 TABLET BY MOUTH DAILY        Last Filled: 02/24/2025      Patient Phone Number: [phone] (home)     Last appt: 3/19/2025   Next appt: Visit date not found    Last OARRS:        No data to display

## 2025-05-21 ENCOUNTER — CARE COORDINATION (OUTPATIENT)
Dept: CARE COORDINATION | Age: 85
End: 2025-05-21

## 2025-05-21 NOTE — CARE COORDINATION
Ambulatory Care Coordination Note     5/21/2025 2:15 PM     Patient outreach attempt by this ACM today to perform care management follow up . ACM was unable to reach the patient by telephone today;   voicemail full and unable to leave a message.      ACM: Filomena Dunham, RN     Care Summary Note: ACM noted pt was a \"no show\" for both of her hospital f/u appts last week and was attempting to call and f/u. Unable to leave a VM message and will attempt to f/u again at a later date/time.     PCP/Specialist follow up:   Future Appointments         Provider Specialty Dept Phone    9/22/2025 3:00 PM Fatuma Vela, LAWERNCE - CNP Cardiology [phone]            Follow Up:   Plan for next AC outreach in approximately 2 weeks to complete:  CC f/u .

## 2025-06-04 ENCOUNTER — CARE COORDINATION (OUTPATIENT)
Dept: CARE COORDINATION | Age: 85
End: 2025-06-04

## 2025-06-04 NOTE — CARE COORDINATION
Ambulatory Care Coordination Note     6/4/2025 2:00 PM     Patient outreach attempt by this ACM today to perform care management follow up . ACM was unable to reach the patient by telephone today;   voicemail full and unable to leave a message.      ACM: Filomena Dunham, RN     Care Summary Note: Will make a third and final outreach attempt at a later date/time.     PCP/Specialist follow up:   Future Appointments         Provider Specialty Dept Phone    9/22/2025 3:00 PM Fatuma Vela APRN - CNP Cardiology [phone]            Follow Up:   Plan for next ACM outreach in approximately 3 weeks to complete:  - disease specific assessments  - goal progression.

## 2025-06-16 RX ORDER — CARVEDILOL 6.25 MG/1
6.25 TABLET ORAL 2 TIMES DAILY WITH MEALS
Qty: 180 TABLET | Refills: 1 | Status: SHIPPED | OUTPATIENT
Start: 2025-06-16

## 2025-06-27 ENCOUNTER — CARE COORDINATION (OUTPATIENT)
Dept: CARE COORDINATION | Age: 85
End: 2025-06-27

## 2025-06-27 NOTE — CARE COORDINATION
Ambulatory Care Coordination Note     6/27/2025 1:38 PM     patient outreach attempt by this ACM today to perform care management follow up . ACM was unable to reach the patient by telephone today;   voicemail full and unable to leave a message.      Patient closed (unable to reach patient) from the High Risk Care Management program on 6/27/2025.  Patient has been utr for the last several outreach attempts. .  Care management goals have been completed. No further Ambulatory Care Manager follow up scheduled.

## 2025-07-01 ENCOUNTER — TELEPHONE (OUTPATIENT)
Dept: FAMILY MEDICINE CLINIC | Age: 85
End: 2025-07-01

## 2025-07-21 ENCOUNTER — OFFICE VISIT (OUTPATIENT)
Dept: FAMILY MEDICINE CLINIC | Age: 85
End: 2025-07-21
Payer: MEDICARE

## 2025-07-21 VITALS
TEMPERATURE: 97.6 F | WEIGHT: 293 LBS | OXYGEN SATURATION: 98 % | BODY MASS INDEX: 47.09 KG/M2 | HEART RATE: 63 BPM | DIASTOLIC BLOOD PRESSURE: 78 MMHG | HEIGHT: 66 IN | SYSTOLIC BLOOD PRESSURE: 156 MMHG

## 2025-07-21 DIAGNOSIS — I48.11 LONGSTANDING PERSISTENT ATRIAL FIBRILLATION (HCC): ICD-10-CM

## 2025-07-21 DIAGNOSIS — Z00.00 MEDICARE ANNUAL WELLNESS VISIT, SUBSEQUENT: Primary | ICD-10-CM

## 2025-07-21 DIAGNOSIS — M79.605 CHRONIC PAIN OF BOTH LOWER EXTREMITIES: ICD-10-CM

## 2025-07-21 DIAGNOSIS — M79.604 CHRONIC PAIN OF BOTH LOWER EXTREMITIES: ICD-10-CM

## 2025-07-21 DIAGNOSIS — N18.32 STAGE 3B CHRONIC KIDNEY DISEASE (HCC): ICD-10-CM

## 2025-07-21 DIAGNOSIS — G89.29 CHRONIC PAIN OF BOTH LOWER EXTREMITIES: ICD-10-CM

## 2025-07-21 PROCEDURE — 1123F ACP DISCUSS/DSCN MKR DOCD: CPT | Performed by: FAMILY MEDICINE

## 2025-07-21 PROCEDURE — 99213 OFFICE O/P EST LOW 20 MIN: CPT | Performed by: FAMILY MEDICINE

## 2025-07-21 PROCEDURE — G0439 PPPS, SUBSEQ VISIT: HCPCS | Performed by: FAMILY MEDICINE

## 2025-07-21 PROCEDURE — 1159F MED LIST DOCD IN RCRD: CPT | Performed by: FAMILY MEDICINE

## 2025-07-21 NOTE — PROGRESS NOTES
Medicare Annual Wellness Visit    Myra Nina is here for Medicare AWV    Assessment & Plan         Diagnosis Orders   1. Medicare annual wellness visit, subsequent   Safety discussed  Get  vaccines at her pharmacy,   Get eye exam   Falls precautions   Use walker at all times          2. Longstanding persistent atrial fibrillation (HCC)   Referred to new cardiologist   Continue same medications no changes needed at this time    Ira Nicholson MD, Cardiac Electrophysiology, [facility]      3. Stage 3b chronic kidney disease (HCC)   encourage low sodium diet  Get fu lab   She may benefit from taking Jardiance,   Check lab first    Basic Metabolic Panel      5. Chronic leg pain :   Nsaid is contraindicated sec to ckd, chf and allergy to asa   Opiate not indicated   Continue tylenol HS prn              Subjective   The following acute and/or chronic problems were also addressed today:  Doing ok,   Continue tylenol for leg pain bilateral     A fib: would like referral to new cardiologist, hers retired.     Patient's complete Health Risk Assessment and screening values have been reviewed and are found in Flowsheets. The following problems were reviewed today and where indicated follow up appointments were made and/or referrals ordered.    Positive Risk Factor Screenings with Interventions:    Fall Risk:  Do you feel unsteady or are you worried about falling? : (!) yes  2 or more falls in past year?: no  Fall with injury in past year?: no  Interventions:    Continue use of walker              Inactivity:  On average, how many days per week do you engage in moderate to strenuous exercise (like a brisk walk)?: 5 days (!) Abnormal  On average, how many minutes do you engage in exercise at this level?: 0 min    Interventions:  Patient declined any further interventions or treatment    Poor Eating Habits/Diet:  Do you eat balanced/healthy meals regularly?: (!) No    Interventions:  Patient declines

## 2025-08-28 RX ORDER — FERROUS SULFATE 325(65) MG
1 TABLET ORAL DAILY
Qty: 90 TABLET | Refills: 0 | Status: SHIPPED | OUTPATIENT
Start: 2025-08-28